# Patient Record
Sex: MALE | Race: WHITE
[De-identification: names, ages, dates, MRNs, and addresses within clinical notes are randomized per-mention and may not be internally consistent; named-entity substitution may affect disease eponyms.]

---

## 2024-08-17 ENCOUNTER — HOSPITAL ENCOUNTER (EMERGENCY)
Age: 69
Discharge: HOME | End: 2024-08-17
Payer: MEDICARE

## 2024-08-17 VITALS — HEART RATE: 90 BPM | SYSTOLIC BLOOD PRESSURE: 140 MMHG | OXYGEN SATURATION: 97 % | DIASTOLIC BLOOD PRESSURE: 80 MMHG

## 2024-08-17 VITALS — BODY MASS INDEX: 45.5 KG/M2

## 2024-08-17 VITALS
HEART RATE: 92 BPM | DIASTOLIC BLOOD PRESSURE: 83 MMHG | TEMPERATURE: 98.06 F | SYSTOLIC BLOOD PRESSURE: 170 MMHG | OXYGEN SATURATION: 97 %

## 2024-08-17 DIAGNOSIS — M25.561: ICD-10-CM

## 2024-08-17 DIAGNOSIS — M17.11: ICD-10-CM

## 2024-08-17 DIAGNOSIS — M25.461: Primary | ICD-10-CM

## 2024-08-17 LAB
ADD MANUAL DIFF: NO
HCT VFR BLD CALC: 49.3 % (ref 42–54)
HEMATOCRIT: 49.3 % (ref 42–54)
HEMOGLOBIN: 16.3 G/DL (ref 14–18)
IMMATURE GRANULOCYTES ABS AUTO: 0.03 10^3/UL (ref 0–0.03)
IMMATURE GRANULOCYTES PCT AUTO: 0.4 % (ref 0–0.5)
LACTATE/LACTIC ACID: 1.6 MMOL/L (ref 0.4–2)
LYMPHOCYTES  ABSOLUTE AUTO: 0.4 10^3/UL (ref 1.2–3.8)
MCV RBC: 89.2 FL (ref 80–94)
MEAN CORPUSCULAR HEMOGLOBIN: 29.5 PG (ref 25.9–34)
MEAN CORPUSCULAR HGB CONC: 33.1 G/DL (ref 29.9–35.2)
MEAN CORPUSCULAR VOLUME: 89.2 FL (ref 80–94)
PLATELET # BLD: 151 10^3/UL (ref 150–450)
PLATELET COUNT: 151 10^3/UL (ref 150–450)
RED BLOOD COUNT: 5.53 10^6/UL (ref 4.7–6.1)
WBC # BLD: 7.4 10^3/UL (ref 4–11)
WHITE BLOOD COUNT: 7.4 10^3/UL (ref 4–11)

## 2024-08-17 PROCEDURE — 96374 THER/PROPH/DIAG INJ IV PUSH: CPT

## 2024-08-17 PROCEDURE — 99284 EMERGENCY DEPT VISIT MOD MDM: CPT

## 2024-08-17 PROCEDURE — 96375 TX/PRO/DX INJ NEW DRUG ADDON: CPT

## 2024-08-17 PROCEDURE — 86140 C-REACTIVE PROTEIN: CPT

## 2024-08-17 PROCEDURE — 73564 X-RAY EXAM KNEE 4 OR MORE: CPT

## 2024-08-17 PROCEDURE — 83605 ASSAY OF LACTIC ACID: CPT

## 2024-08-17 PROCEDURE — 85025 COMPLETE CBC W/AUTO DIFF WBC: CPT

## 2024-08-17 PROCEDURE — 36415 COLL VENOUS BLD VENIPUNCTURE: CPT

## 2024-08-17 PROCEDURE — 85652 RBC SED RATE AUTOMATED: CPT

## 2024-08-17 NOTE — ED_ITS
HPI    
HPI - General Adult    
General    
Chief complaint: Extremity Problem, Nontraumatic    
Stated complaint: LOWER EXTREMITY PAIN    
Time Seen by Provider: 08/17/24 09:45    
Source: patient    
Mode of arrival: Wheelchair    
Limitations: no limitations    
Related Data    
                                  Previous Rx's    
    
    
    
?Medication ?Instructions ?Recorded    
     
nabumetone 750 mg tablet 750 mg PO BID PRN pain #14 tabs 08/17/24    
    
    
    
                                    Allergies    
    
    
    
Allergy/AdvReac Type Severity Reaction Status Date / Time    
     
Penicillins AdvReac Mild Hives Verified 08/17/24 09:45    
    
    
    
    
Opioid HPI    
Opioid Management    
Most Recent Opioid Data:     
    
    
                Last Pain Scale 10              08/17/24 10:23      
     
                          Last MAR Pain Assessment  08/17/24 10:23    
    
    
    
Exam    
Constitutional    
Vital Signs, click to edit/add:     
    
                                Last Vital Signs    
    
    
    
Temp  98.1 F   08/17/24 09:45    
     
Pulse  90   08/17/24 10:46    
     
Resp  18   08/17/24 10:46    
     
BP  140/80   08/17/24 10:46    
     
Pulse Ox  97   08/17/24 10:46    
     
O2 Del Method  Room Air  08/17/24 09:45    
    
    
    
    
    
Course    
Vital Signs    
Vital signs:     
    
                                   Vital Signs    
    
    
    
Temperature  98.1 F   08/17/24 09:45    
     
Pulse Rate  92 H  08/17/24 09:45    
     
Respiratory Rate  20   08/17/24 09:45    
     
Blood Pressure  170/83 H  08/17/24 09:45    
     
Pulse Oximetry  97   08/17/24 09:45    
     
Oxygen Delivery Method  Room Air  08/17/24 09:45    
    
    
                                            
    
    
    
Temperature  98.1 F   08/17/24 09:45    
     
Pulse Rate  90   08/17/24 10:46    
     
Respiratory Rate  18   08/17/24 10:46    
     
Blood Pressure  140/80   08/17/24 10:46    
     
Pulse Oximetry  97   08/17/24 10:46    
     
Oxygen Delivery Method  Room Air  08/17/24 09:45    
    
    
    
    
    
Medical Decision Making    
MDM Narrative    
Medical decision making narrative:     
Peripheral IV established and blood drawn and sent for testing.  He was given IV  
Toradol and IV Solu-Medrol.  X-rays of the right knee were obtained.    
    
Results - Normal white blood cell count.Lactate is negative.  Sed rate is   
elevated at 36.  CRP elevated at 8.01.  X-ray shows moderate size right knee   
joint effusion.  He has tricompartmental knee degenerative changes But no   
fracture or other acute osseous abnormalities.    
    
Patient was informed of results.  I do not suspect acute septic arthritis of the  
right knee joint.  ED nurse applied an Ace wrap and the patient was given   
cultures to limit weightbearing on the right knee.  He can call his orthopedist   
on Monday to discuss office follow-up.  I instructed him to go to the emergency   
department at [facility] if his condition worsens as that is the facility in   
which his orthopedist works.    
    
Lab Data    
Lab results reviewed: Yes I reviewed the patient's lab results    
Labs:     
    
                                   Lab Results    
    
    
    
  08/17/24 Range/Units    
    
  10:16     
     
WBC  7.4  (4.0-11.0)  10^3/uL    
     
RBC  5.53  (4.70-6.10)  10^6/uL    
     
Hgb  16.3  (14.0-18.0)  g/dL    
     
Hct  49.3  (42.0-54.0)  %    
     
MCV  89.2  (80.0-94.0)  fL    
     
MCH  29.5  (25.9-34.0)  pg    
     
MCHC  33.1  (29.9-35.2)  g/dL    
     
RDW  12.8  (11.0-15.0)  %    
     
Plt Count  151  (150-450)  10^3/uL    
     
MPV  9.3 L  (9.5-13.5)  fL    
     
Neut % (Auto)  82.0 H  (43.0-75.0)  %    
     
Lymph % (Auto)  5.1 L  (20.5-60.0)  %    
     
Mono % (Auto)  12.1 H  (1.7-12.0)  %    
     
Eos % (Auto)  0.1 L  (0.9-7.0)  %    
     
Baso % (Auto)  0.3  (0.2-2.0)  %    
     
Neut # (Auto)  6.1  (1.4-6.5)  10^3/uL    
     
Lymph # (Auto)  0.4 L  (1.2-3.8)  10^3/uL    
     
Mono # (Auto)  0.9 H  (0.3-0.8)  10^3/uL    
     
Eos # (Auto)  0.0  (0.0-0.7)  10^3/uL    
     
Baso # (Auto)  0.0  (0.0-0.1)  10^3/uL    
     
Abs Immat Gran (auto)  0.03  (0.00-0.03)  10^3/uL    
     
Imm/Tot Granulo (auto)  0.4  (0.0-0.5)  %    
     
ESR  36 H  (<=20)  mm/hr    
     
Lactate  1.6  (0.4-2.0)  mmol/L    
     
C-Reactive Protein  8.01 H  (<=0.50)  mg/dL    
    
    
    
    
Imaging Data    
xr knee:     
      Attestation: I have reviewed the pertinent imaging results.    
      Radiologist's impression:     
    
ITS Impressions    
    
Knee X-Ray  08/17/24 10:07    
IMPRESSION:    
     
1. Tricompartmental knee degenerative change most prominently involving the     
medial compartment    
2. Moderate size knee joint effusion.    
     
     
Electronically authenticated by: MANAN WALLS   Date: 8/17/2024  11:06    
    
    
    
    
    
Discharge Plan    
Discharge    
Stand Alone Forms:  Portal Instructions    
    
Chief Complaint: Extremity Problem, Nontraumatic    
    
Clinical Impression:    
 Effusion of knee joint right, Acute pain of right knee, Osteoarthritis of right  
knee    
    
    
Patient Disposition: Home, Self-Care    
    
Time of Disposition Decision: 11:12    
    
Prescriptions / Home Meds:    
New    
  nabumetone 750 mg tablet     
   750 mg PO BID PRN (Reason: pain) Qty: 14 0RF    
    
Print Language: English    
    
Instructions:  Osteoarthritis (ED), Swollen Knee Joint (ED), Knee Pain (ED)    
    
Referrals:    
Elvira Harper DO [Primary Care Provider] - 1 week    
    
Discharge Date/Time: 08/17/24 11:40

## 2024-08-17 NOTE — ED.GENADUL1
HPI
HPI - General Adult
General
Chief complaint: Extremity Problem, Nontraumatic
Stated complaint: LOWER EXTREMITY PAIN
Time Seen by Provider: 08/17/24 09:45
Source: patient
Mode of arrival: Wheelchair
Limitations: no limitations
Related Data
Previous Rx's

?Medication ?Instructions ?Recorded
nabumetone 750 mg tablet 750 mg PO BID PRN pain #14 tabs 08/17/24


Allergies

Allergy/AdvReac Type Severity Reaction Status Date / Time
Penicillins AdvReac Mild Hives Verified 08/17/24 09:45



Opioid HPI
Opioid Management
Most Recent Opioid Data: 
      Last Pain Scale 10 08/17/24 10:23 
      Last MAR Pain Assessment 08/17/24 10:23  


Exam
Constitutional
Vital Signs, click to edit/add: 

Last Vital Signs

Temp  98.1 F   08/17/24 09:45
Pulse  90   08/17/24 10:46
Resp  18   08/17/24 10:46
BP  140/80   08/17/24 10:46
Pulse Ox  97   08/17/24 10:46
O2 Del Method  Room Air  08/17/24 09:45




Course
Vital Signs
Vital signs: 

Vital Signs

Temperature  98.1 F   08/17/24 09:45
Pulse Rate  92 H  08/17/24 09:45
Respiratory Rate  20   08/17/24 09:45
Blood Pressure  170/83 H  08/17/24 09:45
Pulse Oximetry  97   08/17/24 09:45
Oxygen Delivery Method  Room Air  08/17/24 09:45



Temperature  98.1 F   08/17/24 09:45
Pulse Rate  90   08/17/24 10:46
Respiratory Rate  18   08/17/24 10:46
Blood Pressure  140/80   08/17/24 10:46
Pulse Oximetry  97   08/17/24 10:46
Oxygen Delivery Method  Room Air  08/17/24 09:45




Medical Decision Making
MDM Narrative
Medical decision making narrative: 
Peripheral IV established and blood drawn and sent for testing.  He was given IV Toradol and IV Solu-Medrol.  X-rays of the right knee were obtained.

Results - Normal white blood cell count.Lactate is negative.  Sed rate is elevated at 36.  CRP elevated at 8.01.  X-ray shows moderate size right knee joint effusion.  He has tricompartmental knee degenerative changes But no fracture or other acute 
osseous abnormalities.

Patient was informed of results.  I do not suspect acute septic arthritis of the right knee joint.  ED nurse applied an Ace wrap and the patient was given cultures to limit weightbearing on the right knee.  He can call his orthopedist on Monday to 
discuss office follow-up.  I instructed him to go to the emergency department at [facility] if his condition worsens as that is the facility in which his orthopedist works.

Lab Data
Lab results reviewed: Yes I reviewed the patient's lab results
Labs: 

Lab Results

  08/17/24 Range/Units
  10:16 
WBC  7.4  (4.0-11.0)  10^3/uL
RBC  5.53  (4.70-6.10)  10^6/uL
Hgb  16.3  (14.0-18.0)  g/dL
Hct  49.3  (42.0-54.0)  %
MCV  89.2  (80.0-94.0)  fL
MCH  29.5  (25.9-34.0)  pg
MCHC  33.1  (29.9-35.2)  g/dL
RDW  12.8  (11.0-15.0)  %
Plt Count  151  (150-450)  10^3/uL
MPV  9.3 L  (9.5-13.5)  fL
Neut % (Auto)  82.0 H  (43.0-75.0)  %
Lymph % (Auto)  5.1 L  (20.5-60.0)  %
Mono % (Auto)  12.1 H  (1.7-12.0)  %
Eos % (Auto)  0.1 L  (0.9-7.0)  %
Baso % (Auto)  0.3  (0.2-2.0)  %
Neut # (Auto)  6.1  (1.4-6.5)  10^3/uL
Lymph # (Auto)  0.4 L  (1.2-3.8)  10^3/uL
Mono # (Auto)  0.9 H  (0.3-0.8)  10^3/uL
Eos # (Auto)  0.0  (0.0-0.7)  10^3/uL
Baso # (Auto)  0.0  (0.0-0.1)  10^3/uL
Abs Immat Gran (auto)  0.03  (0.00-0.03)  10^3/uL
Imm/Tot Granulo (auto)  0.4  (0.0-0.5)  %
ESR  36 H  (<=20)  mm/hr
Lactate  1.6  (0.4-2.0)  mmol/L
C-Reactive Protein  8.01 H  (<=0.50)  mg/dL



Imaging Data
xr knee: 
      Attestation: I have reviewed the pertinent imaging results.
      Radiologist's impression: 

ITS Impressions

Knee X-Ray  08/17/24 10:07
IMPRESSION:
 
1. Tricompartmental knee degenerative change most prominently involving the 
medial compartment
2. Moderate size knee joint effusion.
 
 
Electronically authenticated by: MANAN WALLS   Date: 8/17/2024  11:06





Discharge Plan
Discharge
Stand Alone Forms:  Portal Instructions

Chief Complaint: Extremity Problem, Nontraumatic

Clinical Impression:
 Effusion of knee joint right, Acute pain of right knee, Osteoarthritis of right knee


Patient Disposition: Home, Self-Care

Time of Disposition Decision: 11:12

Prescriptions / Home Meds:
New
  nabumetone 750 mg tablet 
   750 mg PO BID PRN (Reason: pain) Qty: 14 0RF

Print Language: English

Instructions:  Osteoarthritis (ED), Swollen Knee Joint (ED), Knee Pain (ED)

Referrals:
Elvira Harper DO [Primary Care Provider] - 1 week

Discharge Date/Time: 08/17/24 11:40

## 2024-08-25 VITALS
TEMPERATURE: 98.4 F | DIASTOLIC BLOOD PRESSURE: 79 MMHG | OXYGEN SATURATION: 97 % | HEART RATE: 82 BPM | SYSTOLIC BLOOD PRESSURE: 131 MMHG

## 2024-08-26 VITALS
DIASTOLIC BLOOD PRESSURE: 95 MMHG | OXYGEN SATURATION: 94 % | HEART RATE: 95 BPM | SYSTOLIC BLOOD PRESSURE: 114 MMHG | TEMPERATURE: 98.5 F

## 2024-08-27 VITALS
HEART RATE: 89 BPM | OXYGEN SATURATION: 96 % | DIASTOLIC BLOOD PRESSURE: 62 MMHG | SYSTOLIC BLOOD PRESSURE: 115 MMHG | TEMPERATURE: 97.8 F

## 2024-08-28 VITALS
OXYGEN SATURATION: 98 % | HEART RATE: 85 BPM | TEMPERATURE: 97.52 F | SYSTOLIC BLOOD PRESSURE: 150 MMHG | DIASTOLIC BLOOD PRESSURE: 69 MMHG

## 2024-08-29 VITALS
DIASTOLIC BLOOD PRESSURE: 65 MMHG | OXYGEN SATURATION: 94 % | TEMPERATURE: 98.1 F | HEART RATE: 88 BPM | SYSTOLIC BLOOD PRESSURE: 119 MMHG

## 2024-08-29 NOTE — PC.NURSE
1035: Pt. to [facility] amb. with use of walker. Seated in recliner. VSS. PICC line in place to RUE. Site without s&s of infection or infiltration. Flushes easily with good blood return. IV antibiotic initiated at this time. Pt. given water. Denies needs.
1112: Antibiotic completed at this time without s&s of adverse reaction. PICC line flushed. Pt. d/c'd amb. to home with wife.

## 2024-08-30 ENCOUNTER — HOSPITAL ENCOUNTER (OUTPATIENT)
Dept: HOSPITAL 101 - INF | Age: 69
LOS: 1 days | Discharge: HOME | End: 2024-08-31
Payer: MEDICARE

## 2024-08-30 ENCOUNTER — HOSPITAL ENCOUNTER
Dept: HOSPITAL 101 - INF | Age: 69
Discharge: HOME | End: 2024-08-30
Payer: MEDICARE

## 2024-08-30 VITALS
OXYGEN SATURATION: 96 % | HEART RATE: 105 BPM | SYSTOLIC BLOOD PRESSURE: 122 MMHG | DIASTOLIC BLOOD PRESSURE: 84 MMHG | TEMPERATURE: 98 F

## 2024-08-30 DIAGNOSIS — Z79.899: Primary | ICD-10-CM

## 2024-08-30 DIAGNOSIS — R78.81: ICD-10-CM

## 2024-08-30 DIAGNOSIS — M00.9: ICD-10-CM

## 2024-08-30 DIAGNOSIS — A49.1: ICD-10-CM

## 2024-08-30 LAB
ANION GAP: 14.5
BLOOD UREA NITROGEN: 15 MG/DL (ref 7–18)
CALCIUM: 9.7 MG/DL (ref 8.5–10.1)
CARBON DIOXIDE: 28.2 MMOL/L (ref 21–32)
CHLORIDE: 95 MMOL/L (ref 98–107)
CO2 BLD-SCNC: 28.2 MMOL/L (ref 21–32)
ESTIMATED GFR (AFRICAN AMERICA: >60 (ref 60–?)
ESTIMATED GFR (NON-AFRICAN AME: >60 (ref 60–?)
GLUCOSE BLD-MCNC: 122 MG/DL (ref 74–106)
POTASSIUM SERPLBLD-SCNC: 3.7 MMOL/L (ref 3.5–5.1)
POTASSIUM: 3.7 MMOL/L (ref 3.5–5.1)
SODIUM BLD-SCNC: 134 MMOL/L (ref 136–145)
SODIUM: 134 MMOL/L (ref 136–145)

## 2024-08-30 PROCEDURE — 80048 BASIC METABOLIC PNL TOTAL CA: CPT

## 2024-08-30 PROCEDURE — 96365 THER/PROPH/DIAG IV INF INIT: CPT

## 2024-08-30 PROCEDURE — 86140 C-REACTIVE PROTEIN: CPT

## 2024-08-30 NOTE — PC.NURSE
1035: Pt to [facility] amb with use of walker. Seated in recliner. VSS. PICC line intact to right upper arm and without s&s of infection or infiltration. Dressing changed, see documentation. Blood obtained from PICC line for ordered labs. IV Rocephin 
initiated. Pt. without c/o or needs. Wife at chairside.
1116: Rocephin completed at this time without s&s of reaction. PICC line flushed with saline. Pt. d/c'd amb to home.

## 2024-08-31 VITALS
DIASTOLIC BLOOD PRESSURE: 70 MMHG | TEMPERATURE: 97.7 F | HEART RATE: 94 BPM | OXYGEN SATURATION: 96 % | SYSTOLIC BLOOD PRESSURE: 101 MMHG

## 2024-09-01 VITALS
SYSTOLIC BLOOD PRESSURE: 105 MMHG | DIASTOLIC BLOOD PRESSURE: 72 MMHG | TEMPERATURE: 98.1 F | HEART RATE: 98 BPM | OXYGEN SATURATION: 92 %

## 2024-09-02 VITALS
HEART RATE: 97 BPM | OXYGEN SATURATION: 94 % | SYSTOLIC BLOOD PRESSURE: 112 MMHG | TEMPERATURE: 98.2 F | DIASTOLIC BLOOD PRESSURE: 74 MMHG

## 2024-09-03 VITALS — SYSTOLIC BLOOD PRESSURE: 139 MMHG | DIASTOLIC BLOOD PRESSURE: 75 MMHG | TEMPERATURE: 98.2 F | OXYGEN SATURATION: 97 %

## 2024-09-03 NOTE — PC.NURSE
piccline flushed with ns after antibiotic completed. chg cap applied. released amb with walker accompanied by wife

## 2024-09-04 VITALS
OXYGEN SATURATION: 96 % | DIASTOLIC BLOOD PRESSURE: 72 MMHG | TEMPERATURE: 98.06 F | HEART RATE: 91 BPM | SYSTOLIC BLOOD PRESSURE: 136 MMHG

## 2024-09-05 VITALS
OXYGEN SATURATION: 96 % | DIASTOLIC BLOOD PRESSURE: 69 MMHG | SYSTOLIC BLOOD PRESSURE: 139 MMHG | HEART RATE: 101 BPM | TEMPERATURE: 97.52 F

## 2024-09-05 NOTE — XMS_ITS
Comprehensive CCD (C-CDA v2.1)  
  
                          Created on: 2024  
  
  
Kaylynn Mims  
External Reference #: CDR,PersonID:[id number]  
: [id number]  
Sex: Male  
  
Demographics  
  
  
                                        Address             [address] [place] [address]  [address]  
   
                                        Home Phone          [phone]  
   
                                        Preferred Language  en  
   
                                        Marital Status        
   
                                        Islam Affiliation Unknown  
   
                                        Race                White  
   
                                        Ethnic Group        Not  or Lati  
no  
  
  
Author  
  
  
                                        Organization        [facility]  
  
  
Care Team Providers  
  
  
                                Care Team Member Name Role            Phone  
   
                                MISC, DR ACEVEDO Admitting       Unavailable  
   
                                MISC, DR ACEVEDO Consulting      Unavailable  
   
                                MISC, DR ACEVEDO Attending       Unavailable  
   
                                ZIEBER, DR KAYLYNN HOPKINS Consulting      Unavailable  
   
                                HOUSE, DR TREVINO Consulting      Unavailable  
   
                                HOUSE, DR TREVINO Attending       Unavailable  
   
                                HOUSE, DR TREVINO Admitting       Unavailable  
   
                                Harper, Elvira   Unavailable     [phone]  
   
                                MD Gucci Maxwell Attending Provider [phone]  
   
                                Harper, DO Elvira A Primary Care Provider [phone]   
   
                                Harper, DO Elvira A Attending Provider [phone]  
   
                                SANJANA Khan Attending Provider [phone]  
[phone]  
   
                                Harper, DO Elvira A Primary Care Provider [phone]   
   
                                Harper, DO Elvira A Primary Care Provider [phone]   
   
                                SANJANA Khan Attending Provider [phone]  
   
                                [facility]aura [facility] Daniela BENITES Emergency Provider [phone]  
[phone]  
   
                                MD Josie Ann Admit Provider  [phone]  
   
                                MD Josie Ann Attending Provider [phone]  
   
                                MD Gucci Maxwell Other Provider  [phone]  
   
                                MD Ashley Rai Other Provider  [phone]  
[phone]  
   
                                GEOVANNY Hancock Other Provider  [phone]   
   
                                DO Ted Penny Other Provider  [phone]  
   
                                MD Jeff Onofre II Other Provider  [phone]   
   
                                DO Sai Butts A Other Provider  [phone]  
[phone]  
   
                                MD Paul Crum Other Provider  [phone]  
   
                                To Khan Admitting       Unavailable  
   
                                Binks, To Tavon Attending       Unavailable  
   
                                Harper, Elvira A Primary Care    Unavailable  
   
                                Olexa, Gucci   Admitting       Unavailable  
   
                                Olexa, Gucci   Attending       Unavailable  
   
                                Harper, Elvira A Primary Care    Unavailable  
   
                                Marissa, Josie Admitting       Unavailable  
   
                                Marissa, Josie Attending       Unavailable  
   
                                Alissa, Gucci   Consulting      Unavailable  
   
                                Harper, Elvira A Primary Care    Unavailable  
   
                                Ashley Rai Consulting      Unavailable  
   
                                Eduarda Hancock Consulting      Unavailable  
   
                                Ted Penny Consulting      Unavailable  
   
                                Jeff Onofre II Consulting      UnavailSai Toney Consulting      Unavailable  
   
                                Paul Crum  Consulting      Unavailable  
   
                                Harper, Elvira A Attending       Unavailable  
   
                                Harper, Elvira A Admitting       Unavailable  
   
                                Harper, Elvira A Primary Care    Unavailable  
   
                                To Khan Admitting       Unavailable  
   
                                To Khan Attending       Unavailable  
   
                                Harper, Elvira A Primary Care    Unavailable  
  
  
  
Allergies  
  
  
                                                    Allergy   
Classification                          Reported   
Allergen(s)               Allergy Type              Date of   
Onset                     Reaction(s)               Facility  
   
                                                    metFORMIN  
(2 sources)         metFORMIN           Drug Allergy          
4                         [facility]  
   
                                                    Penicillins   
(antibiotic)  
(2 sources)         Penicillins         Drug Allergy          
4                         [facility]  
   
                                                      
(9 sources)         metFORMIN           Drug Allergy          
4                         [facility]  
   
                                                      
(2 sources)  Penicillin   Drug Allergy              rash         [facility]  
Other Phone:   
[phone]  
   
                                                      
(8 sources)                             Penicillins;   
Translations:   
[Penicillins]                           Allergy to   
substance                                 
4                         [facility]  
   
                                                      
(1 source)          metFORMIN           Drug Allergy          
[address] [place] [address]   
Repository  
  
  
  
Medications  
Current Medications  
  
  
  
                      Medication Drug Class(es) Dates      Sig (Normalized) Sig   
(Original)  
   
                                                    acetaminophen 325 mg   
oral tablet  
(2 sources)                                         Start:   
2024                              take 2 tablets by   
mouth every four   
hours                                   Acetaminophen   
(Tylenol) 325 mg   
Tablet Active 650   
MG PO Q4H  12:00am  
   
                                                    ascorbic acid 500 mg   
oral tablet  
(2 sources)               Vitamin C                 Start:   
2024                              take 1 tablet by   
mouth twice daily   
at mealtime                             Ascorbic Acid   
(Vitamin C)   
(Vitamin C) 500 mg   
Tablet Active 500   
MG PO Twice daily   
with meals    
12:00am  
   
                                                    BD Syringe/Needle   
23G X 1  3 ML  
(1 source)                                          Start:   
2023                                          BD Syringe/Needle   
23G X 1  3 ML as   
directed SQ every 2   
weeks for 90 days   
 Active  
   
                                                    Blood Sugar   
Diagnostic  
(7 sources)                                         Start:   
05-                                          Blood Sugar   
Diagnostic Active 0   
.Route 50 May 15th,   
2024 12:00am As   
directed  
   
                                                    cefTRIAXone 2000 mg   
injection  
(2 sources)                             Cephalosporin   
Antibacterial                           Start:   
2024                              take 2 g   
intravenously   
every twenty-four   
hours                                   Ceftriaxone Active   
2 GM IV Q24H 24    
12:00am  
   
                                                    chlorthalidone 25 mg   
oral tablet  
(2 sources)                             Thiazide-like   
Diuretic                                Start:   
2024                              take 25 mg by   
mouth once daily                        Chlorthalidone   
Active 25 MG PO   
Daily  12:00am  
   
                                                    diclofenac sodium 75   
mg delayed release   
oral tablet  
(13 sources)                            Nonsteroidal   
Anti-inflammatory   
Drug                                    Start:   
05-  
End:   
2024                              take 75 mg by   
mouth twice daily                       Diclofenac Sodium   
Active 75 MG PO   
Twice daily 60    
9:30am  
   
                                                    docusate sodium 50   
mg / sennosides, usp   
8.6 mg oral tablet  
(2 sources)                                         Start:   
2024                              take 2 tablets by   
mouth twice daily                       Sennosides-Docusate   
Sodium Active 2 TAB   
PO Twice daily 60   
2024   
12:00am  
   
                                                    ferrous sulfate 324   
mg delayed release   
oral tablet  
(2 sources)                                         Start:   
2024                              take 324 mg by   
mouth twice daily   
at mealtime                             Ferrous Sulfate   
Active 324 MG PO   
Twice daily with   
meals 0 2024 12:00am  
   
                                                    nabumetone 750 mg   
oral tablet  
(6 sources)                             Nonsteroidal   
Anti-inflammatory   
Drug                                    Start:   
2024                              take 750 mg by   
mouth twice daily                       Nabumetone Active   
750 MG PO Twice   
daily 2024 12:00am  
  
  
  
                                                    Start: 2024  
End: 2024                                     Nabumetone Discontinued MG T  
ABLET 2024 12:00am   
2024 6:31pm  
  
  
  
                                                    oxyCODONE   
hydrochloride 5 mg   
oral tablet  
(2 sources)         Opioid Agonist      Start: 2024   take 5 mg by   
mouth every   
four hours                              Oxycodone Active 5   
MG PO Every 4   
hours 20 7 2024  
   
                                                    polyethylene glycol   
3350 43954 mg powder   
for oral solution  
(2 sources)     Osmotic Laxative Start: 08-                 Polyethylene   
Glycol 3350   
(Healthylax) 17   
gram Powder In   
Packet Active 17   
GM PO Daily    
12:00am  
   
                                                    valsartan 160 mg   
oral tablet  
(2 sources)                             Angiotensin 2   
Receptor Blocker          Start: 2024         take 160 mg   
by mouth   
once daily                              Valsartan Active   
160 MG PO Daily    
12:00am  
  
  
  
Completed/Discontinued Medications  
  
  
  
                      Medication Drug Class(es) Dates      Sig (Normalized) Sig   
(Original)  
   
                                                    glipiZIDE er 5 mg   
24 hr extended   
release oral tablet  
(20 sources)              Sulfonylurea              Start:   
2024  
End:   
2024                                          Glipizide   
Discontinued MG PO   
2024   
12:00am 2024 6:30pm  
  
  
  
                                                    Start: 2024  
End: 2024                         take 1 tablet by mouth once   
daily at mealtime                       Glipizide Discontinued 0 .ROUTE   
.COMPLEX 90 2024 8:21am   
2024 9:15am TAKE 1   
TABLET BY MOUTH EVERY DAY WITH FOOD  
   
                                                    Start: 2024  
End: 2024                                     Glipizide Discontinued MG PO  
 2024 12:00am 2024   
8:22am  
   
                                                    Start: 2024  
End: 2024                         take 1 tablet by mouth once   
daily at mealtime                       Glipizide Discontinued 1 TAB PO   
Daily 2024 1:00am   
2024 2:16pm   
FreeTextSi tablet with food   
Orally Once a day; Note: Source   
Status: Taking; Refills: 1; Qty: 90   
Tablet; Provider: Meredith ROGERS  
   
                                        Start: 08-   take 1 tablet by chrissy  
th every   
twenty-four hours                       glipiZIDE ER 5 MG 1 tablet with   
food Orally Once a day for 90 days   
10 Aug, 2023 Active  
  
  
  
                                                    meloxicam 15 mg   
oral tablet  
(15 sources)                            Nonsteroidal   
Anti-inflammatory   
Drug                                    Start:   
2024  
End:   
2024                              take 15 mg by   
mouth once daily                        Meloxicam   
Discontinued 15   
MG PO Daily May   
15th, 2024   
9:36am 2024   
9:16am On Hold:   
While on   
Diclofenac  
   
                                                    1 ml testosterone   
cypionate 200   
mg/ml injection  
(20 sources)              Androgen                  Start:   
2024  
End:   
2024                              inject 200 mg by   
intramuscular   
injection every   
other week                              Testosterone   
Cypionate   
Discontinued 200   
MG IM EVERY 2   
WEEKS 2 30 May   
3rd, 2024 8:40am   
2024   
7:48am  
  
  
  
                                                    Start: 2024  
End: 2024                         inject 1 mL by intramuscular   
injection every other week              Testosterone Cypionate   
Discontinued 200 MG SUBCUT EVERY 2   
WEEKS 2024 1:00am   
2024 9:44am   
FreeTextSig: INJECT 1 ML   
INTRAMUSCULARLY EVERY 2 WEEKS;   
Note: Source Status: Start;   
Refills: 2; Qty: 2 Milliliter;   
Provider: Meredith Felix (NPI:   
[id number])  
   
                                        Start: 08-   inject 1 mL by intra  
muscular   
injection every other week              Testosterone Cypionate 200 MG/ML 1   
mL Intramuscular every two weeks   
for 30 days 10 Aug, 2023 Active  
   
                                        Start: 08-   inject 1 mL by intra  
muscular   
injection every other week              Testosterone Cypionate 200 MG/ML 1   
mL Intramuscular every two weeks   
for 30 days 10 Aug, 2023 Active  
  
  
  
                                                    triamcinolone acetonide 1   
mg/ml topical cream  
(18 sources)              Corticosteroid            Start: 2024  
End: 2024                                     Triamcinolone Acetonide   
Discontinued 1 APPLIC   
TOPICAL Twice daily    
8:41am 2024   
9:44am  
  
  
  
                                                                Triamcinolone Ac  
etonide 0.1 % 1 application Externally twice a day Active  
  
  
  
Problems  
Active Problems  
  
  
                      Problem Classification Problem    Date       Documented Da  
te Episodic/Chronic  
   
                                                    Bacterial infection;   
unspecified site  
(10 sources)                            Microbiologic   
culture positive;   
Translations:   
[Bacteremia]                            Onset:   
2024                Episodic  
   
                                                    Diabetes mellitus   
without complication  
(14 sources)                            Type 2 diabetes   
mellitus;   
Translations: [Type   
2 diabetes mellitus   
without   
complications]                          Onset:   
2024                                          Chronic  
   
                                                    Infective arthritis   
and osteomyelitis   
(except that caused by   
tuberculosis or   
sexually transmitted   
disease)  
(5 sources)                             Knee pyogenic   
arthritis;   
Translations:   
[Pyogenic arthritis,   
unspecified]                            Onset:   
2024                Episodic  
   
                                                    Osteoarthritis  
(20 sources)                            Arthritis of knee;   
Translations:   
[Unilateral primary   
osteoarthritis, left   
knee]                                   2024          Chronic  
   
                                                    Other aftercare  
(2 sources)                             Other long term   
(current) drug   
therapy;   
Translations: [Other   
long term (current)   
drug therapy]                           Onset:   
2024                                          Episodic  
   
                                                    Other bone disease and   
musculoskeletal   
deformities  
(4 sources)                             Segmental and   
somatic dysfunction   
of cervical region;   
Translations: [SEG   
SOMATIC DYSF   
CERVICAL REGION]                        Onset:   
2023                                          Episodic  
   
                                                    Other connective   
tissue disease  
(1 source)                              Pain in right arm;   
Translations: [PAIN   
IN RIGHT ARM]                           Onset:   
2023                                          Episodic  
   
                                                    Other connective   
tissue disease  
(8 sources)                             Bursitis of right   
shoulder;   
Translations:   
[Bursitis of right   
shoulder]                               2024          Episodic  
   
                                                    Other connective   
tissue disease  
(2 sources)                             Bursitis of right   
shoulder;   
Translations:   
[Disorders of bursae   
and tendons in   
shoulder region,   
unspecified]                            2024          Episodic  
   
                                                    Other connective   
tissue disease  
(6 sources)                             Iliotibial band   
syndrome,   
unspecified leg;   
Translations: [Other   
disorders of muscle,   
ligament, and   
fascia]                                 2024          Episodic  
   
                                                    Other endocrine   
disorders  
(2 sources)                             Decreased   
testosterone level ;   
Translations:   
[Testicular   
hypofunction]                                               Chronic  
   
                                                    Other endocrine   
disorders  
(3 sources)                             Testicular   
hypofunction;   
Translations:   
[Testicular   
hypofunction]                           Onset:   
2024                                          Chronic  
   
                                                    Other nervous system   
disorders  
(1 source)                              Difficulty in   
walking, not   
elsewhere   
classified;   
Translations:   
[Difficulty in   
walking, not   
elsewhere   
classified]                             Onset:   
2024                                          Chronic  
   
                                                    Other nervous system   
disorders  
(1 source)                              Anesthesia of skin;   
Translations:   
[ANESTHESIA OF SKIN]                    Onset:   
2023                                          Episodic  
   
                                                    Other non-traumatic   
joint disorders  
(11 sources)                            Pain in right   
shoulder;   
Translations: [Right   
shoulder pain]                          2024          Episodic  
   
                                                    Other non-traumatic   
joint disorders  
(8 sources)                             Effusion, left knee;   
Translations:   
[Effusion of joint,   
lower leg]                              05-          Episodic  
   
                                                    Other non-traumatic   
joint disorders  
(11 sources)                            Pain in right knee;   
Translations: [Pain   
in joint, lower leg]                    Onset:   
2024                Episodic  
   
                                                    Other non-traumatic   
joint disorders  
(2 sources)                             Effusion of right   
knee joint;   
Translations:   
[Effusion, right   
knee]                                   2024          Episodic  
   
                                                    Other non-traumatic   
joint disorders  
(2 sources)                             Effusion, right   
knee; Translations:   
[Effusion of joint,   
lower leg]                              2024          Episodic  
   
                                                    Other screening for   
suspected conditions   
(not mental disorders   
or infectious disease)  
(10 sources)                            Encounter for   
screening for   
malignant neoplasm   
of prostate;   
Translations:   
[Decreased   
testosterone level ]                    Onset:   
2024                                          Episodic  
   
                                                    Residual codes;   
unclassified  
(2 sources)                             Pain; Translations:   
[Pain, unspecified]                     2024          Episodic  
   
                                                    Residual codes;   
unclassified  
(2 sources)                             Pain, unspecified;   
Translations:   
[Generalized pain]                      2024          Episodic  
   
                                                    Unclassified  
(1 source)                              Pain in right   
shoulder;   
Translations: [Pain   
in right shoulder]                      Onset:   
2024                                            
  
  
Past or Other Problems  
  
  
                      Problem Classification Problem    Date       Documented Da  
te Episodic/Chronic  
   
                                                    Other non-traumatic   
joint disorders  
(4 sources)                             Pain in left   
knee;   
Translations:   
[Pain in joint,   
lower leg]                              Onset:   
05-                05-                Episodic  
  
  
  
Results  
  
  
                          Test Name    Value        Interpretation Reference   
Range                                   Facility  
   
                                                    Basic Metabolic Panelon 08-2  
3-2024   
   
                                                    Creatinine Clr Calc   
Pharmacy        142.70          Normal                          [facility]   
Physician   
Group  
   
                                        Comment on above:   Order Comment: Reaso  
n for Exam Type 2 diabetes mellitus   
   
                                                            Result Comment: PERF  
ORMED BY:  
[place] [address]  
[phone]  
PATHOLOGIST MEDICAL DIRECTOR  
VIKAS MARTINEZ M.D.   
   
                                                            Performed By: #### U  
RMA, CMP, LIPID, CBCNO, PSAS, TEST ####  
[facility]  
[address]   
   
                                                    GFR/1.73 sq M.predicted   
MDRD (S/P/Bld) [Vol   
rate/Area]      mL/min/{1.73_m2} Normal                          [facility]   
Physician   
Group  
   
                                        Comment on above:   Order Comment: Reaso  
n for Exam Type 2 diabetes mellitus   
   
                                                            Performed By: #### U  
RMA, CMP, LIPID, CBCNO, PSAS, TEST ####  
[facility]  
[address] [place] [address] [place]   
   
                                                    C reactive protein [Mass/vol  
ume] in Serum or PlasmaOrdered By: Paul Crum on   
2024   
   
                      CRP [Mass/Vol] 22.7 mg/dL High       0.0-0.5    [facility]  
   
                                                    C-Reactive Proteinon   
024   
   
                      C-Reactive Protein 22.7 mg/dL High       0.0-0.5    The Count includes the [facility]   
Physician   
Group  
   
                                        Comment on above:   Result Comment: PERF  
ORMED BY:  
[address] [place] [address]  
[phone]  
PATHOLOGIST MEDICAL DIRECTOR  
VIKAS MARTINEZ M.D.   
   
                                                            Performed By: #### U  
RMA, CMP, LIPID, CBCNO, PSAS, TEST ####  
[facility]  
[address]   
   
                                                    Calcium [Mass/volume] in Ser  
um or PlasmaOrdered By: Josie Marissa on   
2024   
   
                      Calcium [Mass/Vol] 8.6 mg/dL             8.6-10.3   [facility]  
   
                                        Comment on above:   Order Comment: Reaso  
n for Exam Type 2 diabetes mellitus   
   
                                                            Performed By: #### U  
RMA, CMP, LIPID, CBCNO, PSAS, TEST ####  
[facility]  
[address]   
   
                                                    Carbon dioxide, total [Moles  
/volume] in Serum or PlasmaOrdered By: Josie   
Marissa on   
2024   
   
                      CO2 [Moles/Vol] 27.1 mmol/L            21.0-31.0  [facility]  
   
                                        Comment on above:   Order Comment: Reaso  
n for Exam Type 2 diabetes mellitus   
   
                                                            Performed By: #### U  
RMA, CMP, LIPID, CBCNO, PSAS, TEST ####  
[facility]  
[address] [place] [address] [place]   
   
                                                    Chloride [Moles/volume] in S  
lorraine or PlasmaOrdered By: Josie Marissa on   
2024   
   
                      Chloride [Moles/Vol] 98 mmol/L                  [facility]  
   
                                        Comment on above:   Order Comment: Reaso  
n for Exam Type 2 diabetes mellitus   
   
                                                            Performed By: #### U  
RMA, CMP, LIPID, CBCNO, PSAS, TEST ####  
[facility]  
[address] [place] [address] [place]   
   
                                                    Creatinine [Mass/volume] in   
Serum or PlasmaOrdered By: Josie Bethele on   
2024   
   
                      Creatinine [Mass/Vol] 0.67 mg/dL Low        0.70-1.30  [facility]  
   
                                        Comment on above:   Order Comment: Reaso  
n for Exam Type 2 diabetes mellitus   
   
                                                            Performed By: #### U  
RMA, CMP, LIPID, CBCNO, PSAS, TEST ####  
[facility]  
[address] [place] [address] [place]   
   
                                                    Glucose [Mass/volume] in Ser  
um or PlasmaOrdered By: Josie Ann on   
2024   
   
                      Glucose [Mass/Vol] 151 mg/dL  High            [facility]  
   
                                        Comment on above:   ADA recommended refe  
rence rangeRandom Glucose Reference Range   
is dependent on time and content of last meal. Glucose of more   
than 200 mg/dL in a nonstressed, ambulatory subject supports   
the diagnosis of Diabetes Mellitus.   
   
                                                            Order Comment: Reaso  
n for Exam Type 2 diabetes mellitus   
   
                                                            Result Comment: Rand  
om Glucose Reference Range is dependent on   
time and  
content of last meal. Glucose of more than 200 mg/dL in a  
nonstressed, ambulatory subject supports the diagnosis of  
Diabetes Mellitus.  
ADA recommended reference range   
   
                                                            Performed By: #### U  
RMA, CMP, LIPID, CBCNO, PSAS, TEST ####  
[address]   
   
                                                    No Panel InformationOrdered   
By: Josie Ann on 2024   
   
                      Estimated GFR (CKD-EPI) > 60.0 mL/Min                       
  [facility]  
   
                                                    Pharmacy Creatinine   
Clearance (Chem 142.70                                          [facility]  
   
                                                    Potassium [Moles/volume] in   
Serum or PlasmaOrdered By: Josie Ann on   
2024   
   
                      Potassium [Moles/Vol] 4.3 mmol/L            3.5-5.1    [facility]  
   
                                        Comment on above:   Order Comment: Reaso  
n for Exam Type 2 diabetes mellitus   
   
                                                            Performed By: #### U  
RMA, CMP, LIPID, CBCNO, PSAS, TEST ####  
[facility]  
[address] [place] [address] [place]   
   
                                                    Serum or plasma anion gap de  
terminationOrdered By: Josie Ann on 2024  
   
   
                      Anion gap [Moles/Vol] 12.2 mmol/L            6.0-15.0   [facility]  
   
                                        Comment on above:   Order Comment: Reaso  
n for Exam Type 2 diabetes mellitus   
   
                                                            Performed By: #### U  
RMA, CMP, LIPID, CBCNO, PSAS, TEST ####  
[place] [address] [place]   
   
                                                    Sodium [Moles/volume] in Ser  
um or PlasmaOrdered By: Josie Ann on   
2024   
   
                      Sodium [Moles/Vol] 133 mmol/L Low        136-145    [facility]  
   
                                        Comment on above:   Order Comment: Reaso  
n for Exam Type 2 diabetes mellitus   
   
                                                            Performed By: #### U  
RMA, CMP, LIPID, CBCNO, PSAS, TEST ####  
[address]   
   
                                                    Urea nitrogen [Mass/volume]   
in Serum or PlasmaOrdered By: Josie Ann on   
2024   
   
                                                    Urea nitrogen   
[Mass/Vol]      14 mg/dL                                    [facility]  
   
                                        Comment on above:   Order Comment: Reaso  
n for Exam Type 2 diabetes mellitus   
   
                                                            Performed By: #### U  
RMA, CMP, LIPID, CBCNO, PSAS, TEST ####  
[address]   
   
                                                    Automated basophil %Ordered   
By: Jeff Onofre on 2024   
   
                      Basophils/100 WBC (Bld) 1.5 %                 .          [facility]  
   
                                        Comment on above:   Performed By: #### U  
RMA, CMP, LIPID, CBCNO, PSAS, TEST ####  
[address]   
   
                                                    Automated basophil countOrde  
red By: Jeff Onofre on 2024   
   
                      Basophils (Bld) [#/Vol] 0.1 10*3/uL            0.0-0.2      
[facility]  
   
                                        Comment on above:   Result Comment: PERF  
ORMED BY:  
[place] [address]  
[phone]  
PATHOLOGIST MEDICAL DIRECTOR  
VIKAS MARTINEZ M.D.   
   
                                                            Performed By: #### U  
RMA, CMP, LIPID, CBCNO, PSAS, TEST ####  
[address]   
   
                                                    Automated blood monocyte cou  
ntOrdered By: Jeff Onofre on 2024   
   
                      Monocytes (Bld) [#/Vol] 1.2 10*3/uL High       0.0-0.8      
[facility]  
   
                                        Comment on above:   Performed By: #### U  
RMA, CMP, LIPID, CBCNO, PSAS, TEST ####  
[address]   
   
                                                    Automated eosinophil %Ordere  
d By: Jeff Onofre on 2024   
   
                                                    Eosinophils/100 WBC   
(Bld)           1.1 %                           .               [facility]  
   
                                        Comment on above:   Performed By: #### U  
RMA, CMP, LIPID, CBCNO, PSAS, TEST ####  
[address]   
   
                                                    Automated eosinophil countOr  
dered By: Jeff Onofre on 2024   
   
                                                    Eosinophils (Bld)   
[#/Vol]         0.1 10*3/uL                     0.0-0.45        [facility]  
   
                                        Comment on above:   Performed By: #### U  
RMA, CMP, LIPID, CBCNO, PSAS, TEST ####  
[address]   
   
                                                    Automated monocyte %Ordered   
By: Jeff Onofre on 2024   
   
                      Monocytes/100 WBC (Bld) 14.0 %                .          F  
[facility]  
   
                                        Comment on above:   Performed By: #### U  
RMA, CMP, LIPID, CBCNO, PSAS, TEST ####  
[address]   
   
                                                    Automated neutrophil %Ordere  
d By: Jeff Onofre on 2024   
   
                                                    Neutrophils/100 WBC   
(Bld)           68.5 %                          .               [facility]  
   
                                        Comment on above:   Performed By: #### U  
RMA, CMP, LIPID, CBCNO, PSAS, TEST ####  
[address]   
   
                                                    Basic Metabolic Panelon    
   
                      Anion gap [Moles/Vol] 12.4 mmol/L Normal     6.0-15.0   Th  
e [facility]   
Physician   
Group  
   
                                        Comment on above:   Performed By: #### U  
RMA, CMP, LIPID, CBCNO, PSAS, TEST ####  
[address]   
   
                      Calcium [Mass/Vol] 8.2 mg/dL  Low        8.6-10.3   The Count includes the [facility]   
Physician   
Group  
   
                                        Comment on above:   Performed By: #### U  
RMA, CMP, LIPID, CBCNO, PSAS, TEST ####  
[address]   
   
                      Chloride [Moles/Vol] 99 mmol/L  Normal          The   
[facility]   
Physician   
Group  
   
                                        Comment on above:   Performed By: #### U  
RMA, CMP, LIPID, CBCNO, PSAS, TEST ####  
[address]   
   
                      CO2 [Moles/Vol] 29.1 mmol/L Normal     21.0-31.0  [facility]   
Physician   
Group  
   
                                        Comment on above:   Performed By: #### U  
RMA, CMP, LIPID, CBCNO, PSAS, TEST ####  
[address]   
   
                      Creatinine [Mass/Vol] 0.90 mg/dL Normal     0.70-1.30  The  
 [facility]   
Physician   
Group  
   
                                        Comment on above:   Performed By: #### U  
RMA, CMP, LIPID, CBCNO, PSAS, TEST ####  
[address]   
   
                                                    Creatinine Clr Calc   
Pharmacy        126.85          Normal                          [facility]   
Physician   
Group  
   
                                        Comment on above:   Result Comment: PERF  
ORMED BY:  
[place] [address]  
[phone]  
PATHOLOGIST MEDICAL DIRECTOR  
VIKAS MARTINEZ M.D.   
   
                                                            Performed By: #### U  
RMA, CMP, LIPID, CBCNO, PSAS, TEST ####  
[address]   
   
                                                    GFR/1.73 sq M.predicted   
MDRD (S/P/Bld) [Vol   
rate/Area]      mL/min/{1.73_m2} Normal                          [facility]   
Physician   
[facility]  
   
                                        Comment on above:   Performed By: #### U  
RMA, CMP, LIPID, CBCNO, PSAS, TEST ####  
[address]   
   
                      Glucose [Mass/Vol] 137 mg/dL  High            The Count includes the [facility]   
Physician   
Group  
   
                                        Comment on above:   Result Comment: Rand  
om Glucose Reference Range is dependent on  
   
time and  
content of last meal. Glucose of more than 200 mg/dL in a  
nonstressed, ambulatory subject supports the diagnosis of  
Diabetes Mellitus.  
ADA recommended reference range   
   
                                                            Performed By: #### U  
RMA, CMP, LIPID, CBCNO, PSAS, TEST ####  
[address]   
   
                      Potassium [Moles/Vol] 4.5 mmol/L Normal     3.5-5.1    The  
 [facility]   
Physician   
Group  
   
                                        Comment on above:   Performed By: #### U  
RMA, CMP, LIPID, CBCNO, PSAS, TEST ####  
[address]   
   
                      Sodium [Moles/Vol] 136 mmol/L Normal     136-145    The Count includes the [facility]   
Physician   
Group  
   
                                        Comment on above:   Performed By: #### U  
RMA, CMP, LIPID, CBCNO, PSAS, TEST ####  
[address]   
   
                                                    Urea nitrogen   
[Mass/Vol]      17 mg/dL        Normal          7-25            [facility]   
Physician   
Group  
   
                                        Comment on above:   Performed By: #### U  
RMA, CMP, LIPID, CBCNO, PSAS, TEST ####  
[address]   
   
                                                    C-Reactive Proteinon   
024   
   
                      C-Reactive Protein 12.2 mg/dL High       0.0-0.5    The Count includes the [facility]   
Physician   
Group  
   
                                        Comment on above:   Result Comment: PERF  
ORMED BY:  
[place] [address]  
[phone]  
PATHOLOGIST MEDICAL DIRECTOR  
VIKAS MARTINEZ M.D.   
   
                                                            Performed By: #### U  
RMA, CMP, LIPID, CBCNO, PSAS, TEST ####  
[address]   
   
                                                    Complete Blood Count Auto Di  
ffon 2024   
   
                                                    Mean Corpuscular HGB   
Conc            33.8 g/dL       Normal          32.5-35.6       [facility]   
Physician   
Group  
   
                                        Comment on above:   Performed By: #### U  
RMA, CMP, LIPID, CBCNO, PSAS, TEST ####  
[address]   
   
                      NRBC%      0.2 /100{WBC} Normal     0-0.5      [facility]   
Physician   
Group  
   
                                        Comment on above:   Performed By: #### U  
RMA, CMP, LIPID, CBCNO, PSAS, TEST ####  
[address]   
   
                                                    Erythrocyte distribution wid  
th [Ratio] by Automated countOrdered By: Jfef Onofre   
on 2024   
   
                                                    Erythrocyte   
distribution width   
(RBC) [Ratio]   13.3 %                          12.0-14.8       [facility]  
   
                                        Comment on above:   Performed By: #### U  
RMA, CMP, LIPID, CBCNO, PSAS, TEST ####  
[address]   
   
                                                    Erythrocytes [#/volume] in B  
lood by Automated countOrdered By: Jeff Onofre   
on   
2024   
   
                      RBC (Bld) [#/Vol] 5.03 10*6/uL            3.90-5.60  [facility]  
   
                                        Comment on above:   Performed By: #### U  
RMA, CMP, LIPID, CBCNO, PSAS, TEST ####  
[address]   
   
                                                    Hematocrit [Volume Fraction]  
 of Blood by Automated countOrdered By: Jeff Onofre   
on 2024   
   
                                                    Hematocrit (Bld)   
[Volume fraction] 44.1 %                          38.8-50.0       [facility]  
   
                                        Comment on above:   Performed By: #### U  
RMA, CMP, LIPID, CBCNO, PSAS, TEST ####  
[address]   
   
                                                    Hemoglobin [Mass/volume] in   
BloodOrdered By: Jeff Onofre on 2024   
   
                                                    Hemoglobin (Bld)   
[Mass/Vol]      14.9 g/dL                       13.0-17.0       [facility]  
   
                                        Comment on above:   Performed By: #### U  
RMA, CMP, LIPID, CBCNO, PSAS, TEST ####  
[address]   
   
                                                    Leukocytes [#/volume] correc  
bassam for nucleated erythrocytes in Blood by Automated  
   
counOrdered By: Jeff Onofre on 2024   
   
                                                    WBC corrected for nucl   
RBC Auto (Bld) [#/Vol] 8.5 10*3/uL                     4.1-10.5        [facility]  
   
                                                    Leukocytes [#/volume] in Blo  
od by Automated countOrdered By: Jeff Onofre on   
2024   
   
                      WBC (Bld) [#/Vol] 8.5 10*3/uL            4.1-10.5   [facility]  
   
                                        Comment on above:   Performed By: #### U  
RMA, CMP, LIPID, CBCNO, PSAS, TEST ####  
[facility]  
[address]   
   
                                                    Lymphocytes [#/volume] in Bl  
ood by Automated countOrdered By: Jeff Onofre on  
   
2024   
   
                                                    Lymphocytes (Bld)   
[#/Vol]         1.3 10*3/uL                     1.00-4.8        [facility]  
   
                                        Comment on above:   Performed By: #### U  
RMA, CMP, LIPID, CBCNO, PSAS, TEST ####  
[facility]  
[address] [place] [address]   
   
                                                    Lymphocytes/100 leukocytes i  
n Blood by Automated countOrdered By: Jeff Onofre on   
2024   
   
                                                    Lymphocytes/100 WBC   
(Bld)           14.9 %                          .               [facility]  
   
                                        Comment on above:   Performed By: #### U  
RMA, CMP, LIPID, CBCNO, PSAS, TEST ####  
[facility]  
[address] [place] [address]   
   
                                                    MCH [Entitic mass] by Automa  
bassam countOrdered By: Jeff Onofre on 2024   
   
                                                    MCH (RBC) [Entitic   
mass]           29.6 pg                         27.5-35.2       [facility]  
   
                                        Comment on above:   Performed By: #### U  
RMA, CMP, LIPID, CBCNO, PSAS, TEST ####  
[facility]  
[address] [place] [address]   
   
                                                    MCHC Auto (RBC) [Mass/Vol]Or  
dered By: Jeff Onofre on 2024   
   
                      MCHC (RBC) [Mass/Vol] 33.8 g/dL             32.5-35.6  [facility]  
   
                                                    MCV [Entitic volume] by Auto  
mated countOrdered By: Jeff Onofre on 2024  
  
   
                      MCV (RBC) [Entitic vol] 87.5 fL               83.5-101   F  
[facility]  
   
                                        Comment on above:   Performed By: #### U  
RMA, CMP, LIPID, CBCNO, PSAS, TEST ####  
[facility]  
[address] [place] [address]   
   
                                                    Neutrophils [#/volume] in Bl  
ood by Automated countOrdered By: Jeff Onofre on  
   
2024   
   
                                                    Neutrophils (Bld)   
[#/Vol]         5.8 10*3/uL                     1.8-7.7         [facility]  
   
                                        Comment on above:   Performed By: #### U  
RMA, CMP, LIPID, CBCNO, PSAS, TEST ####  
[facility]  
[address] [place] [address]   
   
                                                    Nucleated erythrocytes [Pres  
ence] in Blood by Automated countOrdered By: Jeff Onofre on 2024   
   
                                                    Nucleated RBC Auto Ql   
(Bld)           0.2 /100{WBC}                   0-0.5           [facility]  
   
                                                    Platelet mean volume [Entiti  
c volume] in Blood by Automated countOrdered By:   
Jeff Onofre on 2024   
   
                                                    Platelet mean volume   
(Bld) [Entitic vol] 7.8 fL                          6.6-10.1        [facility]  
   
                                        Comment on above:   Performed By: #### U  
RMA, CMP, LIPID, CBCNO, PSAS, TEST ####  
[facility]  
[address] [place] [address]   
   
                                                    Platelets [#/volume] in Bloo  
d by Automated countOrdered By: Jeff Onofre on   
2024   
   
                      Platelets (Bld) [#/Vol] 182 10*3/uL            150-450      
[facility]  
   
                                        Comment on above:   Performed By: #### U  
RMA, CMP, LIPID, CBCNO, PSAS, TEST ####  
[facility]  
[address] [place] [address]   
   
                                                    XR chest 1V portableon    
   
                                        XR chest 1V portable [facility]  
[address] [place] [address]  
  
XRay Report  
Signed  
  
Patient: Kaylynn Mims MR#: [id number]  
16  
: 1955   
Acct:[id number]  
  
Age/Sex: 68 / M ADM   
Date: 24  
  
Loc:  Room:   
6P9393-3 Type: ADM IN  
Attending Dr: Josie Ann MD  
Copies to: MD Josie Sanz MD  
  
  
  
Ordering Provider:   
Jeff Onofre MD  
Date of Service:   
24  
Accession #:   
([id number]) XR/XR   
chest 1V portable:   
PICC line   
verification  
  
  
  
  
SINGLE VIEW CHEST  
  
CLINICAL HISTORY:   
PICC line placement.  
  
COMPARISON: None  
  
FINDINGS:  
  
A right-sided PICC   
line is identified   
with its tip   
projecting over the   
right subclavian vein   
region.  
Cardiomegaly with   
vascular congestion   
is noted. No lung   
consolidation   
pneumothorax pleural   
effus  
ion or free air.  
  
  
ORDER #: [id number]   
XR/XR chest 1V   
portable  
IMPRESSION:  
  
A RIGHT-SIDED PICC   
LINE IS SEEN WITH ITS   
TIP PROJECTING OVER   
THE RIGHT SUBCLAVIAN   
VEIN REGION.  
  
Impression dictated   
by: Claudy Smith Jr., D.O.2024   
11:28 AM  
  
  
Dictation Location:   
[place] [address]  
  
  
  
Transcribed By: [facility]   
24 1128  
Dictated By: Claudy Smith Jr, DO   
24 1126  
  
Signed By:  
24 1128       Normal                                  [facility]   
Physician   
Group  
   
                                                    Bacterial blood cultureOrder  
ed By: Josie Ann on 2024   
   
                                                    Bacteria identified Cx   
Nom (Bld)       NO GROWTH 5 DAYS                                 [facility]  
   
                                                    Bacteria identified Cx   
Nom (Bld)       NO GROWTH 5 DAYS                                 [facility]  
   
                                                    Basic Metabolic Panelon    
   
                      Anion gap [Moles/Vol] 11.4 mmol/L Normal     6.0-15.0   Th  
e [facility]   
Physician   
Group  
   
                                        Comment on above:   Performed By: #### U  
RMA, CMP, LIPID, CBCNO, PSAS, TEST ####  
[facility]  
[address] [place] [address] [place]   
   
                      Calcium [Mass/Vol] 8.2 mg/dL  Low        8.6-10.3   The Count includes the [facility]   
Physician   
Group  
   
                                        Comment on above:   Performed By: #### U  
RMA, CMP, LIPID, CBCNO, PSAS, TEST ####  
[facility]  
[address] [place] [id number] [place]   
   
                      Chloride [Moles/Vol] 103 mmol/L Normal          The   
[facility]   
Physician   
Group  
   
                                        Comment on above:   Performed By: #### U  
RMA, CMP, LIPID, CBCNO, PSAS, TEST ####  
[address]   
   
                      CO2 [Moles/Vol] 25.9 mmol/L Normal     21.0-31.0  The [facility]   
Physician   
Group  
   
                                        Comment on above:   Performed By: #### U  
RMA, CMP, LIPID, CBCNO, PSAS, TEST ####  
[address]   
   
                      Creatinine [Mass/Vol] 0.85 mg/dL Normal     0.70-1.30  The  
 [facility]   
Physician   
Group  
   
                                        Comment on above:   Performed By: #### U  
RMA, CMP, LIPID, CBCNO, PSAS, TEST ####  
[address]   
   
                                                    Creatinine Clr Calc   
Pharmacy        133.88          Normal                          The [facility]   
Physician   
Group  
   
                                        Comment on above:   Result Comment: PERF  
ORMED BY:  
[place] [address]  
[phone]  
PATHOLOGIST MEDICAL DIRECTOR  
VIKAS MARTINEZ M.D.   
   
                                                            Performed By: #### U  
RMA, CMP, LIPID, CBCNO, PSAS, TEST ####  
[address]   
   
                                                    GFR/1.73 sq M.predicted   
MDRD (S/P/Bld) [Vol   
rate/Area]      mL/min/{1.73_m2} Normal                          The [facility]   
Physician   
Group  
   
                                        Comment on above:   Performed By: #### U  
RMA, CMP, LIPID, CBCNO, PSAS, TEST ####  
[address]   
   
                      Glucose [Mass/Vol] 146 mg/dL  High            The Count includes the [facility]   
Physician   
Group  
   
                                        Comment on above:   Result Comment: Rand  
om Glucose Reference Range is dependent on  
   
time and  
content of last meal. Glucose of more than 200 mg/dL in a  
nonstressed, ambulatory subject supports the diagnosis of  
Diabetes Mellitus.  
ADA recommended reference range   
   
                                                            Performed By: #### U  
RMA, CMP, LIPID, CBCNO, PSAS, TEST ####  
[address]   
   
                      Potassium [Moles/Vol] 4.3 mmol/L Normal     3.5-5.1    The  
 [facility]   
Physician   
Group  
   
                                        Comment on above:   Performed By: #### U  
RMA, CMP, LIPID, CBCNO, PSAS, TEST ####  
[address]   
   
                      Sodium [Moles/Vol] 136 mmol/L Normal     136-145    The Count includes the [facility]   
Physician   
Group  
   
                                        Comment on above:   Performed By: #### U  
RMA, CMP, LIPID, CBCNO, PSAS, TEST ####  
[address]   
   
                                                    Urea nitrogen   
[Mass/Vol]      20 mg/dL        Normal          7-25            The [facility]   
Physician   
Group  
   
                                        Comment on above:   Performed By: #### U  
RMA, CMP, LIPID, CBCNO, PSAS, TEST ####  
[address]   
   
                                                    Blood Cultureon 2024   
   
                                                    Bacteria identified Cx   
Nom (Bld)                               NO GROWTH 5 DAYS  
PERFORMED BY:  
[place] [address]  
[phone]  
PATHOLOGIST MEDICAL   
DIRECTOR  
VIKAS MARTINEZ M.D.    Normal                                  The [facility]   
Physician   
Group  
   
                                        Comment on above:   Performed By: #### U  
RMA, CMP, LIPID, CBCNO, PSAS, TEST ####  
[address]   
   
                                                    Bacteria identified Cx   
Nom (Bld)                               NO GROWTH 5 DAYS  
PERFORMED BY:  
[place] [address]  
[phone]  
PATHOLOGIST MEDICAL   
DIRECTOR  
VIKAS MARTINEZ M.D.    Normal                                  The [facility]   
Physician   
Group  
   
                                        Comment on above:   Performed By: #### U  
RMA, CMP, LIPID, CBCNO, PSAS, TEST ####  
[address]   
   
                                                    Complete Blood Count Auto Di  
ffon 2024   
   
                      Basophils (Bld) [#/Vol] 0.1 10*3/uL Normal     0.0-0.2      
The [facility]   
Physician   
Group  
   
                                        Comment on above:   Result Comment: PERF  
ORMED BY:  
[place] [address]  
[phone]  
PATHOLOGIST MEDICAL DIRECTOR  
VIKAS MARTINEZ M.D.   
   
                                                            Performed By: #### U  
RMA, CMP, LIPID, CBCNO, PSAS, TEST ####  
[address]   
   
                      Basophils/100 WBC (Bld) 0.6 %      Normal     .          T  
[facility]   
Physician   
Group  
   
                                        Comment on above:   Performed By: #### U  
RMA, CMP, LIPID, CBCNO, PSAS, TEST ####  
[address]   
   
                                                    Eosinophils (Bld)   
[#/Vol]         0.0 10*3/uL     Normal          0.0-0.45        The [facility]   
Physician   
Group  
   
                                        Comment on above:   Performed By: #### U  
RMA, CMP, LIPID, CBCNO, PSAS, TEST ####  
[address]   
   
                                                    Eosinophils/100 WBC   
(Bld)           0.1 %           Normal          .               The [facility]   
Physician   
Group  
   
                                        Comment on above:   Performed By: #### U  
RMA, CMP, LIPID, CBCNO, PSAS, TEST ####  
[address]   
   
                                                    Erythrocyte   
distribution width   
(RBC) [Ratio]   13.3 %          Normal          12.0-14.8       The [facility]   
Physician   
Group  
   
                                        Comment on above:   Performed By: #### U  
RMA, CMP, LIPID, CBCNO, PSAS, TEST ####  
[address]   
   
                                                    Hematocrit (Bld)   
[Volume fraction] 43.3 %          Normal          38.8-50.0       The [facility]   
Physician   
Group  
   
                                        Comment on above:   Performed By: #### U  
RMA, CMP, LIPID, CBCNO, PSAS, TEST ####  
[address]   
   
                                                    Hemoglobin (Bld)   
[Mass/Vol]      14.7 g/dL       Normal          13.0-17.0       The [facility]   
Physician   
Group  
   
                                        Comment on above:   Performed By: #### U  
RMA, CMP, LIPID, CBCNO, PSAS, TEST ####  
[place] [id number] [place]   
   
                                                    Lymphocytes (Bld)   
[#/Vol]         0.8 10*3/uL     Low             1.00-4.8        The [facility]   
Physician   
Group  
   
                                        Comment on above:   Performed By: #### U  
RMA, CMP, LIPID, CBCNO, PSAS, TEST ####  
[address]   
   
                                                    Lymphocytes/100 WBC   
(Bld)           9.3 %           Normal          .               The [facility]   
Physician   
Group  
   
                                        Comment on above:   Performed By: #### U  
RMA, CMP, LIPID, CBCNO, PSAS, TEST ####  
[address]   
   
                                                    MCH (RBC) [Entitic   
mass]           29.7 pg         Normal          27.5-35.2       The [facility]   
Physician   
Group  
   
                                        Comment on above:   Performed By: #### U  
RMA, CMP, LIPID, CBCNO, PSAS, TEST ####  
[address]   
   
                      MCV (RBC) [Entitic vol] 87.6 fL    Normal     83.5-101   T  
[facility]   
Physician   
Group  
   
                                        Comment on above:   Performed By: #### U  
RMA, CMP, LIPID, CBCNO, PSAS, TEST ####  
[address]   
   
                                                    Mean Corpuscular HGB   
Conc            33.9 g/dL       Normal          32.5-35.6       The [facility]   
Physician   
Group  
   
                                        Comment on above:   Performed By: #### U  
RMA, CMP, LIPID, CBCNO, PSAS, TEST ####  
[address]   
   
                      Monocytes (Bld) [#/Vol] 0.9 10*3/uL High       0.0-0.8      
The [facility]   
Physician   
Group  
   
                                        Comment on above:   Performed By: #### U  
RMA, CMP, LIPID, CBCNO, PSAS, TEST ####  
[address]   
   
                      Monocytes/100 WBC (Bld) 9.7 %      Normal     .          T  
[facility]   
Physician   
Group  
   
                                        Comment on above:   Performed By: #### U  
RMA, CMP, LIPID, CBCNO, PSAS, TEST ####  
[address]   
   
                                                    Neutrophils (Bld)   
[#/Vol]         7.3 10*3/uL     Normal          1.8-7.7         The [facility]   
Physician   
Group  
   
                                        Comment on above:   Performed By: #### U  
RMA, CMP, LIPID, CBCNO, PSAS, TEST ####  
[address]   
   
                                                    Neutrophils/100 WBC   
(Bld)           80.3 %          Normal          .               The [facility]   
Physician   
Group  
   
                                        Comment on above:   Performed By: #### U  
RMA, CMP, LIPID, CBCNO, PSAS, TEST ####  
[address]   
   
                      NRBC%      0.2 /100{WBC} Normal     0-0.5      The [facility]   
Physician   
Group  
   
                                        Comment on above:   Performed By: #### U  
RMA, CMP, LIPID, CBCNO, PSAS, TEST ####  
[address]   
   
                                                    Platelet mean volume   
(Bld) [Entitic vol] 7.6 fL          Normal          6.6-10.1        The [facility]   
Physician   
Group  
   
                                        Comment on above:   Performed By: #### U  
RMA, CMP, LIPID, CBCNO, PSAS, TEST ####  
[address]   
   
                      Platelets (Bld) [#/Vol] 180 10*3/uL Normal     150-450      
The [facility]   
Physician   
Group  
   
                                        Comment on above:   Performed By: #### U  
RMA, CMP, LIPID, CBCNO, PSAS, TEST ####  
[address]   
   
                      RBC (Bld) [#/Vol] 4.95 10*6/uL Normal     3.90-5.60  The [facility]   
Physician   
Group  
   
                                        Comment on above:   Performed By: #### U  
RMA, CMP, LIPID, CBCNO, PSAS, TEST ####  
[place] [address] [place]   
   
                      WBC (Bld) [#/Vol] 9.1 10*3/uL Normal     4.1-10.5   The [facility]   
Physician   
Group  
   
                                        Comment on above:   Performed By: #### U  
RMA, CMP, LIPID, CBCNO, PSAS, TEST ####  
[facility]  
[address] [id number] [facility]   
   
                                                    Aerobic Cultureon 2024  
   
   
                                        Aerobic Culture     Comment right knee   
culture #3  
ORGANISM:   
Streptococcus   
mitis/oralis grp   
(O:STRMITORGR)  
Comments  
Organism Not   
Routinely Tested for   
Susceptibilities  
Quantity of Growth  
Rare Growth  
*********************  
**********  
* This is a corrected   
result. *  
*********************  
**********  
A prior result that   
was reported as final   
has been changed.  
Strep mitis/oralis   
group added to report  
Comment right knee   
culture #3  
Anaerobic Culture   
Results  
No Anaerobes Isolated   
3 Days  
Comment right knee   
culture #3  
Gram Stain Result  
1+ White Blood Cells  
No Bacteria Seen  
PERFORMED BY:  
[place] [address]  
[phone]  
PATHOLOGIST MEDICAL   
DIRECTOR  
VIKAS MARTINEZ M.D.    Normal                                  [facility]   
Physician   
Group  
   
                                        Comment on above:   Performed By: #### U  
RMA, CMP, LIPID, CBCNO, PSAS, TEST ####  
[facility]  
[address] [id number] [facility]   
   
                                        Aerobic Culture     Comment right knee   
culture #2  
ORGANISM:   
Streptococcus   
mitis/oralis grp   
(O:STRMITORGR)  
Comments  
Organism Not   
Routinely Tested for   
Susceptibilities  
Quantity of Growth  
Rare Growth  
*********************  
**********  
* This is a corrected   
result. *  
*********************  
**********  
A prior result that   
was reported as final   
has been changed.  
Strep mitis/oralis   
group added to report  
  
  
Comment right knee   
culture #2  
Anaerobic Culture   
Results  
No Anaerobes Isolated   
3 Days  
Comment right knee   
culture #2  
Gram Stain Result  
2+ White Blood Cells  
No Bacteria Seen  
PERFORMED BY:  
[place] [id number] [phone]  
PATHOLOGIST MEDICAL   
DIRECTOR  
VIKAS Art                                  [facility]   
Physician   
Group  
   
                                        Comment on above:   Performed By: #### U  
RMA, CMP, LIPID, CBCNO, PSAS, TEST ####  
[address]   
   
                                        Aerobic Culture     Comment right knee   
culture #1  
ORGANISM:   
Streptococcus   
mitis/oralis grp   
(O:STRMITORGR)  
Comments  
Organism Not   
Routinely Tested for   
Susceptibilities  
Quantity of Growth  
Rare Growth  
*********************  
**********  
* This is a corrected   
result. *  
*********************  
**********  
A prior result that   
was reported as final   
has been changed.  
Strep mitis/oralis   
group added to report  
  
Comment right knee   
culture #1  
Anaerobic Culture   
Results  
No Anaerobes Isolated   
3 Days  
Comment right knee   
culture #1  
Gram Stain Result  
1+ White Blood Cells  
No Bacteria Seen  
PERFORMED BY:  
[place] [address]  
[phone]  
PATHOLOGIST MEDICAL   
DIRECTOR  
VIKAS MARTINEZ M.D.    Normal                                  The [facility]   
Physician   
Group  
   
                                        Comment on above:   Performed By: #### U  
RMA, CMP, LIPID, CBCNO, PSAS, TEST ####  
[address]   
   
                                                    Basic Metabolic Panelon 08-2  
   
   
                      Anion gap [Moles/Vol] 11.6 mmol/L Normal     6.0-15.0     
[facility]   
Physician   
Group  
   
                                        Comment on above:   Performed By: #### U  
RMA, CMP, LIPID, CBCNO, PSAS, TEST ####  
[address]   
   
                      Calcium [Mass/Vol] 8.4 mg/dL  Low        8.6-10.3   The Count includes the [facility]   
Physician   
Group  
   
                                        Comment on above:   Performed By: #### U  
RMA, CMP, LIPID, CBCNO, PSAS, TEST ####  
[address]   
   
                      Chloride [Moles/Vol] 102 mmol/L Normal          The   
[facility]   
Physician   
Group  
   
                                        Comment on above:   Performed By: #### U  
RMA, CMP, LIPID, CBCNO, PSAS, TEST ####  
[facility]  
[address]   
   
                      CO2 [Moles/Vol] 25.2 mmol/L Normal     21.0-31.0  The [facility]   
Physician   
Group  
   
                                        Comment on above:   Performed By: #### U  
RMA, CMP, LIPID, CBCNO, PSAS, TEST ####  
[facility]  
[address]   
   
                      Creatinine [Mass/Vol] 0.72 mg/dL Normal     0.70-1.30  The  
 [facility]   
Physician   
Group  
   
                                        Comment on above:   Performed By: #### U  
RMA, CMP, LIPID, CBCNO, PSAS, TEST ####  
[address]   
   
                                                    Creatinine Clr Calc   
Pharmacy        142.25          Normal                          The [facility]   
Physician   
Group  
   
                                        Comment on above:   Result Comment: PERF  
ORMED BY:  
[place] [address]  
[phone]  
PATHOLOGIST MEDICAL DIRECTOR  
VIKAS MARTINEZ M.D.   
   
                                                            Performed By: #### U  
RMA, CMP, LIPID, CBCNO, PSAS, TEST ####  
[address]   
   
                                                    GFR/1.73 sq M.predicted   
MDRD (S/P/Bld) [Vol   
rate/Area]      mL/min/{1.73_m2} Normal                          The [facility]   
Physician   
Group  
   
                                        Comment on above:   Performed By: #### U  
RMA, CMP, LIPID, CBCNO, PSAS, TEST ####  
[address]   
   
                      Glucose [Mass/Vol] 142 mg/dL  High            The Count includes the [facility]   
Physician   
Group  
   
                                        Comment on above:   Result Comment: Rand  
 Glucose Reference Range is dependent on  
   
time and  
content of last meal. Glucose of more than 200 mg/dL in a  
nonstressed, ambulatory subject supports the diagnosis of  
Diabetes Mellitus.  
ADA recommended reference range   
   
                                                            Performed By: #### U  
RMA, CMP, LIPID, CBCNO, PSAS, TEST ####  
[address]   
   
                      Potassium [Moles/Vol] 3.8 mmol/L Normal     3.5-5.1    The  
 [facility]   
Physician   
Group  
   
                                        Comment on above:   Performed By: #### U  
RMA, CMP, LIPID, CBCNO, PSAS, TEST ####  
[address]   
   
                      Sodium [Moles/Vol] 135 mmol/L Low        136-145    The Count includes the [facility]   
Physician   
Group  
   
                                        Comment on above:   Performed By: #### U  
RMA, CMP, LIPID, CBCNO, PSAS, TEST ####  
[address]   
   
                                                    Urea nitrogen   
[Mass/Vol]      18 mg/dL        Normal          7-25            The [facility]   
Physician   
Group  
   
                                        Comment on above:   Performed By: #### U  
RMA, CMP, LIPID, CBCNO, PSAS, TEST ####  
[address]   
   
                                                    Complete Blood Count Auto Di  
ffon 2024   
   
                      Basophils (Bld) [#/Vol] 0.0 10*3/uL Normal     0.0-0.2      
The [facility]   
Physician   
[facility]  
   
                                        Comment on above:   Result Comment: PERF  
ORMED BY:  
[place] [address]  
[phone]  
PATHOLOGIST MEDICAL DIRECTOR  
VIKAS MARTINEZ M.D.   
   
                                                            Performed By: #### U  
RMA, CMP, LIPID, CBCNO, PSAS, TEST ####  
[address]   
   
                      Basophils/100 WBC (Bld) 0.5 %      Normal     .          T  
[facility]   
Physician   
Group  
   
                                        Comment on above:   Performed By: #### U  
RMA, CMP, LIPID, CBCNO, PSAS, TEST ####  
[address]   
   
                                                    Eosinophils (Bld)   
[#/Vol]         0.0 10*3/uL     Normal          0.0-0.45        The [facility]   
Physician   
Group  
   
                                        Comment on above:   Performed By: #### U  
RMA, CMP, LIPID, CBCNO, PSAS, TEST ####  
[address]   
   
                                                    Eosinophils/100 WBC   
(Bld)           0.4 %           Normal          .               The [facility]   
Physician   
Group  
   
                                        Comment on above:   Performed By: #### U  
RMA, CMP, LIPID, CBCNO, PSAS, TEST ####  
[address]   
   
                                                    Erythrocyte   
distribution width   
(RBC) [Ratio]   13.5 %          Normal          12.0-14.8       The [facility]   
Physician   
Group  
   
                                        Comment on above:   Performed By: #### U  
RMA, CMP, LIPID, CBCNO, PSAS, TEST ####  
[address]   
   
                                                    Hematocrit (Bld)   
[Volume fraction] 43.0 %          Normal          38.8-50.0       The [facility]   
Physician   
Group  
   
                                        Comment on above:   Performed By: #### U  
RMA, CMP, LIPID, CBCNO, PSAS, TEST ####  
[address]   
   
                                                    Hemoglobin (Bld)   
[Mass/Vol]      14.7 g/dL       Normal          13.0-17.0       The [facility]   
Physician   
Group  
   
                                        Comment on above:   Performed By: #### U  
RMA, CMP, LIPID, CBCNO, PSAS, TEST ####  
[address]   
   
                                                    Lymphocytes (Bld)   
[#/Vol]         1.5 10*3/uL     Normal          1.00-4.8        The [facility]   
Physician   
Group  
   
                                        Comment on above:   Performed By: #### U  
RMA, CMP, LIPID, CBCNO, PSAS, TEST ####  
[address]   
   
                                                    Lymphocytes/100 WBC   
(Bld)           17.6 %          Normal          .               The [facility]   
Physician   
Group  
   
                                        Comment on above:   Performed By: #### U  
RMA, CMP, LIPID, CBCNO, PSAS, TEST ####  
[address]   
   
                                                    MCH (RBC) [Entitic   
mass]           29.9 pg         Normal          27.5-35.2       [facility]   
Physician   
Group  
   
                                        Comment on above:   Performed By: #### U  
RMA, CMP, LIPID, CBCNO, PSAS, TEST ####  
[address]   
   
                      MCV (RBC) [Entitic vol] 87.5 fL    Normal     83.5-101   T  
he [facility]   
Physician   
Group  
   
                                        Comment on above:   Performed By: #### U  
RMA, CMP, LIPID, CBCNO, PSAS, TEST ####  
[address]   
   
                                                    Mean Corpuscular HGB   
Conc            34.2 g/dL       Normal          32.5-35.6       [facility]   
Physician   
Group  
   
                                        Comment on above:   Performed By: #### U  
RMA, CMP, LIPID, CBCNO, PSAS, TEST ####  
[address]   
   
                      Monocytes (Bld) [#/Vol] 0.8 10*3/uL Normal     0.0-0.8      
[facility]   
Physician   
Group  
   
                                        Comment on above:   Performed By: #### U  
RMA, CMP, LIPID, CBCNO, PSAS, TEST ####  
[address]   
   
                      Monocytes/100 WBC (Bld) 10.0 %     Normal     .          [facility]   
Physician   
Group  
   
                                        Comment on above:   Performed By: #### U  
RMA, CMP, LIPID, CBCNO, PSAS, TEST ####  
[address]   
   
                                                    Neutrophils (Bld)   
[#/Vol]         5.9 10*3/uL     Normal          1.8-7.7         [facility]   
Physician   
[facility]  
   
                                        Comment on above:   Performed By: #### U  
RMA, CMP, LIPID, CBCNO, PSAS, TEST ####  
[address]   
   
                                                    Neutrophils/100 WBC   
(Bld)           71.5 %          Normal          .               [facility]   
Physician   
Group  
   
                                        Comment on above:   Performed By: #### U  
RMA, CMP, LIPID, CBCNO, PSAS, TEST ####  
[address]   
   
                      NRBC%      0.1 /100{WBC} Normal     0-0.5      [facility]   
Physician   
Group  
   
                                        Comment on above:   Performed By: #### U  
RMA, CMP, LIPID, CBCNO, PSAS, TEST ####  
[address]   
   
                                                    Platelet mean volume   
(Bld) [Entitic vol] 7.8 fL          Normal          6.6-10.1        The [facility]   
Physician   
Group  
   
                                        Comment on above:   Performed By: #### U  
RMA, CMP, LIPID, CBCNO, PSAS, TEST ####  
[facility]  
[address]   
   
                      Platelets (Bld) [#/Vol] 175 10*3/uL Normal     150-450      
[facility]   
Physician   
Group  
   
                                        Comment on above:   Performed By: #### U  
RMA, CMP, LIPID, CBCNO, PSAS, TEST ####  
[facility]  
[address]   
   
                      RBC (Bld) [#/Vol] 4.91 10*6/uL Normal     3.90-5.60  [facility]   
Physician   
Group  
   
                                        Comment on above:   Performed By: #### U  
RMA, CMP, LIPID, CBCNO, PSAS, TEST ####  
[address]   
   
                      WBC (Bld) [#/Vol] 8.3 10*3/uL Normal     4.1-10.5   The [facility] the [facility]   
Physician   
Group  
   
                                        Comment on above:   Performed By: #### U  
RMA, CMP, LIPID, CBCNO, PSAS, TEST ####  
[address]   
   
                                                    ECG 12 lead ECGon 2024  
   
   
                                        ECG 12 lead ECG     [facility] [place]  
[address] [place] [address]  
  
Electrocardiograph   
Report  
Signed  
  
Patient: Kaylynn Mims MR#: [id number]  
16  
: 1955   
Acct:[id number]  
  
Age/Sex: 68 / M ADM   
Date: 24  
  
Loc:  Room:   
[address] [place] [address] Type: ADM IN  
Attending Dr: Josie Ann MD  
  
Ordering Provider:   
Eze Gordon MD  
Date of Service:   
  
Accession #:   
([id number]) ECG/ECG   
12 lead ECG: pre-op  
  
  
Copies to:  
  
  
Test Reason :  
Blood Pressure : */*   
mmHG  
Vent. Rate : 72 BPM   
Atrial Rate : 72 BPM  
P-R Int : 168 ms QRS   
Dur : 102 ms  
QT Int : 416 ms P-R-T   
Axes : 30 21 -12   
degrees  
QTcB Int : 455 ms  
  
Normal sinus rhythm  
  
Non-specific change   
in ST segment in  
No previous ECGs   
available  
Confirmed by   
JESSICA VELASCO MD ([id number]) on   
2024 8:59:42 PM  
  
Referred By:   
Electronically Signed   
By: JESSICA VELASCO MD  
  
  
  
Transcribed By: MUS  
Signed By Jessica Velasco MD 0  
24        Normal                                  [facility]   
Physician   
Group  
   
                                                    Gram stain for investigation  
 of transfusion reactionOrdered By: Jeff Onofre   
on   
2024   
   
                                                    Microscopic observation   
Gram stain Nom (Unsp   
spec)           St. mitis/oralis grp Abnormal                        [facility]  
   
                                                    Microscopic observation   
Gram stain Nom (Unsp   
spec)           St. mitis/oralis grp Abnormal                        [facility]  
   
                                                    Microscopic observation   
Gram stain Nom (Unsp   
spec)           St. mitis/oralis grp Abnormal                        [facility]  
   
                                                    US venous duplex LE RTon    
   
                                        US venous duplex LE RT [facility] [place] [address]  
  
Ultrasound Report  
Signed  
  
Patient: Kaylynn Mims MR#: [id number]  
16  
: 1955   
Acct:[id number]  
  
Age/Sex: 68 / M ADM   
Date: 24  
  
Loc:  Room:   
[address] [place] [address] Type: ADM IN  
Attending Dr: Josie Ann MD  
  
Ordering Provider:   
MAYRA Martinez  
Date of Service:   
24  
Accession #:   
([id number] US/US   
venous duplex LE RT:   
leg pain and swelling  
  
  
Copies to: MAYRA Martinez MD  
  
  
RIGHT LOWER EXTREMITY   
VENOUS DUPLEX  
  
INDICATION: Painful   
swollen right leg  
  
Unilateral right   
lower extremity   
venous duplex Doppler   
study was obtained   
utilizing B-mode,   
color-  
flow and spectral   
Doppler.  
  
FINDINGS: The right   
common femoral,   
femoral, and   
popliteal veins   
showed adequate   
compressibility,  
color-flow and   
augmentation. The   
right posterior   
tibial and peroneal   
veins were   
compressible, as  
well as proximal   
greater saphenous   
vein. The   
contralateral left   
common femoral vein   
was  
compressible with   
color-flow and   
augmentation.  
  
  
ORDER #: [id number]   
US/US venous duplex   
LE RT  
IMPRESSION:  
  
NO EVIDENCE OF DEEP   
VENOUS THROMBOSIS IN   
THE RIGHT LOWER   
EXTREMITY. NO   
SUPERFICIAL   
THROMBOPHLEBITIS  
WAS NOTED.  
  
Impression dictated   
by: Neo Franco M.D.2024 9:51 AM  
  
  
Dictation Location:   
[place] [address]  
  
Tech: Bridget Luz  
  
Transcribed By: MARBELLA   
24  
Dictated By: Neo Franco MD 24  
  
Signed By:  
24       Normal                                  [facility]   
Physician   
Group  
   
                                                    Aerobic Cultureon 2024  
   
   
                                        Aerobic Culture     Comment R knee  
ORGANISM:   
Streptococcus   
mitis/oralis grp   
(O:STRMITORGR)  
Comments  
Organism Not   
Routinely Tested for   
Susceptibilities  
Quantity of Growth  
Light Growth  
  
Comment R knee  
No Anaerobes Isolated   
3 Days  
Comment R knee  
Gram Stain Result  
3+ White Blood Cells  
No Bacteria Seen  
PERFORMED BY:  
[place] [address]  
[phone]  
PATHOLOGIST MEDICAL   
DIRECTOR  
VIKAS MARTINEZ M.D.    Normal                                  [facility]   
Physician   
Group  
   
                                        Comment on above:   Performed By: #### U  
RMA, CMP, LIPID, CBCNO, PSAS, TEST ####  
[address]   
   
                                        Aerobic Culture     No Growth 2 Days  
No Anaerobes Isolated   
3 Days  
Gram Stain Result  
Rare White Blood   
Cells  
4+ Red Blood Cells  
No Bacteria Seen  
PERFORMED BY:  
[place] [address]  
[phone]  
PATHOLOGIST MEDICAL   
DIRECTOR  
VIKAS MARTINEZ M.D.    Normal                                  [facility]   
Physician   
Group  
   
                                        Comment on above:   Performed By: #### U  
RMA, CMP, LIPID, CBCNO, PSAS, TEST ####  
[address]   
   
                                                    Alanine aminotransferase [En  
zymatic activity/volume] in Serum or PlasmaOrdered   
By:   
Daniela Sanchez on 2024   
   
                                                    ALT [Catalytic   
activity/Vol]   38 U/L                                      [facility]  
   
                                        Comment on above:   Performed By: #### U  
RMA, CMP, LIPID, CBCNO, PSAS, TEST ####  
[place] [address] [place]   
   
                                                    Albumin [Mass/volume] in Ser  
um or Plasma by Bromocresol green (BCG) dye binding   
methoOrdered By: Daniela Sanchez on 2024   
   
                                                    Albumin BCG dye   
[Mass/Vol]      3.9 g/dL                        3.5-5.7         [facility]  
   
                                                    Alkaline phosphatase [Enzyma  
tic activity/volume] in Serum or PlasmaOrdered By:   
Daniela   
[place] on 2024   
   
                                                    ALP [Catalytic   
activity/Vol]   60 U/L                                    [facility]  
   
                                        Comment on above:   Performed By: #### U  
RMA, CMP, LIPID, CBCNO, PSAS, TEST ####  
[facility]  
[address]   
   
                                                    Aspartate aminotransferase [  
Enzymatic activity/volume] in Serum or PlasmaOrdered  
By:   
Daniela Bullimore on 2024   
   
                                                    AST [Catalytic   
activity/Vol]   19 U/L                          13-39           [facility]  
   
                                        Comment on above:   Performed By: #### U  
RMA, CMP, LIPID, CBCNO, PSAS, TEST ####  
[address]   
   
                                                    Automated basophil %Ordered   
By: Daniela Mayimore on 2024   
   
                      Basophils/100 WBC (Bld) 0.5 %      Normal     .          F  
[facility]  
   
                                        Comment on above:   Performed By: #### U  
RMA, CMP, LIPID, CBCNO, PSAS, TEST ####  
[address]   
   
                                                    Automated basophil countOrde  
red By: Daniela Mayimore on 2024   
   
                      Basophils (Bld) [#/Vol] 0.0 10*3/uL Normal     0.0-0.2      
[facility]  
   
                                        Comment on above:   Performed By: #### U  
RMA, CMP, LIPID, CBCNO, PSAS, TEST ####  
[facility]  
[address] [place] [address]   
   
                                                    Automated blood monocyte cou  
ntOrdered By: Daniela Laloimore on 2024   
   
                      Monocytes (Bld) [#/Vol] 0.5 10*3/uL Normal     0.0-0.8      
[facility]  
   
                                        Comment on above:   Performed By: #### U  
RMA, CMP, LIPID, CBCNO, PSAS, TEST ####  
[facility]  
[address] [place] [address]   
   
                                                    Automated eosinophil %Ordere  
d By: Daniela Laura on 2024   
   
                                                    Eosinophils/100 WBC   
(Bld)           0.1 %           Normal          .               [facility]  
   
                                        Comment on above:   Performed By: #### U  
RMA, CMP, LIPID, CBCNO, PSAS, TEST ####  
[address]   
   
                                                    Automated eosinophil countOr  
dered By: Daniela Sanchez on 2024   
   
                                                    Eosinophils (Bld)   
[#/Vol]         0.0 10*3/uL     Normal          0.0-0.45        [facility]  
   
                                        Comment on above:   Performed By: #### U  
RMA, CMP, LIPID, CBCNO, PSAS, TEST ####  
[address]   
   
                                                    Automated monocyte %Ordered   
By: Daniela Sanchez on 2024   
   
                      Monocytes/100 WBC (Bld) 5.1 %      Normal     .          F  
[facility]  
   
                                        Comment on above:   Performed By: #### U  
RMA, CMP, LIPID, CBCNO, PSAS, TEST ####  
[address]   
   
                                                    Automated neutrophil %Ordere  
d By: Daniela Sanchez on 2024   
   
                                                    Neutrophils/100 WBC   
(Bld)           88.3 %          Normal          .               [facility]  
   
                                        Comment on above:   Performed By: #### U  
RMA, CMP, LIPID, CBCNO, PSAS, TEST ####  
[address]   
   
                                                    Bacterial blood cultureOrder  
ed By: Daniela Sanchez on 2024   
   
                                                    Bacteria identified Cx   
Nom (Bld)       NO GROWTH 5 DAYS                                 [facility]  
   
                                                    Bilirubin.total [Mass/volume  
] in Serum or PlasmaOrdered By: Daniela Sanchez on   
2024   
   
                      Bilirubin [Mass/Vol] 1.5 mg/dL  High       0.3-1.0    [facility]  
   
                                        Comment on above:   Samples from patient  
s who have taken Naproxen have shown   
spurious elevation in Total Bilirubin levels. A metabolite of   
Naproxen, O-desmethylnaproxen, has been shown to interfere   
with the Jendrassik-Grof method for measuring Total Bilirubin.   
   
                                                            Result Comment: Samp  
les from patients who have taken Naproxen   
have shown  
spurious elevation in Total Bilirubin levels. A metabolite  
of Naproxen, O-desmethylnaproxen, has been shown to  
interfere with the Jendrassik-Grof method for measuring  
Total Bilirubin.   
   
                                                            Performed By: #### U  
RMA, CMP, LIPID, CBCNO, PSAS, TEST ####  
[facility]  
[address] [place] [id number] [facility]   
   
                                                    Blood Cultureon 2024   
   
                                                    Bacteria identified Cx   
Nom (Bld)                               NO GROWTH 5 DAYS  
PERFORMED BY:  
[place] [address]  
[phone]  
PATHOLOGIST MEDICAL   
DIRECTOR  
VIKAS MARTINEZ M.D.    Normal                                  [facility]   
Physician   
Group  
   
                                        Comment on above:   Performed By: #### L  
ACTIC, CUBLD ####  
[facility]  
[address]   
   
                                                    Bacteria identified Cx   
Nom (Bld)                               Results called  
at 0209 on 24  
Gram Stain  
Gram Positive Cocci   
in Chains  
ORGANISM:   
Streptococcus   
mitis/oralis grp   
(O:STRMITORGR)  
Aerobic Lori Charge   
(Strep)  
---------------------  
---- SUSCEPTIBILITY   
---------------------  
---  
ORGANISM:   
O:STRMITORGR  
ANTIBIOTIC   
INTERPRETATION LORI  
Ampicillin I 0.5  
Cefepime S 0.5  
Ceftriaxone S <0.25  
Erythromycin R >0.5  
Penicillin I 0.25  
Vancomycin S 0.5  
Results called  
at 0209 on 24  
Staphylococcus aureus   
DNA [Presence] by ZOË   
with non-probe   
detection in Positive   
blood culture  
Not detected  
Bacteroides fragilis   
DNA [Presence] by ZOË   
with non-probe   
detection in Positive   
blood culture  
Not detected  
Candida auris DNA   
[Presence] by ZOË   
with non-probe   
detection in Positive   
blood culture  
Not detected  
Candida albicans DNA   
[Presence] by ZOË   
with non-probe   
detection in Positive   
blood culture  
Not detected  
Acinetobacter   
calcoaceticus-marielena  
ii complex DNA   
[Presence] by ZOË   
with non-probe   
detection in Positive   
blood culture  
Not detected  
Cryptococcus   
neoformans or gattii   
9002  
Not detected  
Cephalosporin   
resistance blaCTX-M   
gene [Presence] by   
Molecular method  
Not Applicable  
Escherichia coli  
Not detected  
Enterobacterales DNA   
[Presence] by ZOË   
with non-probe   
detection in Positive   
blood culture  
Not detected  
Enterobacter cloacae   
complex DNA   
[Presence] by ZOË   
with non-probe   
detection in Positive   
blood culture  
Not detected  
Staphylococcus   
epidermidis DNA   
[Presence] by ZOË   
with non-probe   
detection in Positive   
blood culture  
Not detected  
Enterococcus faecalis   
DNA [Presence] by ZOË   
with non-probe   
detection in Positive   
blood culture  
Not detected  
Enterococcus faecium   
DNA [Presence] by ZOË   
with non-probe   
detection in Positive   
blood culture  
Not detected  
Candida glabrata DNA   
[Presence] by ZOË   
with non-probe   
detection in Positive   
blood culture  
Not detected  
Haemophilus   
influenzae (reported   
as H flu)  
Not detected  
Carbapenem resistance   
blaIMP gene   
[Presence] by   
Molecular method  
Not Applicable  
Klebsiella aerogenes   
DNA [Presence] by ZOË   
with non-probe   
detection in Positive   
blood culture  
Not detected  
Klebsiella   
pneumoniae+Klebsiella   
variicola+Klebsiella   
quasipneumoniae DNA   
[Presence] by ZOË   
with non-probe   
detection in Positive   
blood culture  
Not detected  
Klebsiella oxytoca   
DNA [Presence] by ZOË   
with non-probe   
detection in Positive   
blood culture  
Not detected  
Carbapenem resistance   
blaKPC gene   
[Presence] by   
Molecular method  
Not Applicable  
Nereida krusei DNA   
[Presence] by ZOË   
with non-probe   
detection in Positive   
blood culture  
Not detected  
Listeria   
monocytogenes   
(reported as   
listeriosis)  
Not detected  
Staphylococcus   
lugdunensis DNA   
[Presence] by ZOË   
with non-probe   
detection in Positive   
blood culture  
Not detected  
Methicillin   
resistance mecA+mecC   
genes+SCCmec+OrfX   
junction [Presence]   
by Molecular method  
Not Applicable  
Carbapenem resistance   
blaNDM gene   
[Presence] by   
Molecular method  
Not Applicable  
Neisseria   
meningitidis -   
reported as   
meningococcal disease  
Not detected  
Carbapenem resistance   
cherri OXA-48-like gene   
[Presence] by   
Molecular method  
Not Applicable  
Candida parapsilosis   
DNA [Presence] by ZOË   
with non-probe   
detection in Positive   
blood culture  
Not detected  
Streptococcus   
pneumoniae - reported   
at [facility]  
Not detected  
Proteus sp DNA   
[Presence] by ZOË   
with non-probe   
detection in Positive   
blood culture  
Not detected  
Pseudomonas   
aeruginosa DNA   
[Presence] by ZOË   
with non-probe   
detection in Positive   
blood culture  
Not detected  
Salmonella sp DNA   
[Presence] by ZOË   
with non-probe   
detection in Positive   
blood culture  
Not detected  
Serratia marcescens   
DNA [Presence] by ZOË   
with non-probe   
detection in Positive   
blood culture  
Not detected  
Staphylococcus sp DNA   
[Presence] by ZOË   
with non-probe   
detection in Positive   
blood culture  
Not detected  
Stenotrophomonas   
maltophilia DNA   
[Presence] by ZOË   
with non-probe   
detection in Positive   
blood culture  
Not detected  
Group A   
(Streptococcus   
pyogenes) [id number]  
Not detected  
Group B Strep   
(Streptococcus   
agalactiae)  
Not detected  
Streptococcus sp DNA   
[Presence] by ZOË   
with non-probe   
detection in Positive   
blood culture  
Detected  
Candida tropicalis   
DNA [Presence] by ZOË   
with non-probe   
detection in Positive   
blood culture  
Not detected  
Vancomycin resistance   
Aidan + vanB genes   
[Presence] by   
Molecular method  
Not Applicable  
Carbapenem resistance   
blaVIM gene   
[Presence] by   
Molecular method  
Not Applicable  
Colistin resistance   
mcr-1 gene [Presence]   
by Molecular method  
Not Applicable  
Methicillin   
resistance mecA+mecC   
genes [Presence] in   
Isolate or Specimen   
by Molecular genetics   
method  
Not Applicable  
RESIS. GENE COMMENT 1  
Antimicrobial   
resistance can occur   
via multiple  
RESIS. GENE COMMENT 2  
mechanisms. A Not   
Detected result for   
antimicrobial  
RESIS. GENE COMMENT 3  
resistance gene(s)   
does not indicate   
antimicrobial  
RESIS. GENE COMMENT 4  
susceptibility.  
S = SUSCEPTIBLE I =   
INTERMEDIATE R =   
RESISTANT  
BLANK = DATA NO (more   
content not   
included)...        Normal                                  [facility]   
Physician   
Group  
   
                                        Comment on above:   Performed By: #### L  
ACTIC, CUBLD ####  
[address]   
   
                                                    Body fluid crystal identific  
ation by light microscopyOrdered By: Jeff Onofre  
 on   
2024   
   
                                                    Crystals LM Nom (Body   
fld)            None seen                       None Seen       [facility]  
   
                                                    C reactive protein [Mass/vol  
ume] in Serum or PlasmaOrdered By: Daniela Sanchez   
on   
2024   
   
                      CRP [Mass/Vol] 12.3 mg/dL High       0.0-0.5    [facility]  
   
                                                    C-Reactive Proteinon   
024   
   
                      C-Reactive Protein 12.3 mg/dL High       0.0-0.5    The Count includes the [facility]   
Physician   
Group  
   
                                        Comment on above:   Result Comment: PERF  
ORMED BY:  
[place] [address]  
[phone]  
PATHOLOGIST MEDICAL DIRECTOR  
VIKAS MARTINEZ M.D.   
   
                                                            Performed By: #### U  
RMA, CMP, LIPID, CBCNO, PSAS, TEST ####  
[facility]  
[address]   
   
                                                    Calcium [Mass/volume] in Ser  
um or PlasmaOrdered By: Daniela Sanchez on   
2024   
   
                      Calcium [Mass/Vol] 8.6 mg/dL  Normal     8.6-10.3   [facility]  
   
                                        Comment on above:   Performed By: #### U  
RMA, CMP, LIPID, CBCNO, PSAS, TEST ####  
[facility]  
[address] [place] [address]   
   
                                                    Carbon dioxide, total [Moles  
/volume] in Serum or PlasmaOrdered By: Daniela Sanchez   
on 2024   
   
                      CO2 [Moles/Vol] 23.1 mmol/L Normal     21.0-31.0  [facility]  
   
                                        Comment on above:   Performed By: #### U  
RMA, CMP, LIPID, CBCNO, PSAS, TEST ####  
[facility]  
[address] [place] [address]   
   
                                                    Cell Count Diff,Synovial Flu  
idon 2024   
   
                                                    Appearance, Synovial   
Fluid                     Cloudy                    Critically   
abnormal                  Clear                     [facility]   
Physician   
Group  
   
                                        Comment on above:   Order Comment: Reaso  
n for Exam Type 2 diabetes mellitus   
   
                                                            Performed By: #### U  
RMA, CMP, LIPID, CBCNO, PSAS, TEST ####  
[facility]  
[address] [place] [address]   
   
                                Color, Synovial Fluid Yellow          Critically  
   
abnormal                  [facility]               [facility]   
Physician   
Group  
   
                                        Comment on above:   Order Comment: Reaso  
n for Exam Type 2 diabetes mellitus   
   
                                                            Performed By: #### U  
RMA, CMP, LIPID, CBCNO, PSAS, TEST ####  
[facility]  
[address] [place] [address]   
   
                                                    Color, Synovial   
Supernatant     Yellow          Normal                          [facility]   
Physician   
Group  
   
                                        Comment on above:   Order Comment: Reaso  
n for Exam Type 2 diabetes mellitus   
   
                                                            Result Comment: The   
reference interval and other method   
performance  
specifications have not been established for this body  
fluid. The test result must be integrated into the clinical  
context for interpretation.   
   
                                                            Performed By: #### U  
RMA, CMP, LIPID, CBCNO, PSAS, TEST ####  
[facility]  
[address] [place] [address] [place]   
   
                                                    Eosinophils,Synovial   
Fluid           0 %             Normal          0-2             [facility]   
Physician   
Group  
   
                                        Comment on above:   Order Comment: Reaso  
n for Exam Type 2 diabetes mellitus   
   
                                                            Result Comment: PERF  
ORMED BY:  
[address]  
[place] [address]  
[phone]  
PATHOLOGIST MEDICAL DIRECTOR  
VIKAS MARTINEZ M.D.   
   
                                                            Performed By: #### U  
RMA, CMP, LIPID, CBCNO, PSAS, TEST ####  
[facility]  
[address]   
   
                                                    Lymphocytes, Synovial   
Fluid           7 %             Normal          0-74            [facility]   
Physician   
Group  
   
                                        Comment on above:   Order Comment: Reaso  
n for Exam Type 2 diabetes mellitus   
   
                                                            Performed By: #### U  
RMA, CMP, LIPID, CBCNO, PSAS, TEST ####  
[facility]  
[address]   
   
                                                    Monocyte/Histiocyte,Syn  
ov.Fld.         1 %             Normal          0-69            [facility]   
Physician   
Group  
   
                                        Comment on above:   Order Comment: Reaso  
n for Exam Type 2 diabetes mellitus   
   
                                                            Performed By: #### U  
RMA, CMP, LIPID, CBCNO, PSAS, TEST ####  
[address]   
   
                                                    Neutrophils, Synovial   
Fluid           92 %            High            0-24            [facility]   
Physician   
Group  
   
                                        Comment on above:   Order Comment: Reaso  
n for Exam Type 2 diabetes mellitus   
   
                                                            Performed By: #### U  
RMA, CMP, LIPID, CBCNO, PSAS, TEST ####  
[facility]  
[address]   
   
                      RBC, Synovial Fluid 6370 /uL   High       0-0        [facility]   
Physician   
Group  
   
                                        Comment on above:   Order Comment: Reaso  
n for Exam Type 2 diabetes mellitus   
   
                                                            Performed By: #### U  
RMA, CMP, LIPID, CBCNO, PSAS, TEST ####  
[address]   
   
                      TNC, Synovial Fluid 29062 /uL  High       0-150      [facility]   
Physician   
Group  
   
                                        Comment on above:   Order Comment: Reaso  
n for Exam Type 2 diabetes mellitus   
   
                                                            Result Comment: The   
reference interval and other method   
performance  
specifications have not been established for this body  
fluid. The test result must be integrated into the clinical  
context for interpretation.   
   
                                                            Performed By: #### U  
RMA, CMP, LIPID, CBCNO, PSAS, TEST ####  
[address]   
   
                                                    Chloride [Moles/volume] in S  
lorraine or PlasmaOrdered By: Daniela Sanchez on   
2024   
   
                      Chloride [Moles/Vol] 102 mmol/L Normal          [facility]  
   
                                        Comment on above:   Performed By: #### U  
RMA, CMP, LIPID, CBCNO, PSAS, TEST ####  
[address]   
   
                                                    Complete Blood Count Auto Di  
ffon 2024   
   
                                                    Mean Corpuscular HGB   
Conc            34.1 g/dL       Normal          32.5-35.6       [facility]   
Physician   
Group  
   
                                        Comment on above:   Performed By: #### U  
RMA, CMP, LIPID, CBCNO, PSAS, TEST ####  
[address]   
   
                      Monocytes/100 WBC (Bld) 22.07 %    High       0.00-20.00 T  
[facility]   
Physician   
Group  
   
                                        Comment on above:   Result Comment: For   
adults in ED, MDW > 20.0 may be associated  
   
with a higher  
risk of sepsis during the first 12 hrs of hospital admission   
   
                                                            Performed By: #### U  
RMA, CMP, LIPID, CBCNO, PSAS, TEST ####  
[address]   
   
                      NRBC%      0.8 /100{WBC} High       0-0.5      [facility]   
Physician   
Group  
   
                                        Comment on above:   Performed By: #### U  
RMA, CMP, LIPID, CBCNO, PSAS, TEST ####  
[address]   
   
                                                    Comprehensive Metabolic Pane  
guerline 2024   
   
                      Albumin [Mass/Vol] 3.9 g/dL   Normal     3.5-5.7    [facility]   
Physician   
Group  
   
                                        Comment on above:   Performed By: #### U  
RMA, CMP, LIPID, CBCNO, PSAS, TEST ####  
[address]   
   
                                                    Creatinine Clr Calc   
Pharmacy        143.91          Normal                          [facility]   
Physician   
Group  
   
                                        Comment on above:   Performed By: #### U  
RMA, CMP, LIPID, CBCNO, PSAS, TEST ####  
[address]   
   
                                                    GFR/1.73 sq M.predicted   
MDRD (S/P/Bld) [Vol   
rate/Area]      mL/min/{1.73_m2} Normal                          [facility]   
Physician   
Group  
   
                                        Comment on above:   Performed By: #### U  
RMA, CMP, LIPID, CBCNO, PSAS, TEST ####  
[address]   
   
                                                    Creatinine [Mass/volume] in   
Serum or PlasmaOrdered By: Daniela Sanchez on   
2024   
   
                      Creatinine [Mass/Vol] 0.68 mg/dL Low        0.70-1.30  [facility]  
   
                                        Comment on above:   Performed By: #### U  
RMA, CMP, LIPID, CBCNO, PSAS, TEST ####  
[address]   
   
                                                    Crystals,Synovial Fluidon    
   
                      Crystals,Synovial Fluid None Seen  Normal     None Seen  T  
he [facility]   
Physician   
Group  
   
                                        Comment on above:   Order Comment: Reaso  
n for Exam Type 2 diabetes mellitus   
   
                                                            Result Comment: PERF  
ORMED BY:  
[place] [address]  
[phone]  
PATHOLOGIST MEDICAL DIRECTOR  
VIKAS MARTINEZ M.D.   
   
                                                            Performed By: #### U  
RMA, CMP, LIPID, CBCNO, PSAS, TEST ####  
[address]   
   
                                                    Determination of appearance   
of body fluidOrdered By: Jeff Onofre on   
2024   
   
                      Appearance (Body fld) Cloudy     Abnormal   Clear      [facility]  
   
                                                    Erythrocyte Sedimentation Ra  
gina 2024   
   
                      ESR (Bld) [Velocity] 32 mm/h    High       0-19       The   
[facility]   
Physician   
Group  
   
                                        Comment on above:   Result Comment: PERF  
ORMED BY:  
[place] [address]  
[phone]  
PATHOLOGIST MEDICAL DIRECTOR  
VIKAS MARTINEZ M.D.   
   
                                                            Performed By: #### U  
RMA, CMP, LIPID, CBCNO, PSAS, TEST ####  
[address]   
   
                                                    Erythrocyte distribution wid  
th [Ratio] by Automated countOrdered By: Daniela Sanchez   
on 2024   
   
                                                    Erythrocyte   
distribution width   
(RBC) [Ratio]   13.3 %          Normal          12.0-14.8       [facility]  
   
                                        Comment on above:   Performed By: #### U  
RMA, CMP, LIPID, CBCNO, PSAS, TEST ####  
[facility]  
[address]   
   
                                                    Erythrocyte sedimentation ra  
te by Photometric methodOrdered By: Daniela Sanchez  
 on   
2024   
   
                                                    ESR Photometric method   
(Bld) [Velocity] 32 mm/hr        High            0-19            [facility]  
   
                                                    Erythrocytes [#/volume] in B  
lood by Automated countOrdered By: Daniela Sanchez   
on   
2024   
   
                      RBC (Bld) [#/Vol] 5.50 10*6/uL Normal     3.90-5.60  [facility]  
   
                                        Comment on above:   Performed By: #### U  
RMA, CMP, LIPID, CBCNO, PSAS, TEST ####  
[facility]  
[address]   
   
                                                    Evaluation of color of body   
fluidOrdered By: Jeff Onofre on 2024   
   
                      Color (Body fld) Yellow     Abnormal   [facility]  
   
                                                    Glucose [Mass/volume] in Ser  
um or PlasmaOrdered By: Daniela Sanchez on   
2024   
   
                      Glucose [Mass/Vol] 169 mg/dL  High            [facility]  
   
                                        Comment on above:   ADA recommended refe  
rence rangeRandom Glucose Reference Range   
is dependent on time and content of last meal. Glucose of more   
than 200 mg/dL in a nonstressed, ambulatory subject supports   
the diagnosis of Diabetes Mellitus.   
   
                                                            Result Comment: Rand  
om Glucose Reference Range is dependent on   
time and  
content of last meal. Glucose of more than 200 mg/dL in a  
nonstressed, ambulatory subject supports the diagnosis of  
Diabetes Mellitus.  
ADA recommended reference range   
   
                                                            Performed By: #### U  
RMA, CMP, LIPID, CBCNO, PSAS, TEST ####  
[facility]  
[address] [place] [address] [place]   
   
                                                    Glucose [Mass/volume] in Syn  
ovial fluidOrdered By: Jeff Onofre on 2024  
   
   
                                                    Glucose (Syn fld)   
[Mass/Vol]      80 mg/dL                                        [facility]  
   
                                        Comment on above:   No reference range e  
stablished   
   
                                                    Glucose, Synovial Fluidon    
   
                      Glucose, Synovial Fluid 80 mg/dL   Normal                T  
he [facility]   
Physician   
Group  
   
                                        Comment on above:   Order Comment: Reaso  
n for Exam Type 2 diabetes mellitus   
   
                                                            Result Comment: No r  
eference range established  
PERFORMED BY:  
[place] [address]  
[phone]  
PATHOLOGIST MEDICAL DIRECTOR  
VIKAS MARTINEZ M.D.   
   
                                                            Performed By: #### U  
RMA, CMP, LIPID, CBCNO, PSAS, TEST ####  
[facility]  
[address] [place] [address]   
   
                                                    Gram Stainon 2024   
   
                                                    Microscopic observation   
Gram stain Nom (Unsp   
spec)                                   Comment R knee  
Gram Stain Result  
3+ White Blood Cells  
No Bacteria Seen  
PERFORMED BY:  
[place] [address]  
[phone]  
PATHOLOGIST MEDICAL   
DIRECTOR  
VIKAS MARTINEZ M.D.    Normal                                  [facility]   
Physician   
Group  
   
                                        Comment on above:   Performed By: #### U  
RMA, CMP, LIPID, CBCNO, PSAS, TEST ####  
[address]   
   
                                                    Microscopic observation   
Gram stain Nom (Unsp   
spec)                                   Gram Stain Result  
Rare White Blood   
Cells  
4+ Red Blood Cells  
No Bacteria Seen  
PERFORMED BY:  
[place] [address]  
[phone]  
PATHOLOGIST MEDICAL   
DIRECTOR  
VIKAS MARTINEZ M.D.    Normal                                  [facility]   
Physician   
Group  
   
                                        Comment on above:   Performed By: #### U  
RMA, CMP, LIPID, CBCNO, PSAS, TEST ####  
[facility]  
[address] [place] [address]   
   
                                                    Gram stain for investigation  
 of transfusion reactionOrdered By: eJff Onofre   
on   
2024   
   
                                                    Microscopic observation   
Gram stain Nom (Unsp   
spec)           St. mitis/oralis grp Abnormal                        [facility]  
   
                                                    Gram stain for investigation  
 of transfusion reactionOrdered By: Josie Ann   
on   
2024   
   
                                                    Microscopic observation   
Gram stain Nom (Unsp   
spec)                                                           [facility]  
   
                                                    Microscopic observation   
Gram stain Nom (Unsp   
spec)                                   No Anaerobes Isolated   
3 Days                                                      [facility]  
   
                                                    Hematocrit [Volume Fraction]  
 of Blood by Automated countOrdered By: Daniela Sanchez   
on 2024   
   
                                                    Hematocrit (Bld)   
[Volume fraction] 47.8 %          Normal          38.8-50.0       [facility]  
   
                                        Comment on above:   Performed By: #### U  
RMA, CMP, LIPID, CBCNO, PSAS, TEST ####  
[facility]  
[address]   
   
                                                    Hemoglobin [Mass/volume] in   
BloodOrdered By: Daniela Sanchez on 2024   
   
                                                    Hemoglobin (Bld)   
[Mass/Vol]      16.3 g/dL       Normal          13.0-17.0       [facility]  
   
                                        Comment on above:   Performed By: #### U  
RMA, CMP, LIPID, CBCNO, PSAS, TEST ####  
[facility]  
[address]   
   
                                                    Lab Allan Total Protein, Flon  
 2024   
   
                                                    Lab Allan Total Protein,   
Fl              4.4 g/dL        Normal          .               [facility]   
Physician   
Group  
   
                                        Comment on above:   Order Comment: Reaso  
n for Exam Type 2 diabetes mellitus   
   
                                                            Result Comment: ____  
_____________________________________  
: BODY FLUID TYPE : TOTAL PROTEIN :  
:_________________:_______________________:  
: Amniotic Fluid : <0.4 :  
:_________________:_______________________:  
: : Nonmalignant: <3.0 :  
: Ascitic Fluid : Malignant: >3.0 :  
:_________________:_______________________:  
: Bile, Clear : <0.9 :  
:_________________:_______________________:  
: Bile, Yellow : 0.2 - 0.6 :  
:_________________:_______________________:  
: Lymph : 2.2 - 6.0 :  
:_________________:_______________________:  
: Human Milk : 1.9 - 2.0 :  
:_________________: ______________________:  
: Nasal Secretion : 0.1 - 3.5 :  
:_________________:_______________________:  
: Pancreatic : 0.0 - 0.1 :  
: Juice : (post stimulation) :  
:_________________:_______________________:  
: : Transudate: <0.3 :  
: Pleural Fluid : Exudate: >0.3 :  
:_________________:_______________________:  
: Saliva : 0.1 - 0.2 :  
: (Mixed Glands) : :  
:_________________:_______________________:  
: Synovial Fluid : <2.5 :  
:_________________:_______________________:  
: Tears : 0.8 - 0.9 :  
:_________________:_______________________:  
[place]. Reference Intervals  
for Adults and Children 2008. Ninth  
Edition (V9.1) [employer],  
[facility]; [place]: 2009.  
Performed at: [facility] [id number]  
: Alejandro Salomon PhD, Phone: [phone]  
PERFORMED BY:  
[place] [address]  
[phone]  
PATHOLOGIST MEDICAL DIRECTOR  
VIKAS MARTINEZ M.D.   
   
                                                            Performed By: #### U  
RMA, CMP, LIPID, CBCNO, PSAS, TEST ####  
[facility]  
[address] [place] [address] [place]   
   
                                                    Lactate [Moles/volume] in Se  
rum or PlasmaOrdered By: Daniela Bullimore on   
2024   
   
                      Lactate [Moles/Vol] 1.3 mmol/L            0.5-2.2    [facility]  
   
                                        Comment on above:   Result Comment: PERF  
ORMED BY:  
[place] [address]  
[phone]  
PATHOLOGIST MEDICAL DIRECTOR  
VIKAS MARTINEZ M.D.   
   
                                                            Performed By: #### L  
ACTJULIA SULLIVANLD ####  
[facility]  
[address] [place] [address]   
   
                                                    Leukocytes [#/volume] correc  
bassam for nucleated erythrocytes in Blood by Automated  
   
counOrdered By: Daniela Bullimore on 2024   
   
                                                    WBC corrected for nucl   
RBC Auto (Bld) [#/Vol] 9.4 10*3/uL                     4.1-10.5        [facility]  
   
                                                    Leukocytes [#/volume] in Blo  
od by Automated countOrdered By: Daniela Bullimore on  
   
2024   
   
                      WBC (Bld) [#/Vol] 9.4 10*3/uL Normal     4.1-10.5   [facility]  
   
                                        Comment on above:   Performed By: #### U  
RMA, CMP, LIPID, CBCNO, PSAS, TEST ####  
[facility]  
[address] [place] [address] [place]   
   
                                                    Lymphocytes [#/volume] in Bl  
ood by Automated countOrdered By: Daniela Bullimore   
on   
2024   
   
                                                    Lymphocytes (Bld)   
[#/Vol]         0.6 10*3/uL     Low             1.00-4.8        [facility]  
   
                                        Comment on above:   Performed By: #### U  
RMA, CMP, LIPID, CBCNO, PSAS, TEST ####  
[facility]  
[address]   
   
                                                    Lymphocytes/100 leukocytes i  
n Blood by Automated countOrdered By: Daniela Sanchez on   
2024   
   
                                                    Lymphocytes/100 WBC   
(Bld)           6.0 %           Normal          .               [facility]  
   
                                        Comment on above:   Performed By: #### U  
RMA, CMP, LIPID, CBCNO, PSAS, TEST ####  
[facility]  
[address] [place] [address]   
   
                                                    MCH [Entitic mass] by Automa  
bassam countOrdered By: Daniela Sanchez on 2024   
   
                                                    MCH (RBC) [Entitic   
mass]           29.6 pg         Normal          27.5-35.2       [facility]  
   
                                        Comment on above:   Performed By: #### U  
RMA, CMP, LIPID, CBCNO, PSAS, TEST ####  
[address]   
   
                                                    MCHC Auto (RBC) [Mass/Vol]Or  
dered By: Daniela Sanchez on 2024   
   
                      MCHC (RBC) [Mass/Vol] 34.1 g/dL             32.5-35.6  [facility]  
   
                                                    MCV [Entitic volume] by Auto  
mated countOrdered By: Daniela Sanchez on   
2024   
   
                      MCV (RBC) [Entitic vol] 86.9 fL    Normal     83.5-101   F  
[facility]  
   
                                        Comment on above:   Performed By: #### U  
RMA, CMP, LIPID, CBCNO, PSAS, TEST ####  
[facility]  
[address] [place] [address]   
   
                                                    Manual body fluid eosinophil  
s/100 leukocytesOrdered By: Jeff Onofre on   
2024   
   
                                                    Eosinophils/100 WBC   
Manual cnt (Body fld) 0 %                             0-2             [facility]  
   
                                                    Manual body fluid erythrocyt  
es count (number/volume)Ordered By: Jeff Onofre   
on   
2024   
   
                                                    RBC Manual cnt (Body   
fld) [#/Vol]    6370 /uL        High            0-0             [facility]  
   
                                                    Manual body fluid lymphocyte  
s/100 leukocytesOrdered By: Jeff Onofre on   
2024   
   
                                                    Lymphocytes/100 WBC   
Manual cnt (Body fld) 7 %                             0-74            [facility]  
   
                                                    Monocyte distribution width   
[Entitic volume] in Blood by AutomatedOrdered By:   
Daniela Sanchez on 2024   
   
                                                    Monocyte distribution   
width Auto (Bld)   
[Entitic vol]   22.07 %         High            0.00-20.00      [facility]  
   
                                        Comment on above:   For adults in ED, MD  
W > 20.0 may be associated with a higher   
risk of sepsis during the first 12 hrs of hospital admission   
   
                                                    Neutrophils [#/volume] in Bl  
ood by Automated countOrdered By: Daniela Sanchez   
on   
2024   
   
                                                    Neutrophils (Bld)   
[#/Vol]         8.4 10*3/uL     High            1.8-7.7         [facility]  
   
                                        Comment on above:   Performed By: #### U  
RMA, CMP, LIPID, CBCNO, PSAS, TEST ####  
[facility]  
[address] [place] [address]   
   
                                                    Neutrophils/100 WBC Manual c  
nt (Body fld)Ordered By: Jeff Onofre on   
2024   
   
                                                    Neutrophils/100 WBC   
(Body fld)      92 %            High            0-24            [facility]  
   
                                                    No Panel InformationOrdered   
By: Jeff Onofre on 2024   
   
                                                    Synovial Fluid   
Supernatant Color Yellow                                          [facility]  
   
                                        Comment on above:   The reference interv  
al and other method performance   
specifications have not been established for this body fluid.   
The test result must be integrated into the clinical context   
for interpretation.   
   
                                                    Synovial Fluid Tot   
Nucleated Cells 39963 /uL       High            0-150           [facility]  
   
                                        Comment on above:   The reference interv  
al and other method performance   
specifications have not been established for this body fluid.   
The test result must be integrated into the clinical context   
for interpretation.   
   
                                                    No Panel InformationOrdered   
By: Daniela Sanchez on 2024   
   
                                                    Bacterial ID (NA   
Multiplex Assay) St. mitis/oralis grp Abnormal                        [facility]  
   
                      Estimated GFR (CKD-EPI) > 60.0 mL/Min                       
  [facility]  
   
                                                    Pharmacy Creatinine   
Clearance (Chem 143.91                                          [facility]  
   
                                                    Nucleated erythrocytes [Pres  
ence] in Blood by Automated countOrdered By: Daniela Sanchez on 2024   
   
                                                    Nucleated RBC Auto Ql   
(Bld)           0.8 /100{WBC}   High            0-0.5           [facility]  
   
                                                    Platelet mean volume [Entiti  
c volume] in Blood by Automated countOrdered By:   
Daniela Sanchez on 2024   
   
                                                    Platelet mean volume   
(Bld) [Entitic vol] 8.0 fL          Normal          6.6-10.1        [facility]  
   
                                        Comment on above:   Performed By: #### U  
RMA, CMP, LIPID, CBCNO, PSAS, TEST ####  
[facility]  
[address]   
   
                                                    Platelets [#/volume] in Bloo  
d by Automated countOrdered By: Daniela Sanchez on   
2024   
   
                      Platelets (Bld) [#/Vol] 170 10*3/uL Normal     150-450      
[facility]  
   
                                        Comment on above:   Performed By: #### U  
RMA, CMP, LIPID, CBCNO, PSAS, TEST ####  
[facility]  
[address] [place] [address] [place]   
   
                                                    Potassium [Moles/volume] in   
Serum or PlasmaOrdered By: Daniela Sanchez on   
2024   
   
                      Potassium [Moles/Vol] 4.0 mmol/L Normal     3.5-5.1    [facility]  
   
                                        Comment on above:   Performed By: #### U  
RMA, CMP, LIPID, CBCNO, PSAS, TEST ####  
[facility]  
[address] [place] [address] [place]   
   
                                                    Protein [Mass/volume] in Bod  
y fluidOrdered By: Jeff Onofre on 2024   
   
                                                    Protein (Body fld)   
[Mass/Vol]      See comment                                     [facility]  
   
                                        Comment on above:   Testing sent to [facility].No reference range   
established   
   
                                                    Protein (Body fld)   
[Mass/Vol]      4.4 g/dL                        .               [facility]  
   
                                        Comment on above:   ____________________  
_____________________ : BODY FLUID TYPE :   
TOTAL PROTEIN : :_________________:_______________________: :   
Amniotic Fluid : <0.4 :   
:_________________:_______________________: : : Nonmalignant:   
<3.0 : : Ascitic Fluid : Malignant: >3.0 :   
:_________________:_______________________: : Bile, Clear :   
<0.9 : :_________________:_______________________: : Bile,   
Yellow : 0.2 - 0.6 :   
:_________________:_______________________: : Lymph : 2.2 -   
6.0 : :_________________:_______________________: : Human Milk   
: 1.9 - 2.0 : :_________________: ______________________: :   
Nasal Secretion : 0.1 - 3.5 :   
:_________________:_______________________: : Pancreatic : 0.0   
- 0.1 : : Juice : (post stimulation) :   
:_________________:_______________________: : : Transudate:   
<0.3 : : Pleural Fluid : Exudate: >0.3 :   
:_________________:_______________________: : Saliva : 0.1 -   
0.2 : : (Mixed Glands) : :   
:_________________:_______________________: : Synovial Fluid :   
<2.5 : :_________________:_______________________: : Tears :   
0.8 - 0.9 : :_________________:_______________________: Tianna   
WJustino V. Reference Intervals for Adults and Children   
2008. Ninth Edition (V9.1) [employer], [facility];   
[place]: 2009.Performed at: [address] [id number] Director: Alejandro Salomon PhD, Phone: [phone]   
   
                                                    Protein [Mass/volume] in Ser  
um or PlasmaOrdered By: Daniela Sanchez on   
2024   
   
                      Protein [Mass/Vol] 6.8 g/dL              6.4-8.9    [facility]  
   
                                        Comment on above:   Performed By: #### U  
RMA, CMP, LIPID, CBCNO, PSAS, TEST ####  
[facility]  
[address]   
   
                                                    Serum globulin measurement b  
y calculation (mass/volume)Ordered By: Daniela Sanchez   
on 2024   
   
                      Globulin (S) [Mass/Vol] 2.9 g/dL                         [facility]  
   
                                        Comment on above:   Performed By: #### U  
RMA, CMP, LIPID, CBCNO, PSAS, TEST ####  
[facility]  
[address] [place] [address]   
   
                                                    Serum or plasma albumin/glob  
ulin mass ratioOrdered By: Daniela Bullimore on   
2024   
   
                                                    Albumin/Globulin [Mass   
ratio]          1.3 {ratio}                                     [facility]  
   
                                        Comment on above:   Performed By: #### U  
RMA, CMP, LIPID, CBCNO, PSAS, TEST ####  
[address]   
   
                                                    Serum or plasma anion gap de  
terminationOrdered By: Daniela Bullimore on   
2024   
   
                      Anion gap [Moles/Vol] 13.9 mmol/L Normal     6.0-15.0   [facility]  
   
                                        Comment on above:   Performed By: #### U  
RMA, CMP, LIPID, CBCNO, PSAS, TEST ####  
[address]   
   
                                                    Sodium [Moles/volume] in Ser  
um or PlasmaOrdered By: Dainela Bullimore on   
2024   
   
                      Sodium [Moles/Vol] 135 mmol/L Low        136-145    [facility]  
   
                                        Comment on above:   Performed By: #### U  
RMA, CMP, LIPID, CBCNO, PSAS, TEST ####  
[facility]  
[address] [place] [address]   
   
                                                    Synovial fluid differential   
cell countOrdered By: Jeff Onofre on 2024   
   
                                                    Differential panel (Syn   
fld)            1 %                             0-69            [facility]  
   
                                                    Total Protein, Synovial Flui  
don 2024   
   
                                                    Total Protein, Synovial   
Fluid                           Normal                          [facility]   
Physician   
Group  
   
                                        Comment on above:   Order Comment: Reaso  
n for Exam Type 2 diabetes mellitus   
   
                                                            Result Comment: Test  
ing sent to [facility] Laboratory.  
No reference range established  
PERFORMED BY:  
[place] [address]  
[phone]  
PATHOLOGIST MEDICAL DIRECTOR  
VIKAS MARTINEZ M.D.   
   
                                                            Performed By: #### U  
RMA, CMP, LIPID, CBCNO, PSAS, TEST ####  
[address]   
   
                                                    Urea nitrogen [Mass/volume]   
in Serum or PlasmaOrdered By: Daniela Bullimore on   
2024   
   
                                                    Urea nitrogen   
[Mass/Vol]      15 mg/dL        Normal          7-25            [facility]  
   
                                        Comment on above:   Performed By: #### U  
RMA, CMP, LIPID, CBCNO, PSAS, TEST ####  
[address]   
   
                                                    XR knee RT 4V*on 2024   
   
                                        XR knee RT 4V*      [facility] [place] [address]  
  
XRay Report  
Signed  
  
Patient: Kaylynn Mims MR#: [id number]  
16  
: 1955   
Acct:[id number]  
  
Age/Sex: 68 / M ADM   
Date: 24  
  
Loc: ER Room: Type:   
[facility] ER  
Attending Dr:  
Copies to: MAYRA Martinez  
  
  
  
Ordering Provider:   
MAYRA Martinez  
Date of Service:   
24  
Accession #:   
([id number] XR/XR   
knee RT 4V*:   
Extremity Injury,   
Lower  
  
  
  
  
XR knee RT 4V*   
2024 11:19 AM  
  
SIGNS AND SYMPTOMS:   
Right knee pain and   
swelling  
  
PROTOCOL: Frontal,   
lateral, and oblique   
radiographs of the   
right knee  
  
COMPARISON: 2024  
  
FINDINGS:  
  
There is narrowing of   
the weightbearing   
joint spaces and   
temporal joint space   
which I lateral  
spurring. Fracture.   
There is a large   
joint effusion. No   
soft tissue swelling.  
  
  
ORDER #: [id number]   
XR/XR knee RT 4V*  
IMPRESSION:  
  
No fracture.  
  
Tricompartmental   
degenerative changes   
are redemonstrated   
with a large joint   
effusion. This is  
worsened compared to   
the prior exam.  
  
Impression dictated   
by: Jesus Dukes M.D.2024 12:03   
PM  
  
  
Dictation Location:   
[place] [address]  
  
  
  
Transcribed By: [facility]   
24 1203  
Dictated By: Jesus Dukes II, MD   
24 1149  
  
Signed By:  
24 1203       Normal                                  [facility]   
Physician   
Group  
   
                                                    XR knee RT 4V*on 2024   
   
                                        XR knee RT 4V*      [facility] [place] [id number]  
  
XRay Report  
Signed  
  
Patient: Kaylynn Mims MR#: [id number]  
16  
: 1955   
Acct:[id number]  
  
Age/Sex: 68 / M ADM   
Date: 24  
  
Loc: [facility] Room:   
Type: REG CLI  
Attending Dr: To Khan APRN  
Copies to: SANJANA Samuel  
  
  
  
Ordering Provider:   
SANJANA Samuel  
Date of Service:   
24  
Accession #:   
([id number] XR/XR   
knee RT 4V*: M25.561   
- Pain in right knee  
  
  
  
  
RIGHT KNEE - 4 views  
  
CLINICAL HISTORY:   
Jaundice right knee   
pain with increasing   
pain over 2 years.  
  
COMPARISON: None  
  
FINDINGS:  
  
Moderate degenerative   
changes with medial   
weightbearing joint   
space narrowing. No   
acute bony  
process. Moderate   
joint effusion.  
  
  
ORDER #: [id number]   
XR/XR knee RT 4V*  
IMPRESSION:  
  
MODERATE DEGENERATIVE   
CHANGES OF THE RIGHT   
KNEE WITHOUT ACUTE   
BONY PROCESS.  
  
Impression dictated   
by: Claudy Smith Jr., D.[email or web address]   
2:42 PM  
  
  
Dictation Location:   
[place] [address]  
  
  
  
Transcribed By: [facility]   
24 1442  
Dictated By: Claudy Smith Jr, DO   
24 1441  
  
Signed By:  
24 1442       Normal                                  [facility]   
Physician   
Group  
   
                                                    XR knee LT 4V*on 05-   
   
                                        XR knee LT 4V*      [facility] [place] [address]  
  
XRay Report  
Signed  
  
Patient: Kaylynn Mims MR#: [id number]  
16  
: 1955   
Acct:[id number]  
  
Age/Sex: 68 / M ADM   
Date: 05/15/24  
  
Loc: [facility] Room:   
Type: REG CLI  
Attending Dr: To Khan APRN  
Copies to: SANJANA Samuel  
  
  
  
Ordering Provider:   
SANJANA Samuel  
Date of Service:   
05/15/24  
Accession #:   
([id number] XR/XR   
knee LT 4V*: M25.562   
- Pain in left knee  
  
  
  
  
XR knee LT 4V*   
5/15/2024 10:14 AM  
  
SIGNS AND SYMPTOMS:   
Medial and posterior   
left knee pain  
  
PROTOCOL: Frontal,   
lateral, and oblique   
radiographs of the   
left knee  
  
COMPARISON: None  
  
FINDINGS:  
  
There is   
tricompartmental   
joint space loss,   
greatest in the   
medial weightbearing   
compartment. There  
is spurring of the   
tibial spines, tibial   
plateaus, and femoral   
condyles. No joint   
effusion. No soft  
tissue swelling.   
There is a 15 mm   
suspected ossific   
loose body in the   
anterior aspect of   
the joint  
space.  
  
  
ORDER #: [id number]   
XR/XR knee LT 4V*  
IMPRESSION:  
  
Tricompartmental   
degenerative changes   
are noted in the left   
knee greatest in the   
medial  
weightbearing   
compartment.  
  
No fracture.  
  
There is a 15 mm   
suspected ossific   
loose body in the   
anterior aspect of   
the joint space.  
  
Impression dictated   
by: Jeuss Dukes M.D.5/15/2024 3:02 PM  
  
  
Dictation Location:   
[place] [address]  
  
  
  
Transcribed By: [facility]   
05/15/24 1502  
Dictated By: Jesus Dukes II, MD   
05/15/24 1500  
  
Signed By:  
05/15/24 1502       Normal                                  [facility]   
Physician   
Group  
   
                                                    A1C with Estimated Average G  
polodavid 2024   
   
                      Glucose [Mass/Vol] 137 mg/dL  Normal                [facility]   
Physician   
Group  
   
                                        Comment on above:   Result Comment: PERF  
ORMED BY:  
[place] [address]  
[phone]  
PATHOLOGIST MEDICAL DIRECTOR  
VIKAS MARTINEZ M.D.   
   
                                                            Performed By: #### A  
1C WTH eA ####  
[facility]  
[address] [place] [address]   
   
                                                    Alanine aminotransferase [En  
zymatic activity/volume] in Serum or PlasmaOrdered   
By:   
Elvira Harper on 2024   
   
                                                    ALT [Catalytic   
activity/Vol]   31 U/L          Normal          7-52            [facility]  
   
                                        Comment on above:   Order Comment: Reaso  
n for Exam Type 2 diabetes mellitus   
   
                                                            Performed By: #### U  
RMA, CMP, LIPID, CBCNO, PSAS, TEST ####  
[facility]  
[address] [place] [address] [place]   
   
                                                    Albumin [Mass/volume] in Ser  
um or Plasma by Bromocresol green (BCG) dye binding   
methoOrdered By: Elviar Harper on 2024   
   
                                                    Albumin BCG dye   
[Mass/Vol]      4.4 g/dL                        3.5-5.7         [facility]  
   
                                                    Alkaline phosphatase [Enzyma  
tic activity/volume] in Serum or PlasmaOrdered By:   
Elvira Harper on 2024   
   
                                                    ALP [Catalytic   
activity/Vol]   68 U/L          Normal                    [facility]  
   
                                        Comment on above:   Order Comment: Reaso  
n for Exam Type 2 diabetes mellitus   
   
                                                            Performed By: #### U  
RMA, CMP, LIPID, CBCNO, PSAS, TEST ####  
[facility]  
[address]   
   
                                                    Aspartate aminotransferase [  
Enzymatic activity/volume] in Serum or PlasmaOrdered  
By:   
Elvira Harper on 2024   
   
                                                    AST [Catalytic   
activity/Vol]   22 U/L          Normal          13-39           [facility]  
   
                                        Comment on above:   Order Comment: Reaso  
n for Exam Type 2 diabetes mellitus   
   
                                                            Performed By: #### U  
RMA, CMP, LIPID, CBCNO, PSAS, TEST ####  
[facility]  
[address]   
   
                                                    Bilirubin.total [Mass/volume  
] in Serum or PlasmaOrdered By: Elvira Harper on   
2024   
   
                      Bilirubin [Mass/Vol] 1.4 mg/dL  High       0.3-1.0    [facility]  
   
                                        Comment on above:   Samples from patient  
s who have taken Naproxen have shown   
spurious elevation in Total Bilirubin levels. A metabolite of   
Naproxen, O-desmethylnaproxen, has been shown to interfere   
with the Jendrassik-Grof method for measuring Total Bilirubin.   
   
                                                            Order Comment: Reaso  
n for Exam Type 2 diabetes mellitus   
   
                                                            Result Comment: Samp  
les from patients who have taken Naproxen   
have shown  
spurious elevation in Total Bilirubin levels. A metabolite  
of Naproxen, O-desmethylnaproxen, has been shown to  
interfere with the Jendrassik-Grof method for measuring  
Total Bilirubin.   
   
                                                            Performed By: #### U  
RMA, CMP, LIPID, CBCNO, PSAS, TEST ####  
[facility]  
[address]   
   
                                                    Calcium [Mass/volume] in Ser  
um or PlasmaOrdered By: Elvira Harper on 2024   
   
                      Calcium [Mass/Vol] 8.8 mg/dL  Normal     8.6-10.3   [facility]  
   
                                        Comment on above:   Order Comment: Reaso  
n for Exam Type 2 diabetes mellitus   
   
                                                            Performed By: #### U  
RMA, CMP, LIPID, CBCNO, PSAS, TEST ####  
[facility]  
[address] [place] [id number] [place]   
   
                                                    Carbon dioxide, total [Moles  
/volume] in Serum or PlasmaOrdered By: Elvira Harper   
on   
2024   
   
                      CO2 [Moles/Vol] 29.3 mmol/L Normal     21.0-31.0  [facility]  
   
                                        Comment on above:   Order Comment: Reaso  
n for Exam Type 2 diabetes mellitus   
   
                                                            Performed By: #### U  
RMA, CMP, LIPID, CBCNO, PSAS, TEST ####  
[facility]  
[address] [place] [id number] [place]   
   
                                                    Chloride [Moles/volume] in S  
lorraine or PlasmaOrdered By: Elvira Harper on 2024  
   
   
                      Chloride [Moles/Vol] 104 mmol/L Normal          [facility]  
   
                                        Comment on above:   Order Comment: Reaso  
n for Exam Type 2 diabetes mellitus   
   
                                                            Performed By: #### U  
RMA, CMP, LIPID, CBCNO, PSAS, TEST ####  
[facility]  
[address] [place] [id number] [place]   
   
                                                    Cholesterol [Mass/volume] in  
 Serum or PlasmaOrdered By: Elvira Harper on   
2024   
   
                      Cholesterol [Mass/Vol] 166 mg/dL  Normal     140-200    [facility]  
   
                                        Comment on above:   Chol less than 200 m  
g/dl low riskChol 201-239 mg/dl borderline  
   
riskChol 240 mg/dl and greater high risk   
   
                                                            Order Comment: Reaso  
n for Exam Type 2 diabetes mellitus   
   
                                                            Result Comment: Chol  
 less than 200 mg/dl low risk  
Chol 201-239 mg/dl borderline risk  
Chol 240 mg/dl and greater high risk   
   
                                                            Performed By: #### U  
RMA, CMP, LIPID, CBCNO, PSAS, TEST ####  
[facility]  
[address] [place] [id number] [place]   
   
                                                    Cholesterol in LDL Calc [Mas  
s/Vol]Ordered By: Elvira Harper on 2024   
   
                                                    Cholesterol in LDL   
[Mass/Vol]      100 mg/dL                       0-100           [facility]  
   
                                        Comment on above:   LDL ATP III CLASSIFI  
CATIONLDL less than 100 mg/dL OptimalLDL   
100-129 mg/dL Near or above optimalLDL 130-159 mg/dL   
Borderline highLDL 160-189 mg/dL HighLDL greater than 189   
mg/dL Very high   
   
                                                    Cholesterol in VLDL Calc [Ma  
ss/Vol]Ordered By: Elvira Harper on 2024   
   
                                                    Cholesterol in VLDL   
[Mass/Vol]      23 mg/dL                                        [facility]  
   
                                                    Comprehensive Metabolic Pane  
guerline 2024   
   
                      Albumin [Mass/Vol] 4.4 g/dL   Normal     3.5-5.7    The Count includes the [facility]   
Physician   
Group  
   
                                        Comment on above:   Order Comment: Reaso  
n for Exam Type 2 diabetes mellitus   
   
                                                            Performed By: #### U  
RMA, CMP, LIPID, CBCNO, PSAS, TEST ####  
[facility]  
[address]   
   
                                                    GFR/1.73 sq M.predicted   
MDRD (S/P/Bld) [Vol   
rate/Area]      mL/min/{1.73_m2} Normal                          [facility]   
Physician   
Group  
   
                                        Comment on above:   Order Comment: Reaso  
n for Exam Type 2 diabetes mellitus   
   
                                                            Performed By: #### U  
RMA, CMP, LIPID, CBCNO, PSAS, TEST ####  
[facility]  
[address] [place] [address]   
   
                                                    Creatinine [Mass/volume] in   
Serum or PlasmaOrdered By: Elvira Harper on   
2024   
   
                      Creatinine [Mass/Vol] 0.79 mg/dL Normal     0.70-1.30  [facility]  
   
                                        Comment on above:   Order Comment: Reaso  
n for Exam Type 2 diabetes mellitus   
   
                                                            Performed By: #### U  
RMA, CMP, LIPID, CBCNO, PSAS, TEST ####  
[facility]  
[address] [place] [address]   
   
                                                    Erythrocyte distribution wid  
th [Ratio] by Automated countOrdered By: Elvira Harper on   
2024   
   
                                                    Erythrocyte   
distribution width   
(RBC) [Ratio]   13.5 %          Normal          12.0-14.8       [facility]  
   
                                        Comment on above:   Order Comment: Reaso  
n for Exam Type 2 diabetes mellitus   
   
                                                            Performed By: #### U  
RMA, CMP, LIPID, CBCNO, PSAS, TEST ####  
[facility]  
[address] [place] [address] [place]   
   
                                                    Erythrocytes [#/volume] in B  
lood by Automated countOrdered By: Elvira Harper on   
2024   
   
                      RBC (Bld) [#/Vol] 5.82 10*6/uL High       3.90-5.60  [facility]  
   
                                        Comment on above:   Order Comment: Reaso  
n for Exam Type 2 diabetes mellitus   
   
                                                            Performed By: #### U  
RMA, CMP, LIPID, CBCNO, PSAS, TEST ####  
[facility]  
[address] [place] [id number] [place]   
   
                                                    Glucose [Mass/volume] in Ser  
um or PlasmaOrdered By: Elvira Harper on 2024   
   
                      Glucose [Mass/Vol] 128 mg/dL  High            [facility]  
   
                                        Comment on above:   ADA recommended refe  
rence rangeRandom Glucose Reference Range   
is dependent on time and content of last meal. Glucose of more   
than 200 mg/dL in a nonstressed, ambulatory subject supports   
the diagnosis of Diabetes Mellitus.   
   
                                                            Order Comment: Reaso  
n for Exam Type 2 diabetes mellitus   
   
                                                            Result Comment: Rand  
om Glucose Reference Range is dependent on   
time and  
content of last meal. Glucose of more than 200 mg/dL in a  
nonstressed, ambulatory subject supports the diagnosis of  
Diabetes Mellitus.  
ADA recommended reference range   
   
                                                            Performed By: #### U  
RMA, CMP, LIPID, CBCNO, PSAS, TEST ####  
[facility]  
[address] [place] [address] [place]   
   
                                                    Glucose mean value [Mass/vol  
ume] in Blood Estimated from glycated   
hemoglobinOrdered   
By: Elvira Harper on 2024   
   
                                                    Average glucose   
Estimated from glycated   
hemoglobin (Bld)   
[Mass/Vol]      137 mg/dL                                       [facility]  
   
                                                    Hematocrit [Volume Fraction]  
 of Blood by Automated countOrdered By: Elvira Harper  
on   
2024   
   
                                                    Hematocrit (Bld)   
[Volume fraction] 50.0 %          Normal          38.8-50.0       [facility]  
   
                                        Comment on above:   Order Comment: Reaso  
n for Exam Type 2 diabetes mellitus   
   
                                                            Performed By: #### U  
RMA, CMP, LIPID, CBCNO, PSAS, TEST ####  
[facility]  
[address] [place] [id number] [place]   
   
                                                    Hemoglobin A1c percentageOrd  
ered By: Elvira Harper on 2024   
   
                                                    HbA1c (Bld) [Mass   
fraction]       6.4 %           High            4.3-5.6         [facility]  
   
                                        Comment on above:   Increased risk for d  
iabetes: 5.7 - 6.4diabetes: >6.4glycemic   
control for adults with diabetes: <7.0   
   
                                                            Result Comment: Incr  
eased risk for diabetes: 5.7 - 6.4  
diabetes: >6.4  
glycemic control for adults with diabetes: <7.0   
   
                                                            Performed By: #### A  
1C [facility] e[facility] ####  
[address]   
   
                                                    Hemoglobin [Mass/volume] in   
BloodOrdered By: Elvira Harper on 2024   
   
                                                    Hemoglobin (Bld)   
[Mass/Vol]      16.6 g/dL       Normal          13.0-17.0       [facility]  
   
                                        Comment on above:   Order Comment: Reaso  
n for Exam Type 2 diabetes mellitus   
   
                                                            Performed By: #### U  
RMA, CMP, LIPID, CBCNO, PSAS, TEST ####  
[place] [id number] [facility]   
   
                                                    Hemogram CBC Without Diffon   
2024   
   
                                                    Mean Corpuscular HGB   
Conc            33.1 g/dL       Normal          32.5-35.6       [facility]   
Physician   
Group  
   
                                        Comment on above:   Order Comment: Reaso  
n for Exam Type 2 diabetes mellitus   
   
                                                            Performed By: #### U  
RMA, CMP, LIPID, CBCNO, PSAS, TEST ####  
[facility]  
[address] [place], [place] [id number] [facility]   
   
                      WBC (Bld) [#/Vol] 5.3 10*3/uL Normal     4.1-10.5   The Count includes the [facility]   
Physician   
Group  
   
                                        Comment on above:   Order Comment: Reaso  
n for Exam Type 2 diabetes mellitus   
   
                                                            Performed By: #### U  
RMA, CMP, LIPID, CBCNO, PSAS, TEST ####  
[place] [id number] [place]   
   
                                                    Leukocytes [#/volume] correc  
bassam for nucleated erythrocytes in Blood by Automated  
   
counOrdered By: Elvira Harper on 2024   
   
                                                    WBC corrected for nucl   
RBC Auto (Bld) [#/Vol] 5.3 10*3/uL                     4.1-10.5        [facility]  
   
                                                    Lipid Panelon 2024   
   
                                                    LDL   
Cholesterol,Calculated 100 mg/dL       Normal          0-100           [facility]   
Physician   
Group  
   
                                        Comment on above:   Order Comment: Reaso  
n for Exam Type 2 diabetes mellitus   
   
                                                            Result Comment: LDL   
ATP III CLASSIFICATION  
LDL less than 100 mg/dL Optimal  
-129 mg/dL Near or above optimal  
-159 mg/dL Borderline high  
-189 mg/dL High  
LDL greater than 189 mg/dL Very high   
   
                                                            Performed By: #### U  
RMA, CMP, LIPID, CBCNO, PSAS, TEST ####  
[facility]  
[address]   
   
                      Triglyceride w/Reflex 117 mg/dL  Normal     0-149      The  
 [facility]   
Physician   
Group  
   
                                        Comment on above:   Order Comment: Reaso  
n for Exam Type 2 diabetes mellitus   
   
                                                            Result Comment: TRIG  
 ATP III CLASSIFICATION  
TRIG less than 150 mg/dL Normal  
TRIG 150-199 mg/dL Borderline high  
TRIG 200-500 mg/dL High  
TRIG greater than 500 mg/dL Very high  
Standard traceable to the Center for Disease Conrtrol and  
Prevention (CDC) test method.   
   
                                                            Performed By: #### U  
RMA, CMP, LIPID, CBCNO, PSAS, TEST ####  
[facility]  
[address]   
   
                      VLDL CHOLESTEROL 23 mg/dL   Normal                [facility]   
Physician   
Group  
   
                                        Comment on above:   Order Comment: Reaso  
n for Exam Type 2 diabetes mellitus   
   
                                                            Performed By: #### U  
RMA, CMP, LIPID, CBCNO, PSAS, TEST ####  
[facility]  
[address]   
   
                                                    MCH [Entitic mass] by Automa  
bassam countOrdered By: Elvira Harper on 2024   
   
                                                    MCH (RBC) [Entitic   
mass]           28.5 pg         Normal          27.5-35.2       [facility]  
   
                                        Comment on above:   Order Comment: Reaso  
n for Exam Type 2 diabetes mellitus   
   
                                                            Performed By: #### U  
RMA, CMP, LIPID, CBCNO, PSAS, TEST ####  
[facility]  
[address]   
   
                                                    MCHC Auto (RBC) [Mass/Vol]Or  
dered By: Elvira Harper on 2024   
   
                      MCHC (RBC) [Mass/Vol] 33.1 g/dL             32.5-35.6  [facility]  
   
                                                    MCV [Entitic volume] by Auto  
mated countOrdered By: Elvira Harper on 2024   
   
                      MCV (RBC) [Entitic vol] 85.9 fL    Normal     83.5-101   F  
[facility]  
   
                                        Comment on above:   Order Comment: Reaso  
n for Exam Type 2 diabetes mellitus   
   
                                                            Performed By: #### U  
RMA, CMP, LIPID, CBCNO, PSAS, TEST ####  
[facility]  
[address]   
   
                                                    Microalbumin [Mass/volume] i  
n UrineOrdered By: Elvira Harper on 2024   
   
                                                    Albumin DL <= 20 mg/L   
(U) [Mass/Vol]  1.1 mg/dL       Normal          0.0-1.8         [facility]  
   
                                        Comment on above:   Order Comment: Reaso  
n for Exam Type 2 diabetes mellitus   
   
                                                            Result Comment: PERF  
ORMED BY:  
[place] [address]  
[phone]  
PATHOLOGIST MEDICAL DIRECTOR  
VIKAS MARTINEZ M.D.   
   
                                                            Performed By: #### U  
RMA, CMP, LIPID, CBCNO, PSAS, TEST ####  
[facility]  
[address] [place] [address]   
   
                                                    No Panel InformationOrdered   
By: Elvira Harper on 2024   
   
                      Estimated GFR (CKD-EPI) > 60.0 mL/Min                       
  [facility]  
   
                                                    Pharmacy Creatinine   
Clearance (Chem N/A                                             [facility]  
   
                                                    PSA Screen (Yearly Only)on 0  
2024   
   
                                                    PSA Screen (Yearly   
Only)           0.970 ng/mL     Normal          0.000-4.000     [facility]   
Physician   
Group  
   
                                        Comment on above:   Order Comment: Reaso  
n for Exam Prostate cancer screening  
Is patient <50 yrs? Medicare does not pay <50.: N  
Is Medicare the insurance?: Y   
   
                                                            Result Comment: Seri  
al tumor marker results determined by   
assays using  
different manufacturers or methods may not be comparable.  
[facility]  and method:  
Learning HyperdriveEL DXI, CHEMILUMINESCENT IMMUNOASSAY.  
PERFORMED BY:  
[place] [address]  
[phone]  
PATHOLOGIST MEDICAL DIRECTOR  
VIKAS MARTINEZ M.D.   
   
                                                            Performed By: #### U  
RMA, CMP, LIPID, CBCNO, PSAS, TEST ####  
[place] [id number] [facility]   
   
                                                    Platelet mean volume [Entiti  
c volume] in Blood by Automated countOrdered By:   
Elvira Harper on 2024   
   
                                                    Platelet mean volume   
(Bld) [Entitic vol] 7.9 fL          Normal          6.6-10.1        [facility]  
   
                                        Comment on above:   Order Comment: Reaso  
n for Exam Type 2 diabetes mellitus   
   
                                                            Result Comment: PERF  
ORMED BY:  
[place] [address]  
[phone]  
PATHOLOGIST MEDICAL DIRECTOR  
VIKAS MARTINEZ M.D.   
   
                                                            Performed By: #### U  
RMA, CMP, LIPID, CBCNO, PSAS, TEST ####  
[facility]  
[address] [place] [address]   
   
                                                    Platelets [#/volume] in Bloo  
d by Automated countOrdered By: Elvira Harper on   
2024   
   
                      Platelets (Bld) [#/Vol] 170 10*3/uL Normal     150-450      
[facility]  
   
                                        Comment on above:   Order Comment: Reaso  
n for Exam Type 2 diabetes mellitus   
   
                                                            Performed By: #### U  
RMA, CMP, LIPID, CBCNO, PSAS, TEST ####  
[facility]  
[address] [place] [address]   
   
                                                    Potassium [Moles/volume] in   
Serum or PlasmaOrdered By: Elvira Harper on   
2024   
   
                      Potassium [Moles/Vol] 4.6 mmol/L Normal     3.5-5.1    [facility]  
   
                                        Comment on above:   Order Comment: Reaso  
n for Exam Type 2 diabetes mellitus   
   
                                                            Performed By: #### U  
RMA, CMP, LIPID, CBCNO, PSAS, TEST ####  
[facility]  
[address] [place] [address]   
   
                                                    Prostate specific Ag [Mass/v  
olume] in Serum or PlasmaOrdered By: Elvira Harper on  
   
2024   
   
                                                    Prostate specific Ag   
[Mass/Vol]      0.970 ng/mL                     0.000-4.000     [facility]  
   
                                        Comment on above:   Serial tumor marker   
results determined by assays using   
different manufacturers or methods may not be   
comparable.[facility]  and   
method:ANNA Tuic[employer]EL DXI, CHEMILUMINESCENT IMMUNOASSAY.   
   
                                                    Protein [Mass/volume] in Ser  
um or PlasmaOrdered By: Elvira Harper on 2024   
   
                      Protein [Mass/Vol] 6.7 g/dL   Normal     6.4-8.9    [facility]  
   
                                        Comment on above:   Order Comment: Reaso  
n for Exam Type 2 diabetes mellitus   
   
                                                            Performed By: #### U  
RMA, CMP, LIPID, CBCNO, PSAS, TEST ####  
[facility]  
[address]   
   
                                                    Serum globulin measurement b  
y calculation (mass/volume)Ordered By: Elvira Harper   
on   
2024   
   
                      Globulin (S) [Mass/Vol] 2.3 g/dL   Normal                [facility]  
   
                                        Comment on above:   Order Comment: Reaso  
n for Exam Type 2 diabetes mellitus   
   
                                                            Performed By: #### U  
RMA, CMP, LIPID, CBCNO, PSAS, TEST ####  
[facility]  
[address]   
   
                                                    Serum or plasma albumin/glob  
ulin mass ratioOrdered By: Elvira Harper on   
2024   
   
                                                    Albumin/Globulin [Mass   
ratio]          1.9 {ratio}     Normal                          [facility]  
   
                                        Comment on above:   Order Comment: Reaso  
n for Exam Type 2 diabetes mellitus   
   
                                                            Performed By: #### U  
RMA, CMP, LIPID, CBCNO, PSAS, TEST ####  
[facility]  
[address] [place] [address]   
   
                                                    Serum or plasma anion gap de  
terminationOrdered By: Elvira Harper on 2024   
   
                      Anion gap [Moles/Vol] 8.3 mmol/L Normal     6.0-15.0   [facility]  
   
                                        Comment on above:   Order Comment: Reaso  
n for Exam Type 2 diabetes mellitus   
   
                                                            Performed By: #### U  
RMA, CMP, LIPID, CBCNO, PSAS, TEST ####  
[facility]  
[address]   
   
                                                    Serum or plasma high density  
 lipoprotein (HDL) cholesterol measurementOrdered   
By:   
Elvira Harper on 2024   
   
                                                    Cholesterol in HDL   
[Mass/Vol]      43 mg/dL        Normal          23-92           [facility]  
   
                                        Comment on above:   HDL CHOL ATP-III CLA  
SSIFICATION Cardiovascular RiskHDL > or   
equal to 60 mg/dL LOWHDL < 40 mg/dL HIGH   
   
                                                            Order Comment: Reaso  
n for Exam Type 2 diabetes mellitus   
   
                                                            Result Comment: HDL   
CHOL ATP-III CLASSIFICATION  
Cardiovascular  
Risk  
HDL > or equal to 60 mg/dL LOW  
HDL < 40 mg/dL HIGH   
   
                                                            Performed By: #### U  
RMA, CMP, LIPID, CBCNO, PSAS, TEST ####  
[facility]  
[address]   
   
                                                    Serum or plasma total choles  
terol/high density lipoprotein (HDL) cholesterol   
mass   
ratOrdered By: Elvira Harper on 2024   
   
                                                    Cholesterol.total/Fernanda  
sterol in HDL [Mass   
ratio]          3.9 {ratio}     Normal          <5.0            [facility]  
   
                                        Comment on above:   Order Comment: Reaso  
n for Exam Type 2 diabetes mellitus   
   
                                                            Result Comment: PERF  
ORMED BY:  
[place] [address]  
[phone]  
PATHOLOGIST MEDICAL DIRECTOR  
VIKAS MARTINEZ M.D.   
   
                                                            Performed By: #### U  
RMA, CMP, LIPID, CBCNO, PSAS, TEST ####  
[facility]  
[address] [place] [address] [place]   
   
                                                    Sodium [Moles/volume] in Ser  
um or PlasmaOrdered By: Elvira Harper on 2024   
   
                      Sodium [Moles/Vol] 137 mmol/L Normal     136-145    [facility]  
   
                                        Comment on above:   Order Comment: Reaso  
n for Exam Type 2 diabetes mellitus   
   
                                                            Performed By: #### U  
RMA, CMP, LIPID, CBCNO, PSAS, TEST ####  
[facility]  
[address] [place] [address]   
   
                                                    Testosteroneon 2024   
   
                      Testosterone 4.30 ng/mL Normal     1.75-7.81  [facility]   
Physician   
Group  
   
                                        Comment on above:   Order Comment: Reaso  
n for Exam Low testosterone   
   
                                                            Result Comment: PERF  
ORMED BY:  
[place] [address]  
[phone]  
PATHOLOGIST MEDICAL DIRECTOR  
VIKAS MARTINEZ M.D.   
   
                                                            Performed By: #### U  
RMA, CMP, LIPID, CBCNO, PSAS, TEST ####  
[facility]  
[address] [place] [address] [place]   
   
                                                    Testosterone [Mass/volume] i  
n Serum or PlasmaOrdered By: Elvira Harper on   
2024   
   
                      Testosterone [Mass/Vol] 4.30 ng/mL            1.75-7.81  F  
[facility]  
   
                                                    Triglyceride [Mass/volume] i  
n Serum or PlasmaOrdered By: Elvira Harper on   
2024   
   
                      Triglyceride [Mass/Vol] 117 mg/dL             0-149      F  
[facility]  
   
                                        Comment on above:   TRIG ATP III CLASSIF  
ICATIONTRIG less than 150 mg/dL NormalTRIG  
   
150-199 mg/dL Borderline highTRIG 200-500 mg/dL High TRIG   
greater than 500 mg/dL Very highStandard traceable to the   
Center for Disease Conrtrol and Prevention (CDC) test method.   
   
                                                    Urea nitrogen [Mass/volume]   
in Serum or PlasmaOrdered By: Elvira Harper on   
2024   
   
                                                    Urea nitrogen   
[Mass/Vol]      14 mg/dL        Normal          7-25            [facility]  
   
                                        Comment on above:   Order Comment: Reaso  
n for Exam Type 2 diabetes mellitus   
   
                                                            Performed By: #### U  
RMA, CMP, LIPID, CBCNO, PSAS, TEST ####  
[facility]  
[address]   
   
                                                    XR shoulder RT min 2V*on    
   
                                        XR shoulder RT min 2V* [facility]  
[address] [place] [address]  
  
XRay Report  
Signed  
  
Patient: Kaylynn Mims MR#: [id number]  
16  
: 1955   
Acct:[id number]  
  
Age/Sex: 68 / M ADM   
Date: 24  
  
[place] [facility] [place]   
[facility]  
Attending Dr: Gucci Maxwell MD  
Copies to: Gucci Maxwell MD  
  
  
  
Ordering Provider:   
Gucci Maxwell MD  
Date of Service:   
24  
Accession #:   
([id number]) XR/XR   
shoulder RT min 2V*:   
M25.511 - Pain in   
right shoulder  
  
  
  
  
4 views RIGHT   
shoulder plain film  
  
HISTORY: RIGHT   
shoulder pain for 2   
months.  
  
COMPARISON: None  
  
ACUTE FINDINGS: None  
  
DEGENERATIVE CHANGE:   
Extensive   
acromioclavicular   
degeneration with   
inferior marginal   
spurring.  
  
SOFT TISSUE FINDINGS:   
Unremarkable  
  
JOINT EFFUSION: None  
  
POSTOP CHANGES: None  
  
BONY MINERALIZATION:   
Adequate  
  
  
ORDER #: [id number]   
XR/XR shoulder RT min   
2V*  
IMPRESSION: Extensive   
acromioclavicular   
degenerative change.  
  
Impression dictated   
by: Neo Perales M.D.3/7/2024 2:07 PM  
  
  
Dictation Location:   
[place] [address]  
  
  
  
Transcribed By: [facility]   
24 1407  
Dictated By:   
Neo Perales DO   
24 1406  
  
Signed By:  
24 1407       Normal                                  [facility]   
Physician   
Group  
   
                                                    A1C HEMOGLOBINon 2023   
   
                                                    HbA1c (Bld) [Mass   
fraction]       5.8 %                                           [facility]   
[facility]  
Other Phone:   
[phone]  
   
                                                    HbA1c (Bld) [Mass fraction]o  
n 2023   
   
                      A1C HEMOGLOBIN                                  [facility]  
t   
[facility]   
[facility]  
Other Phone:   
[phone]  
   
                                                    A1C HEMOGLOBINon 08-   
   
                                                    HbA1c (Bld) [Mass   
fraction]       5.7 %                                           [facility]   
[facility]  
Other Phone:   
[phone]  
   
                                                    HbA1c (Bld) [Mass fraction]o  
n 08-   
   
                      A1C HEMOGLOBIN                                  [facility]  
t   
[facility]   
[facility]  
Other Phone:   
[phone]  
   
                                                    URINE DRUG SCREEN (IN-HOUSE)  
on 08-   
   
                                                    URINE DRUG SCREEN   
(IN-HOUSE)                                                      [facility]   
[facility]  
Other Phone:   
[phone]  
   
                                                    CBC AUTO DIFFon 2023   
   
                      BASO #     0.0 103/ul Normal     0.0-0.1    [facility]  
   
                                        Comment on above:   Performed By: #### C  
BC ####  
[facility]  
[address] [place] [address]  
Dr. Dariana Hollingsworth   
   
                      Basophils/100 WBC (Bld) 0.5 %      Normal     0.2-2.0    [facility]  
   
                                        Comment on above:   Performed By: #### C  
BC ####  
[facility]  
[address] [place] [address]  
Dr. Dariana Hollingsworth   
   
                      EO #       0.1 103/ul Normal     0.0-0.7    [facility]  
   
                                        Comment on above:   Performed By: #### C  
BC ####  
[facility]  
[address] [place] [address]  
Dr. Dariana Hollingsworth   
   
                                                    Eosinophils/100 WBC   
(Bld)           1.8 %           Normal          0.9-7.0         [facility]  
   
                                        Comment on above:   Performed By: #### C  
BC ####  
[facility]  
[address] [place] [address]  
Dr. Dariana Hollingsworth   
   
                                                    Erythrocyte   
distribution width   
(RBC) [Ratio]   12.6 %          Normal          11.0-15.0       [facility]  
   
                                        Comment on above:   Performed By: #### C  
BC ####  
[facility]  
[address] [place] [address]  
Dr. Dariana oHllingsworth   
   
                                                    Hematocrit (Bld)   
[Volume fraction] 50.9 %          Normal          42.0-54.0       [facility]  
   
                                        Comment on above:   Performed By: #### C  
BC ####  
[facility]  
[address] [place] [address]  
Dr. Dariana Hollingsworth   
   
                                                    Hemoglobin (Bld)   
[Mass/Vol]      16.6 g/dL       Normal          14.0-18.0       [facility]  
   
                                        Comment on above:   Performed By: #### C  
BC ####  
[facility]  
[address] [place] [address]  
Dr. Dariana Hollingsworth   
   
                      IG #       0.03 10e3/ul Normal     0.00-0.03  [facility]  
   
                                        Comment on above:   Performed By: #### C  
BC ####  
[facility]  
[address] [place] [address]  
Dr. Dariana Hollingsworth   
   
                      IG %       0.5 %      Normal     0.0-0.5    [facility]  
   
                                        Comment on above:   Performed By: #### C  
BC ####  
[facility]  
[address] [place] [address]  
Dr. Dariana Hollingsworth   
   
                      LYMPH #    1.4 103/ul Normal     1.2-3.8    [facility]  
   
                                        Comment on above:   Performed By: #### C  
BC ####  
[facility]  
[address] [place] [address]  
Dr. Dariana Hollingsworth   
   
                                                    Lymphocytes/100 WBC   
(Bld)           22.8 %          Normal          20.5-60.0       [facility]  
   
                                        Comment on above:   Performed By: #### C  
BC ####  
[facility]  
[address] [place] [address]  
Dr. Dariana Hollingsworth   
   
                      MANUAL DIFF REQ NO         Normal                [facility]  
   
                                        Comment on above:   Performed By: #### C  
BC ####  
[facility]  
[address] [place] [address]  
Dr. Dariana Hollingsworth   
   
                                                    MCH (RBC) [Entitic   
mass]           28.2 pg         Normal          25.9-34.0       [facility]  
   
                                        Comment on above:   Performed By: #### C  
BC ####  
[facility]  
[address] [place] [address]  
Dr. Dariana Hollingsworth   
   
                      MCHC (RBC) [Mass/Vol] 32.6 g/dL  Normal     29.9-35.2  [facility]  
   
                                        Comment on above:   Performed By: #### C  
BC ####  
[facility]  
[address] [place] [address]  
Dr. Dariana Hollingsworth   
   
                      MCV (RBC) [Entitic vol] 86.4 fL    Normal     80.0-94.0  [facility]  
   
                                        Comment on above:   Performed By: #### C  
BC ####  
[facility]  
[address] [place] [address]  
Dr. Dariana Hollingsworth   
   
                      MONO #     0.5 103/ul Normal     0.3-0.8    [facility]  
   
                                        Comment on above:   Performed By: #### C  
BC ####  
[facility]  
[address] [place] [address]  
Dr. Dariana Hollingsworth   
   
                      Monocytes/100 WBC (Bld) 8.7 %      Normal     1.7-12.0   [facility]  
   
                                        Comment on above:   Performed By: #### C  
BC ####  
[facility]  
[address] [place] [address]  
Dr. Dariana Hollingsworth   
   
                      NEUT #     4.1 103/ul Normal     1.4-6.5    [facility]  
   
                                        Comment on above:   Performed By: #### C  
BC ####  
[facility]  
[address] [place] [address]  
Dr. Dariana Hollingsworth   
   
                                                    Neutrophils/100 WBC   
(Bld)           65.7 %          Normal          43.0-75.0       [facility]  
   
                                        Comment on above:   Performed By: #### C  
BC ####  
[facility]  
[address] [place] [address]  
Dr. Dariana Hollingsworth   
   
                                                    Platelet mean volume   
(Bld) [Entitic vol] 9.3 fL          Critically low  9.5-13.5        [facility]  
   
                                        Comment on above:   Performed By: #### C  
BC ####  
[facility]  
[address] [place] [address]  
Dr. Dariana Hollingsworth   
   
                      PLT        176 103/ul Normal     150-450    [facility]  
   
                                        Comment on above:   Performed By: #### C  
BC ####  
[facility]  
[address] [place] [address]  
Dr. Dariana Hollingsworth   
   
                      RBC        5.89 106/ul Normal     4.70-6.10  [facility]  
   
                                        Comment on above:   Performed By: #### C  
BC ####  
[facility]  
[address] [place] [address]  
Dr. Dariana Hollingsworth   
   
                      WBC        6.2 103/ul Normal     4.0-11.0   [facility]  
   
                                        Comment on above:   Performed By: #### C  
BC ####  
[facility]  
[address] [place] [address]  
Dr. Dariana Hollingsworth   
   
                                                    GLYCOHEMOGLOBIN A1Con 2023   
   
                      ADA RECOMMENDATION SEE BELOW  Normal                [facility]  
   
                                        Comment on above:   Result Comment: ADA   
RECOMMENDED LIMIT 4.0 - 6.0 ADA   
THERAPEUTIC TARGET < 7.0 ACTION SUGGESTED > 7.0   
   
                                                            Performed By: #### A  
1C ####  
[facility]  
[address] [place] [address]  
Dr. Dariana Hollingsworth   
   
                      Glucose [Mass/Vol] 126 mg/dL  Normal                [facility]  
   
                                        Comment on above:   Performed By: #### A  
1C ####  
[facility]  
[address] [place] [address]  
Dr. Dariana Hollingsworth   
   
                                                    HbA1c (Bld) [Mass   
fraction]       6.0 %           Normal          4.5-6.2         [facility]  
   
                                        Comment on above:   Performed By: #### A  
1C ####  
[facility]  
[address] [place] [address]  
Dr. Darinaa Hollingsworth   
   
                                                    MICROALBUMIN, RAND URon 04-1  
3-2023   
   
                      mALB       2.0 mg/L   Normal     <=30.0     [facility]  
   
                                        Comment on above:   Performed By: #### M  
ALBR ####  
[facility]  
[address] [place] [address]  
Dr. Dariana Hollingsworth   
   
                                                    PROF 14(COMP METB)on   
023   
   
                      Albumin [Mass/Vol] 3.6 g/dL   Normal     3.4-5.0    [facility]  
   
                                        Comment on above:   Performed By: #### C  
MP ####  
[facility]  
[address] [place] [address]  
Dr. Dariana Hollingsworth   
   
                                                    Albumin/Globulin [Mass   
ratio]          1.0 {ratio}     Normal                          [facility]  
   
                                        Comment on above:   Performed By: #### C  
MP ####  
[facility]  
[address] [place] [address]  
Dr. Dariana Hollingsworth   
   
                                                    ALP [Catalytic   
activity/Vol]   82 U/L          Normal                    [facility]  
   
                                        Comment on above:   Performed By: #### C  
MP ####  
[facility]  
[address] [place] [address]  
Dr. Dariana Hollingsworth   
   
                                                    ALT [Catalytic   
activity/Vol]   46 U/L          Normal          16-63           [facility]  
   
                                        Comment on above:   Performed By: #### C  
MP ####  
[facility]  
[address] [place] [address]  
Dr. Dariana Hollingsworth   
   
                      Anion gap [Moles/Vol] 11.2 mmol/L Normal                [facility]  
   
                                        Comment on above:   Performed By: #### C  
MP ####  
[facility]  
[address] [place] [address]  
Dr. Dariana Hollingsworth   
   
                                                    AST [Catalytic   
activity/Vol]   24 U/L          Normal          15-37           [facility]  
   
                                        Comment on above:   Performed By: #### C  
MP ####  
[facility]  
[address] [place] [address]  
Dr. Dariana Hollingsworth   
   
                      Bilirubin [Mass/Vol] 1.2 mg/dL  Critically high 0.2-1.0     
 [facility]  
   
                                        Comment on above:   Performed By: #### C  
MP ####  
[facility]  
[address] [place] [address]  
Dr. Dariana Hollingsworth   
   
                      Calcium [Mass/Vol] 8.9 mg/dL  Normal     8.5-10.1   [facility]  
   
                                        Comment on above:   Performed By: #### C  
MP ####  
[facility]  
[address] [place] [address]  
Dr. Dariana Hollingsworth   
   
                      Chloride [Moles/Vol] 104 mmol/L Normal          [facility]  
   
                                        Comment on above:   Performed By: #### C  
MP ####  
[facility]  
[address] [place] [address]  
Dr. Dariana Hollingsworth   
   
                      CO2 [Moles/Vol] 25.2 mmol/L Normal     21.0-32.0  [facility]  
   
                                        Comment on above:   Performed By: #### C  
MP ####  
[facility]  
[address] [place] [address]  
Dr. Dariana Hollingsworth   
   
                      Creatinine [Mass/Vol] 0.83 mg/dL Normal     0.70-1.30  [facility]  
   
                                        Comment on above:   Performed By: #### C  
MP ####  
[facility]  
[address] [place] [address]  
Dr. Dariana Hollingsworth   
   
                      EGFR-AF AMERICAN >60        Normal     >=60       [facility]  
   
                                        Comment on above:   Performed By: #### C  
MP ####  
[facility]  
[address] [place] [address]  
Dr. Dariana Hollingsworth   
   
                      EGFR-NON AF AMERICAN >60        Normal     >=60       [facility]  
   
                                        Comment on above:   Performed By: #### C  
MP ####  
[facility]  
[address] [place] [address]  
Dr. Dariana Hollingsworth   
   
                      Globulin (S) [Mass/Vol] 3.7 g/dL   Normal                [facility]  
   
                                        Comment on above:   Performed By: #### C  
MP ####  
[facility]  
[address] [place] [address]  
Dr. Dariana Hollingsworth   
   
                      Glucose [Mass/Vol] 133 mg/dL  Critically high      [facility]  
   
                                        Comment on above:   Performed By: #### C  
MP ####  
[facility]  
[address] [place] [address]  
Dr. Dariana Hollingsworth   
   
                      Potassium [Moles/Vol] 4.4 mmol/L Normal     3.5-5.1    [facility]  
 [facility]  
   
                                        Comment on above:   Performed By: #### C  
MP ####  
[facility]  
[address] [place] [address]  
Dr. Dariana Hollingsworth   
   
                      Protein [Mass/Vol] 7.3 g/dL   Normal     6.4-8.2    [facility]  
   
                                        Comment on above:   Performed By: #### C  
MP ####  
[facility]  
[address] [place] [address]  
Dr. Dariana Hollingsworth   
   
                      Sodium [Moles/Vol] 136 mmol/L Normal     136-145    [facility]  
   
                                        Comment on above:   Performed By: #### C  
MP ####  
[facility]  
[address] [place], [place] [address]  
Dr. Dariana Hollingsworth   
   
                                                    Urea nitrogen   
[Mass/Vol]      13.0 mg/dL      Normal          7.0-18.0        [facility]  
   
                                        Comment on above:   Performed By: #### C  
MP ####  
[facility]  
[address] [place], [place] [address]  
Dr. Dariana Hollingsworth   
   
                                                    Urea   
nitrogen/Creatinine   
[Mass ratio]    15.7 mg/mg      Normal                          [facility]  
   
                                        Comment on above:   Performed By: #### C  
MP ####  
[facility]  
[address] [place], [place] [address]  
Dr. Dariana Hollingsworth   
   
                                                    XR CSPINE MIN 4 VIEWSon    
   
                                        XR CSPINE MIN 4 VIEWS EXAMINATION: XR   
CSPINE MIN 4 VIEWS  
HISTORY: Cervical   
segmental dysfunction  
COMPARISON: No   
relevant comparison   
available.  
FINDINGS:  
BONES: No significant   
spondylosis,   
scoliosis, fracture,   
or visible bony   
lesion.  
DISC SPACES: Mild   
narrowing C5-C6.   
Suspect mild foramen   
narrowing C5-C6  
bilaterally.  
PARASPINOUS:   
Negative. No   
paraspinous   
abnormality is seen.  
OTHER: Negative.  
IMPRESSION:  
1. C5-C6 moderate   
degenerative disc   
disease and suspected   
mild-moderate foramen  
narrowing   
bilaterally. Consider   
MRI for further   
evaluation if   
symptoms persist.  
Electronically   
authenticated by:   
KAYLYNN GOMEZ Date:   
2023-02-10 07:11    Normal                                  [facility]  
  
  
  
Vital Signs  
  
  
                          Date Time    Vital Sign   Value        Performing   
Clinician                               Facility  
   
                                                    2024   
08:050400          Body height         187.96 cm           DO Elvira Harper  
Work Phone:   
[phone]                          [facility]  
   
                                                    2024   
08:                              Body mass index   
(BMI) [Ratio]             41.5 kg/m2                DO Elvira Harper  
Work Phone:   
[phone]                          [facility]  
   
                                                    2024   
08:05040          Body temperature    98.3 [degF]         DO Elviraandres Harper  
Work Phone:   
[phone]                          [facility]  
   
                                                    2024   
08:          Body weight         146.59 kg           DO Elviraamilcar Harper  
Work Phone:   
[phone]                          [facility]  
   
                                                    2024   
08:                              Diastolic blood   
pressure                  58 mm[Hg]                 DO Elvira Harper  
Work Phone:   
[phone]                          [facility]  
   
                                                    2024   
08:          Heart rate          97 /min             DO Elvira Harper  
Work Phone:   
[phone]                          [facility]  
   
                                                    2024   
08:          Respiratory rate    20 /min             DO Elvira Harper  
Work Phone:   
[phone]                          [facility]  
   
                                                    2024   
08:                              SaO2% (BldA) [Mass   
fraction]                 98 %                      DO Elvira Harper  
Work Phone:   
[phone]                          [facility]  
   
                                                    2024   
08:                              Systolic blood   
pressure                  102 mm[Hg]                DO Elvira Harper  
Work Phone:   
[phone]                          [facility]  
   
                                                    2024   
11:          Body temperature    98.8 [degF]         DO Elvira Harper  
Work Phone:   
[phone]                          [facility]  
   
                                                    2024   
11:                              Diastolic blood   
pressure                  82 mm[Hg]                 DO Elvira Harper  
Work Phone:   
[phone]                          [facility]  
   
                                                    2024   
11:          Heart rate          75 /min             DO Elvira Harper  
Work Phone:   
[phone]                          [facility]  
   
                                                    2024   
11:          Respiratory rate    18 /min             DO Elvira Harper  
Work Phone:   
[phone]                          [facility]  
   
                                                    2024   
11:                              SaO2% (BldA) [Mass   
fraction]                 96 %                      DO Elvira Harper  
Work Phone:   
[phone]                          [facility]  
   
                                                    2024   
11:                              Systolic blood   
pressure                  177 mm[Hg]                DO Elvira Harper  
Work Phone:   
[phone]                          [facility]  
   
                                                    2024   
05:          Body weight         158.5 kg            DO Elvira Harper  
Work Phone:   
[phone]                          [facility]  
   
                                                    2024   
13:          Body height         189.23 cm           DO Elvira Harper  
Work Phone:   
[phone]                          [facility]  
   
                                                    2024   
15:                              Inhaled oxygen flow   
rate                      2 L/min                   DO Elvira Harper  
Work Phone:   
[phone]                          [facility]  
   
                                                    2024   
17:          Body temperature    98.3 [degF]         DO Elvira Harper  
Work Phone:   
[phone]                          [facility]  
   
                                                    2024   
17:                              Diastolic blood   
pressure                  67 mm[Hg]                 DO Elvira Harper  
Work Phone:   
[phone]                          [facility]  
   
                                                    2024   
17:          Heart rate          75 /min             DO Elvira Harper  
Work Phone:   
[phone]                          [facility]  
   
                                                    2024   
17:          Respiratory rate    18 /min             DO Elvira Harper  
Work Phone:   
[phone]                          [facility]  
   
                                                    2024   
17:                              SaO2% (BldA) [Mass   
fraction]                 95 %                      DO Elvira Harper  
Work Phone:   
[phone]                          [facility]  
   
                                                    2024   
17:                              Systolic blood   
pressure                  148 mm[Hg]                DO Elvira Harper  
Work Phone:   
[phone]                          [facility]  
   
                                                    2024   
11:          Body height         189.23 cm           DO Elvira Harper  
Work Phone:   
[phone]                          [facility]  
   
                                                    2024   
11:          Body weight         160.57 kg           DO Elvira Harper  
Work Phone:   
[phone]                          [facility]  
   
                                                    2024   
14:          Body height         187.96 cm           DO Elvira Harper  
Work Phone:   
[phone]                          [facility]  
   
                                                    2024   
14:                              Body mass index   
(BMI) [Ratio]             45.4 kg/m2                DO Elvira Harper  
Work Phone:   
[phone]                          [facility]  
   
                                                    2024   
14:          Body weight         160.57 kg           DO Elvira Harper  
Work Phone:   
[phone]                          [facility]  
   
                                                    2024   
14:                              Diastolic blood   
pressure                  82 mm[Hg]                 DO Elvira Harper  
Work Phone:   
[phone]                          [facility]  
   
                                                    2024   
14:          Heart rate          68 /min             DO Elvira Harper  
Work Phone:   
[phone]                          [facility]  
   
                                                    2024   
14:                              SaO2% (BldA) [Mass   
fraction]                 97 %                      DO Elvira Harper  
Work Phone:   
[phone]                          [facility]  
   
                                                    2024   
14:                              Systolic blood   
pressure                  144 mm[Hg]                DO Elvira Harper  
Work Phone:   
[phone]                          [facility]  
   
                                                    2024   
09:          Body height         187.96 cm           DO Elvira Harper  
Work Phone:   
[phone]                          [facility]  
   
                                                    2024   
09:                              Body mass index   
(BMI) [Ratio]             45.7 kg/m2                DO Elvira Harper  
Work Phone:   
[phone]                          [facility]  
   
                                                    2024   
09:          Body weight         161.47 kg           DO Elvira Harper  
Work Phone:   
[phone]                          [facility]  
   
                                                    2024   
09:                              Diastolic blood   
pressure                  82 mm[Hg]                 DO Elvira Harper  
Work Phone:   
[phone]                          [facility]  
   
                                                    2024   
09:          Heart rate          68 /min             DO Elvira Harper  
Work Phone:   
[phone]                          [facility]  
   
                                                    2024   
09:          Respiratory rate    18 /min             DO Elvira Harper  
Work Phone:   
[phone]                          [facility]  
   
                                                    2024   
09:                              SaO2% (BldA) [Mass   
fraction]                 98 %                      DO Elvira Harper  
Work Phone:   
[phone]                          [facility]  
   
                                                    2024   
09:                              Systolic blood   
pressure                  144 mm[Hg]                DO Elvira Harper  
Work Phone:   
[phone]                          [facility]  
   
                                                    2024   
07:          Body height         187.96 cm           DO Elvira Harper  
Work Phone:   
[phone]                          [facility]  
   
                                                    2024   
07:                              Body mass index   
(BMI) [Ratio]             46.3 kg/m2                DO Elvira Harper  
Work Phone:   
[phone]                          [facility]  
   
                                                    2024   
07:          Body weight         163.74 kg           DO Elvira Harper  
Work Phone:   
[phone]                          [facility]  
   
                                                    2024   
07:                              Diastolic blood   
pressure                  78 mm[Hg]                 DO Elvira Harper  
Work Phone:   
[phone]                          [facility]  
   
                                                    2024   
07:          Heart rate          76 /min             DO Elvira Harper  
Work Phone:   
[phone]                          [facility]  
   
                                                    2024   
07:          Respiratory rate    18 /min             DO Elvira Harper  
Work Phone:   
[phone]                          [facility]  
   
                                                    2024   
07:                              SaO2% (BldA) [Mass   
fraction]                 94 %                      DO Elvira Harper  
Work Phone:   
[phone]                          [facility]  
   
                                                    2024   
07:                              Systolic blood   
pressure                  138 mm[Hg]                DO Elvira Harper  
Work Phone:   
[phone]                          [facility]  
   
                                                    05-   
09:          Body height         187.96 cm           DO Elvira Harper  
Work Phone:   
[phone]                          [facility]  
   
                                                    05-   
09:                              Body mass index   
(BMI) [Ratio]             46.4 kg/m2                DO Elvira Harper  
Work Phone:   
[phone]                          [facility]  
   
                                                    05-   
09:          Body weight         163.97 kg           DO Elvira Harper  
Work Phone:   
[phone]                          [facility]  
   
                                                    05-   
09:                              Diastolic blood   
pressure                  78 mm[Hg]                 DO Elvira Harper  
Work Phone:   
[phone]                          [facility]  
   
                                                    05-   
09:          Heart rate          74 /min             DO Elvira Harper  
Work Phone:   
[phone]                          [facility]  
   
                                                    05-   
09:          Respiratory rate    18 /min             DO Elvira Harper  
Work Phone:   
[phone]                          [facility]  
   
                                                    05-   
09:                              SaO2% (BldA) [Mass   
fraction]                 94 %                      DO Elvira Harper  
Work Phone:   
[phone]                          [facility]  
   
                                                    05-   
09:                              Systolic blood   
pressure                  142 mm[Hg]                DO Elvira Harper  
Work Phone:   
[phone]                          [facility]  
   
                                                    2024   
08:          Body height         187.96 cm           DO Elvira Harper  
Work Phone:   
[phone]                          [facility]  
   
                                                    2024   
08:                              Body mass index   
(BMI) [Ratio]             47 kg/m2                  DO Elvira Harper  
Work Phone:   
[phone]                          [facility]  
   
                                                    2024   
08:          Body weight         166.01 kg           DO Elvira Harper  
Work Phone:   
[phone]                          [facility]  
   
                                                    2024   
13:          Body height         187.96 cm           DO Elvira Harper  
Work Phone:   
[phone]                          [facility]  
   
                                                    2024   
13:                              Body mass index   
(BMI) [Ratio]             47 kg/m2                  DO Elvira Harper  
Work Phone:   
[phone]                          [facility]  
   
                                                    2024   
13:          Body weight         166.09 kg           DO Elvira Harper  
Work Phone:   
[phone]                          [facility]  
   
                                                    2024   
13:                              Diastolic blood   
pressure                  84 mm[Hg]                 DO Elvira Harper  
Work Phone:   
[phone]                          [facility]  
   
                                                    2024   
13:          Heart rate          72 /min             DO Elvira Harper  
Work Phone:   
[phone]                          [facility]  
   
                                                    2024   
13:          Respiratory rate    18 /min             DO Elvira Harper  
Work Phone:   
[phone]                          [facility]  
   
                                                    2024   
13:                              SaO2% (BldA) [Mass   
fraction]                 96 %                      DO Elvira Harper  
Work Phone:   
[phone]                          [facility]  
   
                                                    2024   
13:                              Systolic blood   
pressure                  132 mm[Hg]                DO Elvira Harper  
Work Phone:   
[phone]                          [facility]  
   
                                                    2023   
10:          Body height         187.96 cm           Elviraamilcar Harper  
Other Phone:   
[phone]                           [employer]  
Other Phone:   
[phone]  
   
                                                    2023   
10:                              Body mass index   
(BMI) [Ratio]             46.91 kg/m2               Elviraamilcar Harper  
Other Phone:   
[phone]                           [employer]  
Other Phone:   
[phone]  
   
                                                    2023   
10:          Body weight         165.75 kg           Elviraamilcar Harper  
Other Phone:   
[phone]                           [employer]  
Other Phone:   
[phone]  
   
                                                    2023   
10:                              Diastolic blood   
pressure                  78 mm[Hg]                 Elvira Harper  
Other Phone:   
[phone]                           [employer]  
Other Phone:   
[phone]  
   
                                                    2023   
10:          Respiratory rate    18 /min             Elvira Harper  
Other Phone:   
[phone]                           [employer]  
Other Phone:   
[phone]  
   
                                                    2023   
10:                              SaO2% (BldA) [Mass   
fraction]                 97 %                      Elvira Meredith  
Other Phone:   
[phone]                           [employer]  
Other Phone:   
[phone]  
   
                                                    2023   
10:                              Systolic blood   
pressure                  128 mm[Hg]                Elvira Meredith  
Other Phone:   
[phone]                           [employer]  
Other Phone:   
[phone]  
   
                                                    08-   
09:          Body height         187.96 cm           Elvira Harper  
Other Phone:   
[phone]                           [employer]  
Other Phone:   
[phone]  
   
                                                    08-   
09:                              Body mass index   
(BMI) [Ratio]             47.06 kg/m2               Elviraandres Harper  
Other Phone:   
[phone]                           [employer]  
Other Phone:   
[phone]  
   
                                                    08-   
09:          Body weight         166.29 kg           Elviraamilcar Harper  
Other Phone:   
[phone]                           [employer]  
Other Phone:   
[phone]  
   
                                                    08-   
09:                              Diastolic blood   
pressure                  78 mm[Hg]                 Elvira Meredith  
Other Phone:   
[phone]                           [employer]  
Other Phone:   
[phone]  
   
                                                    08-   
09:          Respiratory rate    18 /min             Elviraandres Harper  
Other Phone:   
[phone]                           [employer]  
Other Phone:   
[phone]  
   
                                                    08-   
09:                              SaO2% (BldA) [Mass   
fraction]                 97 %                      Elvira Meredith  
Other Phone:   
[phone]                           [employer]  
Other Phone:   
[phone]  
   
                                                    08-   
09:                              Systolic blood   
pressure                  136 mm[Hg]                Elvira Meredith  
Other Phone:   
[phone]                           [employer]  
Other Phone:   
[phone]  
  
  
  
Encounters  
  
  
                          Encounter Date Encounter Type Care Provider Facility  
   
                                                    Start: 2024  
End: 2024           ambulatory                DO Elvira A Harper  
Work Phone:   
[phone]                          [facility]  
Work Phone:   
[phone]  
   
                                                    Start: 2024  
End: 2024                         Patient encounter   
procedure                               DO Elvira Harper  
Work Phone:   
[phone]                          [facility]   
[facility] [place]  
Work Phone:   
[phone]  
   
                                Start: 2024 Non-patient / Non-visit DO Alyssia  
andres Harper  
Work Phone:   
[phone]                          [facility]   
[facility]   
[facility] [place]  
Work Phone:   
[phone]  
   
                                Start: 2024 Non-patient / Non-visit DO Alyssia  
andres Harper  
Work Phone:   
[phone]                          [facility]   
[facility]   
Disease  
Work Phone:   
[phone]  
   
                                Start: 2024 Non-patient / Non-visit DO Alyssia  
andres Harper  
Work Phone:   
[phone]                          [facility]   
[facility] [place]   
Orthopedics  
Work Phone:   
[phone]  
   
                                                    Start: 2024  
End: 2024                         Evaluation and   
management of inpatient                 DO Elvira Harper  
Work Phone:   
[phone]                          [facility]   
Surg  
Work Phone:   
[phone]  
   
                                                    Start: 2024  
End: 2024           ambulatory                DO Elvira A Harper  
Work Phone:   
[phone]                          [facility]  
Work Phone:   
[phone]  
   
                                                    Start: 2024  
End: 2024                         Patient encounter   
procedure                               DO Elvira Harper  
Work Phone:   
[phone]                          [facility]   
[facility]   
[facility] [place]  
Work Phone:   
[phone]  
   
                                                    Start: 2024  
End: 2024           ambulatory                DO Elvira A Harper  
Work Phone:   
[phone]                          [facility]  
Work Phone:   
[phone]  
   
                                                    Start: 2024  
End: 2024                         Patient encounter   
procedure                               DO Elvira Harper  
Work Phone:   
[phone]                          [facility]   
[facility] [place]  
Work Phone:   
[phone]  
   
                                Start: 2024 Non-patient / Non-visit DO Alyssia  
andres Harper  
Work Phone:   
[phone]                          [facility] [place]  
Work Phone:   
[phone]  
   
                                                    Start: 2024  
End: 2024           ambulatory                DO Elvira A Harper  
Work Phone:   
[phone]                          [facility]  
Work Phone:   
[phone]  
   
                                                    Start: 2024  
End: 2024                         Patient encounter   
procedure                               DO Elvira Harper  
Work Phone:   
[phone]                          [facility] [place]  
Work Phone:   
[phone]  
   
                                                    Start: 05-  
End: 05-     ambulatory          To Khan Facility:[facility]  
   
                                                    Start: 05-  
End: 05-                         Patient encounter   
procedure                               DO Elvira Harper  
Work Phone:   
[phone]                          [facility]   
[facility]  
   
                                                    Start: 2024  
End: 2024                         Patient encounter   
procedure                               DO Elvira Harper  
Work Phone:   
[phone]                          [facility]  
   
                                                    Start: 2024  
End: 2024           ambulatory                DO Elvira A Harper  
Work Phone:   
[phone]                          [facility]  
Work Phone:   
[phone]  
   
                                                    Start: 2024  
End: 2024     ambulatory          Gucci Maxwell        Facility:[facility]  
   
                                                    Start: 2024  
End: 2024                         Patient encounter   
procedure                               DO Elvira Harper  
Work Phone:   
[phone]                          [facility]   
[facility]   
Orthopedics  
Work Phone:   
[phone]  
   
                                                    Start: 2024  
End: 2024                         Patient encounter   
procedure                               DO Elvira Meredith  
Work Phone:   
[phone]                          [facility] [place]  
Work Phone:   
[phone]  
   
                                                    Start: 2023  
End: 2023           ambulatory                Elvira Harper  
Other Phone:   
[phone]                           [employer] [facility] [employer]   
[employer]  
Other Phone:   
[phone]  
   
                                        Start: 2023   Patient encounter   
procedure                 Elvira Harper              [facility]   
[place]  
   
                                                    Start: 08-  
End: 08-           ambulatory                Elvira Harper  
Other Phone:   
[phone]                           [employer] [facility] [employer]   
[employer]  
Other Phone:   
[phone]  
   
                                        Start: 08-   Office outpatient ne  
w   
45 minutes                Elvira Harper              [facility]   
[place]  
   
                                                    Start: 2023  
End: 2023     ambulatory          DR BELINDA MAHAJAN    Facility:[facility]  
   
                                                    Start: 2023  
End: 02-     ambulatory          DR DOCTOR ORELLANA      Facility:[facility]  
  
  
  
Procedures  
  
  
                          Date         Procedure    Procedure Detail Performing   
Clinician  
   
                          Start: 2024 Plain chest X-ray              DO Gl  
oria Meredith  
[employer] Phone:   
[phone]  
   
                                        Start: 2024   Blood culture for ba  
cteria,   
including anaerobic screen                           DO Elvira Meredith  
Work Phone:   
[phone]  
   
                                        Start: 2024   Investigation of   
transfusion reaction                                DO Elvira Harper  
Work Phone:   
[phone]  
   
                          Start: 2024 Arthroscopy of knee              DO   
Elvira Meredith  
Work Phone:   
[phone]  
   
                                        Start: 2024   Bacterial ID (NA Mul  
tiplex   
Assay)                                              DO Elvira Harper  
Work Phone:   
[phone]  
   
                                        Start: 2024   Blood culture for ba  
cteria,   
including anaerobic screen                           DO Elvira Meredith  
Work Phone:   
[phone]  
   
                                        Start: 2024   Investigation of   
transfusion reaction                                DO Elvira Harper  
Work Phone:   
[phone]  
   
                                        Start: 2024   Duplex scan of lower  
 limb   
veins                                               DO Elvira Cellerix  
Work Phone:   
[phone]  
   
                          Start: 2024 X-ray of right knee              DO   
Elvira Cellerix  
Work Phone:   
[phone]  
   
                          Start: 2024 X-ray of right knee              DO   
Elvira Cellerix  
Work Phone:   
[phone]  
   
                                        Start: 05-   Radiologic examinati  
on of   
knee                                                DO Elvira Cellerix  
Work Phone:   
[phone]  
   
                                        Start: 2024   Plain X-ray of right  
   
shoulder                                            DO Elvira [employer] Phone:   
[phone]  
   
                          Start: 2023 PSA screening              DR DOCTOR ORELLANA  
   
                                        Comment on above:   Performed By: #### P  
SAD ####  
[facility]  
[address] [place] [address]  
Dr. Dariana Hollingsworth   
  
  
  
Plan of Treatment  
  
  
                          Date         Care Activity Detail       Author  
   
                                                    Start:   
2024                                                  [facility]  
   
                                                    Start:   
2024                                                  [facility]  
   
                                                    Start:   
2024                                                  [facility]  
   
                                                    Start:   
2024                              Blood culture for   
bacteria, including   
anaerobic screen          Blood Culture             [facility]  
   
                                                    Start:   
2024          Hospital admission                      [facility]  
   
                                                    Start:   
2024                              Referral to infectious   
diseases physician                                  [facility]  
   
                                                    Start:   
2024                                                  [facility]  
   
                                                    Start:   
2024                              Microbial culture, body   
fluid                                               [facility]  
   
                                                    Start:   
2024                              Physical therapy   
procedure                                           [facility]  
   
                                                    Start:   
2024                              Referral to occupational   
therapist                                           [facility]  
   
                                                    Start:   
2024          Consultation                            [facility]  
   
                                                    Start:   
2024          Hospital admission                      [facility]  
   
                                                    Start:   
2024                                                  [facility]  
   
                                                    Start:   
2024                              Bacteria identified in   
Blood by Culture                                    [facility]  
   
                                                    Start:   
2024                              Blood culture for   
bacteria, including   
anaerobic screen          Blood Culture             [facility]  
   
                                                    Start:   
2024                              Insertion of Infusion   
Device into Upper Vein,   
Percutaneous Approach                   Insertion of Infusion   
Device into Upper Vein,   
Percutaneous Approach                   [facility]  
   
                                                    Start:   
2024                              Irrigation of Joints   
using Irrigating   
Substance, Percutaneous   
Endoscopic Approach                     Irrigation of Joints   
using Irrigating   
Substance, Percutaneous   
Endoscopic Approach                     [facility]  
   
                                                    Start:   
2024                              Duplex scan of lower   
limb veins                US venous duplex LE RT    [facility]  
   
                                                    Start:   
2024                              US Lower extremity vein   
- right                                             [facility]  
   
                                                    Start:   
2024                                                  [facility]  
   
                                                            Bacteria identified   
in   
Unspecified specimen by   
Aerobe culture                                      [facility]  
   
                                                            Bacteria identified   
in   
Unspecified specimen by   
Anaerobe culture                                    [facility]  
   
                                                            Glucose measurement   
estimated from glycated   
hemoglobin                                          [facility]  
   
                                       Patient Education Know your [employer] [facility]  
Work Phone:   
[phone]  
   
                                       Patient referral              [facility]  
Work Phone:   
[phone]  
   
                                       XR Knee - right 4 Views              [facility]  
  
  
  
Immunizations  
  
  
                      Immunization Date Immunization Notes      Care Provider Fa  
cility  
   
                          2023   Prevnar 20                Elvira Harper  
Other Phone:   
[phone]                           [facility]  
   
                                        2023          zoster vaccine   
recombinant                                         Elvira Harper  
Other Phone:   
[phone]                           [facility]  
   
                                        10-          COVID-19 Pfizer   
(bivalent)                                          Elvira Harper  
Other Phone:   
[phone]                           [facility]  
   
                                        2021          COVID-19 Vaccine Pfi  
zer   
- Documentation Purposes   
Only                                                Elvira Harper  
Other Phone:   
[phone]                           [facility]  
   
                                        2021          COVID-19 Vaccine Pfi  
zer   
- Documentation Purposes   
Only                                                Elvira Harper  
Other Phone:   
[phone]                           [facility]  
   
                                        2021          COVID-19 Vaccine Pfi  
zer   
- Documentation Purposes   
Only                                                Elvira Harper  
Other Phone:   
[phone]                           [facility]  
  
  
  
Payers  
  
  
                          Date         Payer Category Payer        Policy ID  
   
                          2024   Medicare                  [id number]  
   
                          2024   Self-pay                  [id number]  
[id number]  
   
                          1960   Medicare                  [id number]  
   
                          1955   Unknown                   [id number]  
[id number]  
   
                          1956   Unknown                   [id number]  
[id number]  
   
                                       Unknown      Moni BC/TITUS [id number]   
[id number]  
   
                                       Unknown                   [id number] [id number]  
[id number]  
   
                                       Unknown                   [id number] [id number]  
[id number]  
   
                                       Unknown                   [id number] [id number]  
[id number]  
   
                                       Unknown                   [id number] [id number]  
[id number]  
   
                                       Unknown                   [id number] [id number]  
[id number]  
  
  
  
Social History  
  
  
                          Date         Type         Detail       Facility  
   
                                       Sex Assigned At Birth              [facility]  
Other Phone:   
[phone]  
   
                          Start: 1955 Sex Assigned At Birth Male         [facility]  
   
                                                    Start: 2024  
End: 2024                         Tobacco smoking status   
NHIS                                    Never smoked tobacco   
(finding)                               [facility]  
  
  
  
Medical Equipment  
  
  
                                Procedure Code  Equipment Code  Equipment Origin  
al   
Text                      Equipment Identifier      Dates  
   
                                                                OneTouch Delica   
Lancets 33G                                           
   
                                                                OneTouch Delica   
Lancets                                             Start:   
2024  
End: 2024  
   
                                                                OneTouch Delica   
Lancets                                             Start:   
2024  
End: 2024  
   
                                                                OneTouch Delica   
Lancets                                             Start:   
2024  
End: 2024  
   
                                                                OneTouch Delica   
Lancets                                             Start:   
2024  
End: 2024  
   
                                                                OneTouch Delica   
Lancets                                             Start:   
2024  
End: 2024  
   
                                                                OneTouch Delica   
Lancets                                             Start:   
2024  
End: 2024  
   
                                                                OneTouch Delica   
Lancets                                             Start:   
2024  
End: 2024  
   
                                                                OneTouch Delica   
Lancets                                             Start:   
2024  
End: 2024  
   
                                                                OneTouch Delica   
Lancets                                             Start:   
2024  
End: 2024  
  
  
  
Goals  
  
  
                                Date            Patient Goal    Desired Activity  
/State  
   
                                                                  
  
  
  
Functional Status  
  
  
                          Date         Assessment   Result       Facility  
   
                                2024      Functional status Patient is Pro  
gressing Toward   
Baseline                                [facility]  
Work Phone: [phone]  
   
                          2024   Functional status Patient Not at Baseline  
 [facility]  
Work Phone: [phone]  
  
  
  
Mental Status  
  
  
                          Date         Assessment   Result       Facility  
   
                                2024      Cognitive function Cognitive Sta  
tus Patient at   
Baseline                                [facility]  
Work Phone: [phone]  
   
                                2024      Cognitive function Cognitive Sta  
tus Patient at   
Baseline                                [facility]  
Work Phone: [phone]  
  
  
  
Clinical Notes 08- to 2024  
  
  
                                Note Date & Type Note            Facility  
  
  
  
                                        2024 Progress note   
  
  
  
   
                                           
   
                                        Note Date/Time      2024   
12:37pm  
   
                                                    [facility]  
ENTER  
[address] [place] [address]  
  
Hospitalist Progress Note  
Signed with Addenda  
  
Patient: Kaylynn Mims MR#: [id number]  
[id number]  
: 1955 Acct:[id number]  
  
Age/Sex: 68 / M Adm Date:   
4  
Loc:  Room: [address] [place] [address] Type: ADM IN  
Attending Dr: Josie Ann MD  
  
Copies to: ~  
  
  
  
**ADDENDUM**1  
Uncontrolled blood pressure, patient does not have a   
history of hypertension, valsartan started, today   
the dose increased  
  
Addendum Documented By: Josie Ann MD 24   
141  
Addendum Signed By: <Electronically signed by Josie Ann MD> 24 141  
  
  
  
Date of Service:  
2024  
  
  
Subjective  
Subjective  
Narrative:  
Patient has been seen and examined today. He   
complains of worsening pain overnight, sharp, like   
sensation, currently complains 8 out of 10, however   
clinically he does not appear to be in distress  
Patient underwent irrigation of the right knee on   
, cultures pending, so far negative  
Blood cultures strep viridans 1 out of 2  
Synovial fluid culture strep mitis  
  
  
  
Physical exam:  
General -awake, alert, oriented ?3, not in acute   
distress  
Cardiovascular -S1 with S2, no murmurs, no rubs, no   
gallops  
Pulmonary - clear to auscultation bilaterally  
Gastrointestinal - abdomen is soft, nondistended,   
nontender, bowel sounds positive, there is no   
rigidity, no rebound  
Right knee in dressing  
Extremities -no edema  
Neurological -no focal neurological dysfunction   
noted  
  
  
  
Exam  
Physical Exam  
Vital Signs:  
  
  
  
  
Temp Pulse Resp BP Pulse Ox O2 Del Method O2 Flow   
Rate  
  
36.8 C 75 17 172/85 H 95 Room Air 2  
  
24 12:05 24 12:05 24 12:05   
24 12:05 24 12:05 24 12:05   
24 15:21  
  
  
  
  
Objective  
Lab Results  
  
24 05:35  
  
24 05:35  
  
Microbiology Results  
Microbiology  
  
24 10:30 Knee,Right - Other Aerobic Culture -   
Preliminary  
Rare normal skin zack  
2 Days  
24 10:30 Knee,Right - Other Anaerobic Culture   
- Preliminary  
24 10:30 Knee,Right - Other Gram Stain - Final  
24 10:28 Knee,Right - Other Aerobic Culture -   
Final  
No Growth 2 Days  
24 10:28 Knee,Right - Other Anaerobic Culture   
- Preliminary  
24 10:28 Knee,Right - Other Gram Stain - Final  
24 10:29 Knee,Right - Other Aerobic Culture -   
Final  
Rare normal skin zack  
2 Days  
24 10:29 Knee,Right - Other Anaerobic Culture   
- Preliminary  
24 10:29 Knee,Right - Other Gram Stain - Final  
24 19:45 Blood - Left Hand Blood Culture -   
Preliminary  
[facility]  
24 19:45 Blood - Left Hand Bacterial ID (NA   
Multiplex Assay) - Final  
24 23:00 Joint Fluid - Knee, Right Aerobic   
Culture - Final  
[facility]oral[facility]  
24 23:00 Joint Fluid - Knee, Right Anaerobic   
Culture - Preliminary  
No Anaerobes Isolated 2 Days  
24 23:00 Joint Fluid - Knee, Right Gram Stain   
- Final  
24 19:15 Joint Fluid - Knee, Right Aerobic   
Culture - Final  
No Growth 2 Days  
24 19:15 Joint Fluid - Knee, Right Anaerobic   
Culture - Final  
No Anaerobes Isolated 3 Days  
24 19:15 Joint Fluid - Knee, Right Gram Stain   
- Final  
24 19:55 Blood - Right Hand Blood Culture -   
Preliminary  
No Growth 2 Days  
  
  
  
Meds  
Allergies and Active Meds  
Allergies  
  
metformin Allergy (Unknown, Verified 24 08:53)  
back spasms  
Penicillins Allergy (Unknown, Verified 24   
08:53)  
rash  
  
  
Active Meds:  
Active Medications  
  
  
  
Generic Name Dose Route Start Last Admin  
  
Trade Name Freq PRN Reason Stop Dose Admin  
  
Acetaminophen 650 mg 24 17:56 24 08:21  
  
Acetaminophen 325 Mg Tablet PO 25 17:55 650 mg  
  
Q4H PRN Administration  
  
Pain Scale 1 - 5  
  
Acetaminophen 1,000 mg 24 12:00 24 12:23  
  
Acetaminophen 500 Mg Tablet PO 25 11:59 1,000   
mg  
  
Q8H NIHARIKA Administration  
  
Ascorbic Acid 500 mg 24 17:00 24 07:58  
  
Ascorbic Acid 500 Mg Tablet PO 25 16:59 500 mg  
  
BID.WITH.MEALS NIHARIKA Administration  
  
Docusate Sodium 200 mg 24 17:56  
  
Docusate 100 Mg Capsule PO 25 17:55  
  
BID PRN  
  
Constipation  
  
Enoxaparin Sodium 40 mg 24 10:00 24   
09:42  
  
Enoxaparin 40 Mg/0.4 Ml Syringe SUBCUT 25   
09:59 40 mg  
  
DAILY@1000 NIHARIKA Administration  
  
Ferrous Sulfate 324 mg 24 17:00 24 07:57  
  
Ferrous Sulfate 324 Mg Tablet.Dr PO 25 16:59   
324 mg  
  
BID.WITH.MEALS NIHARIKA Administration  
  
Hydralazine HCl 10 mg 24 17:56  
  
Hydralazine 20 Mg/Ml Vial IV-PUSH 25 17:55  
  
Q4H PRN  
  
if SBP > 185  
  
Hydromorphone HCl 0.5 mg 24 09:38  
  
Hydromorphone 0.5 Mg/0.5 Ml Syringe IV-PUSH  
  
Q4H PRN  
  
pain  
  
Ceftriaxone Sodium 2 gm in 50 mls @ 100 mls/hr   
24 14:00 24 15:44  
  
Rocephin  mls/hr  
  
Q24H NIHARIKA Administration  
  
Mineral Oil 1 each 24 11:33  
  
Mineral Oil (Orange) 1 Each Enema IN  
  
ONCE PRN  
  
Constipation  
  
Naloxone HCl 0.4 mg 24 11:33  
  
Naloxone Hcl 0.4 Mg/Ml Vial IV-PUSH 25 11:32  
  
Q2M PRN  
  
Opioid Reversal  
  
Oxycodone HCl 5 mg 24 17:56 24 22:50  
  
Oxycodone Ir 5 Mg Tablet PO 5 mg  
  
Q4HR PRN Administration  
  
Pain  
  
Oxycodone HCl 5 mg 24 11:33 24 12:24  
  
Oxycodone Ir 5 Mg Tablet PO 5 mg  
  
Q4HR PRN Administration  
  
Pain Scale 6 - 10  
  
Pantoprazole Sodium 40 mg 24 09:00 24   
08:37  
  
Pantoprazole 40 Mg Tablet.Dr PO 25 08:59 40 mg  
  
DAILY NIHARIKA Administration  
  
Polyethylene Glycol 17 gm 24 09:00 24   
08:37  
  
Polyethylene Glycol 3350 17 Gm Powd.Pack PO 24   
08:59 17 gm  
  
DAILY NIHARIKA Administration  
  
Prochlorperazine Edisylate 5 mg 24 17:56  
  
Prochlorperazine Edisylate 10 Mg/2 Ml Vial IV-PUSH   
25 17:55  
  
Q4H PRN  
  
Nausea And Vomiting  
  
Senna/Docusate Sodium 2 tab 24 09:00 24   
08:37  
  
Sennosides/Docusate 8.6-50mg 1 Tab Tablet PO   
24 08:59 2 tab  
  
DAILY NIHARIKA Administration  
  
Sodium Chloride 0 ml 24 14:00 24 05:33  
  
Sodium Chloride 0.9 % 10 Ml Syringe IV-PUSH 25   
13:59 Not Given  
  
QSHIFT NIHARIKA  
  
Sodium Chloride 0 ml 24 13:44 24 23:45  
  
Sodium Chloride 0.9 % 10 Ml Syringe IV-PUSH 25   
13:43 10 ml  
  
PRN PRN Administration  
  
Flush  
  
Tramadol HCl 50 mg 24 11:33 24 01:16  
  
Tramadol 50 Mg Tablet PO 25 11:32 50 mg  
  
Q6H PRN Administration  
  
Pain Scale 1 - 5  
  
Valsartan 80 mg 24 13:10 24 08:37  
  
Valsartan 80 Mg Tablet PO 25 13:09 80 mg  
  
DAILY NIHARIKA Administration  
  
  
  
  
A&P - Hospitalist  
Assessment/Plan  
(1) Septic arthritis of knee, right:  
  
Plan  
1. Suspected acute right knee septic arthritis,   
status post irrigation on   
Continue with Rocephin therapy, cultures reviewed,   
discussed with ID  
Blood cultures strep viridans 1 out of 2, repeated   
cultures pending  
Appreciate orthopedic input, will reevaluate today  
Continue with pain management  
  
2. DVT prophylaxis SCDs Lovenox  
  
  
Documented By: Josie Ann MD 24 [id number]  
Signed By: <Electronically signed by Josie Ann MD>  
24 1237  
   
  
[facility]  
Work Phone: [phone]- Progress note  
  
  
  
  
                                        Author              Josie Ann  
[facility]  
2024 2:16pm  
   
                                        Note Date/Time      2024 2:  
16pm  
   
                                                    [facility]  
ENTER  
[address] [place] [address]  
  
Hospitalist Progress Note  
Signed  
  
Patient: Kaylynn Mims MR#: [id number]  
[id number]  
: 1955 Acct:[id number]  
  
Age/Sex: 68 / M Adm Date:   
4  
Loc:  Room: [address] [place] [address] Type: ADM IN  
Attending Dr: Josie Ann MD  
  
Copies to: ~  
  
  
Date of Service:  
2024  
  
  
Subjective  
Subjective  
Narrative:  
Patient has been seen and examined today. He was ambulated in the hallway with a
   
walker, however still complains of significant pain requiring opioids.  
Otherwise denies any abdominal pain, passing flatus, no nausea no vomiting, 
denies   
any shortness of breath any chest pain  
  
  
Physical exam:  
General -awake, alert, oriented ?3, not in acute distress  
Cardiovascular -S1 with S2, no murmurs, no rubs, no gallops  
Pulmonary - clear to auscultation bilaterally  
Gastrointestinal - abdomen is soft, nondistended, nontender, bowel sounds 
positive,   
there is no rigidity, no rebound  
Right knee in dressing, I did not undress, it take a look at the knee, still 
some   
swelling noted, incision site looks quite well, without any drainage without any
   
erythema, no obvious skin signs of infection  
Extremities -no edema  
Neurological -no focal neurological dysfunction noted  
  
  
  
Exam  
Physical Exam  
Vital Signs:  
  
  
  
  
Temp Pulse Resp BP Pulse Ox O2 Del Method O2 Flow Rate  
  
36.5 C 79 20 175/80 H 95 Room Air 2  
  
24 12:08 24 12:08 24 12:08 24 12:08 24 12:08 
24   
12:08 24 15:21  
  
  
  
  
Objective  
Lab Results  
  
24 05:35  
  
24 05:25  
  
Microbiology Results  
Microbiology  
  
24 13:40 Blood - Right Antecubital Blood Culture - Preliminary  
No Growth 2 Days  
24 13:41 Blood - Right Hand Blood Culture - Preliminary  
No Growth 2 Days  
24 10:30 Knee,Right - Other Aerobic Culture - Final  
[facility]  
24 10:30 Knee,Right - Other Anaerobic Culture - Final  
24 10:30 Knee,Right - Other Gram Stain - Final  
24 10:29 Knee,Right - Other Aerobic Culture - Final  
[facility]  
24 10:29 Knee,Right - Other Anaerobic Culture - Final  
24 10:29 Knee,Right - Other Gram Stain - Final  
24 10:28 Knee,Right - Other Aerobic Culture - Final  
[facility]  
24 10:28 Knee,Right - Other Anaerobic Culture - Final  
24 10:28 Knee,Right - Other Gram Stain - Final  
24 23:00 Joint Fluid - Knee, Right Aerobic Culture - Final  
[facility]  
24 23:00 Joint Fluid - Knee, Right Anaerobic Culture - Final  
No Anaerobes Isolated 3 Days  
24 23:00 Joint Fluid - Knee, Right Gram Stain - Final  
24 19:45 Blood - Left Hand Blood Culture - Preliminary  
[facility]  
24 19:45 Blood - Left Hand Bacterial ID (NA Multiplex Assay) - Final  
24 19:55 Blood - Right Hand Blood Culture - Preliminary  
No Growth 3 Days  
  
  
  
Meds  
Allergies and Active Meds  
Allergies  
  
metformin Allergy (Unknown, Verified 24 08:53)  
back spasms  
Penicillins Allergy (Unknown, Verified 24 08:53)  
rash  
  
  
Active Meds:  
Active Medications  
  
  
  
Generic Name Dose Route Start Last Admin  
  
Trade Name Freq PRN Reason Stop Dose Admin  
  
Acetaminophen 650 mg 24 17:56 24 08:21  
  
Acetaminophen 325 Mg Tablet PO 25 17:55 650 mg  
  
Q4H PRN Administration  
  
Pain Scale 1 - 5  
  
Acetaminophen 1,000 mg 24 12:00 24 12:14  
  
Acetaminophen 500 Mg Tablet PO 25 11:59 1,000 mg  
  
Q8H [facility] Administration  
  
Ascorbic Acid 500 mg 24 17:00 24 09:21  
  
Ascorbic Acid 500 Mg Tablet PO 25 16:59 500 mg  
  
BID.WITH.MEALS [facility] Administration  
  
Docusate Sodium 200 mg 24 17:56  
  
Docusate 100 Mg Capsule PO 25 17:55  
  
BID PRN  
  
Constipation  
  
Enoxaparin Sodium 40 mg 24 10:00 24 09:21  
  
Enoxaparin 40 Mg/0.4 Ml Syringe SUBCUT 25 09:59 40 mg  
  
DAILY@1000 [facility] Administration  
  
Ferrous Sulfate 324 mg 24 17:00 24 09:21  
  
Ferrous Sulfate 324 Mg Tablet.Dr PO 25 16:59 324 mg  
  
BID.WITH.MEALS [facility] Administration  
  
Heparin Sodium (Porcine) 0 unit 24 14:00 24 05:21  
  
Heparin-Lock 500 Unit/5 Ml Syringe IV-PUSH 25 13:59 500 unit  
  
QSHIFT [facility] Administration  
  
Hydralazine HCl 10 mg 24 17:56  
  
Hydralazine 20 Mg/Ml Vial IV-PUSH 25 17:55  
  
Q4H PRN  
  
if SBP > 185  
  
Hydromorphone HCl 0.5 mg 24 09:38  
  
Hydromorphone 0.5 Mg/0.5 Ml Syringe IV-PUSH  
  
Q4H PRN  
  
pain  
  
Ceftriaxone Sodium 2 gm in 50 mls @ 100 mls/hr 24 14:00 24 16:50  
  
Rocephin IV Infused  
  
Q24H [facility] Infusion  
  
Mineral Oil 1 each 24 11:33  
  
Mineral Oil (Orange) 1 Each Enema IN  
  
ONCE PRN  
  
Constipation  
  
Naloxone HCl 0.4 mg 24 11:33  
  
Naloxone Hcl 0.4 Mg/Ml Vial IV-PUSH 25 11:32  
  
Q2M PRN  
  
Opioid Reversal  
  
Oxycodone HCl 5 mg 24 11:33 24 12:24  
  
Oxycodone Ir 5 Mg Tablet PO 5 mg  
  
Q4HR PRN Administration  
  
Pain Scale 6 - 10  
  
Oxycodone HCl 10 mg 24 12:38 24 13:30  
  
Oxycodone Ir 5 Mg Tablet PO 10 mg  
  
Q4HR PRN Administration  
  
Pain  
  
Pantoprazole Sodium 40 mg 24 09:00 24 09:20  
  
Pantoprazole 40 Mg Tablet.Dr PO 25 08:59 40 mg  
  
DAILY [facility] Administration  
  
Polyethylene Glycol 17 gm 24 09:00 24 09:22  
  
Polyethylene Glycol 3350 17 Gm Powd.Pack PO 24 08:59 17 gm  
  
DAILY [facility] Administration  
  
Prochlorperazine Edisylate 5 mg 24 17:56  
  
Prochlorperazine Edisylate 10 Mg/2 Ml Vial IV-PUSH 25 17:55  
  
Q4H PRN  
  
Nausea And Vomiting  
  
Senna/Docusate Sodium 2 tab 24 21:00 24 09:22  
  
Sennosides/Docusate 8.6-50mg 1 Tab Tablet PO 24 20:59 2 tab  
  
BID [facility] Administration  
  
Sodium Chloride 0 ml 24 14:00 24 05:29  
  
Sodium Chloride 0.9 % 10 Ml Syringe IV-PUSH 25 13:59 10 ml  
  
QSHIFT [facility] Administration  
  
Sodium Chloride 0 ml 24 13:44 24 23:45  
  
Sodium Chloride 0.9 % 10 Ml Syringe IV-PUSH 25 13:43 10 ml  
  
PRN PRN Administration  
  
Flush  
  
Tramadol HCl 50 mg 24 11:33 24 12:15  
  
Tramadol 50 Mg Tablet PO 25 11:32 50 mg  
  
Q6H PRN Administration  
  
Pain Scale 1 - 5  
  
Valsartan 160 mg 24 09:00  
  
Valsartan 160 Mg Tablet PO 25 08:59  
  
DAILY [facility]A  
  
  
  
  
A&P - Hospitalist  
Assessment/Plan  
(1) Septic arthritis of knee, right:  
  
Plan  
1. Suspected acute right knee septic arthritis, status post irrigation on   
Continue with Rocephin therapy, cultures reviewed, discussed with ID  
Appreciate orthopedic input,  
Continue with pain management, quite difficult to control,  
  
2. DVT prophylaxis SCDs Lovenox  
  
Plan to discharge in a.m.  
  
  
Documented By: Josie Ann MD 24  
Signed By: <Electronically signed by Josie Ann MD>  
24  
   
  
[facility]  
Work Phone: [phone]- Progress note  
  
  
  
  
                                        Author              Paul Crum  
[facility]  
2024 10:56am  
   
                                        Note Date/Time      2024 10  
:56am  
   
                                                    [facility]  
ENTER  
[address] [place] [address]  
  
Infect. Disease Progress Note  
Signed  
  
Patient: Kaylynn Mims MR#: [id number]  
[id number]  
: 1955 Acct:[id number]  
  
Age/Sex: 68 / M Adm Date:   
4  
Loc:  Room: [address] [place] [address] Type: ADM IN  
Attending Dr: Josie Ann MD  
  
Copies to: ~  
  
  
Date of Service:  
2024  
  
  
Subjective  
Interval history:  
Patient still endorses he is having a significant mount of pain at the right 
knee   
itself. States he had a cold sweat last night but remains afebrile. Patient just
   
feels he should be able to put more weight and do more with his right knee as he
   
tells me it feels just as bad as it did when he came to the hospital  
  
Exam  
Physical Exam  
Vital Signs:  
  
Vital Signs  
  
  
  
Temp Pulse Resp BP Pulse Ox O2 Del Method  
  
24 08:00 Room Air  
  
24 07:24 98.8 F 72 12 188/107 H 97 Room Air  
  
24 04:00 86 18 168/83 H 97  
  
24 00:00 76 18 145/69 H 95  
  
24 20:00 98.9 F 82 18 171/89 H 97 Room Air  
  
24 20:00 Room Air  
  
24 16:33 97.6 F 83 18 171/85 H 95 Room Air  
  
24 12:05 98.3 F 75 17 172/85 H 95 Room Air  
  
  
  
Const  
General: cooperative, uncomfortable and no acute distress  
Orientation: oriented x3  
HEENT  
Head: normal to inspection  
Ears: hearing grossly normal bilaterally  
Mouth: oral mucosae normal  
Eyes  
General: appearance normal, both eyes and all related structures  
Neck  
Neck: normal visual inspection  
Chest  
Chest palpation & inspection: normal inspection of the chest  
Resp  
Effort & Inspection: normal respiratory effort  
Cardio  
Rate: regular rate  
Rhythm: regular rhythm  
GI  
Inspection: normal to inspection  
Palpation: soft and nontender  
Auscultation: normal bowel sounds  
Skin  
General: no rashes or lesions noted  
Neuro  
General: patient oriented x3  
Extrem  
General: abnormal to inspection (R knee wrapped)  
Other:  
Wrap not taken down. Patient endorses that he having a lot of knee pain.  
  
Objective  
Labs  
CBC/BMP:  
CBC, BMP  
  
  
  
24  
  
05:25  
  
Sodium 133 L  
  
Potassium 4.3  
  
Chloride 98  
  
Carbon Dioxide 27.1  
  
Anion Gap 12.2  
  
BUN 14  
  
Creatinine 0.67 L  
  
Calcium 8.6  
  
  
  
Labs:  
  
  
  
  
24  
  
05:25  
  
BUN 14  
  
Creatinine 0.67 L  
  
  
  
Microbiology  
Microbiology:  
Microbiology - Results from entire visit  
  
24 10:30 Knee,Right - Other Aerobic Culture - Final  
[facility]  
24 10:30 Knee,Right - Other Anaerobic Culture - Final  
24 10:30 Knee,Right - Other Gram Stain - Final  
24 10:29 Knee,Right - Other Aerobic Culture - Final  
[facility]  
24 10:29 Knee,Right - Other Anaerobic Culture - Final  
24 10:29 Knee,Right - Other Gram Stain - Final  
24 10:28 Knee,Right - Other Aerobic Culture - Final  
[facility]  
24 10:28 Knee,Right - Other Anaerobic Culture - Final  
24 10:28 Knee,Right - Other Gram Stain - Final  
24 23:00 Joint Fluid - Knee, Right Aerobic Culture - Final  
[facility]  
24 23:00 Joint Fluid - Knee, Right Anaerobic Culture - Final  
No Anaerobes Isolated 3 Days  
24 23:00 Joint Fluid - Knee, Right Gram Stain - Final  
24 19:45 Blood - Left Hand Blood Culture - Preliminary  
[facility]  
24 19:45 Blood - Left Hand Bacterial ID (NA Multiplex Assay) - Final  
24 19:55 Blood - Right Hand Blood Culture - Preliminary  
No Growth 3 Days  
24 13:40 Blood - Right Antecubital Blood Culture - Preliminary  
No Growth 1 Day  
24 13:41 Blood - Right Hand Blood Culture - Preliminary  
No Growth 1 Day  
24 19:15 Joint Fluid - Knee, Right Aerobic Culture - Final  
No Growth 2 Days  
24 19:15 Joint Fluid - Knee, Right Anaerobic Culture - Final  
No Anaerobes Isolated 3 Days  
24 19:15 Joint Fluid - Knee, Right Gram Stain - Final  
  
  
  
Allergies and Medications  
Allergies and Active Meds  
Allergies  
  
metformin Allergy (Unknown, Verified 24 08:53)  
back spasms  
Penicillins Allergy (Unknown, Verified 24 08:53)  
rash  
  
  
Active Medications  
  
Acetaminophen (Acetaminophen 325 Mg Tablet) 650 mg PO Q4H PRN  
PRN Reason: Pain Scale 1 - 5  
Stop: 25 17:55  
Last Admin: 24 08:21 Dose: 650 mg  
  
Acetaminophen (Acetaminophen 500 Mg Tablet) 1,000 mg PO Q8H [facility]  
Stop: 25 11:59  
Last Admin: 24 04:02 Dose: 1,000 mg  
  
Ascorbic Acid (Ascorbic Acid 500 Mg Tablet) 500 mg PO BID.WITH.MEALS [facility]  
Stop: 25 16:59  
Last Admin: 24 09:21 Dose: 500 mg  
  
Docusate Sodium (Docusate 100 Mg Capsule) 200 mg PO BID PRN  
PRN Reason: Constipation  
Stop: 25 17:55  
Enoxaparin Sodium (Enoxaparin 40 Mg/0.4 Ml Syringe) 40 mg SUBCUT DAILY@1000 [facility]  
Stop: 25 09:59  
Last Admin: 24 09:21 Dose: 40 mg  
  
Ferrous Sulfate (Ferrous Sulfate 324 Mg Tablet.Dr) 324 mg PO BID.WITH.MEALS [facility]  
Stop: 25 16:59  
Last Admin: 24 09:21 Dose: 324 mg  
  
Heparin Sodium (Porcine) (Heparin-Lock 500 Unit/5 Ml Syringe) 0 unit IV-PUSH 
QSHIFT   
[facility]  
Stop: 25 13:59  
Last Admin: 24 05:21 Dose: 500 unit  
  
Hydralazine HCl (Hydralazine 20 Mg/Ml Vial) 10 mg IV-PUSH Q4H PRN  
PRN Reason: if SBP > 185  
Stop: 25 17:55  
Hydromorphone HCl (Hydromorphone 0.5 Mg/0.5 Ml Syringe) 0.5 mg IV-PUSH Q4H PRN  
PRN Reason: pain  
Ceftriaxone Sodium (Rocephin) 2 gm in 50 mls @ 100 mls/hr IV Q24H [facility]  
Last Infusion: 24 16:50 Dose: Infused  
  
Mineral Oil (Mineral Oil (Orange) 1 Each Enema) 1 each IN ONCE PRN  
PRN Reason: Constipation  
Naloxone HCl (Naloxone Hcl 0.4 Mg/Ml Vial) 0.4 mg IV-PUSH Q2M PRN  
PRN Reason: Opioid Reversal  
Stop: 25 11:32  
Oxycodone HCl (Oxycodone Ir 5 Mg Tablet) 5 mg PO Q4HR PRN  
PRN Reason: Pain Scale 6 - 10  
Last Admin: 24 12:24 Dose: 5 mg  
  
Oxycodone HCl (Oxycodone Ir 5 Mg Tablet) 10 mg PO Q4HR PRN  
PRN Reason: Pain  
Last Admin: 24 09:20 Dose: 10 mg  
  
Pantoprazole Sodium (Pantoprazole 40 Mg Tablet.Dr) 40 mg PO DAILY [facility]  
Stop: 25 08:59  
Last Admin: 24 09:20 Dose: 40 mg  
  
Polyethylene Glycol (Polyethylene Glycol 3350 17 Gm Powd.Pack) 17 gm PO DAILY 
[facility]  
Stop: 24 08:59  
Last Admin: 24 09:22 Dose: 17 gm  
  
Prochlorperazine Edisylate (Prochlorperazine Edisylate 10 Mg/2 Ml Vial) 5 mg IV-
PUSH   
Q4H PRN  
PRN Reason: Nausea And Vomiting  
Stop: 25 17:55  
Senna/Docusate Sodium (Sennosides/Docusate 8.6-50mg 1 Tab Tablet) 2 tab PO BID 
[facility]  
Stop: 24 20:59  
Last Admin: 24 09:22 Dose: 2 tab  
  
Sodium Chloride (Sodium Chloride 0.9 % 10 Ml Syringe) 0 ml IV-PUSH QSHIFT [facility]  
Stop: 25 13:59  
Last Admin: 24 05:29 Dose: 10 ml  
  
Sodium Chloride (Sodium Chloride 0.9 % 10 Ml Syringe) 0 ml IV-PUSH PRN PRN  
PRN Reason: Flush  
Stop: 25 13:43  
Last Admin: 24 23:45 Dose: 10 ml  
  
Tramadol HCl (Tramadol 50 Mg Tablet) 50 mg PO Q6H PRN  
PRN Reason: Pain Scale 1 - 5  
Stop: 25 11:32  
Last Admin: 24 01:16 Dose: 50 mg  
  
Valsartan (Valsartan 80 Mg Tablet) 80 mg PO DAILY [facility]  
Stop: 25 13:09  
Last Admin: 24 09:21 Dose: 80 mg  
  
  
  
  
A&P - Infectious Disease  
Assessment/Plan  
(1) Septic arthritis of knee, right:  
(2) Positive blood culture:  
(3) Streptococcus viridans infection:  
  
Plan  
Patient's cultures all have grown strep mitis/oralis. Follow-up blood cultures 
are   
negative. Patient's white count has remained normal. He also remains afebrile.   
Patient concerned over the significant knee pain he continues to have. Charge 
nurse   
reach out to orthopedics for them to come back and perhaps reevaluate the 
patient.   
From an infection standpoint patient on appropriate antibiotics. He has a PICC 
line   
in the right upper extremity. He is planning to get daily IV ceftriaxone at 
[facility].  
  
  
Documented By: Paul Crum MD 24  
Signed By: <Electronically signed by MD Paul Crum>  
24  
   
  
[facility]  
Work Phone: [phone]- [facility]  
  
  
  
  
                                        Author              Paul Crum  
[facility]  
2024 9:16am  
   
                                        Note Date/Time      2024 9:  
16am  
   
                                                    [facility]  
ENTER  
[address] [place] [address]  
  
Infect. Disease Progress Note  
Signed  
  
Patient: Kaylynn Mims MR#: [id number]  
[id number]  
: 1955 Acct:[id number]  
  
Age/Sex: 68 / M Adm Date:   
4  
Loc: 4N Room: 5Y6016-6 Type: ADM IN  
Attending Dr: Josie Ann MD  
  
Copies to: ~  
  
  
Date of Service:  
2024  
  
  
Subjective  
Interval history:  
Patient feels better overall but expresses concern about where he is going to 
have   
ongoing antibiotics due to cost.  
  
Exam  
Physical Exam  
Vital Signs:  
  
  
  
  
Temp Pulse Resp BP Pulse Ox O2 Del Method O2 Flow Rate  
  
98.5 F 75 17 172/77 H 97 Room Air 2  
  
24 07:43 24 07:43 24 07:43 24 07:43 24 07:43 
24   
07:59 24 15:21  
  
  
  
Const  
General: cooperative and no acute distress  
Orientation: oriented x3  
HEENT  
Head: normal to inspection  
Ears: hearing grossly normal bilaterally  
Mouth: oral mucosae normal  
Eyes  
General: appearance normal, both eyes and all related structures  
Neck  
Neck: normal visual inspection  
Chest  
Chest palpation & inspection: normal inspection of the chest  
Resp  
Effort & Inspection: normal respiratory effort  
Cardio  
Rate: regular rate  
Rhythm: regular rhythm  
GI  
Inspection: normal to inspection  
Palpation: soft and nontender  
Auscultation: normal bowel sounds  
Skin  
General: no rashes or lesions noted  
Neuro  
General: patient oriented x3  
Extrem  
General: abnormal to inspection (R knee wrapped)  
  
Objective  
Labs  
CBC/BMP:  
CBC, BMP  
  
  
  
24  
  
05:35  
  
Corrected WBC 8.5  
  
Uncorrected WBC Count 8.5  
  
RBC 5.03  
  
Hgb 14.9  
  
Hct 44.1  
  
Plt Count 182  
  
Sodium 136  
  
Potassium 4.5  
  
Chloride 99  
  
Carbon Dioxide 29.1  
  
Anion Gap 12.4  
  
BUN 17  
  
Creatinine 0.90  
  
Calcium 8.2 L  
  
  
  
Labs:  
  
  
  
  
24  
  
05:35  
  
BUN 17  
  
Creatinine 0.90  
  
  
  
Microbiology  
Microbiology:  
Microbiology - Results from entire visit  
  
24 19:45 Blood - Left Hand Blood Culture - Preliminary  
Presumptive Viridans Strep  
24 19:45 Blood - Left Hand Bacterial ID (NA Multiplex Assay) - Final  
24 19:55 Blood - Right Hand Blood Culture - Preliminary  
No Growth 2 Days  
24 10:28 Knee,Right - Other Aerobic Culture - Preliminary  
No Growth 1 Day  
24 10:28 Knee,Right - Other Anaerobic Culture - Preliminary  
No Anaerobes Isolated 1 Day  
24 10:28 Knee,Right - Other Gram Stain - Final  
24 10:29 Knee,Right - Other Aerobic Culture - Preliminary  
No Growth 1 Day  
24 10:29 Knee,Right - Other Anaerobic Culture - Preliminary  
No Anaerobes Isolated 1 Day  
24 10:29 Knee,Right - Other Gram Stain - Final  
24 10:30 Knee,Right - Other Aerobic Culture - Preliminary  
Rare normal skin zack  
1 Day  
24 10:30 Knee,Right - Other Anaerobic Culture - Preliminary  
No Anaerobes Isolated 1 Day  
24 10:30 Knee,Right - Other Gram Stain - Final  
24 23:00 Joint Fluid - Knee, Right Aerobic Culture - Preliminary  
[facility]  
24 23:00 Joint Fluid - Knee, Right Anaerobic Culture - Preliminary  
No Anaerobes Isolated 1 Day  
24 23:00 Joint Fluid - Knee, Right Gram Stain - Final  
24 19:15 Joint Fluid - Knee, Right Aerobic Culture - Final  
No Growth 2 Days  
24 19:15 Joint Fluid - Knee, Right Anaerobic Culture - Preliminary  
No Anaerobes Isolated 2 Days  
24 19:15 Joint Fluid - Knee, Right Gram Stain - Final  
  
  
  
Allergies and Medications  
Allergies and Active Meds  
Allergies  
  
metformin Allergy (Unknown, Verified 24 08:53)  
back spasms  
Penicillins Allergy (Unknown, Verified 24 08:53)  
rash  
  
  
Active Medications  
  
Acetaminophen (Acetaminophen 325 Mg Tablet) 650 mg PO Q4H PRN  
PRN Reason: Pain Scale 1 - 5  
Stop: 25 17:55  
Last Admin: 24 08:21 Dose: 650 mg  
  
Acetaminophen (Acetaminophen 500 Mg Tablet) 1,000 mg PO Q8H [facility]  
Stop: 25 11:59  
Last Admin: 24 04:15 Dose: Not Given  
  
Ascorbic Acid (Ascorbic Acid 500 Mg Tablet) 500 mg PO BID.WITH.MEALS [facility]  
Stop: 25 16:59  
Last Admin: 24 07:58 Dose: 500 mg  
  
Docusate Sodium (Docusate 100 Mg Capsule) 200 mg PO BID PRN  
PRN Reason: Constipation  
Stop: 25 17:55  
Enoxaparin Sodium (Enoxaparin 40 Mg/0.4 Ml Syringe) 40 mg SUBCUT DAILY@1000 [facility]  
Stop: 25 09:59  
Last Admin: 24 11:05 Dose: 40 mg  
  
Ferrous Sulfate (Ferrous Sulfate 324 Mg Tablet.) 324 mg PO BID.WITH.MEALS [facility]  
Stop: 25 16:59  
Last Admin: 24 07:57 Dose: 324 mg  
  
Hydralazine HCl (Hydralazine 20 Mg/Ml Vial) 10 mg IV-PUSH Q4H PRN  
PRN Reason: if SBP > 185  
Stop: 25 17:55  
Hydromorphone HCl (Hydromorphone 1 Mg/Ml Syringe) 0.5 mg IV-PUSH Q4H PRN  
PRN Reason: pain  
Ceftriaxone Sodium (Rocephin) 2 gm in 50 mls @ 100 mls/hr IV Q24H [facility]  
Last Admin: 24 15:44 Dose: 100 mls/hr  
  
Mineral Oil (Mineral Oil (Orange) 1 Each Enema) 1 each IN ONCE PRN  
PRN Reason: Constipation  
Naloxone HCl (Naloxone Hcl 0.4 Mg/Ml Vial) 0.4 mg IV-PUSH Q2M PRN  
PRN Reason: Opioid Reversal  
Stop: 25 11:32  
Oxycodone HCl (Oxycodone Ir 5 Mg Tablet) 5 mg PO Q4HR PRN  
PRN Reason: Pain  
Last Admin: 24 22:50 Dose: 5 mg  
  
Oxycodone HCl (Oxycodone Ir 5 Mg Tablet) 5 mg PO Q4HR PRN  
PRN Reason: Pain Scale 6 - 10  
Last Admin: 24 08:38 Dose: 5 mg  
  
Pantoprazole Sodium (Pantoprazole 40 Mg Tablet.) 40 mg PO DAILY [facility]  
Stop: 25 08:59  
Last Admin: 24 08:37 Dose: 40 mg  
  
Polyethylene Glycol (Polyethylene Glycol 3350 17 Gm Powd.Pack) 17 gm PO DAILY 
[facility]  
Stop: 24 08:59  
Last Admin: 24 08:37 Dose: 17 gm  
  
Prochlorperazine Edisylate (Prochlorperazine Edisylate 10 Mg/2 Ml Vial) 5 mg IV-
PUSH   
Q4H PRN  
PRN Reason: Nausea And Vomiting  
Stop: 25 17:55  
Senna/Docusate Sodium (Sennosides/Docusate 8.6-50mg 1 Tab Tablet) 2 tab PO DAILY
 [facility]  
Stop: 24 08:59  
Last Admin: 24 08:37 Dose: 2 tab  
  
Sodium Chloride (Sodium Chloride 0.9 % 10 Ml Syringe) 0 ml IV-PUSH QSHIFT [facility]  
Stop: 25 13:59  
Last Admin: 24 05:33 Dose: Not Given  
  
Sodium Chloride (Sodium Chloride 0.9 % 10 Ml Syringe) 0 ml IV-PUSH PRN PRN  
PRN Reason: Flush  
Stop: 25 13:43  
Last Admin: 24 23:45 Dose: 10 ml  
  
Tramadol HCl (Tramadol 50 Mg Tablet) 50 mg PO Q6H PRN  
PRN Reason: Pain Scale 1 - 5  
Stop: 25 11:32  
Last Admin: 24 01:16 Dose: 50 mg  
  
Valsartan (Valsartan 80 Mg Tablet) 80 mg PO DAILY [facility]  
Stop: 25 13:09  
Last Admin: 24 08:37 Dose: 80 mg  
  
  
  
  
A&P - Infectious Disease  
Assessment/Plan  
(1) Septic arthritis of knee, right:  
(2) Positive blood culture:  
(3) Streptococcus viridans infection:  
  
Plan  
[place] strep from blood culture which would likely be the strep mitis oralis 
that was   
cultured from the joint fluid. Ceftriaxone should suffice. Once dailyneeded. 
Patient   
lives closest to [facility] about 12 miles and expressesconcern about 
getting   
there once daily. His sister is currently in the room anddid offer for him to 
stay at   
her house which is closer to [facility]. Patient states 
he   
only has Medicare and cannot afford IV antibiotics at home. Ultimately will need
 to   
get IV ceftriaxone once daily. 24more days required  
  
  
Documented By: Paul Crum MD [id number]  
Signed By: <Electronically signed by MD Paul Crum>  
24  
   
  
[facility]  
Work Phone: [phone]- Progress note  
  
  
  
  
                                        Author              Josie Ann  
[facility]  
2024 1:08pm  
   
                                        Note Date/Time      2024 1:  
08pm  
   
                                                    [facility]  
ENTER  
[address] [place] [address]  
  
Hospitalist Progress Note  
Signed  
  
Patient: Kaylynn Mims MR#: [id number]  
[id number]  
: 1955 Acct:[id number]  
  
Age/Sex: 68 / M Adm Date:   
4  
Loc: 4N Room: 0Y0412-5 Type: ADM IN  
Attending Dr: Josie Ann MD  
  
Copies to: ~  
  
  
Date of Service:  
2024  
  
  
Subjective  
Subjective  
Narrative:  
  
Patient underwent irrigation of the right knee on , cultures pending, 
so far   
negative  
Blood cultures strep viridans 1 out of 2  
  
Patient is doing better, the pain improved after the surgery  
  
  
Physical exam:  
General -awake, alert, oriented ?3, not in acute distress  
Cardiovascular -S1 with S2, no murmurs, no rubs, no gallops  
Pulmonary - clear to auscultation bilaterally  
Gastrointestinal - abdomen is soft, nondistended, nontender, bowel sounds 
positive,   
there is no rigidity, no rebound  
Extremities -no edema  
Neurological -no focal neurological dysfunction noted  
  
  
  
Exam  
Physical Exam  
Vital Signs:  
  
  
  
  
Temp Pulse Resp BP Pulse Ox O2 Del Method O2 Flow Rate  
  
36.6 C 75 18 173/78 H 97 Room Air 2  
  
24 12:05 24 12:05 24 12:05 24 12:05 24 12:05 
24   
12:05 24 12:00  
  
  
  
  
Objective  
Lab Results  
  
24 05:51  
  
24 05:51  
  
Microbiology Results  
Microbiology  
  
24 10:30 Knee,Right - Other Aerobic Culture - Preliminary  
Rare normal skin zack  
1 Day  
24 10:30 Knee,Right - Other Anaerobic Culture - Preliminary  
No Anaerobes Isolated 1 Day  
24 10:28 Knee,Right - Other Aerobic Culture - Preliminary  
No Growth 1 Day  
24 10:28 Knee,Right - Other Anaerobic Culture - Preliminary  
No Anaerobes Isolated 1 Day  
24 10:29 Knee,Right - Other Aerobic Culture - Preliminary  
No Growth 1 Day  
24 10:29 Knee,Right - Other Anaerobic Culture - Preliminary  
No Anaerobes Isolated 1 Day  
24 23:00 Joint Fluid - Knee, Right Aerobic Culture - Preliminary  
24 23:00 Joint Fluid - Knee, Right Anaerobic Culture - Preliminary  
No Anaerobes Isolated 1 Day  
24 23:00 Joint Fluid - Knee, Right Gram Stain - Final  
24 19:15 Joint Fluid - Knee, Right Aerobic Culture - Final  
No Growth 2 Days  
24 19:15 Joint Fluid - Knee, Right Anaerobic Culture - Preliminary  
No Anaerobes Isolated 2 Days  
24 19:15 Joint Fluid - Knee, Right Gram Stain - Final  
24 19:45 Blood - Left Hand Blood Culture - Preliminary  
Presumptive Viridans Strep  
24 19:45 Blood - Left Hand Bacterial ID (NA Multiplex Assay) - Final  
24 19:55 Blood - Right Hand Blood Culture - Preliminary  
No Growth 1 Day  
  
  
  
Meds  
Allergies and Active Meds  
Allergies  
  
metformin Allergy (Unknown, Verified 24 08:53)  
back spasms  
Penicillins Allergy (Unknown, Verified 24 08:53)  
rash  
  
  
Active Meds:  
Active Medications  
  
  
  
Generic Name Dose Route Start Last Admin  
  
Trade Name Freq PRN Reason Stop Dose Admin  
  
Acetaminophen 650 mg 24 17:56 24 08:21  
  
Acetaminophen 325 Mg Tablet PO 25 17:55 650 mg  
  
Q4H PRN Administration  
  
Pain Scale 1 - 5  
  
Acetaminophen 1,000 mg 24 12:00 24 12:15  
  
Acetaminophen 500 Mg Tablet PO 25 11:59 1,000 mg  
  
Q8H NIHARIKA Administration  
  
Ascorbic Acid 500 mg 24 17:00 24 08:20  
  
Ascorbic Acid 500 Mg Tablet PO 25 16:59 500 mg  
  
BID.WITH.MEALS NIHARIKA Administration  
  
Docusate Sodium 200 mg 24 17:56  
  
Docusate 100 Mg Capsule PO 25 17:55  
  
BID PRN  
  
Constipation  
  
Enoxaparin Sodium 40 mg 24 10:00 24 11:05  
  
Enoxaparin 40 Mg/0.4 Ml Syringe SUBCUT 25 09:59 40 mg  
  
DAILY@1000 NIHARIKA Administration  
  
Ferrous Sulfate 324 mg 24 17:00 24 08:20  
  
Ferrous Sulfate 324 Mg Tablet.Dr PO 25 16:59 324 mg  
  
BID.WITH.MEALS NIHARIKA Administration  
  
Hydralazine HCl 10 mg 24 17:56  
  
Hydralazine 20 Mg/Ml Vial IV-PUSH 25 17:55  
  
Q4H PRN  
  
if SBP > 185  
  
Hydromorphone HCl 0.25 mg 24 17:56  
  
Hydromorphone 1 Mg/Ml Syringe IV-PUSH  
  
Q4H PRN  
  
pain  
  
Vancomycin HCl 1.5 gm/ 530 mls @ 353.333 mls/hr 24 08:00 24 08:39  
  
Dextrose IV 25 07:59 353.33 mls/hr  
  
Q12H NIHARIKA Administration  
  
Cefepime HCl 2 gm in 50 mls @ 100 mls/hr 24 10:45 24 11:30  
  
Maxipime  mls/hr  
  
Q8H NIHARIKA Administration  
  
Mineral Oil 1 each 24 11:33  
  
Mineral Oil (Orange) 1 Each Enema IN  
  
ONCE PRN  
  
Constipation  
  
Naloxone HCl 0.4 mg 24 11:33  
  
Naloxone Hcl 0.4 Mg/Ml Vial IV-PUSH 25 11:32  
  
Q2M PRN  
  
Opioid Reversal  
  
Oxycodone HCl 5 mg 24 17:56  
  
Oxycodone Ir 5 Mg Tablet PO  
  
Q4HR PRN  
  
Pain  
  
Oxycodone HCl 5 mg 24 11:33  
  
Oxycodone Ir 5 Mg Tablet PO  
  
Q4HR PRN  
  
Pain Scale 6 - 10  
  
Pantoprazole Sodium 40 mg 24 09:00 24 08:20  
  
Pantoprazole 40 Mg Tablet. PO 25 08:59 40 mg  
  
DAILY NIHARIKA Administration  
  
Polyethylene Glycol 17 gm 24 09:00 24 08:21  
  
Polyethylene Glycol 3350 17 Gm Powd.Pack PO 24 08:59 17 gm  
  
DAILY NIHARIKA Administration  
  
Prochlorperazine Edisylate 5 mg 24 17:56  
  
Prochlorperazine Edisylate 10 Mg/2 Ml Vial IV-PUSH 25 17:55  
  
Q4H PRN  
  
Nausea And Vomiting  
  
Senna/Docusate Sodium 2 tab 24 09:00 24 08:20  
  
Sennosides/Docusate 8.6-50mg 1 Tab Tablet PO 24 08:59 2 tab  
  
DAILY NIHARIKA Administration  
  
Sodium Chloride 0 ml 24 14:00 24 05:28  
  
Sodium Chloride 0.9 % 10 Ml Syringe IV-PUSH 25 13:59 Not Given  
  
QSHIFT [facility]  
  
Tramadol HCl 50 mg 24 11:33 24 11:06  
  
Tramadol 50 Mg Tablet PO 25 11:32 50 mg  
  
Q6H PRN Administration  
  
Pain Scale 1 - 5  
  
Vancomycin HCl 1 each 24 18:36  
  
Vancomycin - Pharmacy Dosing 1 Each Miscell IV  
  
ONCE PRN  
  
ZZ.Pharmacy Consult  
  
Protocol  
  
  
  
  
A&P - Hospitalist  
Assessment/Plan  
(1) Septic arthritis of knee, right:  
  
Plan  
1. Suspected right knee septic arthritis, status post irrigation on   
Continue with empiric ntibiotic therapy, ID consulted  
Synovial fluid cultures so far negative  
Blood cultures strep viridans 1 out of 2, repeated cultures pending  
Appreciate orthopedic input  
Continue with pain management  
  
2. DVT prophylaxis SCDs for now  
  
  
Documented By: Josie Ann MD 24 [id number]  
Signed By: <Electronically signed by Josie Ann MD>  
24 2562  
   
  
[facility]  
Work Phone: [phone]- Consult note  
  
  
  
  
                                        Author              Paul Crum  
[facility]  
2024 9:03am  
   
                                        Note Date/Time      2024 9:  
09am  
   
                                                    [facility]  
ENTER  
[address] [place] [address]  
  
Infect. Disease Consult Note  
Signed  
  
Patient: Kaylynn Mims MR#: [id number]  
[id number]  
: 1955 Acct:[id number]  
  
Age/Sex: 68 / M Adm Date:   
4  
Loc: 4N Room: 2I1417-4 Type: ADM IN  
Attending Dr: Josie Ann MD  
  
Copies to: DO Paul Baig MD Ruta Semaskiene, MD~  
  
  
HPI  
Data of Consult  
Consult date:  
24  
  
Requesting Physician:  
Josie Ann MD  
  
Primary Care Provider:  
Elvira Harper DO  
  
Consult Narrative  
History of present illness:  
Mr. Mims is a 68 year old male who is status post right knee irrigation and 
lavage   
after concern for septic arthritis became apparent. He has bilateral 
osteoarthritis.   
He recently had an injection of steroids in his right knee and shortly after 
that the   
knee became exquisitely worse with pain and swelling. Blood cultures were drawn 
and   
are positive for Streptococcus viridans. Aspiration of the joint fluid confirmed
   
infection. Intraoperative cultures pending.  
  
CC: Josie Ann MD  
  
  
[facility]  
Surgical History (Reviewed 24 @ 14:38 by GARRET Chong)  
  
History of facial surgery  
jaw  
  
  
Family History (Reviewed 24 @ 14:38 by GARRET Chong)  
Brother Heart disease  
Legacy FamHx Relation: Brother(s)  
Family history of mental disorder  
Legacy FamHx Relation: Brother(s); Legacy FamHx Problem: Diagnosed with Mental   
Illness  
Father   
Family/Other   
Legacy FamHx Problem: father, --cirrhosis of the liver:mother,   
--multiple health issues:1 sister, --pancreatic cancer  
Mother   
Heart disease  
Sister Cancer  
Legacy FamHx Problem: Diagnosed with Cancer  
Heart disease  
  
  
Social History  
Smoking Status: Never smoker  
Substance Use Type: None  
  
Allergies and Medications  
Allergies and Active Meds  
Allergies  
  
metformin Allergy (Unknown, Verified 24 08:53)  
back spasms  
Penicillins Allergy (Unknown, Verified 24 08:53)  
rash  
  
  
Active Medications  
  
Acetaminophen (Acetaminophen 325 Mg Tablet) 650 mg PO Q4H PRN  
PRN Reason: Pain Scale 1 - 5  
Stop: 25 17:55  
Last Admin: 24 08:21 Dose: 650 mg  
  
Acetaminophen (Acetaminophen 500 Mg Tablet) 1,000 mg PO Q8H [facility]  
Stop: 25 11:59  
Last Admin: 24 03:10 Dose: Not Given  
  
Ascorbic Acid (Ascorbic Acid 500 Mg Tablet) 500 mg PO BID.WITH.MEALS [facility]  
Stop: 25 16:59  
Last Admin: 24 08:20 Dose: 500 mg  
  
Docusate Sodium (Docusate 100 Mg Capsule) 200 mg PO BID PRN  
PRN Reason: Constipation  
Stop: 25 17:55  
Enoxaparin Sodium (Enoxaparin 40 Mg/0.4 Ml Syringe) 40 mg SUBCUT DAILY@1000 [facility]  
Stop: 25 09:59  
Ferrous Sulfate (Ferrous Sulfate 324 Mg Tablet.Dr) 324 mg PO BID.WITH.MEALS [facility]  
Stop: 25 16:59  
Last Admin: 24 08:20 Dose: 324 mg  
  
Hydralazine HCl (Hydralazine 20 Mg/Ml Vial) 10 mg IV-PUSH Q4H PRN  
PRN Reason: if SBP > 185  
Stop: 25 17:55  
Hydromorphone HCl (Hydromorphone 1 Mg/Ml Syringe) 0.25 mg IV-PUSH Q4H PRN  
PRN Reason: pain  
Vancomycin HCl 1.5 gm/ (Dextrose) 530 mls @ 353.333 mls/hr IV Q12H [facility]  
Stop: 25 07:59  
Last Admin: 24 08:39 Dose: 353.33 mls/hr  
  
Cefepime HCl (Maxipime) 1 gm in 50 mls @ 12.5 mls/hr IV Q8H [facility]  
Last Infusion: 24 03:45 Dose: Infused  
  
Mineral Oil (Mineral Oil (Orange) 1 Each Enema) 1 each IN ONCE PRN  
PRN Reason: Constipation  
Naloxone HCl (Naloxone Hcl 0.4 Mg/Ml Vial) 0.4 mg IV-PUSH Q2M PRN  
PRN Reason: Opioid Reversal  
Stop: 25 11:32  
Oxycodone HCl (Oxycodone Ir 5 Mg Tablet) 5 mg PO Q4HR PRN  
PRN Reason: Pain  
Oxycodone HCl (Oxycodone Ir 5 Mg Tablet) 5 mg PO Q4HR PRN  
PRN Reason: Pain Scale 6 - 10  
Pantoprazole Sodium (Pantoprazole 40 Mg Tablet.Dr) 40 mg PO DAILY [facility]  
Stop: 25 08:59  
Last Admin: 24 08:20 Dose: 40 mg  
  
Polyethylene Glycol (Polyethylene Glycol 3350 17 Gm Powd.Pack) 17 gm PO DAILY 
[facility]  
Stop: 24 08:59  
Last Admin: 24 08:21 Dose: 17 gm  
  
Prochlorperazine Edisylate (Prochlorperazine Edisylate 10 Mg/2 Ml Vial) 5 mg IV-
PUSH   
Q4H PRN  
PRN Reason: Nausea And Vomiting  
Stop: 25 17:55  
Senna/Docusate Sodium (Sennosides/Docusate 8.6-50mg 1 Tab Tablet) 2 tab PO DAILY
 [facility]  
Stop: 24 08:59  
Last Admin: 24 08:20 Dose: 2 tab  
  
Sodium Chloride (Sodium Chloride 0.9 % 10 Ml Syringe) 0 ml IV-PUSH QSHIFT [facility]  
Stop: 25 13:59  
Last Admin: 24 05:28 Dose: Not Given  
  
Tramadol HCl (Tramadol 50 Mg Tablet) 50 mg PO Q6H PRN  
PRN Reason: Pain Scale 1 - 5  
Stop: 25 11:32  
Last Admin: 24 01:29 Dose: 50 mg  
  
Vancomycin HCl (Vancomycin - Pharmacy Dosing 1 Each Miscell) 1 each IV ONCE PRN;
   
Protocol  
PRN Reason: ZZ.Pharmacy Consult  
  
  
  
Exam  
Physical Exam  
Vital Signs:  
  
  
Vital Signs  
  
  
  
Temp Pulse Pulse Resp BP BP Pulse Ox  
  
24 07:46 97.9 F 72 16 169/84 H 97  
  
24 04:00 97.9 F 70 16 133/68 95  
  
24 23:59 97.9 F 74 18 177/83 H 98  
  
24 20:40  
  
24 19:06 98.6 F 72 17 182/61 H 95  
  
24 17:44  
  
24 17:42 66 17 153/69 H 97  
  
24 16:21 66 17 143/73 H 96  
  
24 16:00  
  
24 15:51 72 17 142/71 H 96  
  
24 15:21 68 17 157/70 H 96  
  
24 14:51 70 17 163/73 H 94 L  
  
24 14:21 73 17 162/80 H 95  
  
24 14:06 68 17 159/85 H 96  
  
24 13:51 69 17 143/72 H 96  
  
24 13:36 68 17 141/73 H 97  
  
24 13:21 67 17 152/78 H 97  
  
24 12:30  
  
24 12:06 70 16 142/72 H 95  
  
24 11:45 97.4 F L 69 16 135/70 95  
  
24 11:35 73 14 147/72 H 98  
  
24 11:30 69 16 145/73 H 98  
  
24 11:25 69 16 142/77 H 98  
  
24 11:20 98.4 F 69 14 141/75 H 95  
  
24 09:07 98.1 F 73 18 144/85 H 96  
  
  
  
  
O2 Del Method O2 Flow Rate  
  
24 07:46 Room Air  
  
24 04:00 Room Air  
  
24 23:59 Room Air  
  
24 20:40 Room Air  
  
24 19:06  
  
24 17:44 Room Air  
  
24 17:42 Room Air  
  
24 16:21 Room Air  
  
24 16:00 Room Air  
  
24 15:51 Room Air  
  
24 15:21 Nasal Cannula 2  
  
24 14:51 Nasal Cannula 2  
  
24 14:21 Nasal Cannula 2  
  
24 14:06 Nasal Cannula 2  
  
24 13:51 Nasal Cannula 3  
  
24 13:36 Nasal Cannula 3  
  
24 13:21 Nasal Cannula 3  
  
24 12:30 Nasal Cannula 3  
  
24 12:06  
  
24 11:45  
  
24 11:35  
  
24 11:30  
  
24 11:25  
  
24 11:20 Simple Mask 8  
  
24 09:07 Room Air  
  
  
Intake and Output  
  
  
  
24  
  
23:59 07:59 15:59  
  
Intake Total 1130 / 1810 750 / 750  
  
Output Total 1800 / 1800  
  
Balance 1130 / 1650 -1050 / -1050  
  
Intake:  
  
 / 1260 50 / 50  
  
Cefepime 1Gm-*Ns* 1 gm In 50 ml 50 / 100 50 / 50  
  
@ 12.5 mls/hr IV Q8H NIHARIKA Rx#:  
  
[id number]  
  
Vancomycin 1.5 gm In Dextrose 5 530 / 1060  
  
% in Water 500 ml @ 353.333  
  
mls/hr IV Q12H NIHARIKA Rx#:[id number]  
  
Oral 550 / 550 700 / 700  
  
Output:  
  
Urine 1800 / 1800  
  
Other:  
  
# Unmeasured Voids 2  
  
Weight 346 lb 12.594 oz  
  
Date of Last Bowel Movement 24  
  
  
Patient Weight  
  
  
  
24  
  
23:59  
  
Weight 346 lb 12.594 oz  
  
  
  
Const  
General: cooperative and no acute distress  
Orientation: oriented x3  
HEENT  
Head: normal to inspection  
Ears: hearing grossly normal bilaterally  
Mouth: oral mucosae normal  
Eyes  
General: appearance normal, both eyes and all related structures  
Neck  
Neck: normal visual inspection  
Chest  
Chest palpation & inspection: normal inspection of the chest  
Resp  
Effort & Inspection: normal respiratory effort  
Cardio  
Rate: regular rate  
Rhythm: regular rhythm  
GI  
Inspection: normal to inspection  
Palpation: soft and nontender  
Auscultation: normal bowel sounds  
Skin  
General: no rashes or lesions noted  
Neuro  
General: patient oriented x3  
Extrem  
General: abnormal to inspection (R knee wrapped)  
  
Results - Infectious Disease  
Labs  
  
24 05:51  
  
24 05:51  
  
Labs:  
  
Laboratory Results - last 24 hr  
  
  
  
24  
  
05:51  
  
Corrected WBC 9.1  
  
Uncorrected WBC Count 9.1  
  
RBC 4.95  
  
Hgb 14.7  
  
Hct 43.3  
  
MCV 87.6  
  
MCH 29.7  
  
MCHC 33.9  
  
RDW 13.3  
  
Plt Count 180  
  
MPV 7.6  
  
Neut % (Auto) 80.3  
  
Lymph % (Auto) 9.3  
  
Mono % (Auto) 9.7  
  
Eos % (Auto) 0.1  
  
Baso % (Auto) 0.6  
  
Nucleat RBC Rel Count 0.2  
  
Neut # (Auto) 7.3  
  
Lymph # (Auto) 0.8 L  
  
Mono # (Auto) 0.9 H  
  
Eos # (Auto) 0.0  
  
Baso # (Auto) 0.1  
  
PHA Creatinine Clear 133.88  
  
Sodium 136  
  
Potassium 4.3  
  
Chloride 103  
  
Carbon Dioxide 25.9  
  
Anion Gap 11.4  
  
BUN 20  
  
Creatinine 0.85  
  
Est GFR (CKD-EPI) > 60.0  
  
Glucose 146 H  
  
Calcium 8.2 L  
  
  
  
  
Laboratory Tests  
  
  
  
24  
  
23:00  
  
Synovial RBC 6370 H  
  
Synovial Tot Nuc Cell 74745 H  
  
Synovial Neutrophils 92 H  
  
Synovial Crystals None seen  
  
  
  
Microbiology Results  
Microbiology Narrative:  
  
  
  
  
  
24 19:45 Blood Culture - Preliminary  
  
Blood - Left Hand Presumptive Viridans Strep  
  
Bacterial ID (NA Multiplex Assay) - Final  
  
24 19:55 Blood Culture - Preliminary  
  
Blood - Right Hand No Growth 1 Day  
  
24 10:30 Aerobic Culture - Pending  
  
Knee,Right - Other Anaerobic Culture - Pending  
  
Gram Stain - Pending  
  
24 10:29 Aerobic Culture - Pending  
  
Knee,Right - Other Anaerobic Culture - Pending  
  
Gram Stain - Pending  
  
24 10:28 Aerobic Culture - Pending  
  
Knee,Right - Other Anaerobic Culture - Pending  
  
Gram Stain - Pending  
  
24 19:15 Aerobic Culture - Preliminary  
  
Joint Fluid - Knee, Right No Growth 1 Day  
  
Anaerobic Culture - Preliminary  
  
No Anaerobes Isolated 1 Day  
  
Gram Stain -  
  
Gram Stain Final   
3+ White Blood Cells  
No Bacteria Seen Final  
  
24 23:00 Aerobic Culture - Pending  
  
Joint Fluid - Knee, Right Anaerobic Culture - Pending  
  
Gram Stain -  
  
Gram Stain Final 24-  
Rare White Blood Cells  
4+ Red Blood Cells  
No Bacteria Seen Final  
  
  
  
  
A&P - Infectious Disease  
(1) Septic arthritis of knee, right:  
(2) Positive blood culture:  
(3) Streptococcus viridans infection:  
  
Plan  
Patient's blood culture 1 of 2 sets is positive for presumptive viridans strep. 
I   
presume this organism is likely what is infecting the knee however given the   
injection so I will cover for potential healthcare associated pathogens. 
Therefore   
vancomycin and cefepime to be maintained at this time. Patient likelywill 
require   
PICC line. Gram stain did not show any bacteria but did have whitecells. Cell 
count   
consistent with septic arthritis. Follow-up on intraoperative cultures.  
  
  
Documented By: Paul Crum MD 24 0855  
Signed By: <Electronically signed by MD Paul Crum>  
24 0903  
   
  
[facility]  
Work Phone: [phone]- Progress note  
  
  
  
  
                                        Author              Jeff Milliganisle  
[facility]  
2024 8:06am  
   
                                        Note Date/Time      2024 8:  
06am  
   
                                                    [facility]  
ENTER  
[address] [place] [address]  
  
Orthopedic Progress Note  
Signed  
  
Patient: Kaylynn Mims MR#: [id number]  
[id number]  
: 1955 Acct:[id number]  
  
Age/Sex: 68 / M Adm Date:   
4  
Loc: 4N Room: [address] [place] [address] Type: ADM IN  
Attending Dr: Josie Ann MD  
  
Copies to: ~  
  
  
Date of Service:  
2024  
  
  
Subjective  
Subjective  
Interval History:  
Patient resting comfortably in bed this morning. He reports that he is sore in 
the   
knee but it feels better than what it did prior to surgery.  
Nursing reports no acute events overnight  
  
Exam  
Physical Exam  
Vital Signs:  
  
  
  
  
Temp Pulse Resp BP Pulse Ox O2 Del Method O2 Flow Rate  
  
97.9 F 72 16 169/84 H 97 Room Air 2  
  
24 07:46 24 07:46 24 07:46 24 07:46 24 07:46 
24   
07:46 24 15:21  
  
  
  
Narrative:  
Right knee Ace wrap and dressing is clean dry and intact. Passively I can move 
his   
knee in a short arc of motion without any discomfort. Neurovascular intact 
distally  
  
Objective  
Labs  
Labs:  
Laboratory Results - last 24 hr  
  
  
  
24  
  
05:51  
  
Corrected WBC 9.1  
  
Uncorrected WBC Count 9.1  
  
RBC 4.95  
  
Hgb 14.7  
  
Hct 43.3  
  
MCV 87.6  
  
MCH 29.7  
  
MCHC 33.9  
  
RDW 13.3  
  
Plt Count 180  
  
MPV 7.6  
  
Neut % (Auto) 80.3  
  
Lymph % (Auto) 9.3  
  
Mono % (Auto) 9.7  
  
Eos % (Auto) 0.1  
  
Baso % (Auto) 0.6  
  
Nucleat RBC Rel Count 0.2  
  
Neut # (Auto) 7.3  
  
Lymph # (Auto) 0.8 L  
  
Mono # (Auto) 0.9 H  
  
Eos # (Auto) 0.0  
  
Baso # (Auto) 0.1  
  
PHA Creatinine Clear 133.88  
  
Sodium 136  
  
Potassium 4.3  
  
Chloride 103  
  
Carbon Dioxide 25.9  
  
Anion Gap 11.4  
  
BUN 20  
  
Creatinine 0.85  
  
Est GFR (CKD-EPI) > 60.0  
  
Glucose 146 H  
  
Calcium 8.2 L  
  
  
  
Micro  
Microbiology Results:  
Microbiology  
  
24 19:45 Blood - Left Hand Blood Culture - Preliminary  
Presumptive Viridans Strep  
24 19:45 Blood - Left Hand Bacterial ID (NA Multiplex Assay) - Final  
24 19:55 Blood - Right Hand Blood Culture - Preliminary  
No Growth 1 Day  
24 19:15 Joint Fluid - Knee, Right Aerobic Culture - Preliminary  
No Growth 1 Day  
24 19:15 Joint Fluid - Knee, Right Anaerobic Culture - Preliminary  
No Anaerobes Isolated 1 Day  
24 19:15 Joint Fluid - Knee, Right Gram Stain - Final  
  
  
  
Assessment / Plan  
Assessment and plan  
(1) Septic arthritis of knee, right:  
Code(s):  
M00.9 - Pyogenic arthritis, unspecified  
  
Plan  
POD 1 sp R knee washout for septic arthritis  
1. Pain control  
2. DVT prophylaxis: SCDs bilaterally and I will defer to the hospitalist team 
onany   
chemical prophylaxis  
3. Infectious disease consulted. Antibiotics per ID. Intraoperative cultures 
pending.   
Blood culture from  is growing strep viridans  
4. PT/OT: WBAT and range of motion as tolerated right lower extremity  
5. Keep dressing in place until 2-week postop follow-up in the office. Upon   
discharge, needs set up with outpatient physical therapy to continue working on 
range   
of motion.  
6. Orthopedic surgery will sign off. Please call with any questions or concerns  
  
  
Documented By: Jeff Onofre MD 24 08  
03  
Signed By: <Electronically signed by Jeff Onofre MD>  
24 0806  
   
  
[facility]  
Work Phone: [phone]- Progress note  
  
  
  
  
                                        Author              Josie Ann  
[facility]  
2024 7:23pm  
   
                                        Note Date/Time      2024 11  
:42am  
   
                                                    [facility]  
ENTER  
[address] [place] [address]  
  
Hospitalist Progress Note  
Signed with Addenda  
  
Patient: Kaylynn Mims MR#: [id number]  
[id number]  
: 1955 Acct:[id number]  
  
Age/Sex: 68 / M Adm Date:   
4  
Loc:  Room: 5P6006-4 Type: ADM IN  
Attending Dr: Josie Ann MD  
  
Copies to: ~  
  
  
  
**ADDENDUM**1  
Patient has been seen and examined today. Patient is doing better. After the 
surgery   
his pain is improved. He was able to ambulate with quite reasonably good pain   
control.  
Otherwise he denies any complaints. No chest pain no shortness of breath no   
palpitations no dizziness  
  
Addendum Documented By: Josie Ann MD 24  
Addendum Signed By: <Electronically signed by Josie Ann MD> 24  
  
  
  
Date of Service:  
2024  
  
  
Subjective  
Subjective  
Narrative:  
  
Patient underwent irrigation of the right knee, cultures pending, so far 
negative  
  
  
Physical exam:  
General -awake, alert, oriented ?3, not in acute distress  
Cardiovascular -S1 with S2, no murmurs, no rubs, no gallops  
Pulmonary - clear to auscultation bilaterally  
Gastrointestinal - abdomen is soft, nondistended, nontender, bowel sounds 
positive,   
there is no rigidity, no rebound  
Extremities -no edema  
Neurological -no focal neurological dysfunction noted  
  
  
  
Exam  
Physical Exam  
Vital Signs:  
  
  
  
  
Temp Pulse Resp BP Pulse Ox O2 Del Method  
  
36.7 C 73 18 144/85 H 96 Room Air  
  
24 09:07 24 09:07 24 09:07 24 09:07 24 09:07 
24   
09:07  
  
  
  
  
Objective  
Lab Results  
  
24 06:26  
  
24 06:26  
  
Microbiology Results  
Microbiology  
  
24 19:15 Joint Fluid - Knee, Right Aerobic Culture - Preliminary  
No Growth 1 Day  
24 19:15 Joint Fluid - Knee, Right Anaerobic Culture - Preliminary  
No Anaerobes Isolated 1 Day  
24 19:15 Joint Fluid - Knee, Right Gram Stain - Final  
24 23:00 Joint Fluid - Knee, Right Gram Stain - Final  
  
  
  
Meds  
Allergies and Active Meds  
Allergies  
  
metformin Allergy (Unknown, Verified 24 08:53)  
back spasms  
Penicillins Allergy (Unknown, Verified 24 08:53)  
rash  
  
  
Active Meds:  
Active Medications  
  
  
  
Generic Name Dose Route Start Last Admin  
  
Trade Name Freq PRN Reason Stop Dose Admin  
  
Acetaminophen 650 mg 24 17:56  
  
Acetaminophen 325 Mg Tablet PO 25 17:55  
  
Q4H PRN  
  
Pain Scale 1 - 5  
  
Acetaminophen 1,000 mg 24 11:45  
  
Acetaminophen 500 Mg Tablet PO 25 11:44  
  
Q8H [facility]  
  
Ascorbic Acid 500 mg 24 17:00  
  
Ascorbic Acid 500 Mg Tablet PO 25 16:59  
  
BID.WITH.MEALS [facility]  
  
Docusate Sodium 200 mg 24 17:56  
  
Docusate 100 Mg Capsule PO 25 17:55  
  
BID PRN  
  
Constipation  
  
Droperidol 1.25 mg 24 09:41  
  
Droperidol 5 Mg/2 Ml Vial IV-PUSH 24 12:42  
  
ONCE PRN  
  
Nausea And Vomiting  
  
Ferrous Sulfate 324 mg 24 17:00  
  
Ferrous Sulfate 324 Mg Tablet.Dr PO 25 16:59  
  
BID.WITH.MEALS [facility]  
  
Hydralazine HCl 10 mg 24 17:56  
  
Hydralazine 20 Mg/Ml Vial IV-PUSH 25 17:55  
  
Q4H PRN  
  
if SBP > 185  
  
Hydromorphone HCl 0.25 mg 24 17:56  
  
Hydromorphone 1 Mg/Ml Syringe IV-PUSH  
  
Q4H PRN  
  
pain  
  
Hydromorphone HCl 0.5 mg 24 09:41  
  
Hydromorphone 0.5 Mg/0.5 Ml Syringe IV-PUSH 24 12:42  
  
Q5M PRN  
  
Pain  
  
Vancomycin HCl 1.5 gm/ 530 mls @ 353.333 mls/hr 24 08:00 24 08:09  
  
Dextrose IV 25 07:59 353.33 mls/hr  
  
Q12H [facility] Administration  
  
Cefepime HCl 1 gm in 50 mls @ 12.5 mls/hr 24 06:30 24 08:09  
  
Maxipime IV 12.5 mls/hr  
  
Q8H [facility] Administration  
  
Lactated Ringer's 1,000 mls @ 20 mls/hr 24 08:03 24 09:17  
  
Lactated Ringers IV 24 08:02 20 mls/hr  
  
.Q24H ONE Administration  
  
Ketorolac Tromethamine 15 mg 24 22:00 24 05:30  
  
Ketorolac Tromethamine 15 Mg/Ml Vial IV-PUSH 24 14:01 15 mg  
  
Q8HR [facility]A Administration  
  
Ketorolac Tromethamine 15 mg 24 11:33  
  
Ketorolac Tromethamine 15 Mg/Ml Vial IV-PUSH 24 11:34  
  
ONCE ONE  
  
Labetalol HCl 10 mg 24 09:41  
  
Labetalol 100 Mg/20 Ml Vial IV-PUSH 24 12:42  
  
Q10M PRN  
  
Hypertension  
  
Mineral Oil 1 each 24 11:33  
  
Mineral Oil (Orange) 1 Each Enema IN  
  
ONCE PRN  
  
Constipation  
  
Naloxone HCl 0.4 mg 24 11:33  
  
Naloxone Hcl 0.4 Mg/Ml Vial IV-PUSH 25 11:32  
  
Q2M PRN  
  
Opioid Reversal  
  
Oxycodone HCl 5 mg 24 17:56  
  
Oxycodone Ir 5 Mg Tablet PO  
  
Q4HR PRN  
  
Pain  
  
Oxycodone HCl 5 mg 24 11:33  
  
Oxycodone Ir 5 Mg Tablet PO  
  
Q4HR PRN  
  
Pain Scale 6 - 10  
  
Pantoprazole Sodium 40 mg 24 09:00  
  
Pantoprazole 40 Mg Tablet. PO 25 08:59  
  
DAILY NIHARIKA  
  
Polyethylene Glycol 17 gm 24 09:00  
  
Polyethylene Glycol 3350 17 Gm Powd.Pack PO 24 08:59  
  
DAILY NIHARIKA  
  
Prochlorperazine Edisylate 5 mg 24 17:56  
  
Prochlorperazine Edisylate 10 Mg/2 Ml Vial IV-PUSH 25 17:55  
  
Q4H PRN  
  
Nausea And Vomiting  
  
Senna/Docusate Sodium 2 tab 24 09:00  
  
Sennosides/Docusate 8.6-50mg 1 Tab Tablet PO 24 08:59  
  
DAILY NIHARIKA  
  
Sodium Chloride 0 ml 24 14:00  
  
Sodium Chloride 0.9 % 10 Ml Syringe IV-PUSH 25 13:59  
  
QSHIFT NIHARIKA  
  
Tramadol HCl 50 mg 24 11:33  
  
Tramadol 50 Mg Tablet PO 25 11:32  
  
Q6H PRN  
  
Pain Scale 1 - 5  
  
Vancomycin HCl 1 each 24 18:36  
  
Vancomycin - Pharmacy Dosing 1 Each Miscell IV  
  
ONCE PRN  
  
ZZ.Pharmacy Consult  
  
Protocol  
  
  
  
  
A&P - Hospitalist  
Assessment/Plan  
(1) Septic arthritis of knee, right:  
  
Plan  
1. Suspected right knee septic arthritis, status post irrigation on   
Continue with empiric ntibiotic therapy, ID consulted  
Appreciate orthopedic input  
Continue with pain management  
  
2. DVT prophylaxis SCDs for now  
  
  
Documented By: Josie Ann MD 24 [id number]  
Signed By: <Electronically signed by Josie Ann MD>  
24 1142  
   
  
[facility]  
Work Phone: [phone]- History and physical note  
  
  
  
  
                                        Author              Josie Ann  
[facility]  
2024 11:41am  
   
                                        Note Date/Time      2024 6:  
33pm  
   
                                                    [facility]  
ENTER  
[address] [place] [address]  
  
Hospitalist H&P  
Signed  
  
Patient: Kaylynn Mims MR#: [id number]  
[id number]  
: 1955 Acct:[id number]  
  
Age/Sex: 68 / M Adm Date:   
4  
Loc:  Room: 3I7349-3 Type: ADM IN  
Attending Dr: Josie Ann MD  
  
Copies to: Elvira Harper, DO Josie Ann MD~  
  
  
HPI  
DATE OF EXAMINATION: 24  
CHIEF COMPLAINT: Right knee pain  
HISTORY OF PRESENT ILLNESS:  
68 years old male presented with complaints of right knee pain. Patient does 
have   
history of osteoarthritis and got injection into his left knee previously, on   
Wednesday, which is 5 days ago patient got injection into his left and right 
knee   
with he thinks with steroids by Dr. Mora. That day patient was doing quite 
well. He   
was walking and done some shopping. However the next day the pain started to 
come, on   
Friday he was not able to ambulate normally. The pain was quite severe. He went 
to   
[facility] emergency room and was prescribed some pain medications. And was sent 
home.   
The pain continued so he came to emergency room. He denied any fevers any 
chills. He   
denied any injury or any trauma. He denied any recent infections. X-ray in 
emergency   
room was done which showed large effusions with degenerative changes, effusions 
are   
worse compared to 2024.  
  
10 systems are reviewed and are negative apart as mentioned H&P  
  
General -patient is awake alert oriented ?3, does not appear to be in distress  
HEENT -normal oropharyngeal mucosa without any ulcers or exudates  
Cardiovascular -S1 plus S2, with regular rate, without any murmurs, gallops, 
rubs  
Pulmonary -clear to auscultation bilaterally  
Gastrointestinal -abdomen is soft, nondistended, nontender, bowel sounds 
positive, no   
rigidity, no rebound  
Genitourinary -no flank tenderness  
Musculoskeletal -no back tenderness,  
Right knee is quite swollen and warmer than the left, but there is no redness, 
no   
skin lesions, no signs of trauma  
Neurological -no facial asymmetry noted, pupils are equal and symmetrical,   
extraocular muscle intact, no nystagmus, motor function 5 out of 5 in upper and 
lower   
extremities, sensation 5 out of 5 in upper and lower extremities, finger to nose
 test   
performed well without any dysmetria, reflexes symmetrical bilaterally in lower   
extremities, speech is clear, no tremors noted, gait deferred  
Skin -no significant ulcers, no rash noted  
Extremities - no edema in bilateral lower extremities noted  
Psychiatry - appropriate affect  
  
  
  
[facility]  
Surgical History (Reviewed 24 @ 14:38 by GARRET Chong)  
  
History of facial surgery  
jaw  
  
  
Family History (Reviewed 24 @ 14:38 by GARRET Chong)  
Brother Heart disease  
Legacy FamHx Relation: Brother(s)  
Family history of mental disorder  
Legacy FamHx Relation: Brother(s); Legacy FamHx Problem: Diagnosed with Mental   
Illness  
Father   
Family/Other   
Legacy FamHx Problem: father, --cirrhosis of the liver:mother,   
--multiple health issues:1 sister, --pancreatic cancer  
Mother   
Heart disease  
Sister Cancer  
Legacy FamHx Problem: Diagnosed with Cancer  
Heart disease  
  
  
Social History  
Smoking Status: Never smoker  
Substance Use Type: None  
  
Meds  
Medications and Allergies  
Allergies  
  
metformin Allergy (Unknown, Verified 24 08:53)  
back spasms  
Penicillins Allergy (Unknown, Verified 24 08:53)  
rash  
  
  
Home Medications  
  
blood sugar diagnostic #50 ea 05/15/24 [Rx Confirmed 24]  
testosterone cypionate 200 mg/mL intramuscular oil 200 mg IM Q2W 24 days #2 mL   
24 [Rx Confirmed 24]  
diclofenac sodium 75 mg tablet,delayed release 75 mg PO BID 30 days #60 tabs 
24   
[Rx Confirmed 24]  
nabumetone 750 mg tablet 750 mg PO BID PRN pain 24 [History Confirmed 
24]  
  
  
  
Exam  
Physical Exam  
Vital Signs:  
  
  
  
  
Temp Pulse Resp BP Pulse Ox O2 Del Method  
  
36.8 C 75 18 148/67 H 95 Room Air  
  
24 17:08 24 17:08 24 17:08 24 17:08 24 17:08 
24   
17:08  
  
  
  
  
Results - Hospitalist H&P  
Lab Results  
Labs:  
Laboratory Last Values  
  
  
  
Corrected WBC 9.4 X10E3/uL (4.1-10.5) 24 16:43  
  
Uncorrected WBC Count 9.4 x10E3/uL (4.1-10.5) 24 16:43  
  
RBC 5.50 X10E6/uL (3.90-5.60) 24 16:43  
  
Hgb 16.3 g/dL (13.0-17.0) 24 16:43  
  
Hct 47.8 % (38.8-50.0) 24 16:43  
  
MCV 86.9 fl (83.5-101) 24 16:43  
  
MCH 29.6 pg (27.5-35.2) 24 16:43  
  
MCHC 34.1 g/dL (32.5-35.6) 24 16:43  
  
RDW 13.3 % (12.0-14.8) 24 16:43  
  
Plt Count 170 x10E3/uL (150-450) 24 16:43  
  
MPV 8.0 fl (6.6-10.1) 24 16:43  
  
Neut % (Auto) 88.3 % (.) 24 16:43  
  
Lymph % (Auto) 6.0 % (.) 24 16:43  
  
Mono % (Auto) 5.1 % (.) 24 16:43  
  
Eos % (Auto) 0.1 % (.) 24 16:43  
  
Baso % (Auto) 0.5 % (.) 24 16:43  
  
Nucleat RBC Rel Count 0.8 /100 WBC (0-0.5) H 24 16:43  
  
Neut # (Auto) 8.4 x10E3/uL (1.8-7.7) H 24 16:43  
  
Lymph # (Auto) 0.6 x10E3/uL (1.00-4.8) L 24 16:43  
  
Mono # (Auto) 0.5 x10E3/uL (0.0-0.8) 24 16:43  
  
Eos # (Auto) 0.0 x10E3/uL (0.0-0.45) 24 16:43  
  
Baso # (Auto) 0.0 x10E3/uL (0.0-0.2) 24 16:43  
  
Monocyte Dist Width 22.07 % (0.00-20.00) H 24 16:43  
  
ESR 32 mm/hr (0-19) H 24 16:43  
  
PHA Creatinine Clear 143.91 24 16:43  
  
Sodium 135 mmol/L (136-145) L 24 16:43  
  
Potassium 4.0 mmol/L (3.5-5.1) 24 16:43  
  
Chloride 102 mmol/L () 24 16:43  
  
Carbon Dioxide 23.1 mmol/L (21.0-31.0) 24 16:43  
  
Anion Gap 13.9 mEq/L (6.0-15.0) 24 16:43  
  
BUN 15 mg/dL (7-25) 24 16:43  
  
Creatinine 0.68 mg/dL (0.70-1.30) L 24 16:43  
  
Est GFR (CKD-EPI) > 60.0 mL/Min 24 16:43  
  
Glucose 169 mg/dL () H 24 16:43  
  
Calcium 8.6 mg/dL (8.6-10.3) 24 16:43  
  
Total Bilirubin 1.5 mg/dl (0.3-1.0) H 24 16:43  
  
AST 19 U/L (13-39) 24 16:43  
  
ALT 38 U/L (7-52) 24 16:43  
  
Alkaline Phosphatase 60 U/L () 24 16:43  
  
C-Reactive Prot, Quant 12.3 mg/dL (0.0-0.5) H 24 16:43  
  
Total Protein 6.8 gm/dL (6.4-8.9) 24 16:43  
  
Albumin 3.9 gm/dL (3.5-5.7) 24 16:43  
  
Globulin 2.9 gm/dL 24 16:43  
  
Albumin/Globulin Ratio 1.3 24 16:43  
  
  
  
  
Assessment & Plan  
Assessment/Plan  
(1) Septic arthritis of knee, right:  
  
Plan  
1. Right knee intractable pain to extended he is not able to ambulate associated
with   
large knee effusions by clinical exam and on x-ray, status post intra-articular   
injection on , per patient with steroids  
Will start empiric antibiotic therapy,  
We will get in touch with orthopedic doctor regarding the patient  
N.p.o. after midnight if he needs any surgical intervention  
Pain management with IV for intractable pain  
  
2. DVT prophylaxis SCDs for now  
IP vs OBS Justification  
Based on differential dx, clinical care plan, and risk of adverse events, if   
untreated, in my clinical judgement this patient requires an acute care setting 
as:   
INPATIENT because of an expectation of an over 2 midnight stay.  
Estimated length of stay (# of days): 5  
  
  
Documented By: Josie Ann MD 24 182  
Signed By: <Electronically signed by Joise Ann MD>  
24 1141  
   
  
[facility]  
Work Phone: [phone]- Progress note  
  
  
  
  
                                        Author              Jeff Onofre  
[facility]  
2024 10:04am  
   
                                        Note Date/Time      2024 10  
:04am  
   
                                                    [facility]  
ENTER  
[address] [place] [address]  
  
Orthopedic Progress Note  
Signed  
  
Patient: Kaylynn Mims MR#: [id number]  
[id number]  
: 1955 Acct:[id number]  
  
Age/Sex: 68 / M Adm Date:   
4  
Loc:  Room: [address] [place] [address] Type: ADM IN  
Attending Dr: Josie Ann MD  
  
Copies to: ~  
  
  
Date of Service:  
2024  
  
  
Subjective  
Subjective  
Interval History:  
Patient is resting comfortably in bed this morning. He reports that while the 
pain   
improved slightly after the aspiration yesterday and has returned and he still 
has   
significant pain with any sort of range of motion of the knee.  
Nursing reports no acute events overnight  
  
Exam  
Physical Exam  
Vital Signs:  
  
  
  
  
Temp Pulse Resp BP Pulse Ox O2 Del Method  
  
98.1 F 73 18 144/85 H 96 Room Air  
  
24 09:07 24 09:07 24 09:07 24 09:07 24 09:07 
24   
09:07  
  
  
  
Narrative:  
Right knee has a very large effusion. With any passive range of motion of the 
right   
knee causes significant pain. Tenderness diffusely about the knee. Minimal to no
  
erythema noted. There is some skin discoloration within the pretibial region   
bilaterally but no pitting edema noted.  
  
Objective  
Labs  
Labs:  
Laboratory Results - last 24 hr  
  
  
  
24  
  
16:43 19:15 19:45  
  
Corrected WBC 9.4  
  
Uncorrected WBC Count 9.4  
  
RBC 5.50  
  
Hgb 16.3  
  
Hct 47.8  
  
MCV 86.9  
  
MCH 29.6  
  
MCHC 34.1  
  
RDW 13.3  
  
Plt Count 170  
  
MPV 8.0  
  
Neut % (Auto) 88.3  
  
Lymph % (Auto) 6.0  
  
Mono % (Auto) 5.1  
  
Eos % (Auto) 0.1  
  
Baso % (Auto) 0.5  
  
Nucleat RBC Rel Count 0.8 H  
  
Neut # (Auto) 8.4 H  
  
Lymph # (Auto) 0.6 L  
  
Mono # (Auto) 0.5  
  
Eos # (Auto) 0.0  
  
Baso # (Auto) 0.0  
  
Monocyte Dist Width 22.07 H  
  
ESR 32 H  
  
PHA Creatinine Clear 143.91  
  
Sodium 135 L  
  
Potassium 4.0  
  
Chloride 102  
  
Carbon Dioxide 23.1  
  
Anion Gap 13.9  
  
BUN 15  
  
Creatinine 0.68 L  
  
Est GFR (CKD-EPI) > 60.0  
  
Glucose 169 H  
  
Lactic Acid 1.3  
  
Calcium 8.6  
  
Total Bilirubin 1.5 H  
  
AST 19  
  
ALT 38  
  
Alkaline Phosphatase 60  
  
C-Reactive Prot, Quant 12.3 H  
  
Total Protein 6.8  
  
Albumin 3.9  
  
Globulin 2.9  
  
Albumin/Globulin Ratio 1.3  
  
Synovial Color Cancelled  
  
Synovial Appearance Cancelled  
  
Synov Supernatant Color Cancelled  
  
Synovial RBC Cancelled  
  
Synovial Tot Nuc Cell Cancelled  
  
Synovial Mononuclear Cancelled  
  
Synovial Neutrophils Cancelled  
  
Synovial Eosinophils Cancelled  
  
Synov Monos/Histiocyte Cancelled  
  
Synovial LE Cells Cancelled  
  
Synovial Lymphocytes % Cancelled  
  
Synovial Other Cells % Cancelled  
  
Synovial Crystals Cancelled  
  
Synovial Glucose  
  
Synovial Total Protein  
  
Synovial Fluid Comment Cancelled  
  
  
  
  
24  
  
23:00 06:26  
  
Corrected WBC 8.3  
  
Uncorrected WBC Count 8.3  
  
RBC 4.91  
  
Hgb 14.7  
  
Hct 43.0  
  
MCV 87.5  
  
MCH 29.9  
  
MCHC 34.2  
  
RDW 13.5  
  
Plt Count 175  
  
MPV 7.8  
  
Neut % (Auto) 71.5  
  
Lymph % (Auto) 17.6  
  
Mono % (Auto) 10.0  
  
Eos % (Auto) 0.4  
  
Baso % (Auto) 0.5  
  
Nucleat RBC Rel Count 0.1  
  
Neut # (Auto) 5.9  
  
Lymph # (Auto) 1.5  
  
Mono # (Auto) 0.8  
  
Eos # (Auto) 0.0  
  
Baso # (Auto) 0.0  
  
Monocyte Dist Width  
  
ESR  
  
PHA Creatinine Clear 142.25  
  
Sodium 135 L  
  
Potassium 3.8  
  
Chloride 102  
  
Carbon Dioxide 25.2  
  
Anion Gap 11.6  
  
BUN 18  
  
Creatinine 0.72  
  
Est GFR (CKD-EPI) > 60.0  
  
Glucose 142 H  
  
Lactic Acid  
  
Calcium 8.4 L  
  
Total Bilirubin  
  
AST  
  
ALT  
  
Alkaline Phosphatase  
  
C-Reactive Prot, Quant  
  
Total Protein  
  
Albumin  
  
Globulin  
  
Albumin/Globulin Ratio  
  
Synovial Color Yellow A  
  
Synovial Appearance Cloudy A  
  
Synov Supernatant Color Yellow  
  
Synovial RBC 6370 H  
  
Synovial Tot Nuc Cell 78262 H  
  
Synovial Mononuclear  
  
Synovial Neutrophils 92 H  
  
Synovial Eosinophils 0  
  
Synov Monos/Histiocyte 1  
  
Synovial LE Cells  
  
Synovial Lymphocytes % 7  
  
Synovial Other Cells %  
  
Synovial Crystals None seen  
  
Synovial Glucose 80  
  
Synovial Total Protein  
  
Synovial Fluid Comment  
  
  
  
Micro  
Microbiology Results:  
Microbiology  
  
24 23:00 Joint Fluid - Knee, Right Gram Stain - Final  
24 19:15 Joint Fluid - Knee, Right Gram Stain - Final  
  
  
  
Assessment / Plan  
Assessment and plan  
(1) Septic arthritis of knee, right:  
Code(s):  
M00.9 - Pyogenic arthritis, unspecified  
  
Plan  
Right knee septic arthritis  
  
We discussed the patient's aspiration results this morning. I explained to him 
that   
with a cell count of greater than 79,000 with 92% PMNs is certainly concerning 
for   
infection especially given his history as well as his elevated inflammatory 
labs. As   
a result, I recommended a washout of the knee today. We discussed this procedure
in   
detail including its risk and benefits. Ultimately patient agreed to proceed 
after   
all of his questions and concerns were answered and addressed. Informed consent 
was   
obtained.  
  
Remain n.p.o. Plan for right knee washout/lavage later this morning.  
  
  
Documented By: Jeff Onofre MD 08/20/24 10  
02  
Signed By: <Electronically signed by Jeff Onofre MD>  
24 0868  
   
  
[facility]  
Work Phone: [phone]- Consult note  
  
  
  
  
                                        Author              Jeff Onofre  
[facility]  
2024 11:12pm  
   
                                        Note Date/Time      2024 11  
:12pm  
   
                                                    [facility]  
ENTER  
[address] [place] [address]  
  
Orthopedic Consult Note  
Signed  
  
Patient: Kaylynn Mims MR#: [id number]  
[id number]  
: 1955 Acct:[id number]  
  
Age/Sex: 68 / M Adm Date:   
4  
Loc:  Room: [address] [place] [address] Type: ADM IN  
Attending Dr: Josie Ann MD  
  
Copies to: DO Jeff Baig MD Ruta Semaskiene, MD~  
  
  
History of Present Illness  
HPI  
Consult date:  
2024  
  
Requesting provider:  
Josie Ann MD  
  
History of present illness:  
Patient is a 68-year-old male with known bilateral knee osteoarthritis being 
treated   
by Dr. Woods. He had bilateral knee steroid injections a few days ago. He did 
really   
well initially for the first day. However, after that he began having 
significant   
pain and swelling to the point where he could not even put weight on the right 
knee.   
He says with any movement of the knee he has significant pain diffusely. He 
denies   
any trauma or injury to the knee. He denies any recent infections.  
I spoke with the emergency room physician and they were unable to obtain any 
fluid. I   
also spoke to lab who reported the small mount of fluid the ER sent was not 
enough   
for analysis.  
Currently the patient is resting comfortably in his bed. Has not had pain 
medications   
for many hours. He reports the knee is bothering him quite a bit ifhe moves it. 
He   
denies any history of gout or pseudogout.  
  
[facility]  
Surgical History (Reviewed 24 @ 14:38 by GARRET Chong)  
  
History of facial surgery  
jaw  
  
  
Family History (Reviewed 24 @ 14:38 by GARRET Chong)  
Brother Heart disease  
Legacy FamHx Relation: Brother(s)  
Family history of mental disorder  
Legacy FamHx Relation: Brother(s); Legacy FamHx Problem: Diagnosed with Mental   
Illness  
Father   
Family/Other   
Legacy FamHx Problem: father, --cirrhosis of the liver:mother,   
--multiple health issues:1 sister, --pancreatic cancer  
Mother   
Heart disease  
Sister Cancer  
Legacy FamHx Problem: Diagnosed with Cancer  
Heart disease  
  
  
Social History  
Smoking Status: Never smoker  
Substance Use Type: None  
  
Allergies & Medications  
Medications and Allergies  
Allergies  
  
metformin Allergy (Unknown, Verified 24 13:30)  
back spasms  
Penicillins Allergy (Unknown, Verified 24 13:30)  
rash  
  
  
Home Medications  
  
blood sugar diagnostic #50 ea 05/15/24 [Rx Confirmed 24]  
testosterone cypionate 200 mg/mL intramuscular oil 200 mg IM Q2W 24 days #2 mL   
24 [Rx Confirmed 24]  
diclofenac sodium 75 mg tablet,delayed release 75 mg PO BID 30 days #60 tabs 
24   
[Rx Confirmed 24]  
nabumetone 750 mg tablet 750 mg PO BID PRN pain 24 [History Confirmed 
24]  
  
  
  
Exam  
Physical Exam  
Vital Signs:  
  
  
  
  
Temp Pulse Resp BP Pulse Ox O2 Del Method  
  
97.8 F 72 18 177/79 H 97 Room Air  
  
24 23:01 24 22:50 24 23:01 24 23:01 24 23:01 
24   
23:01  
  
  
  
Narrative:  
Right knee has a very large effusion. With any passive range of motion of the 
right   
knee causes significant pain. Tenderness diffusely about the knee. Minimal to no
  
erythema noted. There is some skin discoloration within the pretibial region   
bilaterally but no pitting edema noted.  
  
Results - Orthopedics  
Lab Results  
  
24 16:43  
  
24 16:43  
  
Labs:  
Laboratory Results - Last 48 hrs.  
  
24 19:45: Lactic Acid 1.3  
24 19:15: Synovial Color Cancelled, Synovial Appearance Cancelled, Synov   
Supernatant Color Cancelled, Synovial RBC Cancelled, Synovial Tot Nuc Cell 
Cancelled,   
Synovial Mononuclear Cancelled, Synovial Neutrophils Cancelled, Synovial 
Eosinophils   
Cancelled, Synov Monos/Histiocyte Cancelled, Synovial LE Cells Cancelled, 
Synovial   
Lymphocytes % Cancelled, Synovial Other Cells % Cancelled, Synovial Crystals   
Cancelled, Synovial Fluid Comment Cancelled  
24 16:43: Corrected WBC 9.4, Uncorrected WBC Count 9.4, RBC 5.50, Hgb 
16.3, Hct   
47.8, MCV 86.9, MCH 29.6, MCHC 34.1, RDW 13.3, Plt Count 170, MPV 8.0,Neut % 
(Auto)   
88.3, Lymph % (Auto) 6.0, Mono % (Auto) 5.1, Eos % (Auto) 0.1, Baso % (Auto) 
0.5,   
Nucleat RBC Rel Count 0.8 H, Neut # (Auto) 8.4 H, Lymph # (Auto) 0.6 L, Mono # 
(Auto)   
0.5, Eos # (Auto) 0.0, Baso # (Auto) 0.0, Monocyte Dist Width 22.07 H, ESR 32 H,
PHA   
Creatinine Clear 143.91, Sodium 135 L, Potassium 4.0, Chloride 102, Carbon 
Dioxide   
23.1, Anion Gap 13.9, BUN 15, Creatinine 0.68 L, Est GFR (CKD-EPI) > 60.0, 
Glucose   
169 H, Calcium 8.6, Total Bilirubin 1.5 H, AST 19, ALT 38, Alkaline Phosphatase 
60,   
C-Reactive Prot, Quant12.3 H, Total Protein 6.8, Albumin 3.9, Globulin 2.9,   
Albumin/Globulin Ratio 1.3  
  
H & H  
  
  
  
24 Range/Units  
  
16:43  
  
Hgb 16.3 (13.0-17.0) g/dL  
  
Hct 47.8 (38.8-50.0) %  
  
  
All other labs are normal.  
  
Microbiology Results  
Microbiology:  
Microbiology - Results from entire visit  
  
24 19:15 Joint Fluid - Knee, Right Gram Stain - Final  
  
  
Imaging & Diagnostic Results  
Imaging/Diagnostics:  
Nonweightbearing x-rays of the right knee were independently reviewed today. 
These do   
demonstrate signs of osteoarthritis. There is certainly a large effusion noted. 
No   
acute osseous abnormalities are appreciated.  
  
Assessment/Plan  
(1) Effusion, right knee:  
Code(s):  
M25.461 - Effusion, right knee  
  
Plan  
Right knee effusion, concern for gout/pseudogout versus septic arthritis  
  
I had a long conversation with the patient regarding his presentation and 
history   
along with his exam of the right knee. At this point in time the amountof 
swelling   
that he has in his right knee is concerning and given the timing of the 
injection is   
also concerning from an infection standpoint. His ESR is 32 and his CRP is 12.3   
today. Given this information coupled with his history as well as his exam, I am
  
concerned for septic arthritis of the right knee. I recommended a right knee   
aspiration and analysis of the fluid. We discussed therisks and benefits of the   
procedure at length. All the patient's questions and concerns were answered and   
addressed. Informed consent was obtained.  
After consent was obtained, the right knee had approximately 160 cc of 
yellowishegg   
drop soup appearing fluid aspirated using sterile technique. Patient tolerated 
the   
aspiration well. This fluid will be sent for the following:  
Cell count with differential  
Crystals  
Gram stain  
Culture  
Protein  
Glucose  
Patient will remain n.p.o. after midnight. I will check on the results in the 
morning   
and if there is concern for infection then we will consider a washout ofthe 
knee. I   
did discuss this with the patient this evening.  
  
  
Documented By: Jeff Onofre MD 24  
Signed By: <Electronically signed by Jeff Onorfe MD>  
24 2312  
   
  
[facility]  
Work Phone: [phone]- Evaluation note*   
  
                      Encounter Date Diagnosis  Assessment Notes Treatment Notes  
 Treatment   
Clinical Notes  
   
                                                Medicare annual   
wellness visit,   
initial (ICD-10 -   
Z00.00)                                             Personalized health   
advice was given to   
the beneficiary   
including a written   
plan for screenings   
discussed and   
provided. Advanced   
care planning   
reviewed and/or   
information given   
as requested.   
Additional   
counseling was   
provided here today   
in regards to, [ ].   
The above visit was   
performed by Melany PÉREZ   
under direct   
supervision of Dr. Elvira Harper DO.   
Document reviewed   
and amended by   
provider signed   
below.  
UTD on colonoscopy  
Due for screening   
lab work in April  
To get second   
shingrix vaccine   
and TdaP at   
pharmacy  
Declines flu   
vaccine  
                                          
   
                                                Type 2 diabetes   
mellitus (ICD-10 -   
E11.9)                                              Well controlled on   
current dose of   
glipizide ER, will   
continue  
                                          
   
                                                Prostate cancer   
screening (ICD-10 -   
Z12.5)                                                        
   
                                                Low testosterone   
(ICD-10 - E29.1)                                    I have personally   
reviewed the [place]RS   
report for this   
patient. I have   
considered the   
risks of abuse,   
dependence,   
addiction and   
diversion. I   
believe that it is   
clinically   
appropriate for   
this patient to be   
prescribed this   
medication based on   
documented   
diagnosis.  
                                          
  
  
[facility]  
Other Phone: [phone]- Evaluation note*   
  
                      Encounter Date Diagnosis  Assessment Notes Treatment Notes  
 Treatment   
Clinical Notes  
   
                                        10 Aug, 2023        Type 2 diabetes   
mellitus (ICD-10 -   
E11.9)                                              Will discontinue   
Januvia due to   
cost, will trial   
switching to   
glipizide ER 5mg   
daily. Plan to f/u   
in 3 months for AWV   
and recheck a1c. To   
call with any   
issues with med in   
the mean time.  
                                          
   
                                        10 Aug, 2023        Low testosterone   
(ICD-10 - E29.1)                                    Recent PSA and CBC.   
Plan to check CBC   
and testosterone   
level next visit  
                                          
   
                                        10 Aug, 2023        High risk   
medication use   
(ICD-10 - Z79.899)                                            
  
  
[facility]  
Other Phone: [phone] summary  
  
  
  
  
                                        Author              Josie Ann  
[facility]  
2024 2:20pm  
   
                                        Note Date/Time      2024 2:  
21pm  
   
                                                    [facility]  
ENTER  
[address] [place] [address]  
  
Discharge Summary  
Signed  
  
Patient: Kaylynn Mims MR#: [id number]  
[id number]  
: 1955 Acct:[id number]  
  
Age/Sex: 68 / M Adm Date:   
4  
Loc:  Room: [address] [place] [address]  
  
Attending Dr: Josie Ann MD  
  
Copies to: DO Josie Baig MD~  
  
  
Providers  
Date of Discharge: 24  
Discharging Provider: Josie Ann  
Primary Care Provider: Elvira Harper  
Consults:  
  
  
24 17:56  
Consult to Orthopedic Surgery Routine  
Comment:  
Consulting Provider: [facility]  
Reason For Exam: knee effusion  
Has Provider Been Notified: Yes  
Date of Notification: 24  
Time of Notification: 07:32  
  
24 17:57  
Consult to Occupational Therapy Routine  
Comment:  
Physician Instructions:  
Consult to OT for:: Evaluation and Treat  
Consult to Physical Therapy Routine  
Comment:  
Physician Instructions:  
Consult to PT for:: Evaluation and Treat  
  
24 11:33  
Consult to Infectious Diseases Routine  
Comment:  
Consulting Provider: HANG - Infectious Disease  
Reason For Exam: native R knee septic arthritis  
Has Provider Been Notified: Yes  
Date of Notification: 24  
Time of Notification: 11:42  
Consult to Occupational Therapy Routine  
Comment:  
Physician Instructions: WBAT and ROMAT RLE  
Consult to OT for:: Evaluation and Treat  
Comment: WBAT  
Consult to Physical Therapy Routine  
Comment:  
Physician Instructions:  
Consult to PT for:: Evaluation and Treat  
Comment: WBAT  
Extended Comment: WBAT and ROMAT RLE  
  
  
  
  
Discharge Diagnosis  
(1) Septic arthritis of knee, right:  
Final Diagnosis  
Final Discharge Diagnosis:  
Acute strep mitis/oralis septic arthritis of the right knee, status post 
incision and   
irrigation by Ortho team, discharged on Rocephin therapy through the right upper
  
extremity PICC line  
  
Newly diagnosed hypertension, started new medications  
  
  
  
Summary  
Hospital Course  
Hospital course:  
68 years old male presented with complaints of right knee pain. Few days ago 
hehad   
bilateral knee injection with steroids, 24 hours after he developed severe pain 
and   
swelling of the right knee. Patient was afebrile without leukocytosis,however he
did   
have significantly elevated CRP. Right knee drainage was done byan orthopedic 
doctor   
while in the emergency room and was sent for cultures. Patient was started on   
broad-spectrum antibiotics with vancomycin and cefepime, cultures revealed strep
  
mitis, with blood culture showing strep mitis as well. Repeated blood cultures 
remain   
negative. Antibiotics were adjusted Rocephin therapy and PICC line was placed in
the   
right upper extremity. Patient underwent incision and irrigation of the septic 
knee   
by orthopedic surgeon. ID was consulted. Patient was arranged outpatient 
infusion   
center to get IV antibiotics and physical therapy. With recommendations to 
follow-up   
with orthopedic doctor, ID as outpatient.  
Patient's blood pressure was noted to be elevated and he was started on 
valsartan and   
chlorthalidone therapy with recommendations to follow-up with primarycare doctor
for   
further evaluation and treatment. He was asymptomatic. Denies any nausea any   
headache, any vision problems any chest pain shortness of breath.  
On discharge she still had right knee pain, but it was better since admission. 
Denies   
any fevers. Denies any abdominal pain.  
  
Review of system:  
General - denies any fevers, dizziness, headache  
Pulmonary - denies any SOB, cough  
Gastrointestinal - denies any abdominal pain, any nausea, vomiting  
Cardiovascular - denies any chest pain, palpitations  
  
Physical exam:  
General -awake, alert, oriented ?3, not in acute distress  
Cardiovascular -S1 with S2, no murmurs, no rubs, no gallops  
Pulmonary - clear to auscultation bilaterally  
Gastrointestinal - abdomen is soft, slightly distended with positive bowel 
sounds, no   
tenderness  
Extremities -no edema  
Right knee in dressing  
Neurological -no focal neurological dysfunction noted  
  
The patient CARE and further plan was discussed with the patient and the 
patient's   
wife at the bedside. All questions answered. Patient expressed understanding and
was   
discharged home in a stable condition. The patient was given written and verbal   
instructions. The recommendations were made to follow-up as outpatient within 
one   
week.The patient was informed if his symptoms get worse to go back to emergency 
room   
or call his primary care physician office.  
  
Time spent on the discharge day 35 min.  
Time Spent with Patient  
Time spent providing/coordinating discharge services (# min): 35  
Surgeries and Procedures  
  
  
Operation Date: 24 12:30  
Actual Procedures  
p OR Right Knee Washout, Arthroscopy(Right) - Jeff Onofre II, MD  
  
  
  
Discharge Plan  
Discharge Plan  
Patient Disposition: Home  
  
Activity: No Activity Restriction  
  
Diet: Low-Sodium  
  
Additional Instructions:  
Weekly CRP to be done  
  
You were found to have elevated blood pressure, so blood pressure medications 
were   
initiated. Please follow-up with your family doctor for further evaluationand   
treatment and adjustment of your medications.  
  
  
Your next antibiotic will be at [facility] on 24 
at   
11:00am.  
  
Instructions: Know your Meds  
  
Prescriptions:  
New  
ceftriaxone 2 gram Recon Soln  
2 g IV Q24H 24 Days Qty: 24 0RF  
acetaminophen [Tylenol] 325 mg Tablet  
650 mg PO Q4H PRN (Reason: Pain Scale 1 - 5) Qty: 20 0RF  
ascorbic acid (vitamin C) [Vitamin C] 500 mg Tablet  
500 mg PO BID.WITH.MEALS Qty: 60 0RF  
oxycodone 5 mg Tablet  
5 mg PO Q4HR PRN (Reason: Pain Scale 6 - 10) 7 Days Qty: 20 0RF  
ferrous sulfate 324 mg (65 mg iron) Tablet,Delayed Release (Dr/Ec)  
324 mg PO BID.WITH.MEALS Qty: 0 0RF  
polyethylene glycol 3350 [HealthyLax] 17 gram Powder In Packet  
17 g PO DAILY Qty: 30 0RF  
sennosides-docusate sodium 8.6-50 mg Tablet  
2 tab PO BID PRN (Reason: constipation) Qty: 60 0RF  
valsartan 160 mg Tablet  
160 mg PO DAILY Qty: 30 3RF  
chlorthalidone 25 mg tablet  
25 mg PO DAILY Qty: 30 0RF  
  
Continued  
testosterone cypionate 200 mg/mL oil  
200 mg IM Q2W 24 Days Qty: 2 2RF  
nabumetone 750 mg tablet  
750 mg PO BID PRN (Reason: pain)  
(DME) blood sugar diagnostic Strip  
See Rx Instructions .Route Qty: 50 2RF  
Rx Instructions:  
As directed  
diclofenac sodium 75 mg tablet,delayed release (DR/EC)  
75 mg PO BID 30 Days Qty: 60 2RF  
  
Other Ambulatory Orders:  
DME Home Medical Equipment (Routine) Timeframe: 2024  
Location: Determined by Patient  
Ordered By: Jeff Onofre II  
Basic Metabolic Panel (Routine) Timeframe: 1 Week  
Location: Determined by Patient  
Ordered By: Josie Semaskiene  
C-Reactive Protein (QWEEK) Timeframe: 2024  
Location: Determined by Patient  
Ordered By: Josie Semaskiene  
C-Reactive Protein (QWEEK) Timeframe: 2024  
Location: Determined by Patient  
Ordered By: Josie Semaskiene  
C-Reactive Protein (QWEEK) Timeframe: 2024  
Location: Determined by Patient  
Ordered By: Josie Semaskiene  
C-Reactive Protein (Routine) Timeframe: 2024  
Location: Determined by Patient  
Ordered By: Josie Semaskiene  
C-Reactive Protein (Routine) Timeframe: 2024  
Location: Determined by Patient  
Ordered By: Josie Semaskiene  
C-Reactive Protein (Routine) Timeframe: 2024  
Location: Determined by Patient  
Ordered By: Josie Semaskiene  
  
Follow Up:  
Paul Crum MD [Active Staff] - (Call office on Monday to schedule follow-
upwith   
Dr. Crum in 2 weeks)  
Jeff Onofre II, MD [Active Staff] - 24 10:00 am (Follow-up with   
Orthopedic Surgery)  
Elvira Harper DO [Primary Care Provider] - 24 10:45 am (Follow-up with 
your   
Primary Care Provider, call office to reschedule if needed. )  
  
  
Exam  
Physical Exam  
Vital Signs:  
  
  
  
  
Temp Pulse Resp BP Pulse Ox O2 Del Method O2 Flow Rate  
  
37.1 C 75 18 177/82 H 96 Room Air 2  
  
24 11:41 24 11:41 24 11:41 24 11:41 24 11:41 
24   
11:41 24 15:21  
  
  
  
  
Diagnostic Studies  
Completed and Pending Studies  
Pending studies at discharge:  
  
  
24 19:45  
Blood Culture Stat  
  
24 13:40  
Blood Culture Routine  
  
  
Preliminary micro results at discharge  
  
  
  
24 13:40 Blood Culture - Preliminary  
  
Blood - Right Antecubital No Growth 3 Days  
  
24 13:41 Blood Culture - Preliminary  
  
Blood - Right Hand No Growth 3 Days  
  
24 19:55 Blood Culture - Preliminary  
  
Blood - Right Hand No Growth 4 Days  
  
  
  
  
Labs on day of discharge:  
  
  
24 14:52: C-Reactive Prot, Quant 22.7 H  
  
  
  
  
  
Documented By: Josie Ann MD 24 1416  
Signed By: <Electronically signed by Josie Ann MD>  
24 1420  
   
  
[facility]  
Work Phone: [phone] note*   
  
                          Diagnosis    Onset Date   Resolution   Status  
   
                          Right shoulder pain                           acute  
   
                          Type 2 diabetes mellitus                           acu  
te  
   
                          Bursitis of right shoulder                           a  
cute  
   
                          Right shoulder pain                           acute  
  
  
[facility]  
Work Phone: [phone] note*   
  
                          Diagnosis    Onset Date   Resolution   Status  
   
                          Bursitis of right shoulder                           a  
cute  
   
                          Right shoulder pain                           acute  
   
                          Effusion, left knee                           noneacti  
ve  
   
                          Left knee pain                           noneactive  
   
                          Arthritis of left knee                           chron  
ic  
   
                          Effusion, left knee                           noneacti  
ve  
  
  
[facility]  
Work Phone: [phone] note*   
  
                          Diagnosis    Onset Date   Resolution   Status  
   
                          Effusion, left knee                           noneacti  
ve  
   
                          Left knee pain                           noneactive  
   
                          Arthritis of left knee                           chron  
ic  
   
                          Effusion, left knee                           noneacti  
ve  
   
                          Iliotibial band syndrome                           non  
eactive  
   
                          Right knee pain                           noneactive  
  
  
[facility]  
Work Phone: [phone] note*   
  
                          Diagnosis    Onset Date   Resolution   Status  
   
                          Arthritis of left knee                           chron  
ic  
   
                          Effusion, left knee                           noneacti  
ve  
   
                          Iliotibial band syndrome                           non  
eactive  
   
                          Right knee pain                           noneactive  
   
                          Arthritis of knee, right                           chr  
onic  
   
                          Arthritis of left knee                           chron  
ic  
  
  
[facility]  
Work Phone: [phone] note*   
  
                          Diagnosis    Onset Date   Resolution   Status  
   
                          Iliotibial band syndrome                           non  
eactive  
   
                          Right knee pain                           noneactive  
   
                          Arthritis of knee, right                           chr  
onic  
   
                          Arthritis of left knee                           chron  
ic  
   
                          Effusion, right knee                           acute  
   
                          Intractable pain                           acute  
   
                          Pain and swelling of right knee                         
    acute  
   
                          Positive blood culture                           acute  
   
                          Septic arthritis of knee, right                         
    acute  
   
                          Streptococcus viridans infection                        
     acute  
  
  
[facility]  
Work Phone: [phone] note*   
  
                          Diagnosis    Onset Date   Resolution   Status  
   
                          Iliotibial band syndrome                           non  
eactive  
   
                          Right knee pain                           noneactive  
   
                          Arthritis of knee, right                           chr  
onic  
   
                          Arthritis of left knee                           chron  
ic  
   
                          Effusion, right knee                           resolve  
d  
   
                          Intractable pain                           resolved  
   
                          Pain and swelling of right knee                         
    resolved  
   
                          Positive blood culture                           resol  
maine  
   
                          Septic arthritis of knee, right                         
    resolved  
   
                          Streptococcus viridans infection                        
     resolved  
  
  
[facility]  
Work Phone: [phone] general Narrative - Reported*   
  
                                Type            Description     Date  
   
                                Medical History type 2 diabetes   
   
                                Surgical History jaw sx            
   
                                Hospitalization History see above         
  
  
[facility]  
Other Phone: [phone]  
  
Summary Purpose  
  
  
                                                      
  
  
  
Family History  
  
  
                          Relationship Condition    Age at Onset Recorded Date/T  
denise  
   
                          brother      Heart disease Unknown        
   
                                       Family history of mental disorder Unknown  
        
   
                          father            Unknown        
   
                          family member      Unknown        
   
                          Not Specified      Unknown        
   
                                       Heart disease Unknown        
   
                          sister       Malignant neoplasm Unknown        
  
  
  
                          Relationship Condition    Age at Onset Recorded Date/T  
denise  
   
                          brother      Heart disease Unknown        
   
                                       Family history of mental disorder Unknown  
        
   
                          father            Unknown        
   
                          family member      Unknown        
   
                          mother            Unknown        
   
                                       Heart disease Unknown        
   
                          sister       Malignant neoplasm Unknown        
  
  
  
Advance Directives  
  
  
                                Advance Directive Response        Recorded Date/  
Time  
   
                                Advance Directives No               11:42am  
  
  
  
                                Advance Directive Response        Recorded Date/  
Time  
   
                                Advance Directives No               9:39am  
  
  
  
Chief Complaint and Reason for Visit  
  
  
                                        Chief Complaint     BACK SPASMS  
CONSULT DR ELVIRA HARPER RT SHOULDER PAIN  
M25.511 - Pain in right shoulder  
z12.5 e29.1 e11.9  
   
                                        Reason for Visit    Right shoulder pain  
Type 2 diabetes mellitus  
Bursitis of right shoulder  
Right shoulder pain  
  
  
  
                                        Chief Complaint     CONSULT DR ELVIRA GUZMAN RT SHOULDER PAIN  
M25.511 - Pain in right shoulder  
z12.5 e29.1 e11.9  
l knee pain  
M25.562  
Lt knee pain and effusion  
   
                                        Reason for Visit    Bursitis of right sh  
oulder  
Right shoulder pain  
Effusion, left knee  
Left knee pain  
Arthritis of left knee  
Effusion, left knee  
  
  
  
                                        Chief Complaint     l knee pain  
M25.562  
Lt knee pain and effusion  
Amb Documentation  
knee leg hip pain  
   
                                        Reason for Visit    Effusion, left knee  
Left knee pain  
Arthritis of left knee  
Effusion, left knee  
Iliotibial band syndrome  
Right knee pain  
  
  
  
                                        Chief Complaint     l knee pain  
M25.562  
Lt knee pain and effusion  
Amb Documentation  
knee leg hip pain  
M25.561  
   
                                        Reason for Visit    Effusion, left knee  
Left knee pain  
Arthritis of left knee  
Effusion, left knee  
Iliotibial band syndrome  
Right knee pain  
  
  
  
                                        Chief Complaint     Lt knee pain and eff  
usion  
Amb Documentation  
knee leg hip pain  
M25.561  
B/L knee steroid injection  
   
                                        Reason for Visit    Arthritis of left kn  
ee  
Effusion, left knee  
Iliotibial band syndrome  
Right knee pain  
Arthritis of knee, right  
Arthritis of left knee  
  
  
  
                                        Chief Complaint     Lt knee pain and eff  
usion  
Amb Documentation  
knee leg hip pain  
M25.561  
B/L knee steroid injection  
rt knee swelling  
   
                                        Reason for Visit    Arthritis of left kn  
ee  
Effusion, left knee  
Iliotibial band syndrome  
Right knee pain  
Arthritis of knee, right  
Arthritis of left knee  
  
  
  
                                        Chief Complaint     Amb Documentation  
knee leg hip pain  
M25.561  
B/L knee steroid injection  
rt knee swelling  
   
                                        Reason for Visit    Iliotibial band synd  
bon  
Right knee pain  
Arthritis of knee, right  
Arthritis of left knee  
Effusion, right knee  
Intractable pain  
Pain and swelling of right knee  
Positive blood culture  
Septic arthritis of knee, right  
Streptococcus viridans infection  
  
  
  
                                        Chief Complaint     Amb Documentation  
knee leg hip pain  
M25.561  
B/L knee steroid injection  
rt knee swelling  
Amb Documentation  
septic arthritis  
   
                                        Reason for Visit    Iliotibial band synd  
bon  
Right knee pain  
Arthritis of knee, right  
Arthritis of left knee  
Effusion, right knee  
Intractable pain  
Pain and swelling of right knee  
Positive blood culture  
Septic arthritis of knee, right  
Streptococcus viridans infection  
  
  
  
Additional Source Comments  
  
  
  
                                                    PREGNANCY (unrecognized sect  
ion and content)  
   
                                                    No Pregnancy Status Records   
FoundNo Pregnancy Status Records Found  
  
  
  
  
                                                    INFORMATION SOURCE (unrecogn  
ized section and content)  
   
                                          
  
  
  
                                        DATE CREATED        AUTHOR  
   
                                2023                      [facility]  
[facility]  
  
  
  
                                DATE CREATED    AUTHOR          AUTHOR'S ORGANIZ  
ATION  
   
                                2024                      [facility]  
[facility]  
  
  
  
  
  
                                                    REASON FOR VISIT (unrecogniz  
ed section and content)  
   
                                                    [facility]  
  
  
  
  
                                                    Care Teams (unrecognized sec  
tion and content)  
   
                                          
  
  
  
                                                    Team Status: Active   
   
                          Member       Role         Status       Tyesha Harper DO Primary Care Provider Active        
   
  
  
  
                                                    Team Status: Active   
   
                          Member       Role         Status       Tyesha Harper DO Primary Care Provider Active        
 Start: 2024  
  
   
                          SANJANA Samuel Attending Provider Active    
     Start: 2024  
  
  
  
  
                                                    Team Status: Inactive   
   
                          Member       Role         Status       Tyesha Harper DO Primary Care Provider Active        
 Start: 2024  
End: 2024  
   
                          SNAJANA Samuel Attending Provider Active    
     Start: 2024  
End: 2024  
  
  
  
                                                    Team Status: Inactive   
   
                          Member       Role         Status       Tyesha Harper DO Primary Care Provider Active        
 Start: 2024  
End: 2024  
   
                          Matheus Woods DO Attending Provider Active         
Start: 2024  
End: 2024  
  
  
  
                                                    Team Status: Inactive   
   
                          Member       Role         Status       Tyesha Harper DO Primary Care Provider Active        
 Start: 2024  
End: 2024  
   
                          Daniela Sanchez , FNP-BC Emergency Provider Active   
      Start: 2024  
End: 2024  
   
                                        Josie Ann MD Admit Provider, Att  
ending   
Provider                  Active                    Start: 2024  
End: 2024  
   
                          Gucci Maxwell MD Other Provider Active       Start: A  
ugust 2024  
End: 2024  
   
                          Ashley Rai MD Other Provider Active       Star  
t: 2024  
End: 2024  
   
                          Eduarda L Karissa , NP-C Other Provider Active         
Start: 2024  
End: 2024  
   
                          Ted Penny DO Other Provider Active       Start  
: 2024  
End: 2024  
   
                          Jeff Onofre II, MD Other Provider Active       S  
tart: 2024  
End: 2024  
   
                          Sai Butts DO Other Provider Active       Start:  
 2024  
End: 2024  
   
                          Paul Crum MD Other Provider Active       Start:   
2024  
End: 2024  
  
  
  
                                                    Team Status: Active   
   
                          Member       Role         Status       Dates  
   
                          Elvira Harper DO Primary Care Provider Active        
 Start: 2024  
  
   
                          Daniela Sanchez , P-BC Emergency Provider Active   
      Start: 2024  
  
   
                                        Josie Ann MD Admit Provider, Oth  
er   
Provider                  Active                    Start: 2024  
  
   
                          Jeff Onofre II, MD Attending Provider Active      
   Start: 2024  
  
  
  
  
                                                    Team Status: Active   
   
                          Member       Role         Status       Dates  
   
                          Elvira Harper DO Primary Care Provider Active        
 Start: 2024  
  
   
                          Daniela Sanchez , FNP-BC Emergency Provider Active   
      Start: 2024  
  
   
                                        Josie Ann MD Admit Provider, Oth  
er   
Provider                  Active                    Start: 2024  
  
   
                          Gucci Maxwell MD Other Provider Active       Start: A  
ugust 2024  
  
   
                          Ashley Rai MD Other Provider Active       Star  
t: 2024  
  
   
                          Edaurda L Karissa , NP-C Other Provider Active         
Start: 2024  
  
   
                          Ted A Hemalatha , DO Other Provider Active       Start  
: 2024  
  
   
                          Jeff Onofre II, MD Other Provider Active       S  
tart: 2024  
  
   
                          Sai Butts , DO Other Provider Active       Start:  
 2024  
  
   
                                        Paul Crum MD  Attending Provider,   
Other   
Provider                  Active                    Start: 2024  
  
  
  
  
                                                    Team Status: Inactive   
   
                          Member       Role         Status       Dates  
   
                                        Elvira Harper , DO Primary Care Provide  
r,   
Attending Provider        Active                    Start: 2024  
End: 2024  
  
  
  
                                                    Team Status: Inactive   
   
                          Member       Role         Status       Dates  
   
                          Elvira Harper , DO Primary Care Provider Active        
 Start: 2024  
End: 2024  
   
                          Gucci Maxwell MD Attending Provider Active       Star  
t: 2024  
End: 2024  
  
  
  
                                                    Team Status: Inactive   
   
                          Member       Role         Status       Dates  
   
                          Gucci Maxwell MD Attending Provider Active       Star  
t: 2024  
End: 2024  
   
                          Elvira Harper , DO Primary Care Provider Active        
 Start: 2024  
End: 2024  
  
  
  
                                                    Team Status: Inactive   
   
                          Member       Role         Status       Dates  
   
                                        Elvira Harper , DO Primary Care Provide  
r, Attending   
Provider                  Active                    Start: 2024  
End: 2024  
  
  
  
                                                    Team Status: Inactive   
   
                          Member       Role         Status       Dates  
   
                          Elvira Harper , DO Primary Care Provider Active        
 Start: May 15th, 2024  
End: May 15th, 2024  
   
                          SANJANA Samuel Attending Provider Active    
     Start: May 15th, 2024  
End: May 15th, 2024  
  
  
  
                                                    Team Status: Inactive   
   
                          Member       Role         Status       Dates  
   
                          Elvira Harper , DO Primary Care Provider Active        
 Start: May 23rd, 2024  
End: May 23rd, 2024  
   
                          Matheus Woods DO Attending Provider Active         
Start: May 23rd, 2024  
End: May 23rd, 2024  
  
  
  
                                                    Team Status: Active   
   
                          Member       Role         Status       Dates  
   
                          Elvira Harper , DO Primary Care Provider Active        
 Start: 2024  
  
   
                          Daniela Sanchez , [facility] Provider Active   
      Start: 2024  
  
   
                                        Josie Ann MD Admit Provider, Att  
ending   
Provider                  Active                    Start: 2024  
  
  
  
  
                                                    Team Status: Active   
   
                          Member       Role         Status       Dates  
   
                          Elvira Harper , DO Primary Care Provider Active        
 Start: 2024  
  
   
                          Maya Joaquin Attending Provider Active       Start:  
 2024  
  
  
  
  
                                                    Team Status: Inactive   
   
                          Member       Role         Status       Dates  
   
                                        Elvira A Harper , DO Primary Care Provide  
r,   
Attending Provider        Active                    Start: 2024  
End: 2024  
  
  
  
  
  
                                                    Goals (unrecognized section   
and content)  
   
                                                    Goals may be documented in a  
n alternate section  
  
FOR RECORDS PERTAINING TO PATIENTS WHO ARE OR HAVE BEEN ENROLLED IN A CHEMICAL 
DEPENDENCY/SUBSTANCEABUSE PROGRAM, SOME INFORMATION MAY BE OMITTED. This 
clinical summary was aggregated from multiple sources. Caution should be 
exercised in using it in the provision of clinical care. This summary normalizes
information from multiple sources, and as a consequence, information in this 
document may materially change the coding, format and clinical context of 
patient data. In addition, data may be omitted in some cases. CLINICAL DECISIONS
SHOULD BE BASED ON THE PRIMARY CLINICAL RECORDS. [facility] provides 
no warranty or guarantee of the accuracy or completeness of information in this 
document.

## 2024-09-05 NOTE — XMS_ITS
Comprehensive CCD (C-CDA v2.1)  
  
                          Created on: 2024  
  
  
Kaylynn Mims  
External Reference #: CDR,PersonID:[id number]  
: 1955  
Sex: Male  
  
Demographics  
  
  
                                        Address             [address] [place] [address]  [id number]  
   
                                        Home Phone          [phone]  
   
                                        Preferred Language  en  
   
                                        Marital Status        
   
                                        Scientology Affiliation Unknown  
   
                                        Race                White  
   
                                        Ethnic Group        Not  or Lati  
no  
  
  
Author  
  
  
                                        Organization        [facility]  
  
  
Care Team Providers  
  
  
                                Care Team Member Name Role            Phone  
   
                                MISC, DR ACEVEDO Admitting       Unavailable  
   
                                MISC, DR ACEVEDO Consulting      Unavailable  
   
                                MISC, DR ACEVEDO Attending       Unavailable  
   
                                ZIEBER, DR KAYLYNN HOPKINS Consulting      Unavailable  
   
                                HOUSE, DR TREVINO Consulting      Unavailable  
   
                                HOUSE, DR TREVINO Attending       Unavailable  
   
                                HOUSE, DR TREVINO Admitting       Unavailable  
   
                                Harper, Elvira   Unavailable     [phone]  
   
                                MD Gucci Maxwell Attending Provider [phone]  
   
                                Harper, DO Elvira A Primary Care Provider [phone]   
   
                                Harper, DO Elvira A Attending Provider [phone]  
   
                                SANJANA Khan Attending Provider [phone]  
[phone]  
   
                                Harper, DO Elvira A Primary Care Provider [phone]   
   
                                Harper, DO Elvira A Primary Care Provider [phone]   
   
                                SANJANA Khan Attending Provider [phone]  
   
                                [facility] Daniela BENITES Emergency Provider [phone]  
[phone]  
   
                                MD Josie Ann Admit Provider  [phone]  
   
                                MD Josie Ann Attending Provider [phone]  
   
                                MD Gucci Maxwell Other Provider  [phone]  
   
                                MD Ashley Rai Other Provider  [phone]  
[phone]  
   
                                GEOVANNY Hancock Other Provider  [phone]   
   
                                DO Ted Penny Other Provider  [phone]  
   
                                MD Jeff Onofre II Other Provider  [phone]   
   
                                DO Sai Butts A Other Provider  [phone]  
[phone]  
   
                                MD Paul Crum Other Provider  [phone]  
   
                                To Khan Admitting       Unavailable  
   
                                Binks, To Tavon Attending       Unavailable  
   
                                Harper, Elvira A Primary Care    Unavailable  
   
                                Olexa, Gucci   Admitting       Unavailable  
   
                                Olexa, Gucci   Attending       Unavailable  
   
                                Harper, Elvira A Primary Care    Unavailable  
   
                                Marissa, Josie Admitting       Unavailable  
   
                                Marissa, Josie Attending       Unavailable  
   
                                Alissa, Gucci   Consulting      Unavailable  
   
                                Harper, Elvira A Primary Care    Unavailable  
   
                                Ashley Rai Consulting      Unavailable  
   
                                Eduarda Hancock Consulting      Unavailable  
   
                                Ted Penny Consulting      Unavailable  
   
                                Jeff Onofre II Consulting      UnavailSai Toney Consulting      Unavailable  
   
                                Paul Crum  Consulting      Unavailable  
   
                                Harper, Elvira A Attending       Unavailable  
   
                                Harper, Elvira A Admitting       Unavailable  
   
                                Harper, Elvira A Primary Care    Unavailable  
   
                                To Khan Admitting       Unavailable  
   
                                To Khan Attending       Unavailable  
   
                                Harper, Elvira A Primary Care    Unavailable  
  
  
  
Allergies  
  
  
                                                    Allergy   
Classification                          Reported   
Allergen(s)               Allergy Type              Date of   
Onset                     Reaction(s)               Facility  
   
                                                    metFORMIN  
(2 sources)         metFORMIN           Drug Allergy          
4                         [facility]  
   
                                                    Penicillins   
(antibiotic)  
(2 sources)         Penicillins         Drug Allergy          
4                         [facility]  
   
                                                      
(9 sources)         metFORMIN           Drug Allergy          
4                         [facility]  
   
                                                      
(2 sources)  Penicillin   Drug Allergy              rash         [facility]   
[facility]  
Other Phone:   
[phone]  
   
                                                      
(8 sources)                             Penicillins;   
Translations:   
[Penicillins]                           Allergy to   
substance                                 
4                         [facility]  
   
                                                      
(1 source)          metFORMIN           Drug Allergy          
[address] [place] [address]   
Repository  
  
  
  
Medications  
Current Medications  
  
  
  
                      Medication Drug Class(es) Dates      Sig (Normalized) Sig   
(Original)  
   
                                                    acetaminophen 325 mg   
oral tablet  
(2 sources)                                         Start:   
2024                              take 2 tablets by   
mouth every four   
hours                                   Acetaminophen   
(Tylenol) 325 mg   
Tablet Active 650   
MG PO Q4H  12:00am  
   
                                                    ascorbic acid 500 mg   
oral tablet  
(2 sources)               Vitamin C                 Start:   
2024                              take 1 tablet by   
mouth twice daily   
at mealtime                             Ascorbic Acid   
(Vitamin C)   
(Vitamin C) 500 mg   
Tablet Active 500   
MG PO Twice daily   
with meals    
12:00am  
   
                                                    BD Syringe/Needle   
23G X 1  3 ML  
(1 source)                                          Start:   
2023                                          BD Syringe/Needle   
23G X 1  3 ML as   
directed SQ every 2   
weeks for 90 days   
 Active  
   
                                                    Blood Sugar   
Diagnostic  
(7 sources)                                         Start:   
05-                                          Blood Sugar   
Diagnostic Active 0   
.Route 50 May 15th,   
2024 12:00am As   
directed  
   
                                                    cefTRIAXone 2000 mg   
injection  
(2 sources)                             Cephalosporin   
Antibacterial                           Start:   
2024                              take 2 g   
intravenously   
every twenty-four   
hours                                   Ceftriaxone Active   
2 GM IV Q24H 24    
12:00am  
   
                                                    chlorthalidone 25 mg   
oral tablet  
(2 sources)                             Thiazide-like   
Diuretic                                Start:   
2024                              take 25 mg by   
mouth once daily                        Chlorthalidone   
Active 25 MG PO   
Daily  12:00am  
   
                                                    diclofenac sodium 75   
mg delayed release   
oral tablet  
(13 sources)                            Nonsteroidal   
Anti-inflammatory   
Drug                                    Start:   
05-  
End:   
2024                              take 75 mg by   
mouth twice daily                       Diclofenac Sodium   
Active 75 MG PO   
Twice daily 60    
9:30am  
   
                                                    docusate sodium 50   
mg / sennosides, usp   
8.6 mg oral tablet  
(2 sources)                                         Start:   
2024                              take 2 tablets by   
mouth twice daily                       Sennosides-Docusate   
Sodium Active 2 TAB   
PO Twice daily 60   
2024   
12:00am  
   
                                                    ferrous sulfate 324   
mg delayed release   
oral tablet  
(2 sources)                                         Start:   
2024                              take 324 mg by   
mouth twice daily   
at mealtime                             Ferrous Sulfate   
Active 324 MG PO   
Twice daily with   
meals 0 2024 12:00am  
   
                                                    nabumetone 750 mg   
oral tablet  
(6 sources)                             Nonsteroidal   
Anti-inflammatory   
Drug                                    Start:   
2024                              take 750 mg by   
mouth twice daily                       Nabumetone Active   
750 MG PO Twice   
daily 2024 12:00am  
  
  
  
                                                    Start: 2024  
End: 2024                                     Nabumetone Discontinued MG T  
ABLET 2024 12:00am   
2024 6:31pm  
  
  
  
                                                    oxyCODONE   
hydrochloride 5 mg   
oral tablet  
(2 sources)         Opioid Agonist      Start: 2024   take 5 mg by   
mouth every   
four hours                              Oxycodone Active 5   
MG PO Every 4   
hours 20 7 2024  
   
                                                    polyethylene glycol   
3350 31082 mg powder   
for oral solution  
(2 sources)     Osmotic Laxative Start: 08-                 Polyethylene   
Glycol 3350   
(Healthylax) 17   
gram Powder In   
Packet Active 17   
GM PO Daily    
12:00am  
   
                                                    valsartan 160 mg   
oral tablet  
(2 sources)                             Angiotensin 2   
Receptor Blocker          Start: 2024         take 160 mg   
by mouth   
once daily                              Valsartan Active   
160 MG PO Daily    
12:00am  
  
  
  
Completed/Discontinued Medications  
  
  
  
                      Medication Drug Class(es) Dates      Sig (Normalized) Sig   
(Original)  
   
                                                    glipiZIDE er 5 mg   
24 hr extended   
release oral tablet  
(20 sources)              Sulfonylurea              Start:   
2024  
End:   
2024                                          Glipizide   
Discontinued MG PO   
2024   
12:00am 2024 6:30pm  
  
  
  
                                                    Start: 2024  
End: 2024                         take 1 tablet by mouth once   
daily at mealtime                       Glipizide Discontinued 0 .ROUTE   
.COMPLEX 90 2024 8:21am   
2024 9:15am TAKE 1   
TABLET BY MOUTH EVERY DAY WITH FOOD  
   
                                                    Start: 2024  
End: 2024                                     Glipizide Discontinued MG PO  
 2024 12:00am 2024   
8:22am  
   
                                                    Start: 2024  
End: 2024                         take 1 tablet by mouth once   
daily at mealtime                       Glipizide Discontinued 1 TAB PO   
Daily 2024 1:00am   
2024 2:16pm   
FreeTextSi tablet with food   
Orally Once a day; Note: Source   
Status: Taking; Refills: 1; Qty: 90   
Tablet; Provider: Meredith ROGERS  
   
                                        Start: 08-   take 1 tablet by chrissy  
th every   
twenty-four hours                       glipiZIDE ER 5 MG 1 tablet with   
food Orally Once a day for 90 days   
10 Aug, 2023 Active  
  
  
  
                                                    meloxicam 15 mg   
oral tablet  
(15 sources)                            Nonsteroidal   
Anti-inflammatory   
Drug                                    Start:   
2024  
End:   
2024                              take 15 mg by   
mouth once daily                        Meloxicam   
Discontinued 15   
MG PO Daily May   
15th, 2024   
9:36am 2024   
9:16am On Hold:   
While on   
Diclofenac  
   
                                                    1 ml testosterone   
cypionate 200   
mg/ml injection  
(20 sources)              Androgen                  Start:   
2024  
End:   
2024                              inject 200 mg by   
intramuscular   
injection every   
other week                              Testosterone   
Cypionate   
Discontinued 200   
MG IM EVERY 2   
WEEKS 2 30 May   
3rd, 2024 8:40am   
2024   
7:48am  
  
  
  
                                                    Start: 2024  
End: 2024                         inject 1 mL by intramuscular   
injection every other week              Testosterone Cypionate   
Discontinued 200 MG SUBCUT EVERY 2   
WEEKS 2024 1:00am   
2024 9:44am   
FreeTextSig: INJECT 1 ML   
INTRAMUSCULARLY EVERY 2 WEEKS;   
Note: Source Status: Start;   
Refills: 2; Qty: 2 Milliliter;   
Provider: Meredith Felix (NPI:   
[id number])  
   
                                        Start: 08-   inject 1 mL by intra  
muscular   
injection every other week              Testosterone Cypionate 200 MG/ML 1   
mL Intramuscular every two weeks   
for 30 days 10 Aug, 2023 Active  
   
                                        Start: 08-   inject 1 mL by intra  
muscular   
injection every other week              Testosterone Cypionate 200 MG/ML 1   
mL Intramuscular every two weeks   
for 30 days 10 Aug, 2023 Active  
  
  
  
                                                    triamcinolone acetonide 1   
mg/ml topical cream  
(18 sources)              Corticosteroid            Start: 2024  
End: 2024                                     Triamcinolone Acetonide   
Discontinued 1 APPLIC   
TOPICAL Twice daily    
8:41am 2024   
9:44am  
  
  
  
                                                                Triamcinolone Ac  
etonide 0.1 % 1 application Externally twice a day Active  
  
  
  
Problems  
Active Problems  
  
  
                      Problem Classification Problem    Date       Documented Da  
te Episodic/Chronic  
   
                                                    Bacterial infection;   
unspecified site  
(10 sources)                            Microbiologic   
culture positive;   
Translations:   
[Bacteremia]                            Onset:   
2024                Episodic  
   
                                                    Diabetes mellitus   
without complication  
(14 sources)                            Type 2 diabetes   
mellitus;   
Translations: [Type   
2 diabetes mellitus   
without   
complications]                          Onset:   
2024                                          Chronic  
   
                                                    Infective arthritis   
and osteomyelitis   
(except that caused by   
tuberculosis or   
sexually transmitted   
disease)  
(5 sources)                             Knee pyogenic   
arthritis;   
Translations:   
[Pyogenic arthritis,   
unspecified]                            Onset:   
2024                Episodic  
   
                                                    Osteoarthritis  
(20 sources)                            Arthritis of knee;   
Translations:   
[Unilateral primary   
osteoarthritis, left   
knee]                                   2024          Chronic  
   
                                                    Other aftercare  
(2 sources)                             Other long term   
(current) drug   
therapy;   
Translations: [Other   
long term (current)   
drug therapy]                           Onset:   
2024                                          Episodic  
   
                                                    Other bone disease and   
musculoskeletal   
deformities  
(4 sources)                             Segmental and   
somatic dysfunction   
of cervical region;   
Translations: [SEG   
SOMATIC DYSF   
CERVICAL REGION]                        Onset:   
2023                                          Episodic  
   
                                                    Other connective   
tissue disease  
(1 source)                              Pain in right arm;   
Translations: [PAIN   
IN RIGHT ARM]                           Onset:   
2023                                          Episodic  
   
                                                    Other connective   
tissue disease  
(8 sources)                             Bursitis of right   
shoulder;   
Translations:   
[Bursitis of right   
shoulder]                               2024          Episodic  
   
                                                    Other connective   
tissue disease  
(2 sources)                             Bursitis of right   
shoulder;   
Translations:   
[Disorders of bursae   
and tendons in   
shoulder region,   
unspecified]                            2024          Episodic  
   
                                                    Other connective   
tissue disease  
(6 sources)                             Iliotibial band   
syndrome,   
unspecified leg;   
Translations: [Other   
disorders of muscle,   
ligament, and   
fascia]                                 2024          Episodic  
   
                                                    Other endocrine   
disorders  
(2 sources)                             Decreased   
testosterone level ;   
Translations:   
[Testicular   
hypofunction]                                               Chronic  
   
                                                    Other endocrine   
disorders  
(3 sources)                             Testicular   
hypofunction;   
Translations:   
[Testicular   
hypofunction]                           Onset:   
2024                                          Chronic  
   
                                                    Other nervous system   
disorders  
(1 source)                              Difficulty in   
walking, not   
elsewhere   
classified;   
Translations:   
[Difficulty in   
walking, not   
elsewhere   
classified]                             Onset:   
2024                                          Chronic  
   
                                                    Other nervous system   
disorders  
(1 source)                              Anesthesia of skin;   
Translations:   
[ANESTHESIA OF SKIN]                    Onset:   
2023                                          Episodic  
   
                                                    Other non-traumatic   
joint disorders  
(11 sources)                            Pain in right   
shoulder;   
Translations: [Right   
shoulder pain]                          2024          Episodic  
   
                                                    Other non-traumatic   
joint disorders  
(8 sources)                             Effusion, left knee;   
Translations:   
[Effusion of joint,   
lower leg]                              05-          Episodic  
   
                                                    Other non-traumatic   
joint disorders  
(11 sources)                            Pain in right knee;   
Translations: [Pain   
in joint, lower leg]                    Onset:   
2024                Episodic  
   
                                                    Other non-traumatic   
joint disorders  
(2 sources)                             Effusion of right   
knee joint;   
Translations:   
[Effusion, right   
knee]                                   2024          Episodic  
   
                                                    Other non-traumatic   
joint disorders  
(2 sources)                             Effusion, right   
knee; Translations:   
[Effusion of joint,   
lower leg]                              2024          Episodic  
   
                                                    Other screening for   
suspected conditions   
(not mental disorders   
or infectious disease)  
(10 sources)                            Encounter for   
screening for   
malignant neoplasm   
of prostate;   
Translations:   
[Decreased   
testosterone level ]                    Onset:   
2024                                          Episodic  
   
                                                    Residual codes;   
unclassified  
(2 sources)                             Pain; Translations:   
[Pain, unspecified]                     2024          Episodic  
   
                                                    Residual codes;   
unclassified  
(2 sources)                             Pain, unspecified;   
Translations:   
[Generalized pain]                      2024          Episodic  
   
                                                    Unclassified  
(1 source)                              Pain in right   
shoulder;   
Translations: [Pain   
in right shoulder]                      Onset:   
2024                                            
  
  
Past or Other Problems  
  
  
                      Problem Classification Problem    Date       Documented Da  
te Episodic/Chronic  
   
                                                    Other non-traumatic   
joint disorders  
(4 sources)                             Pain in left   
knee;   
Translations:   
[Pain in joint,   
lower leg]                              Onset:   
05-                05-                Episodic  
  
  
  
Results  
  
  
                          Test Name    Value        Interpretation Reference   
Range                                   Facility  
   
                                                    Basic Metabolic Panelon 08-2  
3-2024   
   
                                                    Creatinine Clr Calc   
Pharmacy        142.70          Normal                          [facility]   
Physician   
Group  
   
                                        Comment on above:   Order Comment: Reaso  
n for Exam Type 2 diabetes mellitus   
   
                                                            Result Comment: PERF  
ORMED BY:  
[place] [id number]  
[phone]  
PATHOLOGIST MEDICAL DIRECTOR  
VIKAS MARTINEZ M.D.   
   
                                                            Performed By: #### U  
RMA, CMP, LIPID, CBCNO, PSAS, TEST ####  
[facility]  
[address]   
   
                                                    GFR/1.73 sq M.predicted   
MDRD (S/P/Bld) [Vol   
rate/Area]      mL/min/{1.73_m2} Normal                          [facility]   
Physician   
Group  
   
                                        Comment on above:   Order Comment: Reaso  
n for Exam Type 2 diabetes mellitus   
   
                                                            Performed By: #### U  
RMA, CMP, LIPID, CBCNO, PSAS, TEST ####  
[facility]  
[address] [place] [address] [place]   
   
                                                    C reactive protein [Mass/vol  
ume] in Serum or PlasmaOrdered By: Paul Crum on   
2024   
   
                      CRP [Mass/Vol] 22.7 mg/dL High       0.0-0.5    [facility]  
   
                                                    C-Reactive Proteinon   
024   
   
                      C-Reactive Protein 22.7 mg/dL High       0.0-0.5    [facility]   
Physician   
Group  
   
                                        Comment on above:   Result Comment: PERF  
ORMED BY:  
[address] [place] [place] [address]  
[phone]  
PATHOLOGIST MEDICAL DIRECTOR  
VIKAS MARTINEZ M.D.   
   
                                                            Performed By: #### U  
RMA, CMP, LIPID, CBCNO, PSAS, TEST ####  
[facility]  
[address]   
   
                                                    Calcium [Mass/volume] in Ser  
um or PlasmaOrdered By: Josie Marissa on   
2024   
   
                      Calcium [Mass/Vol] 8.6 mg/dL             8.6-10.3   [facility]  
   
                                        Comment on above:   Order Comment: Reaso  
n for Exam Type 2 diabetes mellitus   
   
                                                            Performed By: #### U  
RMA, CMP, LIPID, CBCNO, PSAS, TEST ####  
[facility]  
[address]   
   
                                                    Carbon dioxide, total [Moles  
/volume] in Serum or PlasmaOrdered By: Josie   
Marissa on   
2024   
   
                      CO2 [Moles/Vol] 27.1 mmol/L            21.0-31.0  [facility]  
   
                                        Comment on above:   Order Comment: Reaso  
n for Exam Type 2 diabetes mellitus   
   
                                                            Performed By: #### U  
RMA, CMP, LIPID, CBCNO, PSAS, TEST ####  
[facility]  
[address] [place] [address] [place]   
   
                                                    Chloride [Moles/volume] in S  
lorraine or PlasmaOrdered By: Josie Marissa on   
2024   
   
                      Chloride [Moles/Vol] 98 mmol/L                  [facility]  
   
                                        Comment on above:   Order Comment: Reaso  
n for Exam Type 2 diabetes mellitus   
   
                                                            Performed By: #### U  
RMA, CMP, LIPID, CBCNO, PSAS, TEST ####  
[facility]  
[address] [place] [address] [place]   
   
                                                    Creatinine [Mass/volume] in   
Serum or PlasmaOrdered By: Josie Bethele on   
2024   
   
                      Creatinine [Mass/Vol] 0.67 mg/dL Low        0.70-1.30  [facility]  
   
                                        Comment on above:   Order Comment: Reaso  
n for Exam Type 2 diabetes mellitus   
   
                                                            Performed By: #### U  
RMA, CMP, LIPID, CBCNO, PSAS, TEST ####  
[facility]  
[address] [place] [address] [place]   
   
                                                    Glucose [Mass/volume] in Ser  
um or PlasmaOrdered By: Josie Ann on   
2024   
   
                      Glucose [Mass/Vol] 151 mg/dL  High            [facility]  
   
                                        Comment on above:   ADA recommended refe  
rence rangeRandom Glucose Reference Range   
is dependent on time and content of last meal. Glucose of more   
than 200 mg/dL in a nonstressed, ambulatory subject supports   
the diagnosis of Diabetes Mellitus.   
   
                                                            Order Comment: Reaso  
n for Exam Type 2 diabetes mellitus   
   
                                                            Result Comment: Rand  
om Glucose Reference Range is dependent on   
time and  
content of last meal. Glucose of more than 200 mg/dL in a  
nonstressed, ambulatory subject supports the diagnosis of  
Diabetes Mellitus.  
ADA recommended reference range   
   
                                                            Performed By: #### U  
RMA, CMP, LIPID, CBCNO, PSAS, TEST ####  
[address]   
   
                                                    No Panel InformationOrdered   
By: Josie Ann on 2024   
   
                      Estimated GFR (CKD-EPI) > 60.0 mL/Min                       
  [facility]  
   
                                                    Pharmacy Creatinine   
Clearance (Chem 142.70                                          [facility]  
   
                                                    Potassium [Moles/volume] in   
Serum or PlasmaOrdered By: Josie Ann on   
2024   
   
                      Potassium [Moles/Vol] 4.3 mmol/L            3.5-5.1    [facility]  
   
                                        Comment on above:   Order Comment: Reaso  
n for Exam Type 2 diabetes mellitus   
   
                                                            Performed By: #### U  
RMA, CMP, LIPID, CBCNO, PSAS, TEST ####  
[facility]  
[address] [place] [address] [place]   
   
                                                    Serum or plasma anion gap de  
terminationOrdered By: Josie Ann on 2024  
   
   
                      Anion gap [Moles/Vol] 12.2 mmol/L            6.0-15.0   [facility]  
   
                                        Comment on above:   Order Comment: Reaso  
n for Exam Type 2 diabetes mellitus   
   
                                                            Performed By: #### U  
RMA, CMP, LIPID, CBCNO, PSAS, TEST ####  
[place] [address] [place]   
   
                                                    Sodium [Moles/volume] in Ser  
um or PlasmaOrdered By: Joise Ann on   
2024   
   
                      Sodium [Moles/Vol] 133 mmol/L Low        136-145    [facility]  
   
                                        Comment on above:   Order Comment: Reaso  
n for Exam Type 2 diabetes mellitus   
   
                                                            Performed By: #### U  
RMA, CMP, LIPID, CBCNO, PSAS, TEST ####  
[address]   
   
                                                    Urea nitrogen [Mass/volume]   
in Serum or PlasmaOrdered By: Josie Ann on   
2024   
   
                                                    Urea nitrogen   
[Mass/Vol]      14 mg/dL                                    [facility]  
   
                                        Comment on above:   Order Comment: Reaso  
n for Exam Type 2 diabetes mellitus   
   
                                                            Performed By: #### U  
RMA, CMP, LIPID, CBCNO, PSAS, TEST ####  
[address]   
   
                                                    Automated basophil %Ordered   
By: Jeff Onofre on 2024   
   
                      Basophils/100 WBC (Bld) 1.5 %                 .          [facility]  
   
                                        Comment on above:   Performed By: #### U  
RMA, CMP, LIPID, CBCNO, PSAS, TEST ####  
[address]   
   
                                                    Automated basophil countOrde  
red By: Jeff Onofre on 2024   
   
                      Basophils (Bld) [#/Vol] 0.1 10*3/uL            0.0-0.2      
[facility]  
   
                                        Comment on above:   Result Comment: PERF  
ORMED BY:  
[place] [address]  
[phone]  
PATHOLOGIST MEDICAL DIRECTOR  
VIKAS MARTINEZ M.D.   
   
                                                            Performed By: #### U  
RMA, CMP, LIPID, CBCNO, PSAS, TEST ####  
[address]   
   
                                                    Automated blood monocyte cou  
ntOrdered By: Jeff Onofre on 2024   
   
                      Monocytes (Bld) [#/Vol] 1.2 10*3/uL High       0.0-0.8      
[facility]  
   
                                        Comment on above:   Performed By: #### U  
RMA, CMP, LIPID, CBCNO, PSAS, TEST ####  
[address]   
   
                                                    Automated eosinophil %Ordere  
d By: Jeff Onofre on 2024   
   
                                                    Eosinophils/100 WBC   
(Bld)           1.1 %                           .               [facility]  
   
                                        Comment on above:   Performed By: #### U  
RMA, CMP, LIPID, CBCNO, PSAS, TEST ####  
[address]   
   
                                                    Automated eosinophil countOr  
dered By: Jeff Onofre on 2024   
   
                                                    Eosinophils (Bld)   
[#/Vol]         0.1 10*3/uL                     0.0-0.45        [facility]  
   
                                        Comment on above:   Performed By: #### U  
RMA, CMP, LIPID, CBCNO, PSAS, TEST ####  
[address]   
   
                                                    Automated monocyte %Ordered   
By: Jeff Onofre on 2024   
   
                      Monocytes/100 WBC (Bld) 14.0 %                .          F  
[facility]  
   
                                        Comment on above:   Performed By: #### U  
RMA, CMP, LIPID, CBCNO, PSAS, TEST ####  
[address]   
   
                                                    Automated neutrophil %Ordere  
d By: Jeff Onofre on 2024   
   
                                                    Neutrophils/100 WBC   
(Bld)           68.5 %                          .               [facility]  
   
                                        Comment on above:   Performed By: #### U  
RMA, CMP, LIPID, CBCNO, PSAS, TEST ####  
[address]   
   
                                                    Basic Metabolic Panelon    
   
                      Anion gap [Moles/Vol] 12.4 mmol/L Normal     6.0-15.0   [facility]  
[facility]   
Physician   
Group  
   
                                        Comment on above:   Performed By: #### U  
RMA, CMP, LIPID, CBCNO, PSAS, TEST ####  
[address]   
   
                      Calcium [Mass/Vol] 8.2 mg/dL  Low        8.6-10.3   [facility]   
Physician   
Group  
   
                                        Comment on above:   Performed By: #### U  
RMA, CMP, LIPID, CBCNO, PSAS, TEST ####  
[address]   
   
                      Chloride [Moles/Vol] 99 mmol/L  Normal          The   
[facility]   
Physician   
Group  
   
                                        Comment on above:   Performed By: #### U  
RMA, CMP, LIPID, CBCNO, PSAS, TEST ####  
[address]   
   
                      CO2 [Moles/Vol] 29.1 mmol/L Normal     21.0-31.0  [facility]   
Physician   
Group  
   
                                        Comment on above:   Performed By: #### U  
RMA, CMP, LIPID, CBCNO, PSAS, TEST ####  
[address]   
   
                      Creatinine [Mass/Vol] 0.90 mg/dL Normal     0.70-1.30  The  
 [facility]   
Physician   
Group  
   
                                        Comment on above:   Performed By: #### U  
RMA, CMP, LIPID, CBCNO, PSAS, TEST ####  
[address]   
   
                                                    Creatinine Clr Calc   
Pharmacy        126.85          Normal                          [facility]   
Physician   
Group  
   
                                        Comment on above:   Result Comment: PERF  
ORMED BY:  
[place] [address]  
[phone]  
PATHOLOGIST MEDICAL DIRECTOR  
VIKAS MARTINEZ M.D.   
   
                                                            Performed By: #### U  
RMA, CMP, LIPID, CBCNO, PSAS, TEST ####  
[address]   
   
                                                    GFR/1.73 sq M.predicted   
MDRD (S/P/Bld) [Vol   
rate/Area]      mL/min/{1.73_m2} Normal                          [facility]   
Physician   
[facility]  
   
                                        Comment on above:   Performed By: #### U  
RMA, CMP, LIPID, CBCNO, PSAS, TEST ####  
[address]   
   
                      Glucose [Mass/Vol] 137 mg/dL  High            [facility]   
Physician   
Group  
   
                                        Comment on above:   Result Comment: Rand  
om Glucose Reference Range is dependent on  
   
time and  
content of last meal. Glucose of more than 200 mg/dL in a  
nonstressed, ambulatory subject supports the diagnosis of  
Diabetes Mellitus.  
ADA recommended reference range   
   
                                                            Performed By: #### U  
RMA, CMP, LIPID, CBCNO, PSAS, TEST ####  
[address]   
   
                      Potassium [Moles/Vol] 4.5 mmol/L Normal     3.5-5.1    The  
 [facility]   
Physician   
Group  
   
                                        Comment on above:   Performed By: #### U  
RMA, CMP, LIPID, CBCNO, PSAS, TEST ####  
[address]   
   
                      Sodium [Moles/Vol] 136 mmol/L Normal     136-145    [facility]   
Physician   
Group  
   
                                        Comment on above:   Performed By: #### U  
RMA, CMP, LIPID, CBCNO, PSAS, TEST ####  
[address]   
   
                                                    Urea nitrogen   
[Mass/Vol]      17 mg/dL        Normal          7-25            [facility]   
Physician   
Group  
   
                                        Comment on above:   Performed By: #### U  
RMA, CMP, LIPID, CBCNO, PSAS, TEST ####  
[address]   
   
                                                    C-Reactive Proteinon   
024   
   
                      C-Reactive Protein 12.2 mg/dL High       0.0-0.5    [facility]   
Physician   
Group  
   
                                        Comment on above:   Result Comment: PERF  
ORMED BY:  
[place] [address]  
[phone]  
PATHOLOGIST MEDICAL DIRECTOR  
VIKAS MARTINEZ M.D.   
   
                                                            Performed By: #### U  
RMA, CMP, LIPID, CBCNO, PSAS, TEST ####  
[address]   
   
                                                    Complete Blood Count Auto Di  
ffon 2024   
   
                                                    Mean Corpuscular HGB   
Conc            33.8 g/dL       Normal          32.5-35.6       [facility]   
Physician   
Group  
   
                                        Comment on above:   Performed By: #### U  
RMA, CMP, LIPID, CBCNO, PSAS, TEST ####  
[address]   
   
                      NRBC%      0.2 /100{WBC} Normal     0-0.5      [facility]   
Physician   
Group  
   
                                        Comment on above:   Performed By: #### U  
RMA, CMP, LIPID, CBCNO, PSAS, TEST ####  
[address]   
   
                                                    Erythrocyte distribution wid  
th [Ratio] by Automated countOrdered By: Jeff Onofre   
on 2024   
   
                                                    Erythrocyte   
distribution width   
(RBC) [Ratio]   13.3 %                          12.0-14.8       [facility]  
   
                                        Comment on above:   Performed By: #### U  
RMA, CMP, LIPID, CBCNO, PSAS, TEST ####  
[address]   
   
                                                    Erythrocytes [#/volume] in B  
lood by Automated countOrdered By: Jeff Onofre   
on   
2024   
   
                      RBC (Bld) [#/Vol] 5.03 10*6/uL            3.90-5.60  [facility]  
   
                                        Comment on above:   Performed By: #### U  
RMA, CMP, LIPID, CBCNO, PSAS, TEST ####  
[address]   
   
                                                    Hematocrit [Volume Fraction]  
 of Blood by Automated countOrdered By: Jeff Onofre   
on 2024   
   
                                                    Hematocrit (Bld)   
[Volume fraction] 44.1 %                          38.8-50.0       [facility]  
   
                                        Comment on above:   Performed By: #### U  
RMA, CMP, LIPID, CBCNO, PSAS, TEST ####  
[address]   
   
                                                    Hemoglobin [Mass/volume] in   
BloodOrdered By: Jeff Onofre on 2024   
   
                                                    Hemoglobin (Bld)   
[Mass/Vol]      14.9 g/dL                       13.0-17.0       [facility]  
   
                                        Comment on above:   Performed By: #### U  
RMA, CMP, LIPID, CBCNO, PSAS, TEST ####  
[address]   
   
                                                    Leukocytes [#/volume] correc  
bassam for nucleated erythrocytes in Blood by Automated  
   
counOrdered By: Jeff Onofre on 2024   
   
                                                    WBC corrected for nucl   
RBC Auto (Bld) [#/Vol] 8.5 10*3/uL                     4.1-10.5        [facility]  
   
                                                    Leukocytes [#/volume] in Blo  
od by Automated countOrdered By: Jeff Onofre on   
2024   
   
                      WBC (Bld) [#/Vol] 8.5 10*3/uL            4.1-10.5   [facility]  
   
                                        Comment on above:   Performed By: #### U  
RMA, CMP, LIPID, CBCNO, PSAS, TEST ####  
[facility]  
[address]   
   
                                                    Lymphocytes [#/volume] in Bl  
ood by Automated countOrdered By: Jeff Onofre on  
   
2024   
   
                                                    Lymphocytes (Bld)   
[#/Vol]         1.3 10*3/uL                     1.00-4.8        [facility]  
   
                                        Comment on above:   Performed By: #### U  
RMA, CMP, LIPID, CBCNO, PSAS, TEST ####  
[facility]  
[address] [place] [address]   
   
                                                    Lymphocytes/100 leukocytes i  
n Blood by Automated countOrdered By: Jeff Onofre on   
2024   
   
                                                    Lymphocytes/100 WBC   
(Bld)           14.9 %                          .               [facility]  
   
                                        Comment on above:   Performed By: #### U  
RMA, CMP, LIPID, CBCNO, PSAS, TEST ####  
[facility]  
[address] [place] [address]   
   
                                                    MCH [Entitic mass] by Automa  
bassam countOrdered By: Jeff Onofre on 2024   
   
                                                    MCH (RBC) [Entitic   
mass]           29.6 pg                         27.5-35.2       [facility]  
   
                                        Comment on above:   Performed By: #### U  
RMA, CMP, LIPID, CBCNO, PSAS, TEST ####  
[facility]  
[address] [place] [address]   
   
                                                    MCHC Auto (RBC) [Mass/Vol]Or  
dered By: Jeff Onofre on 2024   
   
                      MCHC (RBC) [Mass/Vol] 33.8 g/dL             32.5-35.6  [facility]  
   
                                                    MCV [Entitic volume] by Auto  
mated countOrdered By: Jeff nOofre on 2024  
  
   
                      MCV (RBC) [Entitic vol] 87.5 fL               83.5-101   F  
[facility]  
   
                                        Comment on above:   Performed By: #### U  
RMA, CMP, LIPID, CBCNO, PSAS, TEST ####  
[facility]  
[address] [place] [address]   
   
                                                    Neutrophils [#/volume] in Bl  
ood by Automated countOrdered By: Jeff Onofre on  
   
2024   
   
                                                    Neutrophils (Bld)   
[#/Vol]         5.8 10*3/uL                     1.8-7.7         [facility]  
   
                                        Comment on above:   Performed By: #### U  
RMA, CMP, LIPID, CBCNO, PSAS, TEST ####  
[facility]  
[address] [place] [address]   
   
                                                    Nucleated erythrocytes [Pres  
ence] in Blood by Automated countOrdered By: Jeff Onofre on 2024   
   
                                                    Nucleated RBC Auto Ql   
(Bld)           0.2 /100{WBC}                   0-0.5           [facility]  
   
                                                    Platelet mean volume [Entiti  
c volume] in Blood by Automated countOrdered By:   
Jeff Onofre on 2024   
   
                                                    Platelet mean volume   
(Bld) [Entitic vol] 7.8 fL                          6.6-10.1        [facility]  
   
                                        Comment on above:   Performed By: #### U  
RMA, CMP, LIPID, CBCNO, PSAS, TEST ####  
[facility]  
[address] [place] [address]   
   
                                                    Platelets [#/volume] in Bloo  
d by Automated countOrdered By: Jeff Onofre on   
2024   
   
                      Platelets (Bld) [#/Vol] 182 10*3/uL            150-450      
[facility]  
   
                                        Comment on above:   Performed By: #### U  
RMA, CMP, LIPID, CBCNO, PSAS, TEST ####  
[facility]  
[address] [place] [address]   
   
                                                    XR chest 1V portableon    
   
                                        XR chest 1V portable [facility]  
[address] [place] [address]  
  
XRay Report  
Signed  
  
Patient: Kaylynn Mims MR#: [id number]  
16  
: 1955   
Acct:[id number]  
  
Age/Sex: 68 / M ADM   
Date: 24  
  
Loc:  Room:   
0J4070-5 Type: ADM IN  
Attending Dr: Josie Ann MD  
Copies to: MD Josie Sanz MD  
  
  
  
Ordering Provider:   
Jeff Onofre MD  
Date of Service:   
24  
Accession #:   
([id number]) XR/XR   
chest 1V portable:   
PICC line   
verification  
  
  
  
  
SINGLE VIEW CHEST  
  
CLINICAL HISTORY:   
PICC line placement.  
  
COMPARISON: None  
  
FINDINGS:  
  
A right-sided PICC   
line is identified   
with its tip   
projecting over the   
right subclavian vein   
region.  
Cardiomegaly with   
vascular congestion   
is noted. No lung   
consolidation   
pneumothorax pleural   
effus  
ion or free air.  
  
  
ORDER #: [id number]   
XR/XR chest 1V   
portable  
IMPRESSION:  
  
A RIGHT-SIDED PICC   
LINE IS SEEN WITH ITS   
TIP PROJECTING OVER   
THE RIGHT SUBCLAVIAN   
VEIN REGION.  
  
Impression dictated   
by: Claudy Smith Jr., D.O.2024   
11:28 AM  
  
  
Dictation Location:   
[place] [address]  
  
  
  
Transcribed By: [facility]   
24 1128  
Dictated By: Claudy Smith Jr, DO   
24 1126  
  
Signed By:  
24 1128       Normal                                  [facility]   
Physician   
Group  
   
                                                    Bacterial blood cultureOrder  
ed By: Josie Ann on 2024   
   
                                                    Bacteria identified Cx   
Nom (Bld)       NO GROWTH 5 DAYS                                 [facility]  
   
                                                    Bacteria identified Cx   
Nom (Bld)       NO GROWTH 5 DAYS                                 [facility]  
   
                                                    Basic Metabolic Panelon    
   
                      Anion gap [Moles/Vol] 11.4 mmol/L Normal     6.0-15.0   Th  
e [facility]   
Physician   
Group  
   
                                        Comment on above:   Performed By: #### U  
RMA, CMP, LIPID, CBCNO, PSAS, TEST ####  
[facility]  
[address] [place] [address] [place]   
   
                      Calcium [Mass/Vol] 8.2 mg/dL  Low        8.6-10.3   [facility]   
Physician   
Group  
   
                                        Comment on above:   Performed By: #### U  
RMA, CMP, LIPID, CBCNO, PSAS, TEST ####  
[facility]  
[address] [place] [id number] [place]   
   
                      Chloride [Moles/Vol] 103 mmol/L Normal          The   
[facility]   
Physician   
Group  
   
                                        Comment on above:   Performed By: #### U  
RMA, CMP, LIPID, CBCNO, PSAS, TEST ####  
[address]   
   
                      CO2 [Moles/Vol] 25.9 mmol/L Normal     21.0-31.0  The [facility]   
Physician   
Group  
   
                                        Comment on above:   Performed By: #### U  
RMA, CMP, LIPID, CBCNO, PSAS, TEST ####  
[address]   
   
                      Creatinine [Mass/Vol] 0.85 mg/dL Normal     0.70-1.30  The  
 [facility]   
Physician   
Group  
   
                                        Comment on above:   Performed By: #### U  
RMA, CMP, LIPID, CBCNO, PSAS, TEST ####  
[address]   
   
                                                    Creatinine Clr Calc   
Pharmacy        133.88          Normal                          [facility]   
Physician   
Group  
   
                                        Comment on above:   Result Comment: PERF  
ORMED BY:  
[place] [address]  
[phone]  
PATHOLOGIST MEDICAL DIRECTOR  
VIKAS MARTINEZ M.D.   
   
                                                            Performed By: #### U  
RMA, CMP, LIPID, CBCNO, PSAS, TEST ####  
[address]   
   
                                                    GFR/1.73 sq M.predicted   
MDRD (S/P/Bld) [Vol   
rate/Area]      mL/min/{1.73_m2} Normal                          [facility]   
Physician   
Group  
   
                                        Comment on above:   Performed By: #### U  
RMA, CMP, LIPID, CBCNO, PSAS, TEST ####  
[address]   
   
                      Glucose [Mass/Vol] 146 mg/dL  High            [facility]   
Physician   
Group  
   
                                        Comment on above:   Result Comment: Rand  
om Glucose Reference Range is dependent on  
   
time and  
content of last meal. Glucose of more than 200 mg/dL in a  
nonstressed, ambulatory subject supports the diagnosis of  
Diabetes Mellitus.  
ADA recommended reference range   
   
                                                            Performed By: #### U  
RMA, CMP, LIPID, CBCNO, PSAS, TEST ####  
[address]   
   
                      Potassium [Moles/Vol] 4.3 mmol/L Normal     3.5-5.1    The  
 [facility]   
Physician   
Group  
   
                                        Comment on above:   Performed By: #### U  
RMA, CMP, LIPID, CBCNO, PSAS, TEST ####  
[address]   
   
                      Sodium [Moles/Vol] 136 mmol/L Normal     136-145    [facility]   
Physician   
Group  
   
                                        Comment on above:   Performed By: #### U  
RMA, CMP, LIPID, CBCNO, PSAS, TEST ####  
[address]   
   
                                                    Urea nitrogen   
[Mass/Vol]      20 mg/dL        Normal          7-25            [facility]   
Physician   
Group  
   
                                        Comment on above:   Performed By: #### U  
RMA, CMP, LIPID, CBCNO, PSAS, TEST ####  
[address]   
   
                                                    Blood Cultureon 2024   
   
                                                    Bacteria identified Cx   
Nom (Bld)                               NO GROWTH 5 DAYS  
PERFORMED BY:  
[place] [address]  
[phone]  
PATHOLOGIST MEDICAL   
DIRECTOR  
VIKAS MARTINEZ M.D.    Normal                                  [facility]   
Physician   
Group  
   
                                        Comment on above:   Performed By: #### U  
RMA, CMP, LIPID, CBCNO, PSAS, TEST ####  
[address]   
   
                                                    Bacteria identified Cx   
Nom (Bld)                               NO GROWTH 5 DAYS  
PERFORMED BY:  
[place] [address]  
[phone]  
PATHOLOGIST MEDICAL   
DIRECTOR  
VIKAS MARTINEZ M.D.    Normal                                  [facility]   
Physician   
Group  
   
                                        Comment on above:   Performed By: #### U  
RMA, CMP, LIPID, CBCNO, PSAS, TEST ####  
[address]   
   
                                                    Complete Blood Count Auto Di  
ffon 2024   
   
                      Basophils (Bld) [#/Vol] 0.1 10*3/uL Normal     0.0-0.2      
[facility]   
Physician   
Group  
   
                                        Comment on above:   Result Comment: PERF  
ORMED BY:  
[place] [address]  
[phone]  
PATHOLOGIST MEDICAL DIRECTOR  
VIKAS MARTINEZ M.D.   
   
                                                            Performed By: #### U  
RMA, CMP, LIPID, CBCNO, PSAS, TEST ####  
[address]   
   
                      Basophils/100 WBC (Bld) 0.6 %      Normal     .          T  
[facility]   
Physician   
Group  
   
                                        Comment on above:   Performed By: #### U  
RMA, CMP, LIPID, CBCNO, PSAS, TEST ####  
[address]   
   
                                                    Eosinophils (Bld)   
[#/Vol]         0.0 10*3/uL     Normal          0.0-0.45        [facility]   
Physician   
Group  
   
                                        Comment on above:   Performed By: #### U  
RMA, CMP, LIPID, CBCNO, PSAS, TEST ####  
[address]   
   
                                                    Eosinophils/100 WBC   
(Bld)           0.1 %           Normal          .               [facility]   
Physician   
Group  
   
                                        Comment on above:   Performed By: #### U  
RMA, CMP, LIPID, CBCNO, PSAS, TEST ####  
[address]   
   
                                                    Erythrocyte   
distribution width   
(RBC) [Ratio]   13.3 %          Normal          12.0-14.8       [facility]   
Physician   
Group  
   
                                        Comment on above:   Performed By: #### U  
RMA, CMP, LIPID, CBCNO, PSAS, TEST ####  
[address]   
   
                                                    Hematocrit (Bld)   
[Volume fraction] 43.3 %          Normal          38.8-50.0       [facility]   
Physician   
Group  
   
                                        Comment on above:   Performed By: #### U  
RMA, CMP, LIPID, CBCNO, PSAS, TEST ####  
[address]   
   
                                                    Hemoglobin (Bld)   
[Mass/Vol]      14.7 g/dL       Normal          13.0-17.0       [facility]   
Physician   
Group  
   
                                        Comment on above:   Performed By: #### U  
RMA, CMP, LIPID, CBCNO, PSAS, TEST ####  
[place] [id number] [place]   
   
                                                    Lymphocytes (Bld)   
[#/Vol]         0.8 10*3/uL     Low             1.00-4.8        [facility]   
Physician   
Group  
   
                                        Comment on above:   Performed By: #### U  
RMA, CMP, LIPID, CBCNO, PSAS, TEST ####  
[address]   
   
                                                    Lymphocytes/100 WBC   
(Bld)           9.3 %           Normal          .               [facility]   
Physician   
Group  
   
                                        Comment on above:   Performed By: #### U  
RMA, CMP, LIPID, CBCNO, PSAS, TEST ####  
[address]   
   
                                                    MCH (RBC) [Entitic   
mass]           29.7 pg         Normal          27.5-35.2       [facility]   
Physician   
Group  
   
                                        Comment on above:   Performed By: #### U  
RMA, CMP, LIPID, CBCNO, PSAS, TEST ####  
[address]   
   
                      MCV (RBC) [Entitic vol] 87.6 fL    Normal     83.5-101   T  
[facility]   
Physician   
Group  
   
                                        Comment on above:   Performed By: #### U  
RMA, CMP, LIPID, CBCNO, PSAS, TEST ####  
[address]   
   
                                                    Mean Corpuscular HGB   
Conc            33.9 g/dL       Normal          32.5-35.6       [facility]   
Physician   
Group  
   
                                        Comment on above:   Performed By: #### U  
RMA, CMP, LIPID, CBCNO, PSAS, TEST ####  
[address]   
   
                      Monocytes (Bld) [#/Vol] 0.9 10*3/uL High       0.0-0.8      
[facility]   
Physician   
Group  
   
                                        Comment on above:   Performed By: #### U  
RMA, CMP, LIPID, CBCNO, PSAS, TEST ####  
[address]   
   
                      Monocytes/100 WBC (Bld) 9.7 %      Normal     .          T  
[facility]   
Physician   
Group  
   
                                        Comment on above:   Performed By: #### U  
RMA, CMP, LIPID, CBCNO, PSAS, TEST ####  
[address]   
   
                                                    Neutrophils (Bld)   
[#/Vol]         7.3 10*3/uL     Normal          1.8-7.7         [facility]   
Physician   
Group  
   
                                        Comment on above:   Performed By: #### U  
RMA, CMP, LIPID, CBCNO, PSAS, TEST ####  
[address]   
   
                                                    Neutrophils/100 WBC   
(Bld)           80.3 %          Normal          .               [facility]   
Physician   
Group  
   
                                        Comment on above:   Performed By: #### U  
RMA, CMP, LIPID, CBCNO, PSAS, TEST ####  
[address]   
   
                      NRBC%      0.2 /100{WBC} Normal     0-0.5      The [facility]   
Physician   
Group  
   
                                        Comment on above:   Performed By: #### U  
RMA, CMP, LIPID, CBCNO, PSAS, TEST ####  
[address]   
   
                                                    Platelet mean volume   
(Bld) [Entitic vol] 7.6 fL          Normal          6.6-10.1        The [facility]   
Physician   
Group  
   
                                        Comment on above:   Performed By: #### U  
RMA, CMP, LIPID, CBCNO, PSAS, TEST ####  
[address]   
   
                      Platelets (Bld) [#/Vol] 180 10*3/uL Normal     150-450      
[facility]   
Physician   
Group  
   
                                        Comment on above:   Performed By: #### U  
RMA, CMP, LIPID, CBCNO, PSAS, TEST ####  
[address]   
   
                      RBC (Bld) [#/Vol] 4.95 10*6/uL Normal     3.90-5.60  [facility]   
Physician   
Group  
   
                                        Comment on above:   Performed By: #### U  
RMA, CMP, LIPID, CBCNO, PSAS, TEST ####  
[place] [address] [place]   
   
                      WBC (Bld) [#/Vol] 9.1 10*3/uL Normal     4.1-10.5   The [facility]   
Physician   
Group  
   
                                        Comment on above:   Performed By: #### U  
RMA, CMP, LIPID, CBCNO, PSAS, TEST ####  
[facility]  
[address] [id number] [facility]   
   
                                                    Aerobic Cultureon 2024  
   
   
                                        Aerobic Culture     Comment right knee   
culture #3  
ORGANISM:   
Streptococcus   
mitis/oralis grp   
(O:STRMITORGR)  
Comments  
Organism Not   
Routinely Tested for   
Susceptibilities  
Quantity of Growth  
Rare Growth  
*********************  
**********  
* This is a corrected   
result. *  
*********************  
**********  
A prior result that   
was reported as final   
has been changed.  
Strep mitis/oralis   
group added to report  
Comment right knee   
culture #3  
Anaerobic Culture   
Results  
No Anaerobes Isolated   
3 Days  
Comment right knee   
culture #3  
Gram Stain Result  
1+ White Blood Cells  
No Bacteria Seen  
PERFORMED BY:  
[place] [address]  
[phone]  
PATHOLOGIST MEDICAL   
DIRECTOR  
VIKAS MARTINEZ M.D.    Normal                                  [facility]   
Physician   
Group  
   
                                        Comment on above:   Performed By: #### U  
RMA, CMP, LIPID, CBCNO, PSAS, TEST ####  
[facility]  
[address] [id number] [facility]   
   
                                        Aerobic Culture     Comment right knee   
culture #2  
ORGANISM:   
Streptococcus   
mitis/oralis grp   
(O:STRMITORGR)  
Comments  
Organism Not   
Routinely Tested for   
Susceptibilities  
Quantity of Growth  
Rare Growth  
*********************  
**********  
* This is a corrected   
result. *  
*********************  
**********  
A prior result that   
was reported as final   
has been changed.  
Strep mitis/oralis   
group added to report  
  
  
Comment right knee   
culture #2  
Anaerobic Culture   
Results  
No Anaerobes Isolated   
3 Days  
Comment right knee   
culture #2  
Gram Stain Result  
2+ White Blood Cells  
No Bacteria Seen  
PERFORMED BY:  
[place] [id number] [phone]  
PATHOLOGIST MEDICAL   
DIRECTOR  
VIKAS Art                                  [facility]   
Physician   
Group  
   
                                        Comment on above:   Performed By: #### U  
RMA, CMP, LIPID, CBCNO, PSAS, TEST ####  
[address]   
   
                                        Aerobic Culture     Comment right knee   
culture #1  
ORGANISM:   
Streptococcus   
mitis/oralis grp   
(O:STRMITORGR)  
Comments  
Organism Not   
Routinely Tested for   
Susceptibilities  
Quantity of Growth  
Rare Growth  
*********************  
**********  
* This is a corrected   
result. *  
*********************  
**********  
A prior result that   
was reported as final   
has been changed.  
Strep mitis/oralis   
group added to report  
  
Comment right knee   
culture #1  
Anaerobic Culture   
Results  
No Anaerobes Isolated   
3 Days  
Comment right knee   
culture #1  
Gram Stain Result  
1+ White Blood Cells  
No Bacteria Seen  
PERFORMED BY:  
[place] [address]  
[phone]  
PATHOLOGIST MEDICAL   
DIRECTOR  
VIKAS MARTINEZ M.D.    Normal                                  [facility]   
Physician   
Group  
   
                                        Comment on above:   Performed By: #### U  
RMA, CMP, LIPID, CBCNO, PSAS, TEST ####  
[address]   
   
                                                    Basic Metabolic Panelon 08-2  
   
   
                      Anion gap [Moles/Vol] 11.6 mmol/L Normal     6.0-15.0     
[facility]   
Physician   
Group  
   
                                        Comment on above:   Performed By: #### U  
RMA, CMP, LIPID, CBCNO, PSAS, TEST ####  
[address]   
   
                      Calcium [Mass/Vol] 8.4 mg/dL  Low        8.6-10.3   [facility]   
Physician   
Group  
   
                                        Comment on above:   Performed By: #### U  
RMA, CMP, LIPID, CBCNO, PSAS, TEST ####  
[address]   
   
                      Chloride [Moles/Vol] 102 mmol/L Normal          The   
[facility]   
Physician   
Group  
   
                                        Comment on above:   Performed By: #### U  
RMA, CMP, LIPID, CBCNO, PSAS, TEST ####  
[facility]  
[address]   
   
                      CO2 [Moles/Vol] 25.2 mmol/L Normal     21.0-31.0  The [facility]   
Physician   
Group  
   
                                        Comment on above:   Performed By: #### U  
RMA, CMP, LIPID, CBCNO, PSAS, TEST ####  
[facility]  
[address]   
   
                      Creatinine [Mass/Vol] 0.72 mg/dL Normal     0.70-1.30  The  
 [facility]   
Physician   
Group  
   
                                        Comment on above:   Performed By: #### U  
RMA, CMP, LIPID, CBCNO, PSAS, TEST ####  
[address]   
   
                                                    Creatinine Clr Calc   
Pharmacy        142.25          Normal                          [facility]   
Physician   
Group  
   
                                        Comment on above:   Result Comment: PERF  
ORMED BY:  
[place] [address]  
[phone]  
PATHOLOGIST MEDICAL DIRECTOR  
VIKAS MARTINEZ M.D.   
   
                                                            Performed By: #### U  
RMA, CMP, LIPID, CBCNO, PSAS, TEST ####  
[address]   
   
                                                    GFR/1.73 sq M.predicted   
MDRD (S/P/Bld) [Vol   
rate/Area]      mL/min/{1.73_m2} Normal                          [facility]   
Physician   
Group  
   
                                        Comment on above:   Performed By: #### U  
RMA, CMP, LIPID, CBCNO, PSAS, TEST ####  
[address]   
   
                      Glucose [Mass/Vol] 142 mg/dL  High            [facility]   
Physician   
Group  
   
                                        Comment on above:   Result Comment: Rand  
 Glucose Reference Range is dependent on  
   
time and  
content of last meal. Glucose of more than 200 mg/dL in a  
nonstressed, ambulatory subject supports the diagnosis of  
Diabetes Mellitus.  
ADA recommended reference range   
   
                                                            Performed By: #### U  
RMA, CMP, LIPID, CBCNO, PSAS, TEST ####  
[address]   
   
                      Potassium [Moles/Vol] 3.8 mmol/L Normal     3.5-5.1    The  
 [facility]   
Physician   
Group  
   
                                        Comment on above:   Performed By: #### U  
RMA, CMP, LIPID, CBCNO, PSAS, TEST ####  
[address]   
   
                      Sodium [Moles/Vol] 135 mmol/L Low        136-145    [facility]   
Physician   
Group  
   
                                        Comment on above:   Performed By: #### U  
RMA, CMP, LIPID, CBCNO, PSAS, TEST ####  
[address]   
   
                                                    Urea nitrogen   
[Mass/Vol]      18 mg/dL        Normal          7-25            [facility]   
Physician   
Group  
   
                                        Comment on above:   Performed By: #### U  
RMA, CMP, LIPID, CBCNO, PSAS, TEST ####  
[address]   
   
                                                    Complete Blood Count Auto Di  
ffon 2024   
   
                      Basophils (Bld) [#/Vol] 0.0 10*3/uL Normal     0.0-0.2      
[facility]   
Physician   
[facility]  
   
                                        Comment on above:   Result Comment: PERF  
ORMED BY:  
[place] [address]  
[phone]  
PATHOLOGIST MEDICAL DIRECTOR  
VIKAS MARTINEZ M.D.   
   
                                                            Performed By: #### U  
RMA, CMP, LIPID, CBCNO, PSAS, TEST ####  
[address]   
   
                      Basophils/100 WBC (Bld) 0.5 %      Normal     .          T  
[facility]   
Physician   
Group  
   
                                        Comment on above:   Performed By: #### U  
RMA, CMP, LIPID, CBCNO, PSAS, TEST ####  
[address]   
   
                                                    Eosinophils (Bld)   
[#/Vol]         0.0 10*3/uL     Normal          0.0-0.45        [facility]   
Physician   
Group  
   
                                        Comment on above:   Performed By: #### U  
RMA, CMP, LIPID, CBCNO, PSAS, TEST ####  
[address]   
   
                                                    Eosinophils/100 WBC   
(Bld)           0.4 %           Normal          .               [facility]   
Physician   
Group  
   
                                        Comment on above:   Performed By: #### U  
RMA, CMP, LIPID, CBCNO, PSAS, TEST ####  
[address]   
   
                                                    Erythrocyte   
distribution width   
(RBC) [Ratio]   13.5 %          Normal          12.0-14.8       [facility]   
Physician   
Group  
   
                                        Comment on above:   Performed By: #### U  
RMA, CMP, LIPID, CBCNO, PSAS, TEST ####  
[address]   
   
                                                    Hematocrit (Bld)   
[Volume fraction] 43.0 %          Normal          38.8-50.0       [facility]   
Physician   
Group  
   
                                        Comment on above:   Performed By: #### U  
RMA, CMP, LIPID, CBCNO, PSAS, TEST ####  
[address]   
   
                                                    Hemoglobin (Bld)   
[Mass/Vol]      14.7 g/dL       Normal          13.0-17.0       [facility]   
Physician   
Group  
   
                                        Comment on above:   Performed By: #### U  
RMA, CMP, LIPID, CBCNO, PSAS, TEST ####  
[address]   
   
                                                    Lymphocytes (Bld)   
[#/Vol]         1.5 10*3/uL     Normal          1.00-4.8        [facility]   
Physician   
Group  
   
                                        Comment on above:   Performed By: #### U  
RMA, CMP, LIPID, CBCNO, PSAS, TEST ####  
[address]   
   
                                                    Lymphocytes/100 WBC   
(Bld)           17.6 %          Normal          .               [facility]   
Physician   
Group  
   
                                        Comment on above:   Performed By: #### U  
RMA, CMP, LIPID, CBCNO, PSAS, TEST ####  
[address]   
   
                                                    MCH (RBC) [Entitic   
mass]           29.9 pg         Normal          27.5-35.2       [facility]   
Physician   
Group  
   
                                        Comment on above:   Performed By: #### U  
RMA, CMP, LIPID, CBCNO, PSAS, TEST ####  
[address]   
   
                      MCV (RBC) [Entitic vol] 87.5 fL    Normal     83.5-101   T  
he [facility]   
Physician   
Group  
   
                                        Comment on above:   Performed By: #### U  
RMA, CMP, LIPID, CBCNO, PSAS, TEST ####  
[address]   
   
                                                    Mean Corpuscular HGB   
Conc            34.2 g/dL       Normal          32.5-35.6       [facility]   
Physician   
Group  
   
                                        Comment on above:   Performed By: #### U  
RMA, CMP, LIPID, CBCNO, PSAS, TEST ####  
[address]   
   
                      Monocytes (Bld) [#/Vol] 0.8 10*3/uL Normal     0.0-0.8      
[facility]   
Physician   
Group  
   
                                        Comment on above:   Performed By: #### U  
RMA, CMP, LIPID, CBCNO, PSAS, TEST ####  
[address]   
   
                      Monocytes/100 WBC (Bld) 10.0 %     Normal     .          [facility]   
Physician   
Group  
   
                                        Comment on above:   Performed By: #### U  
RMA, CMP, LIPID, CBCNO, PSAS, TEST ####  
[address]   
   
                                                    Neutrophils (Bld)   
[#/Vol]         5.9 10*3/uL     Normal          1.8-7.7         [facility]   
Physician   
[facility]  
   
                                        Comment on above:   Performed By: #### U  
RMA, CMP, LIPID, CBCNO, PSAS, TEST ####  
[address]   
   
                                                    Neutrophils/100 WBC   
(Bld)           71.5 %          Normal          .               [facility]   
Physician   
Group  
   
                                        Comment on above:   Performed By: #### U  
RMA, CMP, LIPID, CBCNO, PSAS, TEST ####  
[address]   
   
                      NRBC%      0.1 /100{WBC} Normal     0-0.5      [facility]   
Physician   
Group  
   
                                        Comment on above:   Performed By: #### U  
RMA, CMP, LIPID, CBCNO, PSAS, TEST ####  
[address]   
   
                                                    Platelet mean volume   
(Bld) [Entitic vol] 7.8 fL          Normal          6.6-10.1        [facility]   
Physician   
Group  
   
                                        Comment on above:   Performed By: #### U  
RMA, CMP, LIPID, CBCNO, PSAS, TEST ####  
[facility]  
[address]   
   
                      Platelets (Bld) [#/Vol] 175 10*3/uL Normal     150-450      
[facility]   
Physician   
Group  
   
                                        Comment on above:   Performed By: #### U  
RMA, CMP, LIPID, CBCNO, PSAS, TEST ####  
[facility]  
[address]   
   
                      RBC (Bld) [#/Vol] 4.91 10*6/uL Normal     3.90-5.60  [facility]   
Physician   
Group  
   
                                        Comment on above:   Performed By: #### U  
RMA, CMP, LIPID, CBCNO, PSAS, TEST ####  
[address]   
   
                      WBC (Bld) [#/Vol] 8.3 10*3/uL Normal     4.1-10.5   [facility]   
Physician   
Group  
   
                                        Comment on above:   Performed By: #### U  
RMA, CMP, LIPID, CBCNO, PSAS, TEST ####  
[address]   
   
                                                    ECG 12 lead ECGon 2024  
   
   
                                        ECG 12 lead ECG     [facility] [place]  
[address] [place] [address]  
  
Electrocardiograph   
Report  
Signed  
  
Patient: Kaylynn Mims MR#: [id number]  
16  
: 1955   
Acct:[id number]  
  
Age/Sex: 68 / M ADM   
Date: 24  
  
Loc:  Room:   
[address] [place] [address] Type: ADM IN  
Attending Dr: Josie Ann MD  
  
Ordering Provider:   
Eze Gordon MD  
Date of Service:   
  
Accession #:   
([id number] ECG/ECG   
12 lead ECG: pre-op  
  
  
Copies to:  
  
  
Test Reason :  
Blood Pressure : */*   
mmHG  
Vent. Rate : 72 BPM   
Atrial Rate : 72 BPM  
P-R Int : 168 ms QRS   
Dur : 102 ms  
QT Int : 416 ms P-R-T   
Axes : 30 21 -12   
degrees  
QTcB Int : 455 ms  
  
Normal sinus rhythm  
  
Non-specific change   
in ST segment in  
No previous ECGs   
available  
Confirmed by   
JESSICA VELASCO MD ([id number]) on   
2024 8:59:42 PM  
  
Referred By:   
Electronically Signed   
By: JESSICA VELASCO MD  
  
  
  
Transcribed By: MUS  
Signed By Jessica Velasco MD 0  
24        Normal                                  [facility]   
Physician   
Group  
   
                                                    Gram stain for investigation  
 of transfusion reactionOrdered By: Jeff Onofre   
on   
2024   
   
                                                    Microscopic observation   
Gram stain Nom (Unsp   
spec)           St. mitis/oralis grp Abnormal                        [facility]  
   
                                                    Microscopic observation   
Gram stain Nom (Unsp   
spec)           St. mitis/oralis grp Abnormal                        [facility]  
   
                                                    Microscopic observation   
Gram stain Nom (Unsp   
spec)           St. mitis/oralis grp Abnormal                        [facility]  
   
                                                    US venous duplex LE RTon    
   
                                        US venous duplex LE RT [facility] [place] [address]  
  
Ultrasound Report  
Signed  
  
Patient: Kaylynn Mims MR#: [id number]  
16  
: 1955   
Acct:[id number]  
  
Age/Sex: 68 / M ADM   
Date: 24  
  
Loc:  Room:   
[address] [place] [address] Type: ADM IN  
Attending Dr: Josie Ann MD  
  
Ordering Provider:   
MAYRA Martinez  
Date of Service:   
24  
Accession #:   
([id number] US/US   
venous duplex LE RT:   
leg pain and swelling  
  
  
Copies to: MAYRA Martinez MD  
  
  
RIGHT LOWER EXTREMITY   
VENOUS DUPLEX  
  
INDICATION: Painful   
swollen right leg  
  
Unilateral right   
lower extremity   
venous duplex Doppler   
study was obtained   
utilizing B-mode,   
color-  
flow and spectral   
Doppler.  
  
FINDINGS: The right   
common femoral,   
femoral, and   
popliteal veins   
showed adequate   
compressibility,  
color-flow and   
augmentation. The   
right posterior   
tibial and peroneal   
veins were   
compressible, as  
well as proximal   
greater saphenous   
vein. The   
contralateral left   
common femoral vein   
was  
compressible with   
color-flow and   
augmentation.  
  
  
ORDER #: [id number]   
US/US venous duplex   
LE RT  
IMPRESSION:  
  
NO EVIDENCE OF DEEP   
VENOUS THROMBOSIS IN   
THE RIGHT LOWER   
EXTREMITY. NO   
SUPERFICIAL   
THROMBOPHLEBITIS  
WAS NOTED.  
  
Impression dictated   
by: Neo Franco M.D.2024 9:51 AM  
  
  
Dictation Location:   
[place] [address]  
  
Tech: Bridget Luz  
  
Transcribed By: MARBELLA   
24  
Dictated By: Neo Franco MD 24  
  
Signed By:  
24       Normal                                  [facility]   
Physician   
Group  
   
                                                    Aerobic Cultureon 2024  
   
   
                                        Aerobic Culture     Comment R knee  
ORGANISM:   
Streptococcus   
mitis/oralis grp   
(O:STRMITORGR)  
Comments  
Organism Not   
Routinely Tested for   
Susceptibilities  
Quantity of Growth  
Light Growth  
  
Comment R knee  
No Anaerobes Isolated   
3 Days  
Comment R knee  
Gram Stain Result  
3+ White Blood Cells  
No Bacteria Seen  
PERFORMED BY:  
[place] [address]  
[phone]  
PATHOLOGIST MEDICAL   
DIRECTOR  
VIKAS MARTINEZ M.D.    Normal                                  [facility]   
Physician   
Group  
   
                                        Comment on above:   Performed By: #### U  
RMA, CMP, LIPID, CBCNO, PSAS, TEST ####  
[address]   
   
                                        Aerobic Culture     No Growth 2 Days  
No Anaerobes Isolated   
3 Days  
Gram Stain Result  
Rare White Blood   
Cells  
4+ Red Blood Cells  
No Bacteria Seen  
PERFORMED BY:  
[place] [address]  
[phone]  
PATHOLOGIST MEDICAL   
DIRECTOR  
VIKAS MARTINEZ M.D.    Normal                                  [facility]   
Physician   
Group  
   
                                        Comment on above:   Performed By: #### U  
RMA, CMP, LIPID, CBCNO, PSAS, TEST ####  
[address]   
   
                                                    Alanine aminotransferase [En  
zymatic activity/volume] in Serum or PlasmaOrdered   
By:   
Daniela Sanchez on 2024   
   
                                                    ALT [Catalytic   
activity/Vol]   38 U/L                                      [facility]  
   
                                        Comment on above:   Performed By: #### U  
RMA, CMP, LIPID, CBCNO, PSAS, TEST ####  
[place] [address] [place]   
   
                                                    Albumin [Mass/volume] in Ser  
um or Plasma by Bromocresol green (BCG) dye binding   
methoOrdered By: Daniela Sanchez on 2024   
   
                                                    Albumin BCG dye   
[Mass/Vol]      3.9 g/dL                        3.5-5.7         [facility]  
   
                                                    Alkaline phosphatase [Enzyma  
tic activity/volume] in Serum or PlasmaOrdered By:   
Daniela   
[place] on 2024   
   
                                                    ALP [Catalytic   
activity/Vol]   60 U/L                                    [facility]  
   
                                        Comment on above:   Performed By: #### U  
RMA, CMP, LIPID, CBCNO, PSAS, TEST ####  
[facility]  
[address]   
   
                                                    Aspartate aminotransferase [  
Enzymatic activity/volume] in Serum or PlasmaOrdered  
By:   
Daniela [place] on 2024   
   
                                                    AST [Catalytic   
activity/Vol]   19 U/L                          13-39           [facility]  
   
                                        Comment on above:   Performed By: #### U  
RMA, CMP, LIPID, CBCNO, PSAS, TEST ####  
[address]   
   
                                                    Automated basophil %Ordered   
By: Daniela [place]imore on 2024   
   
                      Basophils/100 WBC (Bld) 0.5 %      Normal     .          F  
[facility]  
   
                                        Comment on above:   Performed By: #### U  
RMA, CMP, LIPID, CBCNO, PSAS, TEST ####  
[address]   
   
                                                    Automated basophil countOrde  
red By: Daniela Mayimore on 2024   
   
                      Basophils (Bld) [#/Vol] 0.0 10*3/uL Normal     0.0-0.2      
[facility]  
   
                                        Comment on above:   Performed By: #### U  
RMA, CMP, LIPID, CBCNO, PSAS, TEST ####  
[facility]  
[address] [place] [address]   
   
                                                    Automated blood monocyte cou  
ntOrdered By: Daniela Laloimore on 2024   
   
                      Monocytes (Bld) [#/Vol] 0.5 10*3/uL Normal     0.0-0.8      
[facility]  
   
                                        Comment on above:   Performed By: #### U  
RMA, CMP, LIPID, CBCNO, PSAS, TEST ####  
[facility]  
[address] [place] [address]   
   
                                                    Automated eosinophil %Ordere  
d By: Daniela Laura on 2024   
   
                                                    Eosinophils/100 WBC   
(Bld)           0.1 %           Normal          .               [facility]  
   
                                        Comment on above:   Performed By: #### U  
RMA, CMP, LIPID, CBCNO, PSAS, TEST ####  
[address]   
   
                                                    Automated eosinophil countOr  
dered By: Daniela Sanchez on 2024   
   
                                                    Eosinophils (Bld)   
[#/Vol]         0.0 10*3/uL     Normal          0.0-0.45        [facility]  
   
                                        Comment on above:   Performed By: #### U  
RMA, CMP, LIPID, CBCNO, PSAS, TEST ####  
[address]   
   
                                                    Automated monocyte %Ordered   
By: Daniela Sanchez on 2024   
   
                      Monocytes/100 WBC (Bld) 5.1 %      Normal     .          F  
[facility]  
   
                                        Comment on above:   Performed By: #### U  
RMA, CMP, LIPID, CBCNO, PSAS, TEST ####  
[address]   
   
                                                    Automated neutrophil %Ordere  
d By: Daniela Sanchez on 2024   
   
                                                    Neutrophils/100 WBC   
(Bld)           88.3 %          Normal          .               [facility]  
   
                                        Comment on above:   Performed By: #### U  
RMA, CMP, LIPID, CBCNO, PSAS, TEST ####  
[address]   
   
                                                    Bacterial blood cultureOrder  
ed By: Daniela Sanchez on 2024   
   
                                                    Bacteria identified Cx   
Nom (Bld)       NO GROWTH 5 DAYS                                 [facility]  
   
                                                    Bilirubin.total [Mass/volume  
] in Serum or PlasmaOrdered By: Daniela Sanchez on   
2024   
   
                      Bilirubin [Mass/Vol] 1.5 mg/dL  High       0.3-1.0    [facility]  
   
                                        Comment on above:   Samples from patient  
s who have taken Naproxen have shown   
spurious elevation in Total Bilirubin levels. A metabolite of   
Naproxen, O-desmethylnaproxen, has been shown to interfere   
with the Jendrassik-Grof method for measuring Total Bilirubin.   
   
                                                            Result Comment: Samp  
les from patients who have taken Naproxen   
have shown  
spurious elevation in Total Bilirubin levels. A metabolite  
of Naproxen, O-desmethylnaproxen, has been shown to  
interfere with the Jendrassik-Grof method for measuring  
Total Bilirubin.   
   
                                                            Performed By: #### U  
RMA, CMP, LIPID, CBCNO, PSAS, TEST ####  
[facility]  
[address] [place] [id number] [facility]   
   
                                                    Blood Cultureon 2024   
   
                                                    Bacteria identified Cx   
Nom (Bld)                               NO GROWTH 5 DAYS  
PERFORMED BY:  
[place] [address]  
[phone]  
PATHOLOGIST MEDICAL   
DIRECTOR  
VIKAS MARTINEZ M.D.    Normal                                  [facility]   
Physician   
Group  
   
                                        Comment on above:   Performed By: #### L  
ACTIC, CUBLD ####  
[facility]  
[address]   
   
                                                    Bacteria identified Cx   
Nom (Bld)                               Results called  
at 0209 on 24  
Gram Stain  
Gram Positive Cocci   
in Chains  
ORGANISM:   
Streptococcus   
mitis/oralis grp   
(O:STRMITORGR)  
Aerobic Lori Charge   
(Strep)  
---------------------  
---- SUSCEPTIBILITY   
---------------------  
---  
ORGANISM:   
O:STRMITORGR  
ANTIBIOTIC   
INTERPRETATION LORI  
Ampicillin I 0.5  
Cefepime S 0.5  
Ceftriaxone S <0.25  
Erythromycin R >0.5  
Penicillin I 0.25  
Vancomycin S 0.5  
Results called  
at 0209 on 24  
Staphylococcus aureus   
DNA [Presence] by ZOË   
with non-probe   
detection in Positive   
blood culture  
Not detected  
Bacteroides fragilis   
DNA [Presence] by ZOË   
with non-probe   
detection in Positive   
blood culture  
Not detected  
Candida auris DNA   
[Presence] by ZOË   
with non-probe   
detection in Positive   
blood culture  
Not detected  
Candida albicans DNA   
[Presence] by ZOË   
with non-probe   
detection in Positive   
blood culture  
Not detected  
Acinetobacter   
calcoaceticus-marielena  
ii complex DNA   
[Presence] by ZOË   
with non-probe   
detection in Positive   
blood culture  
Not detected  
Cryptococcus   
neoformans or gattii   
9002  
Not detected  
Cephalosporin   
resistance blaCTX-M   
gene [Presence] by   
Molecular method  
Not Applicable  
Escherichia coli  
Not detected  
Enterobacterales DNA   
[Presence] by ZOË   
with non-probe   
detection in Positive   
blood culture  
Not detected  
Enterobacter cloacae   
complex DNA   
[Presence] by ZOË   
with non-probe   
detection in Positive   
blood culture  
Not detected  
Staphylococcus   
epidermidis DNA   
[Presence] by ZOË   
with non-probe   
detection in Positive   
blood culture  
Not detected  
Enterococcus faecalis   
DNA [Presence] by ZOË   
with non-probe   
detection in Positive   
blood culture  
Not detected  
Enterococcus faecium   
DNA [Presence] by ZOË   
with non-probe   
detection in Positive   
blood culture  
Not detected  
Candida glabrata DNA   
[Presence] by ZOË   
with non-probe   
detection in Positive   
blood culture  
Not detected  
Haemophilus   
influenzae (reported   
as H flu)  
Not detected  
Carbapenem resistance   
blaIMP gene   
[Presence] by   
Molecular method  
Not Applicable  
Klebsiella aerogenes   
DNA [Presence] by ZOË   
with non-probe   
detection in Positive   
blood culture  
Not detected  
Klebsiella   
pneumoniae+Klebsiella   
variicola+Klebsiella   
quasipneumoniae DNA   
[Presence] by ZOË   
with non-probe   
detection in Positive   
blood culture  
Not detected  
Klebsiella oxytoca   
DNA [Presence] by ZOË   
with non-probe   
detection in Positive   
blood culture  
Not detected  
Carbapenem resistance   
blaKPC gene   
[Presence] by   
Molecular method  
Not Applicable  
Nereida krusei DNA   
[Presence] by ZOË   
with non-probe   
detection in Positive   
blood culture  
Not detected  
Listeria   
monocytogenes   
(reported as   
listeriosis)  
Not detected  
Staphylococcus   
lugdunensis DNA   
[Presence] by ZOË   
with non-probe   
detection in Positive   
blood culture  
Not detected  
Methicillin   
resistance mecA+mecC   
genes+SCCmec+OrfX   
junction [Presence]   
by Molecular method  
Not Applicable  
Carbapenem resistance   
blaNDM gene   
[Presence] by   
Molecular method  
Not Applicable  
Neisseria   
meningitidis -   
reported as   
meningococcal disease  
Not detected  
Carbapenem resistance   
cherri OXA-48-like gene   
[Presence] by   
Molecular method  
Not Applicable  
Candida parapsilosis   
DNA [Presence] by ZOË   
with non-probe   
detection in Positive   
blood culture  
Not detected  
Streptococcus   
pneumoniae - reported   
at [facility]  
Not detected  
Proteus sp DNA   
[Presence] by ZOË   
with non-probe   
detection in Positive   
blood culture  
Not detected  
Pseudomonas   
aeruginosa DNA   
[Presence] by ZOË   
with non-probe   
detection in Positive   
blood culture  
Not detected  
Salmonella sp DNA   
[Presence] by ZOË   
with non-probe   
detection in Positive   
blood culture  
Not detected  
Serratia marcescens   
DNA [Presence] by ZOË   
with non-probe   
detection in Positive   
blood culture  
Not detected  
Staphylococcus sp DNA   
[Presence] by ZOË   
with non-probe   
detection in Positive   
blood culture  
Not detected  
Stenotrophomonas   
maltophilia DNA   
[Presence] by ZOË   
with non-probe   
detection in Positive   
blood culture  
Not detected  
Group A   
(Streptococcus   
pyogenes) [id number]  
Not detected  
Group B Strep   
(Streptococcus   
agalactiae)  
Not detected  
Streptococcus sp DNA   
[Presence] by ZOË   
with non-probe   
detection in Positive   
blood culture  
Detected  
Candida tropicalis   
DNA [Presence] by ZOË   
with non-probe   
detection in Positive   
blood culture  
Not detected  
Vancomycin resistance   
Aidan + vanB genes   
[Presence] by   
Molecular method  
Not Applicable  
Carbapenem resistance   
blaVIM gene   
[Presence] by   
Molecular method  
Not Applicable  
Colistin resistance   
mcr-1 gene [Presence]   
by Molecular method  
Not Applicable  
Methicillin   
resistance mecA+mecC   
genes [Presence] in   
Isolate or Specimen   
by Molecular genetics   
method  
Not Applicable  
RESIS. GENE COMMENT 1  
Antimicrobial   
resistance can occur   
via multiple  
RESIS. GENE COMMENT 2  
mechanisms. A Not   
Detected result for   
antimicrobial  
RESIS. GENE COMMENT 3  
resistance gene(s)   
does not indicate   
antimicrobial  
RESIS. GENE COMMENT 4  
susceptibility.  
S = SUSCEPTIBLE I =   
INTERMEDIATE R =   
RESISTANT  
BLANK = DATA NO (more   
content not   
included)...        Normal                                  [facility]   
Physician   
Group  
   
                                        Comment on above:   Performed By: #### L  
ACTIC, CUBLD ####  
[address]   
   
                                                    Body fluid crystal identific  
ation by light microscopyOrdered By: Jeff Onofre  
 on   
2024   
   
                                                    Crystals LM Nom (Body   
fld)            None seen                       None Seen       [facility]  
   
                                                    C reactive protein [Mass/vol  
ume] in Serum or PlasmaOrdered By: Daniela Sanchez   
on   
2024   
   
                      CRP [Mass/Vol] 12.3 mg/dL High       0.0-0.5    [facility]  
   
                                                    C-Reactive Proteinon   
024   
   
                      C-Reactive Protein 12.3 mg/dL High       0.0-0.5    [facility]   
Physician   
Group  
   
                                        Comment on above:   Result Comment: PERF  
ORMED BY:  
[place] [address]  
[phone]  
PATHOLOGIST MEDICAL DIRECTOR  
VIKAS MARTINEZ M.D.   
   
                                                            Performed By: #### U  
RMA, CMP, LIPID, CBCNO, PSAS, TEST ####  
[facility]  
[address]   
   
                                                    Calcium [Mass/volume] in Ser  
um or PlasmaOrdered By: Daniela Sanchez on   
2024   
   
                      Calcium [Mass/Vol] 8.6 mg/dL  Normal     8.6-10.3   [facility]  
   
                                        Comment on above:   Performed By: #### U  
RMA, CMP, LIPID, CBCNO, PSAS, TEST ####  
[facility]  
[address] [place] [address]   
   
                                                    Carbon dioxide, total [Moles  
/volume] in Serum or PlasmaOrdered By: Daniela Sanchez   
on 2024   
   
                      CO2 [Moles/Vol] 23.1 mmol/L Normal     21.0-31.0  [facility]  
   
                                        Comment on above:   Performed By: #### U  
RMA, CMP, LIPID, CBCNO, PSAS, TEST ####  
[facility]  
[address] [place] [address]   
   
                                                    Cell Count Diff,Synovial Flu  
idon 2024   
   
                                                    Appearance, Synovial   
Fluid                     Cloudy                    Critically   
abnormal                  Clear                     [facility]   
Physician   
Group  
   
                                        Comment on above:   Order Comment: Reaso  
n for Exam Type 2 diabetes mellitus   
   
                                                            Performed By: #### U  
RMA, CMP, LIPID, CBCNO, PSAS, TEST ####  
[facility]  
[address] [place] [address]   
   
                                Color, Synovial Fluid Yellow          Critically  
   
abnormal                  [address]               [facility]   
Physician   
Group  
   
                                        Comment on above:   Order Comment: Reaso  
n for Exam Type 2 diabetes mellitus   
   
                                                            Performed By: #### U  
RMA, CMP, LIPID, CBCNO, PSAS, TEST ####  
[facility]  
[address] [place] [address]   
   
                                                    Color, Synovial   
Supernatant     Yellow          Normal                          [facility]   
Physician   
Group  
   
                                        Comment on above:   Order Comment: Reaso  
n for Exam Type 2 diabetes mellitus   
   
                                                            Result Comment: The   
reference interval and other method   
performance  
specifications have not been established for this body  
fluid. The test result must be integrated into the clinical  
context for interpretation.   
   
                                                            Performed By: #### U  
RMA, CMP, LIPID, CBCNO, PSAS, TEST ####  
[facility]  
[address] [place] [address] [place]   
   
                                                    Eosinophils,Synovial   
Fluid           0 %             Normal          0-2             [facility]   
Physician   
Group  
   
                                        Comment on above:   Order Comment: Reaso  
n for Exam Type 2 diabetes mellitus   
   
                                                            Result Comment: PERF  
ORMED BY:  
[address]  
[place] [address]  
[phone]  
PATHOLOGIST MEDICAL DIRECTOR  
VIKAS MARTINEZ M.D.   
   
                                                            Performed By: #### U  
RMA, CMP, LIPID, CBCNO, PSAS, TEST ####  
[facility]  
[address]   
   
                                                    Lymphocytes, Synovial   
Fluid           7 %             Normal          0-74            [facility]   
Physician   
Group  
   
                                        Comment on above:   Order Comment: Reaso  
n for Exam Type 2 diabetes mellitus   
   
                                                            Performed By: #### U  
RMA, CMP, LIPID, CBCNO, PSAS, TEST ####  
[facility]  
[address]   
   
                                                    Monocyte/Histiocyte,Syn  
ov.Fld.         1 %             Normal          0-69            [facility]   
Physician   
Group  
   
                                        Comment on above:   Order Comment: Reaso  
n for Exam Type 2 diabetes mellitus   
   
                                                            Performed By: #### U  
RMA, CMP, LIPID, CBCNO, PSAS, TEST ####  
[address]   
   
                                                    Neutrophils, Synovial   
Fluid           92 %            High            0-24            [facility]   
Physician   
Group  
   
                                        Comment on above:   Order Comment: Reaso  
n for Exam Type 2 diabetes mellitus   
   
                                                            Performed By: #### U  
RMA, CMP, LIPID, CBCNO, PSAS, TEST ####  
[facility]  
[address]   
   
                      RBC, Synovial Fluid 6370 /uL   High       0-0        [facility]   
Physician   
Group  
   
                                        Comment on above:   Order Comment: Reaso  
n for Exam Type 2 diabetes mellitus   
   
                                                            Performed By: #### U  
RMA, CMP, LIPID, CBCNO, PSAS, TEST ####  
[address]   
   
                      TNC, Synovial Fluid 23602 /uL  High       0-150      [facility]   
Physician   
Group  
   
                                        Comment on above:   Order Comment: Reaso  
n for Exam Type 2 diabetes mellitus   
   
                                                            Result Comment: The   
reference interval and other method   
performance  
specifications have not been established for this body  
fluid. The test result must be integrated into the clinical  
context for interpretation.   
   
                                                            Performed By: #### U  
RMA, CMP, LIPID, CBCNO, PSAS, TEST ####  
[address]   
   
                                                    Chloride [Moles/volume] in S  
lorraine or PlasmaOrdered By: Daniela Sanchez on   
2024   
   
                      Chloride [Moles/Vol] 102 mmol/L Normal          [facility]  
   
                                        Comment on above:   Performed By: #### U  
RMA, CMP, LIPID, CBCNO, PSAS, TEST ####  
[address]   
   
                                                    Complete Blood Count Auto Di  
ffon 2024   
   
                                                    Mean Corpuscular HGB   
Conc            34.1 g/dL       Normal          32.5-35.6       [facility]   
Physician   
Group  
   
                                        Comment on above:   Performed By: #### U  
RMA, CMP, LIPID, CBCNO, PSAS, TEST ####  
[address]   
   
                      Monocytes/100 WBC (Bld) 22.07 %    High       0.00-20.00 T  
[facility]   
Physician   
Group  
   
                                        Comment on above:   Result Comment: For   
adults in ED, MDW > 20.0 may be associated  
   
with a higher  
risk of sepsis during the first 12 hrs of hospital admission   
   
                                                            Performed By: #### U  
RMA, CMP, LIPID, CBCNO, PSAS, TEST ####  
[address]   
   
                      NRBC%      0.8 /100{WBC} High       0-0.5      [facility]   
Physician   
Group  
   
                                        Comment on above:   Performed By: #### U  
RMA, CMP, LIPID, CBCNO, PSAS, TEST ####  
[address]   
   
                                                    Comprehensive Metabolic Pane  
guerline 2024   
   
                      Albumin [Mass/Vol] 3.9 g/dL   Normal     3.5-5.7    [facility]   
Physician   
Group  
   
                                        Comment on above:   Performed By: #### U  
RMA, CMP, LIPID, CBCNO, PSAS, TEST ####  
[address]   
   
                                                    Creatinine Clr Calc   
Pharmacy        143.91          Normal                          [facility]   
Physician   
Group  
   
                                        Comment on above:   Performed By: #### U  
RMA, CMP, LIPID, CBCNO, PSAS, TEST ####  
[address]   
   
                                                    GFR/1.73 sq M.predicted   
MDRD (S/P/Bld) [Vol   
rate/Area]      mL/min/{1.73_m2} Normal                          [facility]   
Physician   
Group  
   
                                        Comment on above:   Performed By: #### U  
RMA, CMP, LIPID, CBCNO, PSAS, TEST ####  
[address]   
   
                                                    Creatinine [Mass/volume] in   
Serum or PlasmaOrdered By: Daniela Sanchez on   
2024   
   
                      Creatinine [Mass/Vol] 0.68 mg/dL Low        0.70-1.30  [facility]  
   
                                        Comment on above:   Performed By: #### U  
RMA, CMP, LIPID, CBCNO, PSAS, TEST ####  
[address]   
   
                                                    Crystals,Synovial Fluidon    
   
                      Crystals,Synovial Fluid None Seen  Normal     None Seen  T  
[facility]   
Physician   
Group  
   
                                        Comment on above:   Order Comment: Reaso  
n for Exam Type 2 diabetes mellitus   
   
                                                            Result Comment: PERF  
ORMED BY:  
[place] [address]  
[phone]  
PATHOLOGIST MEDICAL DIRECTOR  
VIKAS MARTINEZ M.D.   
   
                                                            Performed By: #### U  
RMA, CMP, LIPID, CBCNO, PSAS, TEST ####  
[address]   
   
                                                    Determination of appearance   
of body fluidOrdered By: Jeff Onofre on   
2024   
   
                      Appearance (Body fld) Cloudy     Abnormal   Clear      [facility]  
   
                                                    Erythrocyte Sedimentation Ra  
gina 2024   
   
                      ESR (Bld) [Velocity] 32 mm/h    High       0-19       The   
[facility]   
Physician   
Group  
   
                                        Comment on above:   Result Comment: PERF  
ORMED BY:  
[place] [address]  
[phone]  
PATHOLOGIST MEDICAL DIRECTOR  
VIKAS MARTINEZ M.D.   
   
                                                            Performed By: #### U  
RMA, CMP, LIPID, CBCNO, PSAS, TEST ####  
[address]   
   
                                                    Erythrocyte distribution wid  
th [Ratio] by Automated countOrdered By: Daniela Sanchez   
on 2024   
   
                                                    Erythrocyte   
distribution width   
(RBC) [Ratio]   13.3 %          Normal          12.0-14.8       [facility]  
   
                                        Comment on above:   Performed By: #### U  
RMA, CMP, LIPID, CBCNO, PSAS, TEST ####  
[facility]  
[address]   
   
                                                    Erythrocyte sedimentation ra  
te by Photometric methodOrdered By: Daniela Sanchez  
 on   
2024   
   
                                                    ESR Photometric method   
(Bld) [Velocity] 32 mm/hr        High            0-19            [facility]  
   
                                                    Erythrocytes [#/volume] in B  
lood by Automated countOrdered By: Daniela Sanchez   
on   
2024   
   
                      RBC (Bld) [#/Vol] 5.50 10*6/uL Normal     3.90-5.60  [facility]  
   
                                        Comment on above:   Performed By: #### U  
RMA, CMP, LIPID, CBCNO, PSAS, TEST ####  
[facility]  
[address]   
   
                                                    Evaluation of color of body   
fluidOrdered By: Jeff Onofre on 2024   
   
                      Color (Body fld) Yellow     Abnormal   [facility]  
   
                                                    Glucose [Mass/volume] in Ser  
um or PlasmaOrdered By: Daniela Sanchez on   
2024   
   
                      Glucose [Mass/Vol] 169 mg/dL  High            [facility]  
   
                                        Comment on above:   ADA recommended refe  
rence rangeRandom Glucose Reference Range   
is dependent on time and content of last meal. Glucose of more   
than 200 mg/dL in a nonstressed, ambulatory subject supports   
the diagnosis of Diabetes Mellitus.   
   
                                                            Result Comment: Rand  
om Glucose Reference Range is dependent on   
time and  
content of last meal. Glucose of more than 200 mg/dL in a  
nonstressed, ambulatory subject supports the diagnosis of  
Diabetes Mellitus.  
ADA recommended reference range   
   
                                                            Performed By: #### U  
RMA, CMP, LIPID, CBCNO, PSAS, TEST ####  
[facility]  
[address] [place] [address] [place]   
   
                                                    Glucose [Mass/volume] in Syn  
ovial fluidOrdered By: Jeff Onofre on 2024  
   
   
                                                    Glucose (Syn fld)   
[Mass/Vol]      80 mg/dL                                        [facility]  
   
                                        Comment on above:   No reference range e  
stablished   
   
                                                    Glucose, Synovial Fluidon    
   
                      Glucose, Synovial Fluid 80 mg/dL   Normal                T  
[facility]   
Physician   
Group  
   
                                        Comment on above:   Order Comment: Reaso  
n for Exam Type 2 diabetes mellitus   
   
                                                            Result Comment: No r  
eference range established  
PERFORMED BY:  
[place] [address]  
[phone]  
PATHOLOGIST MEDICAL DIRECTOR  
VIKAS MARTINEZ M.D.   
   
                                                            Performed By: #### U  
RMA, CMP, LIPID, CBCNO, PSAS, TEST ####  
[facility]  
[address] [place] [address]   
   
                                                    Gram Stainon 2024   
   
                                                    Microscopic observation   
Gram stain Nom (Unsp   
spec)                                   Comment R knee  
Gram Stain Result  
3+ White Blood Cells  
No Bacteria Seen  
PERFORMED BY:  
[place] [address]  
[phone]  
PATHOLOGIST MEDICAL   
DIRECTOR  
VIKAS MARTINEZ M.D.    Normal                                  [facility]   
Physician   
Group  
   
                                        Comment on above:   Performed By: #### U  
RMA, CMP, LIPID, CBCNO, PSAS, TEST ####  
[address]   
   
                                                    Microscopic observation   
Gram stain Nom (Unsp   
spec)                                   Gram Stain Result  
Rare White Blood   
Cells  
4+ Red Blood Cells  
No Bacteria Seen  
PERFORMED BY:  
[place] [address]  
[phone]  
PATHOLOGIST MEDICAL   
DIRECTOR  
VIKAS MARTINEZ M.D.    Normal                                  [facility]   
Physician   
Group  
   
                                        Comment on above:   Performed By: #### U  
RMA, CMP, LIPID, CBCNO, PSAS, TEST ####  
[facility]  
[address] [place] [address]   
   
                                                    Gram stain for investigation  
 of transfusion reactionOrdered By: Jeff Onofre   
on   
2024   
   
                                                    Microscopic observation   
Gram stain Nom (Unsp   
spec)           St. mitis/oralis grp Abnormal                        [facility]  
   
                                                    Gram stain for investigation  
 of transfusion reactionOrdered By: Josie Ann   
on   
2024   
   
                                                    Microscopic observation   
Gram stain Nom (Unsp   
spec)                                                           [facility]  
   
                                                    Microscopic observation   
Gram stain Nom (Unsp   
spec)                                   No Anaerobes Isolated   
3 Days                                                      [facility]  
   
                                                    Hematocrit [Volume Fraction]  
 of Blood by Automated countOrdered By: Daniela Sanchez   
on 2024   
   
                                                    Hematocrit (Bld)   
[Volume fraction] 47.8 %          Normal          38.8-50.0       [facility]  
   
                                        Comment on above:   Performed By: #### U  
RMA, CMP, LIPID, CBCNO, PSAS, TEST ####  
[facility]  
[address]   
   
                                                    Hemoglobin [Mass/volume] in   
BloodOrdered By: Daniela Sanchez on 2024   
   
                                                    Hemoglobin (Bld)   
[Mass/Vol]      16.3 g/dL       Normal          13.0-17.0       [facility]  
   
                                        Comment on above:   Performed By: #### U  
RMA, CMP, LIPID, CBCNO, PSAS, TEST ####  
[facility]  
[address]   
   
                                                    Lab Allan Total Protein, Flon  
 2024   
   
                                                    Lab Allan Total Protein,   
Fl              4.4 g/dL        Normal          .               [facility]   
Physician   
Group  
   
                                        Comment on above:   Order Comment: Reaso  
n for Exam Type 2 diabetes mellitus   
   
                                                            Result Comment: ____  
_____________________________________  
: BODY FLUID TYPE : TOTAL PROTEIN :  
:_________________:_______________________:  
: Amniotic Fluid : <0.4 :  
:_________________:_______________________:  
: : Nonmalignant: <3.0 :  
: Ascitic Fluid : Malignant: >3.0 :  
:_________________:_______________________:  
: Bile, Clear : <0.9 :  
:_________________:_______________________:  
: Bile, Yellow : 0.2 - 0.6 :  
:_________________:_______________________:  
: Lymph : 2.2 - 6.0 :  
:_________________:_______________________:  
: Human Milk : 1.9 - 2.0 :  
:_________________: ______________________:  
: Nasal Secretion : 0.1 - 3.5 :  
:_________________:_______________________:  
: Pancreatic : 0.0 - 0.1 :  
: Juice : (post stimulation) :  
:_________________:_______________________:  
: : Transudate: <0.3 :  
: Pleural Fluid : Exudate: >0.3 :  
:_________________:_______________________:  
: Saliva : 0.1 - 0.2 :  
: (Mixed Glands) : :  
:_________________:_______________________:  
: Synovial Fluid : <2.5 :  
:_________________:_______________________:  
: Tears : 0.8 - 0.9 :  
:_________________:_______________________:  
Slana WJustino. Reference Intervals  
for Adults and Children 2008. Ninth  
Edition (V9.1) [employer],  
[facility]; [place]: 2009.  
Performed at: [facility] [id number]  
: Alejandro Salomon PhD, Phone: [phone]  
PERFORMED BY:  
[place] [address]  
[phone]  
PATHOLOGIST MEDICAL DIRECTOR  
VIKAS MARTINEZ M.D.   
   
                                                            Performed By: #### U  
RMA, CMP, LIPID, CBCNO, PSAS, TEST ####  
[facility]  
[address] [place] [address] [place]   
   
                                                    Lactate [Moles/volume] in Se  
rum or PlasmaOrdered By: Daniela Bullimore on   
2024   
   
                      Lactate [Moles/Vol] 1.3 mmol/L            0.5-2.2    [facility]  
   
                                        Comment on above:   Result Comment: PERF  
ORMED BY:  
[place] [address]  
[phone]  
PATHOLOGIST MEDICAL DIRECTOR  
VIKAS MARTINEZ M.D.   
   
                                                            Performed By: #### L  
ACTJULIA SULLIVANLD ####  
[facility]  
[address] [place] [address]   
   
                                                    Leukocytes [#/volume] correc  
bassam for nucleated erythrocytes in Blood by Automated  
   
counOrdered By: Daniela Bullimore on 2024   
   
                                                    WBC corrected for nucl   
RBC Auto (Bld) [#/Vol] 9.4 10*3/uL                     4.1-10.5        [facility]  
   
                                                    Leukocytes [#/volume] in Blo  
od by Automated countOrdered By: Daniela Bullimore on  
   
2024   
   
                      WBC (Bld) [#/Vol] 9.4 10*3/uL Normal     4.1-10.5   [facility]  
   
                                        Comment on above:   Performed By: #### U  
RMA, CMP, LIPID, CBCNO, PSAS, TEST ####  
[facility]  
[address] [place] [address] [place]   
   
                                                    Lymphocytes [#/volume] in Bl  
ood by Automated countOrdered By: Daniela Bullimore   
on   
2024   
   
                                                    Lymphocytes (Bld)   
[#/Vol]         0.6 10*3/uL     Low             1.00-4.8        [facility]  
   
                                        Comment on above:   Performed By: #### U  
RMA, CMP, LIPID, CBCNO, PSAS, TEST ####  
[facility]  
[address]   
   
                                                    Lymphocytes/100 leukocytes i  
n Blood by Automated countOrdered By: Daniela Sanchez on   
2024   
   
                                                    Lymphocytes/100 WBC   
(Bld)           6.0 %           Normal          .               [facility]  
   
                                        Comment on above:   Performed By: #### U  
RMA, CMP, LIPID, CBCNO, PSAS, TEST ####  
[facility]  
[address] [place] [address]   
   
                                                    MCH [Entitic mass] by Automa  
bassam countOrdered By: Daniela Sanchez on 2024   
   
                                                    MCH (RBC) [Entitic   
mass]           29.6 pg         Normal          27.5-35.2       [facility]  
   
                                        Comment on above:   Performed By: #### U  
RMA, CMP, LIPID, CBCNO, PSAS, TEST ####  
[address]   
   
                                                    MCHC Auto (RBC) [Mass/Vol]Or  
dered By: Daniela Sanchez on 2024   
   
                      MCHC (RBC) [Mass/Vol] 34.1 g/dL             32.5-35.6  [facility]  
   
                                                    MCV [Entitic volume] by Auto  
mated countOrdered By: Daniela Sanchez on   
2024   
   
                      MCV (RBC) [Entitic vol] 86.9 fL    Normal     83.5-101   F  
[facility]  
   
                                        Comment on above:   Performed By: #### U  
RMA, CMP, LIPID, CBCNO, PSAS, TEST ####  
[facility]  
[address] [place] [address]   
   
                                                    Manual body fluid eosinophil  
s/100 leukocytesOrdered By: Jeff Onofre on   
2024   
   
                                                    Eosinophils/100 WBC   
Manual cnt (Body fld) 0 %                             0-2             [facility]  
   
                                                    Manual body fluid erythrocyt  
es count (number/volume)Ordered By: Jeff Onofre   
on   
2024   
   
                                                    RBC Manual cnt (Body   
fld) [#/Vol]    6370 /uL        High            0-0             [facility]  
   
                                                    Manual body fluid lymphocyte  
s/100 leukocytesOrdered By: Jeff Onofre on   
2024   
   
                                                    Lymphocytes/100 WBC   
Manual cnt (Body fld) 7 %                             0-74            [facility]  
   
                                                    Monocyte distribution width   
[Entitic volume] in Blood by AutomatedOrdered By:   
Daniela Sanchez on 2024   
   
                                                    Monocyte distribution   
width Auto (Bld)   
[Entitic vol]   22.07 %         High            0.00-20.00      [facility]  
   
                                        Comment on above:   For adults in ED, MD  
W > 20.0 may be associated with a higher   
risk of sepsis during the first 12 hrs of hospital admission   
   
                                                    Neutrophils [#/volume] in Bl  
ood by Automated countOrdered By: Daniela Sanchez   
on   
2024   
   
                                                    Neutrophils (Bld)   
[#/Vol]         8.4 10*3/uL     High            1.8-7.7         [facility]  
   
                                        Comment on above:   Performed By: #### U  
RMA, CMP, LIPID, CBCNO, PSAS, TEST ####  
[facility]  
[address] [place] [address]   
   
                                                    Neutrophils/100 WBC Manual c  
nt (Body fld)Ordered By: Jeff Onofre on   
2024   
   
                                                    Neutrophils/100 WBC   
(Body fld)      92 %            High            0-24            [facility]  
   
                                                    No Panel InformationOrdered   
By: Jeff Onofre on 2024   
   
                                                    Synovial Fluid   
Supernatant Color Yellow                                          [facility]  
   
                                        Comment on above:   The reference interv  
al and other method performance   
specifications have not been established for this body fluid.   
The test result must be integrated into the clinical context   
for interpretation.   
   
                                                    Synovial Fluid Tot   
Nucleated Cells 29286 /uL       High            0-150           [facility]  
   
                                        Comment on above:   The reference interv  
al and other method performance   
specifications have not been established for this body fluid.   
The test result must be integrated into the clinical context   
for interpretation.   
   
                                                    No Panel InformationOrdered   
By: Daniela Sanchez on 2024   
   
                                                    Bacterial ID (NA   
Multiplex Assay) St. mitis/oralis grp Abnormal                        [facility]  
   
                      Estimated GFR (CKD-EPI) > 60.0 mL/Min                       
  [facility]  
   
                                                    Pharmacy Creatinine   
Clearance (Chem 143.91                                          [facility]  
   
                                                    Nucleated erythrocytes [Pres  
ence] in Blood by Automated countOrdered By: Daniela Sanchez on 2024   
   
                                                    Nucleated RBC Auto Ql   
(Bld)           0.8 /100{WBC}   High            0-0.5           [facility]  
   
                                                    Platelet mean volume [Entiti  
c volume] in Blood by Automated countOrdered By:   
Daniela Sanchez on 2024   
   
                                                    Platelet mean volume   
(Bld) [Entitic vol] 8.0 fL          Normal          6.6-10.1        [facility]  
   
                                        Comment on above:   Performed By: #### U  
RMA, CMP, LIPID, CBCNO, PSAS, TEST ####  
[facility]  
[address]   
   
                                                    Platelets [#/volume] in Bloo  
d by Automated countOrdered By: Daniela Sanchez on   
2024   
   
                      Platelets (Bld) [#/Vol] 170 10*3/uL Normal     150-450      
[facility]  
   
                                        Comment on above:   Performed By: #### U  
RMA, CMP, LIPID, CBCNO, PSAS, TEST ####  
[facility]  
[address] [place] [address] [place]   
   
                                                    Potassium [Moles/volume] in   
Serum or PlasmaOrdered By: Daniela Sanchez on   
2024   
   
                      Potassium [Moles/Vol] 4.0 mmol/L Normal     3.5-5.1    [facility]  
   
                                        Comment on above:   Performed By: #### U  
RMA, CMP, LIPID, CBCNO, PSAS, TEST ####  
[facility]  
[address] [place] [address] [place]   
   
                                                    Protein [Mass/volume] in Bod  
y fluidOrdered By: Jeff Onofre on 2024   
   
                                                    Protein (Body fld)   
[Mass/Vol]      See comment                                     [facility]  
   
                                        Comment on above:   Testing sent to [facility].No reference range   
established   
   
                                                    Protein (Body fld)   
[Mass/Vol]      4.4 g/dL                        .               [facility]  
   
                                        Comment on above:   ____________________  
_____________________ : BODY FLUID TYPE :   
TOTAL PROTEIN : :_________________:_______________________: :   
Amniotic Fluid : <0.4 :   
:_________________:_______________________: : : Nonmalignant:   
<3.0 : : Ascitic Fluid : Malignant: >3.0 :   
:_________________:_______________________: : Bile, Clear :   
<0.9 : :_________________:_______________________: : Bile,   
Yellow : 0.2 - 0.6 :   
:_________________:_______________________: : Lymph : 2.2 -   
6.0 : :_________________:_______________________: : Human Milk   
: 1.9 - 2.0 : :_________________: ______________________: :   
Nasal Secretion : 0.1 - 3.5 :   
:_________________:_______________________: : Pancreatic : 0.0   
- 0.1 : : Juice : (post stimulation) :   
:_________________:_______________________: : : Transudate:   
<0.3 : : Pleural Fluid : Exudate: >0.3 :   
:_________________:_______________________: : Saliva : 0.1 -   
0.2 : : (Mixed Glands) : :   
:_________________:_______________________: : Synovial Fluid :   
<2.5 : :_________________:_______________________: : Tears :   
0.8 - 0.9 : :_________________:_______________________: Tianna   
WJustino V. Reference Intervals for Adults and Children   
2008. Ninth Edition (V9.1) [employer], [facility];   
[place]: 2009.Performed at: [address] [id number] Director: Alejandro Salomon PhD, Phone: [phone]   
   
                                                    Protein [Mass/volume] in Ser  
um or PlasmaOrdered By: Daniela Sanchez on   
2024   
   
                      Protein [Mass/Vol] 6.8 g/dL              6.4-8.9    [facility]  
   
                                        Comment on above:   Performed By: #### U  
RMA, CMP, LIPID, CBCNO, PSAS, TEST ####  
[facility]  
[address]   
   
                                                    Serum globulin measurement b  
y calculation (mass/volume)Ordered By: Daniela Sanchez   
on 2024   
   
                      Globulin (S) [Mass/Vol] 2.9 g/dL                         [facility]  
   
                                        Comment on above:   Performed By: #### U  
RMA, CMP, LIPID, CBCNO, PSAS, TEST ####  
[facility]  
[address] [place] [address]   
   
                                                    Serum or plasma albumin/glob  
ulin mass ratioOrdered By: Daniela Bullimore on   
2024   
   
                                                    Albumin/Globulin [Mass   
ratio]          1.3 {ratio}                                     [facility]  
   
                                        Comment on above:   Performed By: #### U  
RMA, CMP, LIPID, CBCNO, PSAS, TEST ####  
[address]   
   
                                                    Serum or plasma anion gap de  
terminationOrdered By: Daniela Bullimore on   
2024   
   
                      Anion gap [Moles/Vol] 13.9 mmol/L Normal     6.0-15.0   [facility]  
   
                                        Comment on above:   Performed By: #### U  
RMA, CMP, LIPID, CBCNO, PSAS, TEST ####  
[address]   
   
                                                    Sodium [Moles/volume] in Ser  
um or PlasmaOrdered By: Daniela Bullimore on   
2024   
   
                      Sodium [Moles/Vol] 135 mmol/L Low        136-145    [facility]  
   
                                        Comment on above:   Performed By: #### U  
RMA, CMP, LIPID, CBCNO, PSAS, TEST ####  
[facility]  
[address] [place] [address]   
   
                                                    Synovial fluid differential   
cell countOrdered By: Jeff Onofre on 2024   
   
                                                    Differential panel (Syn   
fld)            1 %                             0-69            [facility]  
   
                                                    Total Protein, Synovial Flui  
don 2024   
   
                                                    Total Protein, Synovial   
Fluid                           Normal                          [facility]   
Physician   
Group  
   
                                        Comment on above:   Order Comment: Reaso  
n for Exam Type 2 diabetes mellitus   
   
                                                            Result Comment: Test  
ing sent to Reference Laboratory.  
No reference range established  
PERFORMED BY:  
[place] [address]  
[phone]  
PATHOLOGIST MEDICAL DIRECTOR  
VIKAS MARTINEZ M.D.   
   
                                                            Performed By: #### U  
RMA, CMP, LIPID, CBCNO, PSAS, TEST ####  
[address]   
   
                                                    Urea nitrogen [Mass/volume]   
in Serum or PlasmaOrdered By: Daniela Bullimore on   
2024   
   
                                                    Urea nitrogen   
[Mass/Vol]      15 mg/dL        Normal          7-25            [facility]  
   
                                        Comment on above:   Performed By: #### U  
RMA, CMP, LIPID, CBCNO, PSAS, TEST ####  
[address]   
   
                                                    XR knee RT 4V*on 2024   
   
                                        XR knee RT 4V*      [facility] [place] [place] [address]  
  
XRay Report  
Signed  
  
Patient: Kaylynn Mims MR#: [id number]  
16  
: 1955   
Acct:[id number]  
  
Age/Sex: 68 / M ADM   
Date: 24  
  
Loc: ER Room: Type:   
[facility] ER  
Attending Dr:  
Copies to: MAYRA Martinez  
  
  
  
Ordering Provider:   
MAYRA Martinez  
Date of Service:   
24  
Accession #:   
([id number] XR/XR   
knee RT 4V*:   
Extremity Injury,   
Lower  
  
  
  
  
XR knee RT 4V*   
2024 11:19 AM  
  
SIGNS AND SYMPTOMS:   
Right knee pain and   
swelling  
  
PROTOCOL: Frontal,   
lateral, and oblique   
radiographs of the   
right knee  
  
COMPARISON: 2024  
  
FINDINGS:  
  
There is narrowing of   
the weightbearing   
joint spaces and   
temporal joint space   
which I lateral  
spurring. Fracture.   
There is a large   
joint effusion. No   
soft tissue swelling.  
  
  
ORDER #: [id number]   
XR/XR knee RT 4V*  
IMPRESSION:  
  
No fracture.  
  
Tricompartmental   
degenerative changes   
are redemonstrated   
with a large joint   
effusion. This is  
worsened compared to   
the prior exam.  
  
Impression dictated   
by: Jesus Dukes M.D.2024 12:03   
PM  
  
  
Dictation Location:   
[place] [address]  
  
  
  
Transcribed By: [facility]   
24 1203  
Dictated By: Jesus Dukes II, MD   
24 1149  
  
Signed By:  
24 1203       Normal                                  [facility]   
Physician   
Group  
   
                                                    XR knee RT 4V*on 2024   
   
                                        XR knee RT 4V*      [facility] [place] [id number]  
  
XRay Report  
Signed  
  
Patient: Kaylynn Mims MR#: [id number]  
16  
: 1955   
Acct:[id number]  
  
Age/Sex: 68 / M ADM   
Date: 24  
  
Loc: [facility] Room:   
Type: REG CLI  
Attending Dr: To Khan APRN  
Copies to: SANJANA Samuel  
  
  
  
Ordering Provider:   
SANJANA Samuel  
Date of Service:   
24  
Accession #:   
([id number] XR/XR   
knee RT 4V*: M25.561   
- Pain in right knee  
  
  
  
  
RIGHT KNEE - 4 views  
  
CLINICAL HISTORY:   
Jaundice right knee   
pain with increasing   
pain over 2 years.  
  
COMPARISON: None  
  
FINDINGS:  
  
Moderate degenerative   
changes with medial   
weightbearing joint   
space narrowing. No   
acute bony  
process. Moderate   
joint effusion.  
  
  
ORDER #: [id number]   
XR/XR knee RT 4V*  
IMPRESSION:  
  
MODERATE DEGENERATIVE   
CHANGES OF THE RIGHT   
KNEE WITHOUT ACUTE   
BONY PROCESS.  
  
Impression dictated   
by: Claudy Smith Jr., D.[email or web address]   
2:42 PM  
  
  
Dictation Location:   
[place] [address]  
  
  
  
Transcribed By: [facility]   
24 1442  
Dictated By: Claudy Smith Jr, DO   
24 1441  
  
Signed By:  
24 1442       Normal                                  [facility]   
Physician   
Group  
   
                                                    XR knee LT 4V*on 05-   
   
                                        XR knee LT 4V*      [facility] [place] [address]  
  
XRay Report  
Signed  
  
Patient: Kaylynn Mims MR#: [id number]  
16  
: 1955   
Acct:[id number]  
  
Age/Sex: 68 / M ADM   
Date: 05/15/24  
  
Loc: [facility] Room:   
Type: REG CLI  
Attending Dr: To Khan APRN  
Copies to: SANJANA Samuel  
  
  
  
Ordering Provider:   
SANJANA Samuel  
Date of Service:   
05/15/24  
Accession #:   
([id number] XR/XR   
knee LT 4V*: M25.562   
- Pain in left knee  
  
  
  
  
XR knee LT 4V*   
5/15/2024 10:14 AM  
  
SIGNS AND SYMPTOMS:   
Medial and posterior   
left knee pain  
  
PROTOCOL: Frontal,   
lateral, and oblique   
radiographs of the   
left knee  
  
COMPARISON: None  
  
FINDINGS:  
  
There is   
tricompartmental   
joint space loss,   
greatest in the   
medial weightbearing   
compartment. There  
is spurring of the   
tibial spines, tibial   
plateaus, and femoral   
condyles. No joint   
effusion. No soft  
tissue swelling.   
There is a 15 mm   
suspected ossific   
loose body in the   
anterior aspect of   
the joint  
space.  
  
  
ORDER #: [id number]   
XR/XR knee LT 4V*  
IMPRESSION:  
  
Tricompartmental   
degenerative changes   
are noted in the left   
knee greatest in the   
medial  
weightbearing   
compartment.  
  
No fracture.  
  
There is a 15 mm   
suspected ossific   
loose body in the   
anterior aspect of   
the joint space.  
  
Impression dictated   
by: Jesus Dukes M.D.5/15/2024 3:02 PM  
  
  
Dictation Location:   
[place] [address]  
  
  
  
Transcribed By: [facility]   
05/15/24 1502  
Dictated By: Jesus Dukes II, MD   
05/15/24 1500  
  
Signed By:  
05/15/24 1502       Normal                                  [facility]   
Physician   
Group  
   
                                                    A1C with Estimated Average G  
polodavid 2024   
   
                      Glucose [Mass/Vol] 137 mg/dL  Normal                [facility]   
Physician   
Group  
   
                                        Comment on above:   Result Comment: PERF  
ORMED BY:  
[place] [address]  
[phone]  
PATHOLOGIST MEDICAL DIRECTOR  
VIKAS MARTINEZ M.D.   
   
                                                            Performed By: #### A  
1C WTH eA ####  
[facility]  
[address] [place] [address]   
   
                                                    Alanine aminotransferase [En  
zymatic activity/volume] in Serum or PlasmaOrdered   
By:   
Elvira Harper on 2024   
   
                                                    ALT [Catalytic   
activity/Vol]   31 U/L          Normal          7-52            [facility]  
   
                                        Comment on above:   Order Comment: Reaso  
n for Exam Type 2 diabetes mellitus   
   
                                                            Performed By: #### U  
RMA, CMP, LIPID, CBCNO, PSAS, TEST ####  
[facility]  
[address] [place] [address] [place]   
   
                                                    Albumin [Mass/volume] in Ser  
um or Plasma by Bromocresol green (BCG) dye binding   
methoOrdered By: Elvira Harper on 2024   
   
                                                    Albumin BCG dye   
[Mass/Vol]      4.4 g/dL                        3.5-5.7         [facility]  
   
                                                    Alkaline phosphatase [Enzyma  
tic activity/volume] in Serum or PlasmaOrdered By:   
Elvira Harper on 2024   
   
                                                    ALP [Catalytic   
activity/Vol]   68 U/L          Normal                    [facility]  
   
                                        Comment on above:   Order Comment: Reaso  
n for Exam Type 2 diabetes mellitus   
   
                                                            Performed By: #### U  
RMA, CMP, LIPID, CBCNO, PSAS, TEST ####  
[facility]  
[address]   
   
                                                    Aspartate aminotransferase [  
Enzymatic activity/volume] in Serum or PlasmaOrdered  
By:   
Elvira Harper on 2024   
   
                                                    AST [Catalytic   
activity/Vol]   22 U/L          Normal          13-39           [facility]  
   
                                        Comment on above:   Order Comment: Reaso  
n for Exam Type 2 diabetes mellitus   
   
                                                            Performed By: #### U  
RMA, CMP, LIPID, CBCNO, PSAS, TEST ####  
[facility]  
[address]   
   
                                                    Bilirubin.total [Mass/volume  
] in Serum or PlasmaOrdered By: Elvira Harper on   
2024   
   
                      Bilirubin [Mass/Vol] 1.4 mg/dL  High       0.3-1.0    [facility]  
   
                                        Comment on above:   Samples from patient  
s who have taken Naproxen have shown   
spurious elevation in Total Bilirubin levels. A metabolite of   
Naproxen, O-desmethylnaproxen, has been shown to interfere   
with the Jendrassik-Grof method for measuring Total Bilirubin.   
   
                                                            Order Comment: Reaso  
n for Exam Type 2 diabetes mellitus   
   
                                                            Result Comment: Samp  
les from patients who have taken Naproxen   
have shown  
spurious elevation in Total Bilirubin levels. A metabolite  
of Naproxen, O-desmethylnaproxen, has been shown to  
interfere with the Jendrassik-Grof method for measuring  
Total Bilirubin.   
   
                                                            Performed By: #### U  
RMA, CMP, LIPID, CBCNO, PSAS, TEST ####  
[facility]  
[address]   
   
                                                    Calcium [Mass/volume] in Ser  
um or PlasmaOrdered By: Elvira Harper on 2024   
   
                      Calcium [Mass/Vol] 8.8 mg/dL  Normal     8.6-10.3   [facility]  
   
                                        Comment on above:   Order Comment: Reaso  
n for Exam Type 2 diabetes mellitus   
   
                                                            Performed By: #### U  
RMA, CMP, LIPID, CBCNO, PSAS, TEST ####  
[facility]  
[address] [place] [id number] [place]   
   
                                                    Carbon dioxide, total [Moles  
/volume] in Serum or PlasmaOrdered By: Elvira Harper   
on   
2024   
   
                      CO2 [Moles/Vol] 29.3 mmol/L Normal     21.0-31.0  [facility]  
   
                                        Comment on above:   Order Comment: Reaso  
n for Exam Type 2 diabetes mellitus   
   
                                                            Performed By: #### U  
RMA, CMP, LIPID, CBCNO, PSAS, TEST ####  
[facility]  
[address] [place] [id number] [place]   
   
                                                    Chloride [Moles/volume] in S  
lorraine or PlasmaOrdered By: Elvira Harper on 2024  
   
   
                      Chloride [Moles/Vol] 104 mmol/L Normal          [facility]  
   
                                        Comment on above:   Order Comment: Reaso  
n for Exam Type 2 diabetes mellitus   
   
                                                            Performed By: #### U  
RMA, CMP, LIPID, CBCNO, PSAS, TEST ####  
[facility]  
[address] [place] [id number] [place]   
   
                                                    Cholesterol [Mass/volume] in  
 Serum or PlasmaOrdered By: Elvira Harper on   
2024   
   
                      Cholesterol [Mass/Vol] 166 mg/dL  Normal     140-200    [facility]  
   
                                        Comment on above:   Chol less than 200 m  
g/dl low riskChol 201-239 mg/dl borderline  
   
riskChol 240 mg/dl and greater high risk   
   
                                                            Order Comment: Reaso  
n for Exam Type 2 diabetes mellitus   
   
                                                            Result Comment: Chol  
 less than 200 mg/dl low risk  
Chol 201-239 mg/dl borderline risk  
Chol 240 mg/dl and greater high risk   
   
                                                            Performed By: #### U  
RMA, CMP, LIPID, CBCNO, PSAS, TEST ####  
[facility]  
[address] [place] [id number] [place]   
   
                                                    Cholesterol in LDL Calc [Mas  
s/Vol]Ordered By: Elvira Harper on 2024   
   
                                                    Cholesterol in LDL   
[Mass/Vol]      100 mg/dL                       0-100           [facility]  
   
                                        Comment on above:   LDL ATP III CLASSIFI  
CATIONLDL less than 100 mg/dL OptimalLDL   
100-129 mg/dL Near or above optimalLDL 130-159 mg/dL   
Borderline highLDL 160-189 mg/dL HighLDL greater than 189   
mg/dL Very high   
   
                                                    Cholesterol in VLDL Calc [Ma  
ss/Vol]Ordered By: Elvira Harper on 2024   
   
                                                    Cholesterol in VLDL   
[Mass/Vol]      23 mg/dL                                        [facility]  
   
                                                    Comprehensive Metabolic Pane  
guerline 2024   
   
                      Albumin [Mass/Vol] 4.4 g/dL   Normal     3.5-5.7    [facility]   
Physician   
Group  
   
                                        Comment on above:   Order Comment: Reaso  
n for Exam Type 2 diabetes mellitus   
   
                                                            Performed By: #### U  
RMA, CMP, LIPID, CBCNO, PSAS, TEST ####  
[facility]  
[address]   
   
                                                    GFR/1.73 sq M.predicted   
MDRD (S/P/Bld) [Vol   
rate/Area]      mL/min/{1.73_m2} Normal                          [facility]   
Physician   
Group  
   
                                        Comment on above:   Order Comment: Reaso  
n for Exam Type 2 diabetes mellitus   
   
                                                            Performed By: #### U  
RMA, CMP, LIPID, CBCNO, PSAS, TEST ####  
[facility]  
[address] [place] [address]   
   
                                                    Creatinine [Mass/volume] in   
Serum or PlasmaOrdered By: Elvira Harper on   
2024   
   
                      Creatinine [Mass/Vol] 0.79 mg/dL Normal     0.70-1.30  [facility]  
   
                                        Comment on above:   Order Comment: Reaso  
n for Exam Type 2 diabetes mellitus   
   
                                                            Performed By: #### U  
RMA, CMP, LIPID, CBCNO, PSAS, TEST ####  
[facility]  
[address] [place] [address]   
   
                                                    Erythrocyte distribution wid  
th [Ratio] by Automated countOrdered By: Elvira Harper on   
2024   
   
                                                    Erythrocyte   
distribution width   
(RBC) [Ratio]   13.5 %          Normal          12.0-14.8       [facility]  
   
                                        Comment on above:   Order Comment: Reaso  
n for Exam Type 2 diabetes mellitus   
   
                                                            Performed By: #### U  
RMA, CMP, LIPID, CBCNO, PSAS, TEST ####  
[facility]  
[address] [place] [address] [place]   
   
                                                    Erythrocytes [#/volume] in B  
lood by Automated countOrdered By: Elvira Harper on   
2024   
   
                      RBC (Bld) [#/Vol] 5.82 10*6/uL High       3.90-5.60  [facility]  
   
                                        Comment on above:   Order Comment: Reaso  
n for Exam Type 2 diabetes mellitus   
   
                                                            Performed By: #### U  
RMA, CMP, LIPID, CBCNO, PSAS, TEST ####  
[facility]  
[address] [id number] [place]   
   
                                                    Glucose [Mass/volume] in Ser  
um or PlasmaOrdered By: Elvira Harper on 2024   
   
                      Glucose [Mass/Vol] 128 mg/dL  High            [facility]  
   
                                        Comment on above:   ADA recommended refe  
rence rangeRandom Glucose Reference Range   
is dependent on time and content of last meal. Glucose of more   
than 200 mg/dL in a nonstressed, ambulatory subject supports   
the diagnosis of Diabetes Mellitus.   
   
                                                            Order Comment: Reaso  
n for Exam Type 2 diabetes mellitus   
   
                                                            Result Comment: Rand  
om Glucose Reference Range is dependent on   
time and  
content of last meal. Glucose of more than 200 mg/dL in a  
nonstressed, ambulatory subject supports the diagnosis of  
Diabetes Mellitus.  
ADA recommended reference range   
   
                                                            Performed By: #### U  
RMA, CMP, LIPID, CBCNO, PSAS, TEST ####  
[facility]  
[address] [place] [address] [place]   
   
                                                    Glucose mean value [Mass/vol  
ume] in Blood Estimated from glycated   
hemoglobinOrdered   
By: Elvira Harper on 2024   
   
                                                    Average glucose   
Estimated from glycated   
hemoglobin (Bld)   
[Mass/Vol]      137 mg/dL                                       [facility]  
   
                                                    Hematocrit [Volume Fraction]  
 of Blood by Automated countOrdered By: Elvira Harper  
on   
2024   
   
                                                    Hematocrit (Bld)   
[Volume fraction] 50.0 %          Normal          38.8-50.0       [facility]  
   
                                        Comment on above:   Order Comment: Reaso  
n for Exam Type 2 diabetes mellitus   
   
                                                            Performed By: #### U  
RMA, CMP, LIPID, CBCNO, PSAS, TEST ####  
[facility]  
[address] [id number] [place]   
   
                                                    Hemoglobin A1c percentageOrd  
ered By: Elvira Harper on 2024   
   
                                                    HbA1c (Bld) [Mass   
fraction]       6.4 %           High            4.3-5.6         [facility]  
   
                                        Comment on above:   Increased risk for d  
iabetes: 5.7 - 6.4diabetes: >6.4glycemic   
control for adults with diabetes: <7.0   
   
                                                            Result Comment: Incr  
eased risk for diabetes: 5.7 - 6.4  
diabetes: >6.4  
glycemic control for adults with diabetes: <7.0   
   
                                                            Performed By: #### A  
1C [facility] eA ####  
[address]   
   
                                                    Hemoglobin [Mass/volume] in   
BloodOrdered By: Elvira Harper on 2024   
   
                                                    Hemoglobin (Bld)   
[Mass/Vol]      16.6 g/dL       Normal          13.0-17.0       [facility]  
   
                                        Comment on above:   Order Comment: Reaso  
n for Exam Type 2 diabetes mellitus   
   
                                                            Performed By: #### U  
RMA, CMP, LIPID, CBCNO, PSAS, TEST ####  
[place] [id number] [facility]   
   
                                                    Hemogram CBC Without Diffon   
2024   
   
                                                    Mean Corpuscular HGB   
Conc            33.1 g/dL       Normal          32.5-35.6       [facility]   
Physician   
Group  
   
                                        Comment on above:   Order Comment: Reaso  
n for Exam Type 2 diabetes mellitus   
   
                                                            Performed By: #### U  
RMA, CMP, LIPID, CBCNO, PSAS, TEST ####  
[facility]  
[address] [place], [place] [id number] [facility]   
   
                      WBC (Bld) [#/Vol] 5.3 10*3/uL Normal     4.1-10.5   [facility]   
Physician   
Group  
   
                                        Comment on above:   Order Comment: Reaso  
n for Exam Type 2 diabetes mellitus   
   
                                                            Performed By: #### U  
RMA, CMP, LIPID, CBCNO, PSAS, TEST ####  
[place] [id number] [place]   
   
                                                    Leukocytes [#/volume] correc  
bassam for nucleated erythrocytes in Blood by Automated  
   
counOrdered By: Elvira Harper on 2024   
   
                                                    WBC corrected for nucl   
RBC Auto (Bld) [#/Vol] 5.3 10*3/uL                     4.1-10.5        [facility]  
   
                                                    Lipid Panelon 2024   
   
                                                    LDL   
Cholesterol,Calculated 100 mg/dL       Normal          0-100           [facility]   
Physician   
Group  
   
                                        Comment on above:   Order Comment: Reaso  
n for Exam Type 2 diabetes mellitus   
   
                                                            Result Comment: LDL   
ATP III CLASSIFICATION  
LDL less than 100 mg/dL Optimal  
-129 mg/dL Near or above optimal  
-159 mg/dL Borderline high  
-189 mg/dL High  
LDL greater than 189 mg/dL Very high   
   
                                                            Performed By: #### U  
RMA, CMP, LIPID, CBCNO, PSAS, TEST ####  
[facility]  
[address]   
   
                      Triglyceride w/Reflex 117 mg/dL  Normal     0-149      The  
 [facility]   
Physician   
Group  
   
                                        Comment on above:   Order Comment: Reaso  
n for Exam Type 2 diabetes mellitus   
   
                                                            Result Comment: TRIG  
 ATP III CLASSIFICATION  
TRIG less than 150 mg/dL Normal  
TRIG 150-199 mg/dL Borderline high  
TRIG 200-500 mg/dL High  
TRIG greater than 500 mg/dL Very high  
Standard traceable to the Center for Disease Conrtrol and  
Prevention (CDC) test method.   
   
                                                            Performed By: #### U  
RMA, CMP, LIPID, CBCNO, PSAS, TEST ####  
[facility]  
[address]   
   
                      VLDL CHOLESTEROL 23 mg/dL   Normal                [facility]   
Physician   
Group  
   
                                        Comment on above:   Order Comment: Reaso  
n for Exam Type 2 diabetes mellitus   
   
                                                            Performed By: #### U  
RMA, CMP, LIPID, CBCNO, PSAS, TEST ####  
[facility]  
[address]   
   
                                                    MCH [Entitic mass] by Automa  
bassam countOrdered By: Elvira Harper on 2024   
   
                                                    MCH (RBC) [Entitic   
mass]           28.5 pg         Normal          27.5-35.2       [facility]  
   
                                        Comment on above:   Order Comment: Reaso  
n for Exam Type 2 diabetes mellitus   
   
                                                            Performed By: #### U  
RMA, CMP, LIPID, CBCNO, PSAS, TEST ####  
[facility]  
[address]   
   
                                                    MCHC Auto (RBC) [Mass/Vol]Or  
dered By: Elvira Harper on 2024   
   
                      MCHC (RBC) [Mass/Vol] 33.1 g/dL             32.5-35.6  [facility]  
   
                                                    MCV [Entitic volume] by Auto  
mated countOrdered By: Elvira Harper on 2024   
   
                      MCV (RBC) [Entitic vol] 85.9 fL    Normal     83.5-101   F  
[facility]  
   
                                        Comment on above:   Order Comment: Reaso  
n for Exam Type 2 diabetes mellitus   
   
                                                            Performed By: #### U  
RMA, CMP, LIPID, CBCNO, PSAS, TEST ####  
[facility]  
[address]   
   
                                                    Microalbumin [Mass/volume] i  
n UrineOrdered By: Elvira Harper on 2024   
   
                                                    Albumin DL <= 20 mg/L   
(U) [Mass/Vol]  1.1 mg/dL       Normal          0.0-1.8         [facility]  
   
                                        Comment on above:   Order Comment: Reaso  
n for Exam Type 2 diabetes mellitus   
   
                                                            Result Comment: PERF  
ORMED BY:  
[place] [address]  
[phone]  
PATHOLOGIST MEDICAL DIRECTOR  
VIKAS MARTINEZ M.D.   
   
                                                            Performed By: #### U  
RMA, CMP, LIPID, CBCNO, PSAS, TEST ####  
[facility]  
[address] [place] [address]   
   
                                                    No Panel InformationOrdered   
By: Elvira Harper on 2024   
   
                      Estimated GFR (CKD-EPI) > 60.0 mL/Min                       
  [facility]  
   
                                                    Pharmacy Creatinine   
Clearance (Chem N/A                                             [facility]  
   
                                                    PSA Screen (Yearly Only)on 0  
2024   
   
                                                    PSA Screen (Yearly   
Only)           0.970 ng/mL     Normal          0.000-4.000     [facility]   
Physician   
Group  
   
                                        Comment on above:   Order Comment: Reaso  
n for Exam Prostate cancer screening  
Is patient <50 yrs? Medicare does not pay <50.: N  
Is Medicare the insurance?: Y   
   
                                                            Result Comment: Seri  
al tumor marker results determined by   
assays using  
different manufacturers or methods may not be comparable.  
[facility]  and method:  
[employer] DXI, CHEMILUMINESCENT IMMUNOASSAY.  
PERFORMED BY:  
[place] [address]  
[phone]  
PATHOLOGIST MEDICAL DIRECTOR  
VIKAS MARTINEZ M.D.   
   
                                                            Performed By: #### U  
RMA, CMP, LIPID, CBCNO, PSAS, TEST ####  
[place] [id number] [facility]   
   
                                                    Platelet mean volume [Entiti  
c volume] in Blood by Automated countOrdered By:   
Elvira Harper on 2024   
   
                                                    Platelet mean volume   
(Bld) [Entitic vol] 7.9 fL          Normal          6.6-10.1        [facility]  
   
                                        Comment on above:   Order Comment: Reaso  
n for Exam Type 2 diabetes mellitus   
   
                                                            Result Comment: PERF  
ORMED BY:  
[place] [address]  
[phone]  
PATHOLOGIST MEDICAL DIRECTOR  
VIKAS MARTINEZ M.D.   
   
                                                            Performed By: #### U  
RMA, CMP, LIPID, CBCNO, PSAS, TEST ####  
[facility]  
[address] [place] [address]   
   
                                                    Platelets [#/volume] in Bloo  
d by Automated countOrdered By: Elvira Harper on   
2024   
   
                      Platelets (Bld) [#/Vol] 170 10*3/uL Normal     150-450      
[facility]  
   
                                        Comment on above:   Order Comment: Reaso  
n for Exam Type 2 diabetes mellitus   
   
                                                            Performed By: #### U  
RMA, CMP, LIPID, CBCNO, PSAS, TEST ####  
[facility]  
[address] [place] [address]   
   
                                                    Potassium [Moles/volume] in   
Serum or PlasmaOrdered By: Elvira Harper on   
2024   
   
                      Potassium [Moles/Vol] 4.6 mmol/L Normal     3.5-5.1    [facility]  
   
                                        Comment on above:   Order Comment: Reaso  
n for Exam Type 2 diabetes mellitus   
   
                                                            Performed By: #### U  
RMA, CMP, LIPID, CBCNO, PSAS, TEST ####  
[facility]  
[address] [place] [address]   
   
                                                    Prostate specific Ag [Mass/v  
olume] in Serum or PlasmaOrdered By: Elvira Harper on  
   
2024   
   
                                                    Prostate specific Ag   
[Mass/Vol]      0.970 ng/mL                     0.000-4.000     [facility]  
   
                                        Comment on above:   Serial tumor marker   
results determined by assays using   
different manufacturers or methods may not be   
comparable.[facility]  and   
method:[employer] DXI, CHEMILUMINESCENT IMMUNOASSAY.   
   
                                                    Protein [Mass/volume] in Ser  
um or PlasmaOrdered By: Elvira Harper on 2024   
   
                      Protein [Mass/Vol] 6.7 g/dL   Normal     6.4-8.9    [facility]  
   
                                        Comment on above:   Order Comment: Reaso  
n for Exam Type 2 diabetes mellitus   
   
                                                            Performed By: #### U  
RMA, CMP, LIPID, CBCNO, PSAS, TEST ####  
[facility]  
[address]   
   
                                                    Serum globulin measurement b  
y calculation (mass/volume)Ordered By: Elvira Harper   
on   
2024   
   
                      Globulin (S) [Mass/Vol] 2.3 g/dL   Normal                [facility]  
   
                                        Comment on above:   Order Comment: Reaso  
n for Exam Type 2 diabetes mellitus   
   
                                                            Performed By: #### U  
RMA, CMP, LIPID, CBCNO, PSAS, TEST ####  
[facility]  
[address]   
   
                                                    Serum or plasma albumin/glob  
ulin mass ratioOrdered By: Elvira Harper on   
2024   
   
                                                    Albumin/Globulin [Mass   
ratio]          1.9 {ratio}     Normal                          [facility]  
   
                                        Comment on above:   Order Comment: Reaso  
n for Exam Type 2 diabetes mellitus   
   
                                                            Performed By: #### U  
RMA, CMP, LIPID, CBCNO, PSAS, TEST ####  
[facility]  
[address] [place] [address]   
   
                                                    Serum or plasma anion gap de  
terminationOrdered By: Elvira Harper on 2024   
   
                      Anion gap [Moles/Vol] 8.3 mmol/L Normal     6.0-15.0   [facility]  
   
                                        Comment on above:   Order Comment: Reaso  
n for Exam Type 2 diabetes mellitus   
   
                                                            Performed By: #### U  
RMA, CMP, LIPID, CBCNO, PSAS, TEST ####  
[facility]  
[address]   
   
                                                    Serum or plasma high density  
 lipoprotein (HDL) cholesterol measurementOrdered   
By:   
Elvira Harper on 2024   
   
                                                    Cholesterol in HDL   
[Mass/Vol]      43 mg/dL        Normal          23-92           [facility]  
   
                                        Comment on above:   HDL CHOL ATP-III CLA  
SSIFICATION Cardiovascular RiskHDL > or   
equal to 60 mg/dL LOWHDL < 40 mg/dL HIGH   
   
                                                            Order Comment: Reaso  
n for Exam Type 2 diabetes mellitus   
   
                                                            Result Comment: HDL   
CHOL ATP-III CLASSIFICATION  
Cardiovascular  
Risk  
HDL > or equal to 60 mg/dL LOW  
HDL < 40 mg/dL HIGH   
   
                                                            Performed By: #### U  
RMA, CMP, LIPID, CBCNO, PSAS, TEST ####  
[facility]  
[address]   
   
                                                    Serum or plasma total choles  
terol/high density lipoprotein (HDL) cholesterol   
mass   
ratOrdered By: Elvira Harper on 2024   
   
                                                    Cholesterol.total/Fernanda  
sterol in HDL [Mass   
ratio]          3.9 {ratio}     Normal          <5.0            [facility]  
   
                                        Comment on above:   Order Comment: Reaso  
n for Exam Type 2 diabetes mellitus   
   
                                                            Result Comment: PERF  
ORMED BY:  
[place] [address]  
[phone]  
PATHOLOGIST MEDICAL DIRECTOR  
VIKAS MARTINEZ M.D.   
   
                                                            Performed By: #### U  
RMA, CMP, LIPID, CBCNO, PSAS, TEST ####  
[facility]  
[address] [place] [address] [place]   
   
                                                    Sodium [Moles/volume] in Ser  
um or PlasmaOrdered By: Elvira Harper on 2024   
   
                      Sodium [Moles/Vol] 137 mmol/L Normal     136-145    [facility]  
   
                                        Comment on above:   Order Comment: Reaso  
n for Exam Type 2 diabetes mellitus   
   
                                                            Performed By: #### U  
RMA, CMP, LIPID, CBCNO, PSAS, TEST ####  
[facility]  
[address] [place] [address]   
   
                                                    Testosteroneon 2024   
   
                      Testosterone 4.30 ng/mL Normal     1.75-7.81  [facility]   
Physician   
Group  
   
                                        Comment on above:   Order Comment: Reaso  
n for Exam Low testosterone   
   
                                                            Result Comment: PERF  
ORMED BY:  
[place] [address]  
[phone]  
PATHOLOGIST MEDICAL DIRECTOR  
VIKAS MARTINEZ M.D.   
   
                                                            Performed By: #### U  
RMA, CMP, LIPID, CBCNO, PSAS, TEST ####  
[facility]  
[address] [place] [address] [place]   
   
                                                    Testosterone [Mass/volume] i  
n Serum or PlasmaOrdered By: Elvira Harper on   
2024   
   
                      Testosterone [Mass/Vol] 4.30 ng/mL            1.75-7.81  F  
[facility]  
   
                                                    Triglyceride [Mass/volume] i  
n Serum or PlasmaOrdered By: Elvira Harper on   
2024   
   
                      Triglyceride [Mass/Vol] 117 mg/dL             0-149      F  
[facility]  
   
                                        Comment on above:   TRIG ATP III CLASSIF  
ICATIONTRIG less than 150 mg/dL NormalTRIG  
   
150-199 mg/dL Borderline highTRIG 200-500 mg/dL High TRIG   
greater than 500 mg/dL Very highStandard traceable to the   
Center for Disease Conrtrol and Prevention (CDC) test method.   
   
                                                    Urea nitrogen [Mass/volume]   
in Serum or PlasmaOrdered By: Elvira Harper on   
2024   
   
                                                    Urea nitrogen   
[Mass/Vol]      14 mg/dL        Normal          7-25            [facility]  
   
                                        Comment on above:   Order Comment: Reaso  
n for Exam Type 2 diabetes mellitus   
   
                                                            Performed By: #### U  
RMA, CMP, LIPID, CBCNO, PSAS, TEST ####  
[facility]  
[address]   
   
                                                    XR shoulder RT min 2V*on    
   
                                        XR shoulder RT min 2V* [facility]  
[address] [place] [address]  
  
XRay Report  
Signed  
  
Patient: Kaylynn Mims MR#: [id number]  
16  
: 1955   
Acct:[id number]  
  
Age/Sex: 68 / M ADM   
Date: 24  
  
[place] [facility] [place] Type:   
[facility]  
Attending Dr: Gucci Maxwell MD  
Copies to: Gucci Maxwell MD  
  
  
  
Ordering Provider:   
Gucci Maxwell MD  
Date of Service:   
24  
Accession #:   
([id number]) XR/XR   
shoulder RT min 2V*:   
M25.511 - Pain in   
right shoulder  
  
  
  
  
4 views RIGHT   
shoulder plain film  
  
HISTORY: RIGHT   
shoulder pain for 2   
months.  
  
COMPARISON: None  
  
ACUTE FINDINGS: None  
  
DEGENERATIVE CHANGE:   
Extensive   
acromioclavicular   
degeneration with   
inferior marginal   
spurring.  
  
SOFT TISSUE FINDINGS:   
Unremarkable  
  
JOINT EFFUSION: None  
  
POSTOP CHANGES: None  
  
BONY MINERALIZATION:   
Adequate  
  
  
ORDER #: [id number]   
XR/XR shoulder RT min   
2V*  
IMPRESSION: Extensive   
acromioclavicular   
degenerative change.  
  
Impression dictated   
by: Neo Perales M.D.3/7/2024 2:07 PM  
  
  
Dictation Location:   
[place] [address]  
  
  
  
Transcribed By: [facility]   
24 1407  
Dictated By:   
Neo Perales DO   
24 1406  
  
Signed By:  
24 1407       Normal                                  [facility]   
Physician   
Group  
   
                                                    A1C HEMOGLOBINon 2023   
   
                                                    HbA1c (Bld) [Mass   
fraction]       5.8 %                                           [facility]   
[facility]   
[employer]  
Other Phone:   
[phone]  
   
                                                    HbA1c (Bld) [Mass fraction]o  
n 2023   
   
                      A1C HEMOGLOBIN                                  [facility] [place]  
t   
[employer]   
[employer]poration  
Other Phone:   
[phone]  
   
                                                    A1C HEMOGLOBINon 08-   
   
                                                    HbA1c (Bld) [Mass   
fraction]       5.7 %                                           [facility]   
[facility]   
[facility]  
Other Phone:   
[phone]  
   
                                                    HbA1c (Bld) [Mass fraction]o  
n 08-   
   
                      A1C HEMOGLOBIN                                  [facility] [place]  
t   
Professional   
[employer]poration  
Other Phone:   
[phone]  
   
                                                    URINE DRUG SCREEN (IN-HOUSE)  
on 08-   
   
                                                    URINE DRUG SCREEN   
(IN-HOUSE)                                                      [facility]   
[facility]   
[employer]  
Other Phone:   
[phone]  
   
                                                    CBC AUTO DIFFon 2023   
   
                      BASO #     0.0 103/ul Normal     0.0-0.1    [facility]  
   
                                        Comment on above:   Performed By: #### C  
BC ####  
[facility]  
[address] [place] [address]  
Dr. Dariana Hollingsworth   
   
                      Basophils/100 WBC (Bld) 0.5 %      Normal     0.2-2.0    [facility]  
   
                                        Comment on above:   Performed By: #### C  
BC ####  
[facility]  
[address] [place] [address]  
Dr. Dariana Hollingsworth   
   
                      EO #       0.1 103/ul Normal     0.0-0.7    [facility]  
   
                                        Comment on above:   Performed By: #### C  
BC ####  
[facility]  
[address] [place] [address]  
Dr. Dariana Hollingsworth   
   
                                                    Eosinophils/100 WBC   
(Bld)           1.8 %           Normal          0.9-7.0         [facility]  
   
                                        Comment on above:   Performed By: #### C  
BC ####  
[facility]  
[address] [place] [address]  
Dr. Dariana Hollingsworth   
   
                                                    Erythrocyte   
distribution width   
(RBC) [Ratio]   12.6 %          Normal          11.0-15.0       [facility]  
   
                                        Comment on above:   Performed By: #### C  
BC ####  
[facility]  
[address] [place] [address]  
Dr. Dariana Hollingsworth   
   
                                                    Hematocrit (Bld)   
[Volume fraction] 50.9 %          Normal          42.0-54.0       [facility]  
   
                                        Comment on above:   Performed By: #### C  
BC ####  
[facility]  
[address] [place] [address]  
Dr. Dariana Hollingsworth   
   
                                                    Hemoglobin (Bld)   
[Mass/Vol]      16.6 g/dL       Normal          14.0-18.0       [facility]  
   
                                        Comment on above:   Performed By: #### C  
BC ####  
[facility]  
[address] [place] [address]  
Dr. Dariana Hollingsworth   
   
                      IG #       0.03 10e3/ul Normal     0.00-0.03  [facility]  
   
                                        Comment on above:   Performed By: #### C  
BC ####  
[facility]  
[address] [place] [address]  
Dr. Dariana Hollingsworth   
   
                      IG %       0.5 %      Normal     0.0-0.5    [facility]  
   
                                        Comment on above:   Performed By: #### C  
BC ####  
[facility]  
[address] [place] [address]  
Dr. Dariana Hollingsworth   
   
                      LYMPH #    1.4 103/ul Normal     1.2-3.8    [facility]  
   
                                        Comment on above:   Performed By: #### C  
BC ####  
[facility]  
[address] [place] [address]  
Dr. Dariana Hollingsworth   
   
                                                    Lymphocytes/100 WBC   
(Bld)           22.8 %          Normal          20.5-60.0       [facility]  
   
                                        Comment on above:   Performed By: #### C  
BC ####  
[facility]  
[address] [place] [address]  
Dr. Dariana Hollingsworth   
   
                      MANUAL DIFF REQ NO         Normal                [facility]  
   
                                        Comment on above:   Performed By: #### C  
BC ####  
[facility]  
[address] [place] [address]  
Dr. Dariana Hollingsworth   
   
                                                    MCH (RBC) [Entitic   
mass]           28.2 pg         Normal          25.9-34.0       [facility]  
   
                                        Comment on above:   Performed By: #### C  
BC ####  
[facility]  
[address] [place] [address]  
Dr. Dariana Hollingsworth   
   
                      MCHC (RBC) [Mass/Vol] 32.6 g/dL  Normal     29.9-35.2  [facility]  
   
                                        Comment on above:   Performed By: #### C  
BC ####  
[facility]  
[address] [place] [address]  
Dr. Dariana Hollingsworth   
   
                      MCV (RBC) [Entitic vol] 86.4 fL    Normal     80.0-94.0  [facility]  
   
                                        Comment on above:   Performed By: #### C  
BC ####  
[facility]  
[address] [place] [address]  
Dr. Dariana Hollingsworth   
   
                      MONO #     0.5 103/ul Normal     0.3-0.8    [facility]  
   
                                        Comment on above:   Performed By: #### C  
BC ####  
[facility]  
[address] [place] [address]  
Dr. Dariana Hollingsworth   
   
                      Monocytes/100 WBC (Bld) 8.7 %      Normal     1.7-12.0   [facility]  
   
                                        Comment on above:   Performed By: #### C  
BC ####  
[facility]  
[address] [place] [address]  
Dr. Dariana Hollingsworth   
   
                      NEUT #     4.1 103/ul Normal     1.4-6.5    [facility]  
   
                                        Comment on above:   Performed By: #### C  
BC ####  
[facility]  
[address] [place] [address]  
Dr. Dariana Hollingsworth   
   
                                                    Neutrophils/100 WBC   
(Bld)           65.7 %          Normal          43.0-75.0       [facility]  
   
                                        Comment on above:   Performed By: #### C  
BC ####  
[facility]  
[address] [place] [address]  
Dr. Dariana Hollingsworth   
   
                                                    Platelet mean volume   
(Bld) [Entitic vol] 9.3 fL          Critically low  9.5-13.5        [facility]  
   
                                        Comment on above:   Performed By: #### C  
BC ####  
[facility]  
[address] [place] [address]  
Dr. Dariana Hollingsworth   
   
                      PLT        176 103/ul Normal     150-450    [facility]  
   
                                        Comment on above:   Performed By: #### C  
BC ####  
[facility]  
[address] [place] [address]  
Dr. Dariana Hollingsworth   
   
                      RBC        5.89 106/ul Normal     4.70-6.10  [facility]  
   
                                        Comment on above:   Performed By: #### C  
BC ####  
[facility]  
[address] [place] [address]  
Dr. Dariana Hollingsworth   
   
                      WBC        6.2 103/ul Normal     4.0-11.0   [facility]  
   
                                        Comment on above:   Performed By: #### C  
BC ####  
[facility]  
[address] [place] [address]  
Dr. Dariana Hollingsworth   
   
                                                    GLYCOHEMOGLOBIN A1Con 2023   
   
                      ADA RECOMMENDATION SEE BELOW  Normal                [facility]  
   
                                        Comment on above:   Result Comment: ADA   
RECOMMENDED LIMIT 4.0 - 6.0 ADA   
THERAPEUTIC TARGET < 7.0 ACTION SUGGESTED > 7.0   
   
                                                            Performed By: #### A  
1C ####  
[facility]  
[address] [place] [address]  
Dr. Dariana Hollingsworth   
   
                      Glucose [Mass/Vol] 126 mg/dL  Normal                [facility]  
   
                                        Comment on above:   Performed By: #### A  
1C ####  
[facility]  
[address] [place] [address]  
Dr. Dariana Hollingsworth   
   
                                                    HbA1c (Bld) [Mass   
fraction]       6.0 %           Normal          4.5-6.2         [facility]  
   
                                        Comment on above:   Performed By: #### A  
1C ####  
[facility]  
[address] [place] [address]  
Dr. Dariana Hollingsworth   
   
                                                    MICROALBUMIN, RAND URon 04-1  
3-2023   
   
                      mALB       2.0 mg/L   Normal     <=30.0     [facility]  
   
                                        Comment on above:   Performed By: #### M  
ALBR ####  
[facility]  
[address] [place] [address]  
Dr. Dariana Hollingsworth   
   
                                                    PROF 14(COMP METB)on   
023   
   
                      Albumin [Mass/Vol] 3.6 g/dL   Normal     3.4-5.0    [facility]  
   
                                        Comment on above:   Performed By: #### C  
MP ####  
[facility]  
[address] [place] [address]  
Dr. Dariana Hollingsworth   
   
                                                    Albumin/Globulin [Mass   
ratio]          1.0 {ratio}     Normal                          [facility]  
   
                                        Comment on above:   Performed By: #### C  
MP ####  
[facility]  
[address] [place] [address]  
Dr. Dariana Hollingsworth   
   
                                                    ALP [Catalytic   
activity/Vol]   82 U/L          Normal                    [facility]  
   
                                        Comment on above:   Performed By: #### C  
MP ####  
[facility]  
[address] [place] [address]  
Dr. Dariana Hollingsworth   
   
                                                    ALT [Catalytic   
activity/Vol]   46 U/L          Normal          16-63           [facility]  
   
                                        Comment on above:   Performed By: #### C  
MP ####  
[facility]  
[address] [place] [address]  
Dr. Dariana Hollingsworth   
   
                      Anion gap [Moles/Vol] 11.2 mmol/L Normal                [facility]  
   
                                        Comment on above:   Performed By: #### C  
MP ####  
[facility]  
[address] [place] [address]  
Dr. Dariana Hollingsworth   
   
                                                    AST [Catalytic   
activity/Vol]   24 U/L          Normal          15-37           [facility]  
   
                                        Comment on above:   Performed By: #### C  
MP ####  
[facility]  
[address] [place] [address]  
Dr. Dariana Hollingsworth   
   
                      Bilirubin [Mass/Vol] 1.2 mg/dL  Critically high 0.2-1.0     
 [facility]  
   
                                        Comment on above:   Performed By: #### C  
MP ####  
[facility]  
[address] [place] [address]  
Dr. Dariana Hollingsworth   
   
                      Calcium [Mass/Vol] 8.9 mg/dL  Normal     8.5-10.1   [facility]  
   
                                        Comment on above:   Performed By: #### C  
MP ####  
[facility]  
[address] [place] [address]  
Dr. Dariana Hollingsworth   
   
                      Chloride [Moles/Vol] 104 mmol/L Normal          [facility]  
   
                                        Comment on above:   Performed By: #### C  
MP ####  
[facility]  
[address] [place] [address]  
Dr. Dariaan Hollingsworth   
   
                      CO2 [Moles/Vol] 25.2 mmol/L Normal     21.0-32.0  [facility]  
   
                                        Comment on above:   Performed By: #### C  
MP ####  
[facility]  
[address] [address]  
Dr. Dariana Hollingsworth   
   
                      Creatinine [Mass/Vol] 0.83 mg/dL Normal     0.70-1.30  [facility]  
   
                                        Comment on above:   Performed By: #### C  
MP ####  
[facility]  
[address] [address]  
Dr. Dariana Hollingsworth   
   
                      EGFR-AF AMERICAN >60        Normal     >=60       [facility]  
   
                                        Comment on above:   Performed By: #### C  
MP ####  
[facility]  
[address] [address]  
Dr. Dariana Hollingsworth   
   
                      EGFR-NON AF AMERICAN >60        Normal     >=60       [facility]  
   
                                        Comment on above:   Performed By: #### C  
MP ####  
[facility]  
[address] [address]  
Dr. Dariana Hollingsworth   
   
                      Globulin (S) [Mass/Vol] 3.7 g/dL   Normal                [facility]  
   
                                        Comment on above:   Performed By: #### C  
MP ####  
[facility]  
[address] [address]  
Dr. Dariana Hollingsworth   
   
                      Glucose [Mass/Vol] 133 mg/dL  Critically high      [facility]  
   
                                        Comment on above:   Performed By: #### C  
MP ####  
[facility]  
[address] [address]  
Dr. Dariana Hollingsworth   
   
                      Potassium [Moles/Vol] 4.4 mmol/L Normal     3.5-5.1    [facility]  
 [facility]  
   
                                        Comment on above:   Performed By: #### C  
MP ####  
[facility]  
[address] [address]  
Dr. Dariana Hollingsworth   
   
                      Protein [Mass/Vol] 7.3 g/dL   Normal     6.4-8.2    [facility]  
   
                                        Comment on above:   Performed By: #### C  
MP ####  
[facility]  
[address] [address]  
Dr. Dariana Hollingsworth   
   
                      Sodium [Moles/Vol] 136 mmol/L Normal     136-145    [facility]  
   
                                        Comment on above:   Performed By: #### C  
MP ####  
[facility]  
[address] [place], [place] [address]  
Dr. Daraina Hollingsworth   
   
                                                    Urea nitrogen   
[Mass/Vol]      13.0 mg/dL      Normal          7.0-18.0        [facility]  
   
                                        Comment on above:   Performed By: #### C  
MP ####  
[facility]  
[address] [place], [place] [address]  
Dr. Dariana Hollingsworth   
   
                                                    Urea   
nitrogen/Creatinine   
[Mass ratio]    15.7 mg/mg      Normal                          [facility]  
   
                                        Comment on above:   Performed By: #### C  
MP ####  
[facility]  
[address] [place], [place] [address]  
Dr. Dariana Hollingsworth   
   
                                                    XR CSPINE MIN 4 VIEWSon    
   
                                        XR CSPINE MIN 4 VIEWS EXAMINATION: XR   
CSPINE MIN 4 VIEWS  
HISTORY: Cervical   
segmental dysfunction  
COMPARISON: No   
relevant comparison   
available.  
FINDINGS:  
BONES: No significant   
spondylosis,   
scoliosis, fracture,   
or visible bony   
lesion.  
DISC SPACES: Mild   
narrowing C5-C6.   
Suspect mild foramen   
narrowing C5-C6  
bilaterally.  
PARASPINOUS:   
Negative. No   
paraspinous   
abnormality is seen.  
OTHER: Negative.  
IMPRESSION:  
1. C5-C6 moderate   
degenerative disc   
disease and suspected   
mild-moderate foramen  
narrowing   
bilaterally. Consider   
MRI for further   
evaluation if   
symptoms persist.  
Electronically   
authenticated by:   
KAYLYNN GOMEZ Date:   
2023-02-10 07:11    Normal                                  [facility]  
  
  
  
Vital Signs  
  
  
                          Date Time    Vital Sign   Value        Performing   
Clinician                               Facility  
   
                                                    2024   
08:050400          Body height         187.96 cm           DO Elvira Harper  
Work Phone:   
[phone]                          [facility]  
   
                                                    2024   
08:                              Body mass index   
(BMI) [Ratio]             41.5 kg/m2                DO Elvira Harper  
Work Phone:   
[phone]                          [facility]  
   
                                                    2024   
08:05040          Body temperature    98.3 [degF]         DO Elviraandres Harper  
Work Phone:   
[phone]                          [facility]  
   
                                                    2024   
08:          Body weight         146.59 kg           DO Elviraamilcar Harper  
Work Phone:   
[phone]                          [facility]  
   
                                                    2024   
08:                              Diastolic blood   
pressure                  58 mm[Hg]                 DO Elvira Harper  
Work Phone:   
[phone]                          [facility]  
   
                                                    2024   
08:          Heart rate          97 /min             DO Elvira Harper  
Work Phone:   
[phone]                          [facility]  
   
                                                    2024   
08:          Respiratory rate    20 /min             DO Elvira Harper  
Work Phone:   
[phone]                          [facility]  
   
                                                    2024   
08:                              SaO2% (BldA) [Mass   
fraction]                 98 %                      DO Elvira Harper  
Work Phone:   
[phone]                          [facility]  
   
                                                    2024   
08:                              Systolic blood   
pressure                  102 mm[Hg]                DO Elvira Harper  
Work Phone:   
[phone]                          [facility]  
   
                                                    2024   
11:          Body temperature    98.8 [degF]         DO Elvira Harper  
Work Phone:   
[phone]                          [facility]  
   
                                                    2024   
11:                              Diastolic blood   
pressure                  82 mm[Hg]                 DO Elvira Harper  
Work Phone:   
[phone]                          [facility]  
   
                                                    2024   
11:          Heart rate          75 /min             DO Elvira Harper  
Work Phone:   
[phone]                          [facility]  
   
                                                    2024   
11:          Respiratory rate    18 /min             DO Elvira Harper  
Work Phone:   
[phone]                          [facility]  
   
                                                    2024   
11:                              SaO2% (BldA) [Mass   
fraction]                 96 %                      DO Elvira Harper  
Work Phone:   
[phone]                          [facility]  
   
                                                    2024   
11:                              Systolic blood   
pressure                  177 mm[Hg]                DO Elvira Harper  
Work Phone:   
[phone]                          [facility]  
   
                                                    2024   
05:          Body weight         158.5 kg            DO Elvira Harper  
Work Phone:   
[phone]                          [facility]  
   
                                                    2024   
13:          Body height         189.23 cm           DO Elvira Harper  
Work Phone:   
[phone]                          [facility]  
   
                                                    2024   
15:                              Inhaled oxygen flow   
rate                      2 L/min                   DO Elvira Harper  
Work Phone:   
[phone]                          [facility]  
   
                                                    2024   
17:          Body temperature    98.3 [degF]         DO Elvira Harper  
Work Phone:   
[phone]                          [facility]  
   
                                                    2024   
17:                              Diastolic blood   
pressure                  67 mm[Hg]                 DO Elvira Harper  
Work Phone:   
[phone]                          [facility]  
   
                                                    2024   
17:          Heart rate          75 /min             DO Elvira Harper  
Work Phone:   
[phone]                          [facility]  
   
                                                    2024   
17:          Respiratory rate    18 /min             DO Elvira Harper  
Work Phone:   
[phone]                          [facility]  
   
                                                    2024   
17:                              SaO2% (BldA) [Mass   
fraction]                 95 %                      DO Elvira Harper  
Work Phone:   
[phone]                          [facility]  
   
                                                    2024   
17:                              Systolic blood   
pressure                  148 mm[Hg]                DO Elvira Harper  
Work Phone:   
[phone]                          [facility]  
   
                                                    2024   
11:          Body height         189.23 cm           DO Elvira Harper  
Work Phone:   
[phone]                          [facility]  
   
                                                    2024   
11:          Body weight         160.57 kg           DO Elvira Harper  
Work Phone:   
[phone]                          [facility]  
   
                                                    2024   
14:          Body height         187.96 cm           DO Elvira Harper  
Work Phone:   
[phone]                          [facility]  
   
                                                    2024   
14:                              Body mass index   
(BMI) [Ratio]             45.4 kg/m2                DO Elvira Harper  
Work Phone:   
[phone]                          [facility]  
   
                                                    2024   
14:          Body weight         160.57 kg           DO Elvira Harper  
Work Phone:   
[phone]                          [facility]  
   
                                                    2024   
14:                              Diastolic blood   
pressure                  82 mm[Hg]                 DO Elvira Harper  
Work Phone:   
[phone]                          [facility]  
   
                                                    2024   
14:          Heart rate          68 /min             DO Elvira Harper  
Work Phone:   
[phone]                          [facility]  
   
                                                    2024   
14:                              SaO2% (BldA) [Mass   
fraction]                 97 %                      DO Elvira Harper  
Work Phone:   
[phone]                          [facility]  
   
                                                    2024   
14:                              Systolic blood   
pressure                  144 mm[Hg]                DO Elvira Harper  
Work Phone:   
[phone]                          [facility]  
   
                                                    2024   
09:          Body height         187.96 cm           DO Elvira Harper  
Work Phone:   
[phone]                          [facility]  
   
                                                    2024   
09:                              Body mass index   
(BMI) [Ratio]             45.7 kg/m2                DO Elvira Harper  
Work Phone:   
[phone]                          [facility]  
   
                                                    2024   
09:          Body weight         161.47 kg           DO Elvira Harper  
Work Phone:   
[phone]                          [facility]  
   
                                                    2024   
09:                              Diastolic blood   
pressure                  82 mm[Hg]                 DO Elvira Harper  
Work Phone:   
[phone]                          [facility]  
   
                                                    2024   
09:          Heart rate          68 /min             DO Elvira Harper  
Work Phone:   
[phone]                          [facility]  
   
                                                    2024   
09:          Respiratory rate    18 /min             DO Elvira Harper  
Work Phone:   
[phone]                          [facility]  
   
                                                    2024   
09:                              SaO2% (BldA) [Mass   
fraction]                 98 %                      DO Elvira Harper  
Work Phone:   
[phone]                          [facility]  
   
                                                    2024   
09:                              Systolic blood   
pressure                  144 mm[Hg]                DO Elvira Harper  
Work Phone:   
[phone]                          [facility]  
   
                                                    2024   
07:          Body height         187.96 cm           DO Elvira Harper  
Work Phone:   
[phone]                          [facility]  
   
                                                    2024   
07:                              Body mass index   
(BMI) [Ratio]             46.3 kg/m2                DO Elvira Harper  
Work Phone:   
[phone]                          [facility]  
   
                                                    2024   
07:          Body weight         163.74 kg           DO Elvira Harper  
Work Phone:   
[phone]                          [facility]  
   
                                                    2024   
07:                              Diastolic blood   
pressure                  78 mm[Hg]                 DO Elvira Harper  
Work Phone:   
[phone]                          [facility]  
   
                                                    2024   
07:          Heart rate          76 /min             DO Elvira Harper  
Work Phone:   
[phone]                          [facility]  
   
                                                    2024   
07:          Respiratory rate    18 /min             DO Elvira Harper  
Work Phone:   
[phone]                          [facility]  
   
                                                    2024   
07:                              SaO2% (BldA) [Mass   
fraction]                 94 %                      DO Elvira Harper  
Work Phone:   
[phone]                          [facility]  
   
                                                    2024   
07:                              Systolic blood   
pressure                  138 mm[Hg]                DO Elvira Harper  
Work Phone:   
[phone]                          [facility]  
   
                                                    05-   
09:          Body height         187.96 cm           DO Elvira Harper  
Work Phone:   
[phone]                          [facility]  
   
                                                    05-   
09:                              Body mass index   
(BMI) [Ratio]             46.4 kg/m2                DO Elvira Harper  
Work Phone:   
[phone]                          [facility]  
   
                                                    05-   
09:          Body weight         163.97 kg           DO Elvira Harper  
Work Phone:   
[phone]                          [facility]  
   
                                                    05-   
09:                              Diastolic blood   
pressure                  78 mm[Hg]                 DO Elvira Harper  
Work Phone:   
[phone]                          [facility]  
   
                                                    05-   
09:          Heart rate          74 /min             DO Elvira Harper  
Work Phone:   
[phone]                          [facility]  
   
                                                    05-   
09:          Respiratory rate    18 /min             DO Elvira Harper  
Work Phone:   
[phone]                          [facility]  
   
                                                    05-   
09:                              SaO2% (BldA) [Mass   
fraction]                 94 %                      DO Elvira Harper  
Work Phone:   
[phone]                          [facility]  
   
                                                    05-   
09:                              Systolic blood   
pressure                  142 mm[Hg]                DO Elvira Harper  
Work Phone:   
[phone]                          [facility]  
   
                                                    2024   
08:          Body height         187.96 cm           DO Elvira Harper  
Work Phone:   
[phone]                          [facility]  
   
                                                    2024   
08:                              Body mass index   
(BMI) [Ratio]             47 kg/m2                  DO Elvira Harper  
Work Phone:   
[phone]                          [facility]  
   
                                                    2024   
08:          Body weight         166.01 kg           DO Elvira Harper  
Work Phone:   
[phone]                          [facility]  
   
                                                    2024   
13:          Body height         187.96 cm           DO Elvira Harper  
Work Phone:   
[phone]                          [facility]  
   
                                                    2024   
13:                              Body mass index   
(BMI) [Ratio]             47 kg/m2                  DO Elvira Harper  
Work Phone:   
[phone]                          [facility]  
   
                                                    2024   
13:          Body weight         166.09 kg           DO Elvira Harper  
Work Phone:   
[phone]                          [facility]  
   
                                                    2024   
13:                              Diastolic blood   
pressure                  84 mm[Hg]                 DO Elvira Harper  
Work Phone:   
[phone]                          [facility]  
   
                                                    2024   
13:          Heart rate          72 /min             DO Elvira Harper  
Work Phone:   
[phone]                          [facility]  
   
                                                    2024   
13:          Respiratory rate    18 /min             DO Elvira Harper  
Work Phone:   
[phone]                          [facility]  
   
                                                    2024   
13:                              SaO2% (BldA) [Mass   
fraction]                 96 %                      DO Elvira Harper  
Work Phone:   
[phone]                          [facility]  
   
                                                    2024   
13:                              Systolic blood   
pressure                  132 mm[Hg]                DO Elvira Harper  
Work Phone:   
[phone]                          [facility]  
   
                                                    2023   
10:          Body height         187.96 cm           Elviraamilcar Harper  
Other Phone:   
[phone]                           [employer]  
Other Phone:   
[phone]  
   
                                                    2023   
10:                              Body mass index   
(BMI) [Ratio]             46.91 kg/m2               Elviraamilcar Harper  
Other Phone:   
[phone]                           [employer]  
Other Phone:   
[phone]  
   
                                                    2023   
10:          Body weight         165.75 kg           Elviraamilcar Harper  
Other Phone:   
[phone]                           [employer]  
Other Phone:   
[phone]  
   
                                                    2023   
10:                              Diastolic blood   
pressure                  78 mm[Hg]                 Elvira Harper  
Other Phone:   
[phone]                           [employer]  
Other Phone:   
[phone]  
   
                                                    2023   
10:          Respiratory rate    18 /min             Elvira Harper  
Other Phone:   
[phone]                           [employer]  
Other Phone:   
[phone]  
   
                                                    2023   
10:                              SaO2% (BldA) [Mass   
fraction]                 97 %                      Elvira Meredith  
Other Phone:   
[phone]                           [employer]  
Other Phone:   
[phone]  
   
                                                    2023   
10:                              Systolic blood   
pressure                  128 mm[Hg]                Elvira Meredith  
Other Phone:   
[phone]                           [employer]  
Other Phone:   
[phone]  
   
                                                    08-   
09:          Body height         187.96 cm           Elvira Harper  
Other Phone:   
[phone]                           [employer]  
Other Phone:   
[phone]  
   
                                                    08-   
09:                              Body mass index   
(BMI) [Ratio]             47.06 kg/m2               Elviraandres Harper  
Other Phone:   
[phone]                           [employer]  
Other Phone:   
[phone]  
   
                                                    08-   
09:          Body weight         166.29 kg           Elviraamilcar Harper  
Other Phone:   
[phone]                           [employer]  
Other Phone:   
[phone]  
   
                                                    08-   
09:                              Diastolic blood   
pressure                  78 mm[Hg]                 Elvira Meredith  
Other Phone:   
[phone]                           [employer]  
Other Phone:   
[phone]  
   
                                                    08-   
09:          Respiratory rate    18 /min             Elviraandres Harper  
Other Phone:   
[phone]                           [employer]  
Other Phone:   
[phone]  
   
                                                    08-   
09:                              SaO2% (BldA) [Mass   
fraction]                 97 %                      Elvira Meredith  
Other Phone:   
[phone]                           [employer]  
Other Phone:   
[phone]  
   
                                                    08-   
09:                              Systolic blood   
pressure                  136 mm[Hg]                Elvira Meredith  
Other Phone:   
[phone]                           [employer]  
Other Phone:   
[phone]  
  
  
  
Encounters  
  
  
                          Encounter Date Encounter Type Care Provider Facility  
   
                                                    Start: 2024  
End: 2024           ambulatory                DO Elvira A Harper  
Work Phone:   
[phone]                          [facility]  
Work Phone:   
[phone]  
   
                                                    Start: 2024  
End: 2024                         Patient encounter   
procedure                               DO Elvira Harper  
Work Phone:   
[phone]                          [facility]   
[facility] [place]  
Work Phone:   
[phone]  
   
                                Start: 2024 Non-patient / Non-visit DO Alyssia  
andres Harper  
Work Phone:   
[phone]                          [facility]   
[facility]   
[facility] [place]  
Work Phone:   
[phone]  
   
                                Start: 2024 Non-patient / Non-visit DO Alyssia  
andres Harper  
Work Phone:   
[phone]                          [facility]   
[facility]ious   
Disease  
Work Phone:   
[phone]  
   
                                Start: 2024 Non-patient / Non-visit DO Alyssia  
andres Harper  
Work Phone:   
[phone]                          [facility]   
[facility] [place]   
Orthopedics  
Work Phone:   
[phone]  
   
                                                    Start: 2024  
End: 2024                         Evaluation and   
management of inpatient                 DO Elvira Harper  
Work Phone:   
[phone]                          [facility] [place] [facility]   
Surg  
Work Phone:   
[phone]  
   
                                                    Start: 2024  
End: 2024           ambulatory                DO Elvira A Harper  
Work Phone:   
[phone]                          [facility]  
Work Phone:   
[phone]  
   
                                                    Start: 2024  
End: 2024                         Patient encounter   
procedure                               DO Elvira Harper  
Work Phone:   
[phone]                          [facility]   
[facility]   
[facility] [place]  
Work Phone:   
[phone]  
   
                                                    Start: 2024  
End: 2024           ambulatory                DO Elvira A Harper  
Work Phone:   
[phone]                          [facility]  
Work Phone:   
[phone]  
   
                                                    Start: 2024  
End: 2024                         Patient encounter   
procedure                               DO Elvira Harper  
Work Phone:   
[phone]                          [facility]   
[facility] [place]  
Work Phone:   
[phone]  
   
                                Start: 2024 Non-patient / Non-visit DO Alyssia  
andres Harper  
Work Phone:   
[phone]                          [facility] [place]  
Work Phone:   
[phone]  
   
                                                    Start: 2024  
End: 2024           ambulatory                DO Elvira A Harper  
Work Phone:   
[phone]                          [facility]  
Work Phone:   
[phone]  
   
                                                    Start: 2024  
End: 2024                         Patient encounter   
procedure                               DO Elvira Harper  
Work Phone:   
[phone]                          [facility] [place]  
Work Phone:   
[phone]  
   
                                                    Start: 05-  
End: 05-     ambulatory          [facility] Facility:[facility]  
   
                                                    Start: 05-  
End: 05-                         Patient encounter   
procedure                               DO Elvira Harper  
Work Phone:   
[phone]                          [facility]   
[facility]  
   
                                                    Start: 2024  
End: 2024                         Patient encounter   
procedure                               DO Elvira Harper  
Work Phone:   
[phone]                          [facility]  
   
                                                    Start: 2024  
End: 2024           ambulatory                DO Elvira A Harper  
Work Phone:   
[phone]                          [facility]  
Work Phone:   
[phone]  
   
                                                    Start: 2024  
End: 2024     ambulatory          Gucci Maxwell        Facility:[facility]  
   
                                                    Start: 2024  
End: 2024                         Patient encounter   
procedure                               DO Elvira Harper  
Work Phone:   
[phone]                          [facility]   
[facility]   
Orthopedics  
Work Phone:   
[phone]  
   
                                                    Start: 2024  
End: 2024                         Patient encounter   
procedure                               DO Elvira Meredith  
Work Phone:   
[phone]                          [facility] [place]  
Work Phone:   
[phone]  
   
                                                    Start: 2023  
End: 2023           ambulatory                Elvira Harper  
Other Phone:   
[phone]                           [employer]   
[employer]  
Other Phone:   
[phone]  
   
                                        Start: 2023   Patient encounter   
procedure                 Elvira Harper              [facility]   
[place]  
   
                                                    Start: 08-  
End: 08-           ambulatory                Elvira Harper  
Other Phone:   
[phone]                           [employer]   
[employer]  
Other Phone:   
[phone]  
   
                                        Start: 08-   Office outpatient ne  
w   
45 minutes                Elvira Harper              [facility]   
[place]  
   
                                                    Start: 2023  
End: 2023     ambulatory          DR BELINDA MAHAJAN    Facility:[facility]  
   
                                                    Start: 2023  
End: 02-     ambulatory          DR DOCTOR ORELLANA      Facility:[facility]  
  
  
  
Procedures  
  
  
                          Date         Procedure    Procedure Detail Performing   
Clinician  
   
                          Start: 2024 Plain chest X-ray              DO Gl  
oria Meredith  
[employer] Phone:   
[phone]  
   
                                        Start: 2024   Blood culture for ba  
cteria,   
including anaerobic screen                           DO Elvira Meredith  
Work Phone:   
[phone]  
   
                                        Start: 2024   Investigation of   
transfusion reaction                                DO Elvira Harper  
Work Phone:   
[phone]  
   
                          Start: 2024 Arthroscopy of knee              DO   
Elvira Meredith  
Work Phone:   
[phone]  
   
                                        Start: 2024   Bacterial ID (NA Mul  
tiplex   
Assay)                                              DO Elvira Harper  
Work Phone:   
[phone]  
   
                                        Start: 2024   Blood culture for ba  
cteria,   
including anaerobic screen                           DO Elvira Meredith  
Work Phone:   
[phone]  
   
                                        Start: 2024   Investigation of   
transfusion reaction                                DO Elvira Harper  
Work Phone:   
[phone]  
   
                                        Start: 2024   Duplex scan of lower  
 limb   
veins                                               DO [email or web address] [email or web address]  
Work Phone:   
[phone]  
   
                          Start: 2024 X-ray of right knee              DO   
[email or web address]  
Work Phone:   
[phone]  
   
                          Start: 2024 X-ray of right knee              DO   
[email or web address] [email or web address]  
Work Phone:   
[phone]  
   
                                        Start: 05-   Radiologic examinati  
on of   
knee                                                DO [email or web address] [email or web address]  
Work Phone:   
[phone]  
   
                                        Start: 2024   Plain X-ray of right  
   
shoulder                                            DO Elvira Team [employer] Phone:   
[phone]  
   
                          Start: 2023 PSA screening              DR DOCTOR ORELLANA  
   
                                        Comment on above:   Performed By: #### P  
SAD ####  
[facility]  
[address] [place] [address]  
Dr. Dariana Hollingsworth   
  
  
  
Plan of Treatment  
  
  
                          Date         Care Activity Detail       Author  
   
                                                    Start:   
2024                                                  [facility]  
   
                                                    Start:   
2024                                                  [facility]  
   
                                                    Start:   
2024                                                  [facility]  
   
                                                    Start:   
2024                              Blood culture for   
bacteria, including   
anaerobic screen          Blood Culture             [facility]  
   
                                                    Start:   
2024          Hospital admission                      [facility]  
   
                                                    Start:   
2024                              Referral to infectious   
diseases physician                                  [facility]  
   
                                                    Start:   
2024                                                  [facility]  
   
                                                    Start:   
2024                              Microbial culture, body   
fluid                                               [facility]  
   
                                                    Start:   
2024                              Physical therapy   
procedure                                           [facility]  
   
                                                    Start:   
2024                              Referral to occupational   
therapist                                           [facility]  
   
                                                    Start:   
2024          Consultation                            [facility]  
   
                                                    Start:   
2024          Hospital admission                      [facility]  
   
                                                    Start:   
2024                                                  [facility]  
   
                                                    Start:   
2024                              Bacteria identified in   
Blood by Culture                                    [facility]  
   
                                                    Start:   
2024                              Blood culture for   
bacteria, including   
anaerobic screen          Blood Culture             [facility]  
   
                                                    Start:   
2024                              Insertion of Infusion   
Device into Upper Vein,   
Percutaneous Approach                   Insertion of Infusion   
Device into Upper Vein,   
Percutaneous Approach                   [facility]  
   
                                                    Start:   
2024                              Irrigation of Joints   
using Irrigating   
Substance, Percutaneous   
Endoscopic Approach                     Irrigation of Joints   
using Irrigating   
Substance, Percutaneous   
Endoscopic Approach                     [facility]  
   
                                                    Start:   
2024                              Duplex scan of lower   
limb veins                US venous duplex LE RT    [facility]  
   
                                                    Start:   
2024                              US Lower extremity vein   
- right                                             [facility]  
   
                                                    Start:   
2024                                                  [facility]  
   
                                                            Bacteria identified   
in   
Unspecified specimen by   
Aerobe culture                                      [facility]  
   
                                                            Bacteria identified   
in   
Unspecified specimen by   
Anaerobe culture                                    [facility]  
   
                                                            Glucose measurement   
estimated from glycated   
hemoglobin                                          [facility]  
   
                                       Patient Education Know your [facility]  
Work Phone:   
[phone]  
   
                                       Patient referral              [facility]  
Work Phone:   
[phone]  
   
                                       XR Knee - right 4 Views              [facility]  
  
  
  
Immunizations  
  
  
                      Immunization Date Immunization Notes      Care Provider Fa  
cility  
   
                          2023   Prevnar 20                Elvira Harper  
Other Phone:   
[phone]                           [facility]  
   
                                        2023          zoster vaccine   
recombinant                                         Elvira Harper  
Other Phone:   
[phone]                           [facility]  
   
                                        10-          COVID-19 Pfizer   
(bivalent)                                          Elvira Harper  
Other Phone:   
[phone]                           [facility]  
   
                                        2021          COVID-19 Vaccine Pfi  
zer   
- Documentation Purposes   
Only                                                Elvira Harper  
Other Phone:   
[phone]                           [facility]  
   
                                        2021          COVID-19 Vaccine Pfi  
zer   
- Documentation Purposes   
Only                                                Elvira Harper  
Other Phone:   
[phone]                           [facility]  
   
                                        2021          COVID-19 Vaccine Pfi  
zer   
- Documentation Purposes   
Only                                                Elvira Harper  
Other Phone:   
[phone]                           [facility]  
  
  
  
Payers  
  
  
                          Date         Payer Category Payer        Policy ID  
   
                          2024   Medicare                  [id number]  
   
                          2024   Self-pay                  [id number]  
[id number]  
   
                          1960   Medicare                  [id number]  
   
                          1956   Unknown                   [id number]  
[id number]  
   
                          1956   Unknown                   [id number] [id number]  
[id number]  
   
                                       Unknown      Moni BC/TITUS [id number]   
[id number]  
   
                                       Unknown                   [id number] [id number]  
[id number]  
   
                                       Unknown                   [id number] 2.16.8  
[id number]  
   
                                       Unknown                   [id number] [id number]  
[id number]  
   
                                       Unknown                   [id number] [id number]  
[id number]  
   
                                       Unknown                   [id number] [id number]  
[id number]  
  
  
  
Social History  
  
  
                          Date         Type         Detail       Facility  
   
                                       Sex Assigned At Birth              [facility]  
Other Phone:   
[phone]  
   
                          Start: 1955 Sex Assigned At Birth Male         [facility]  
   
                                                    Start: 2024  
End: 2024                         Tobacco smoking status   
NHIS                                    Never smoked tobacco   
(finding)                               [facility]  
  
  
  
Medical Equipment  
  
  
                                Procedure Code  Equipment Code  Equipment Origin  
al   
Text                      Equipment Identifier      Dates  
   
                                                                OneTouch Delica   
Lancets 33G                                           
   
                                                                OneTouch Delica   
Lancets                                             Start:   
2024  
End: 2024  
   
                                                                OneTouch Delica   
Lancets                                             Start:   
2024  
End: 2024  
   
                                                                OneTouch Delica   
Lancets                                             Start:   
2024  
End: 2024  
   
                                                                OneTouch Delica   
Lancets                                             Start:   
2024  
End: 2024  
   
                                                                OneTouch Delica   
Lancets                                             Start:   
2024  
End: 2024  
   
                                                                OneTouch Delica   
Lancets                                             Start:   
2024  
End: 2024  
   
                                                                OneTouch Delica   
Lancets                                             Start:   
2024  
End: 2024  
   
                                                                OneTouch Delica   
Lancets                                             Start:   
2024  
End: 2024  
   
                                                                OneTouch Delica   
Lancets                                             Start:   
2024  
End: 2024  
  
  
  
Goals  
  
  
                                Date            Patient Goal    Desired Activity  
/State  
   
                                                                  
  
  
  
Functional Status  
  
  
                          Date         Assessment   Result       Facility  
   
                                2024      Functional status Patient is Pro  
gressing Toward   
Baseline                                [facility]  
Work Phone: [phone]  
   
                          2024   Functional status Patient Not at Baseline  
 [facility]  
Work Phone: [phone]  
  
  
  
Mental Status  
  
  
                          Date         Assessment   Result       Facility  
   
                                2024      Cognitive function Cognitive Sta  
tus Patient at   
Baseline                                [facility]  
Work Phone: [phone]  
   
                                2024      Cognitive function Cognitive Sta  
tus Patient at   
Baseline                                [facility]  
Work Phone: [phone]  
  
  
  
Clinical Notes 08- to 2024  
  
  
                                Note Date & Type Note            Facility  
  
  
  
                                        2024 Progress note   
  
  
  
   
                                           
   
                                        Note Date/Time      2024   
12:37pm  
   
                                                    [facility]  
ENTER  
[address] [place] [address]  
  
Hospitalist Progress Note  
Signed with Addenda  
  
Patient: Kaylynn Mims MR#: [id number]  
[id number]  
: 1955 Acct:[id number]  
  
Age/Sex: 68 / M Adm Date:   
4  
Loc:  Room: [address] [place] [address] Type: ADM IN  
Attending Dr: Josie Ann MD  
  
Copies to: ~  
  
  
  
**ADDENDUM**1  
Uncontrolled blood pressure, patient does not have a   
history of hypertension, valsartan started, today   
the dose increased  
  
Addendum Documented By: Josie Ann MD 24   
141  
Addendum Signed By: <Electronically signed by Josie Ann MD> 24 141  
  
  
  
Date of Service:  
2024  
  
  
Subjective  
Subjective  
Narrative:  
Patient has been seen and examined today. He   
complains of worsening pain overnight, sharp, like   
sensation, currently complains 8 out of 10, however   
clinically he does not appear to be in distress  
Patient underwent irrigation of the right knee on   
, cultures pending, so far negative  
Blood cultures strep viridans 1 out of 2  
Synovial fluid culture strep mitis  
  
  
  
Physical exam:  
General -awake, alert, oriented ?3, not in acute   
distress  
Cardiovascular -S1 with S2, no murmurs, no rubs, no   
gallops  
Pulmonary - clear to auscultation bilaterally  
Gastrointestinal - abdomen is soft, nondistended,   
nontender, bowel sounds positive, there is no   
rigidity, no rebound  
Right knee in dressing  
Extremities -no edema  
Neurological -no focal neurological dysfunction   
noted  
  
  
  
Exam  
Physical Exam  
Vital Signs:  
  
  
  
  
Temp Pulse Resp BP Pulse Ox O2 Del Method O2 Flow   
Rate  
  
36.8 C 75 17 172/85 H 95 Room Air 2  
  
24 12:05 24 12:05 24 12:05   
24 12:05 24 12:05 24 12:05   
24 15:21  
  
  
  
  
Objective  
Lab Results  
  
24 05:35  
  
24 05:35  
  
Microbiology Results  
Microbiology  
  
24 10:30 Knee,Right - Other Aerobic Culture -   
Preliminary  
Rare normal skin zack  
2 Days  
24 10:30 Knee,Right - Other Anaerobic Culture   
- Preliminary  
24 10:30 Knee,Right - Other Gram Stain - Final  
24 10:28 Knee,Right - Other Aerobic Culture -   
Final  
No Growth 2 Days  
24 10:28 Knee,Right - Other Anaerobic Culture   
- Preliminary  
24 10:28 Knee,Right - Other Gram Stain - Final  
24 10:29 Knee,Right - Other Aerobic Culture -   
Final  
Rare normal skin zack  
2 Days  
24 10:29 Knee,Right - Other Anaerobic Culture   
- Preliminary  
24 10:29 Knee,Right - Other Gram Stain - Final  
24 19:45 Blood - Left Hand Blood Culture -   
Preliminary  
St. mitis/oralis grp  
24 19:45 Blood - Left Hand Bacterial ID (NA   
Multiplex Assay) - Final  
24 23:00 Joint Fluid - Knee, Right Aerobic   
Culture - Final  
St. mitis/oralis grp  
24 23:00 Joint Fluid - Knee, Right Anaerobic   
Culture - Preliminary  
No Anaerobes Isolated 2 Days  
24 23:00 Joint Fluid - Knee, Right Gram Stain   
- Final  
24 19:15 Joint Fluid - Knee, Right Aerobic   
Culture - Final  
No Growth 2 Days  
24 19:15 Joint Fluid - Knee, Right Anaerobic   
Culture - Final  
No Anaerobes Isolated 3 Days  
24 19:15 Joint Fluid - Knee, Right Gram Stain   
- Final  
24 19:55 Blood - Right Hand Blood Culture -   
Preliminary  
No Growth 2 Days  
  
  
  
Meds  
Allergies and Active Meds  
Allergies  
  
metformin Allergy (Unknown, Verified 24 08:53)  
back spasms  
Penicillins Allergy (Unknown, Verified 24   
08:53)  
rash  
  
  
Active Meds:  
Active Medications  
  
  
  
Generic Name Dose Route Start Last Admin  
  
Trade Name Freq PRN Reason Stop Dose Admin  
  
Acetaminophen 650 mg 24 17:56 24 08:21  
  
Acetaminophen 325 Mg Tablet PO 25 17:55 650 mg  
  
Q4H PRN Administration  
  
Pain Scale 1 - 5  
  
Acetaminophen 1,000 mg 24 12:00 24 12:23  
  
Acetaminophen 500 Mg Tablet PO 25 11:59 1,000   
mg  
  
Q8H NIHARIKA Administration  
  
Ascorbic Acid 500 mg 24 17:00 24 07:58  
  
Ascorbic Acid 500 Mg Tablet PO 25 16:59 500 mg  
  
BID.WITH.MEALS NIHARIKA Administration  
  
Docusate Sodium 200 mg 24 17:56  
  
Docusate 100 Mg Capsule PO 25 17:55  
  
BID PRN  
  
Constipation  
  
Enoxaparin Sodium 40 mg 24 10:00 24   
09:42  
  
Enoxaparin 40 Mg/0.4 Ml Syringe SUBCUT 25   
09:59 40 mg  
  
DAILY@1000 NIHARIKA Administration  
  
Ferrous Sulfate 324 mg 24 17:00 24 07:57  
  
Ferrous Sulfate 324 Mg Tablet.Dr PO 25 16:59   
324 mg  
  
BID.WITH.MEALS NIHARIKA Administration  
  
Hydralazine HCl 10 mg 24 17:56  
  
Hydralazine 20 Mg/Ml Vial IV-PUSH 25 17:55  
  
Q4H PRN  
  
if SBP > 185  
  
Hydromorphone HCl 0.5 mg 24 09:38  
  
Hydromorphone 0.5 Mg/0.5 Ml Syringe IV-PUSH  
  
Q4H PRN  
  
pain  
  
Ceftriaxone Sodium 2 gm in 50 mls @ 100 mls/hr   
24 14:00 24 15:44  
  
Rocephin  mls/hr  
  
Q24H NIHARIKA Administration  
  
Mineral Oil 1 each 24 11:33  
  
Mineral Oil (Orange) 1 Each Enema MN  
  
ONCE PRN  
  
Constipation  
  
Naloxone HCl 0.4 mg 24 11:33  
  
Naloxone Hcl 0.4 Mg/Ml Vial IV-PUSH 25 11:32  
  
Q2M PRN  
  
Opioid Reversal  
  
Oxycodone HCl 5 mg 24 17:56 24 22:50  
  
Oxycodone Ir 5 Mg Tablet PO 5 mg  
  
Q4HR PRN Administration  
  
Pain  
  
Oxycodone HCl 5 mg 24 11:33 24 12:24  
  
Oxycodone Ir 5 Mg Tablet PO 5 mg  
  
Q4HR PRN Administration  
  
Pain Scale 6 - 10  
  
Pantoprazole Sodium 40 mg 24 09:00 24   
08:37  
  
Pantoprazole 40 Mg Tablet.Dr PO 25 08:59 40 mg  
  
DAILY NIHARIKA Administration  
  
Polyethylene Glycol 17 gm 24 09:00 24   
08:37  
  
Polyethylene Glycol 3350 17 Gm Powd.Pack PO 24   
08:59 17 gm  
  
DAILY NIHARIKA Administration  
  
Prochlorperazine Edisylate 5 mg 24 17:56  
  
Prochlorperazine Edisylate 10 Mg/2 Ml Vial IV-PUSH   
25 17:55  
  
Q4H PRN  
  
Nausea And Vomiting  
  
Senna/Docusate Sodium 2 tab 24 09:00 24   
08:37  
  
Sennosides/Docusate 8.6-50mg 1 Tab Tablet PO   
24 08:59 2 tab  
  
DAILY NIHARIKA Administration  
  
Sodium Chloride 0 ml 24 14:00 24 05:33  
  
Sodium Chloride 0.9 % 10 Ml Syringe IV-PUSH 25   
13:59 Not Given  
  
QSHIFT NIHARIKA  
  
Sodium Chloride 0 ml 24 13:44 24 23:45  
  
Sodium Chloride 0.9 % 10 Ml Syringe IV-PUSH 25   
13:43 10 ml  
  
PRN PRN Administration  
  
Flush  
  
Tramadol HCl 50 mg 24 11:33 24 01:16  
  
Tramadol 50 Mg Tablet PO 25 11:32 50 mg  
  
Q6H PRN Administration  
  
Pain Scale 1 - 5  
  
Valsartan 80 mg 24 13:10 24 08:37  
  
Valsartan 80 Mg Tablet PO 25 13:09 80 mg  
  
DAILY NIHARIKA Administration  
  
  
  
  
A&P - Hospitalist  
Assessment/Plan  
(1) Septic arthritis of knee, right:  
  
Plan  
1. Suspected acute right knee septic arthritis,   
status post irrigation on   
Continue with Rocephin therapy, cultures reviewed,   
discussed with ID  
Blood cultures strep viridans 1 out of 2, repeated   
cultures pending  
Appreciate orthopedic input, will reevaluate today  
Continue with pain management  
  
2. DVT prophylaxis SCDs Lovenox  
  
  
Documented By: Josie Ann MD 24 1235  
Signed By: <Electronically signed by Josie Ann MD>  
24 1237  
   
  
[facility]  
Work Phone: [phone]- Progress note  
  
  
  
  
                                        Author              Josie Ann  
[facility]  
2024 2:16pm  
   
                                        Note Date/Time      2024 2:  
16pm  
   
                                                    [facility]  
ENTER  
[address] [place] [address]  
  
Hospitalist Progress Note  
Signed  
  
Patient: Kaylynn Mims MR#: [id number]  
[id number]  
: 1955 Acct:[id number]  
  
Age/Sex: 68 / M Adm Date:   
4  
Loc:  Room: [address] [place] [address] Type: ADM IN  
Attending Dr: Josie Ann MD  
  
Copies to: ~  
  
  
Date of Service:  
2024  
  
  
Subjective  
Subjective  
Narrative:  
Patient has been seen and examined today. He was ambulated in the hallway with a
   
walker, however still complains of significant pain requiring opioids.  
Otherwise denies any abdominal pain, passing flatus, no nausea no vomiting, 
denies   
any shortness of breath any chest pain  
  
  
Physical exam:  
General -awake, alert, oriented ?3, not in acute distress  
Cardiovascular -S1 with S2, no murmurs, no rubs, no gallops  
Pulmonary - clear to auscultation bilaterally  
Gastrointestinal - abdomen is soft, nondistended, nontender, bowel sounds 
positive,   
there is no rigidity, no rebound  
Right knee in dressing, I did not undress, it take a look at the knee, still 
some   
swelling noted, incision site looks quite well, without any drainage without any
   
erythema, no obvious skin signs of infection  
Extremities -no edema  
Neurological -no focal neurological dysfunction noted  
  
  
  
Exam  
Physical Exam  
Vital Signs:  
  
  
  
  
Temp Pulse Resp BP Pulse Ox O2 Del Method O2 Flow Rate  
  
36.5 C 79 20 175/80 H 95 Room Air 2  
  
24 12:08 24 12:08 24 12:08 24 12:08 24 12:08 
24   
12:08 24 15:21  
  
  
  
  
Objective  
Lab Results  
  
24 05:35  
  
24 05:25  
  
Microbiology Results  
Microbiology  
  
24 13:40 Blood - Right Antecubital Blood Culture - Preliminary  
No Growth 2 Days  
24 13:41 Blood - Right Hand Blood Culture - Preliminary  
No Growth 2 Days  
24 10:30 Knee,Right - Other Aerobic Culture - Final  
St. mitis/oralis [facility]  
24 10:30 Knee,Right - Other Anaerobic Culture - Final  
24 10:30 Knee,Right - Other Gram Stain - Final  
24 10:29 Knee,Right - Other Aerobic Culture - Final  
St. mitis/oralis [facility]  
24 10:29 Knee,Right - Other Anaerobic Culture - Final  
24 10:29 Knee,Right - Other Gram Stain - Final  
24 10:28 Knee,Right - Other Aerobic Culture - Final  
St. mitis/oralis [facility]  
24 10:28 Knee,Right - Other Anaerobic Culture - Final  
24 10:28 Knee,Right - Other Gram Stain - Final  
24 23:00 Joint Fluid - Knee, Right Aerobic Culture - Final  
St. mitis/oralis [facility]  
24 23:00 Joint Fluid - Knee, Right Anaerobic Culture - Final  
No Anaerobes Isolated 3 Days  
24 23:00 Joint Fluid - Knee, Right Gram Stain - Final  
24 19:45 Blood - Left Hand Blood Culture - Preliminary  
St. mitis/oralis [facility]  
24 19:45 Blood - Left Hand Bacterial ID (NA Multiplex Assay) - Final  
24 19:55 Blood - Right Hand Blood Culture - Preliminary  
No Growth 3 Days  
  
  
  
Meds  
Allergies and Active Meds  
Allergies  
  
metformin Allergy (Unknown, Verified 24 08:53)  
back spasms  
Penicillins Allergy (Unknown, Verified 24 08:53)  
rash  
  
  
Active Meds:  
Active Medications  
  
  
  
Generic Name Dose Route Start Last Admin  
  
Trade Name Freq PRN Reason Stop Dose Admin  
  
Acetaminophen 650 mg 24 17:56 24 08:21  
  
Acetaminophen 325 Mg Tablet PO 25 17:55 650 mg  
  
Q4H PRN Administration  
  
Pain Scale 1 - 5  
  
Acetaminophen 1,000 mg 24 12:00 24 12:14  
  
Acetaminophen 500 Mg Tablet PO 25 11:59 1,000 mg  
  
Q8H NIHARIKA Administration  
  
Ascorbic Acid 500 mg 24 17:00 24 09:21  
  
Ascorbic Acid 500 Mg Tablet PO 25 16:59 500 mg  
  
BID.WITH.MEALS NIHARIKA Administration  
  
Docusate Sodium 200 mg 24 17:56  
  
Docusate 100 Mg Capsule PO 25 17:55  
  
BID PRN  
  
Constipation  
  
Enoxaparin Sodium 40 mg 24 10:00 24 09:21  
  
Enoxaparin 40 Mg/0.4 Ml Syringe SUBCUT 25 09:59 40 mg  
  
DAILY@1000 NIHARIKA Administration  
  
Ferrous Sulfate 324 mg 24 17:00 24 09:21  
  
Ferrous Sulfate 324 Mg Tablet.Dr PO 25 16:59 324 mg  
  
BID.WITH.MEALS NIHARIKA Administration  
  
Heparin Sodium (Porcine) 0 unit 24 14:00 24 05:21  
  
Heparin-Lock 500 Unit/5 Ml Syringe IV-PUSH 25 13:59 500 unit  
  
QSHIFT NIHARIKA Administration  
  
Hydralazine HCl 10 mg 24 17:56  
  
Hydralazine 20 Mg/Ml Vial IV-PUSH 25 17:55  
  
Q4H PRN  
  
if SBP > 185  
  
Hydromorphone HCl 0.5 mg 24 09:38  
  
Hydromorphone 0.5 Mg/0.5 Ml Syringe IV-PUSH  
  
Q4H PRN  
  
pain  
  
Ceftriaxone Sodium 2 gm in 50 mls @ 100 mls/hr 24 14:00 24 16:50  
  
Rocephin IV Infused  
  
Q24H [facility] Infusion  
  
Mineral Oil 1 each 24 11:33  
  
Mineral Oil (Orange) 1 Each Enema MN  
  
ONCE PRN  
  
Constipation  
  
Naloxone HCl 0.4 mg 24 11:33  
  
Naloxone Hcl 0.4 Mg/Ml Vial IV-PUSH 25 11:32  
  
Q2M PRN  
  
Opioid Reversal  
  
Oxycodone HCl 5 mg 24 11:33 24 12:24  
  
Oxycodone Ir 5 Mg Tablet PO 5 mg  
  
Q4HR PRN Administration  
  
Pain Scale 6 - 10  
  
Oxycodone HCl 10 mg 24 12:38 24 13:30  
  
Oxycodone Ir 5 Mg Tablet PO 10 mg  
  
Q4HR PRN Administration  
  
Pain  
  
Pantoprazole Sodium 40 mg 24 09:00 24 09:20  
  
Pantoprazole 40 Mg Tablet.Dr PO 25 08:59 40 mg  
  
DAILY [facility] Administration  
  
Polyethylene Glycol 17 gm 24 09:00 24 09:22  
  
Polyethylene Glycol 3350 17 Gm Powd.Pack PO 24 08:59 17 gm  
  
DAILY [facility] Administration  
  
Prochlorperazine Edisylate 5 mg 24 17:56  
  
Prochlorperazine Edisylate 10 Mg/2 Ml Vial IV-PUSH 25 17:55  
  
Q4H PRN  
  
Nausea And Vomiting  
  
Senna/Docusate Sodium 2 tab 24 21:00 24 09:22  
  
Sennosides/Docusate 8.6-50mg 1 Tab Tablet PO 24 20:59 2 tab  
  
BID [facility] Administration  
  
Sodium Chloride 0 ml 24 14:00 24 05:29  
  
Sodium Chloride 0.9 % 10 Ml Syringe IV-PUSH 25 13:59 10 ml  
  
QSHIFT [facility] Administration  
  
Sodium Chloride 0 ml 24 13:44 24 23:45  
  
Sodium Chloride 0.9 % 10 Ml Syringe IV-PUSH 25 13:43 10 ml  
  
PRN PRN Administration  
  
Flush  
  
Tramadol HCl 50 mg 24 11:33 24 12:15  
  
Tramadol 50 Mg Tablet PO 25 11:32 50 mg  
  
Q6H PRN Administration  
  
Pain Scale 1 - 5  
  
Valsartan 160 mg 24 09:00  
  
Valsartan 160 Mg Tablet PO 25 08:59  
  
DAILY [facility]AR[facility]A  
  
  
  
  
A&P - Hospitalist  
Assessment/Plan  
(1) Septic arthritis of knee, right:  
  
Plan  
1. Suspected acute right knee septic arthritis, status post irrigation on   
Continue with Rocephin therapy, cultures reviewed, discussed with ID  
Appreciate orthopedic input,  
Continue with pain management, quite difficult to control,  
  
2. DVT prophylaxis SCDs Lovenox  
  
Plan to discharge in a.m.  
  
  
Documented By: Josie Ann MD 24  
Signed By: <Electronically signed by Josie Ann MD>  
24  
   
  
[facility]  
Work Phone: [phone]- Progress note  
  
  
  
  
                                        Author              Paul Crum  
[facility]  
2024 10:56am  
   
                                        Note Date/Time      2024 10  
:56am  
   
                                                    [facility]  
ENTER  
[address] [place] [address]  
  
Infect. Disease Progress Note  
Signed  
  
Patient: Kaylynn Mims MR#: [id number]  
[id number]  
: 1955 Acct:[id number]  
  
Age/Sex: 68 / M Adm Date:   
4  
Loc:  Room: [address] [place] [address] Type: ADM IN  
Attending Dr: Josie Ann MD  
  
Copies to: ~  
  
  
Date of Service:  
2024  
  
  
Subjective  
Interval history:  
Patient still endorses he is having a significant mount of pain at the right 
knee   
itself. States he had a cold sweat last night but remains afebrile. Patient just
   
feels he should be able to put more weight and do more with his right knee as he
   
tells me it feels just as bad as it did when he came to the hospital  
  
Exam  
Physical Exam  
Vital Signs:  
  
Vital Signs  
  
  
  
Temp Pulse Resp BP Pulse Ox O2 Del Method  
  
24 08:00 Room Air  
  
24 07:24 98.8 F 72 12 188/107 H 97 Room Air  
  
24 04:00 86 18 168/83 H 97  
  
24 00:00 76 18 145/69 H 95  
  
24 20:00 98.9 F 82 18 171/89 H 97 Room Air  
  
24 20:00 Room Air  
  
24 16:33 97.6 F 83 18 171/85 H 95 Room Air  
  
24 12:05 98.3 F 75 17 172/85 H 95 Room Air  
  
  
  
Const  
General: cooperative, uncomfortable and no acute distress  
Orientation: oriented x3  
HEENT  
Head: normal to inspection  
Ears: hearing grossly normal bilaterally  
Mouth: oral mucosae normal  
Eyes  
General: appearance normal, both eyes and all related structures  
Neck  
Neck: normal visual inspection  
Chest  
Chest palpation & inspection: normal inspection of the chest  
Resp  
Effort & Inspection: normal respiratory effort  
Cardio  
Rate: regular rate  
Rhythm: regular rhythm  
GI  
Inspection: normal to inspection  
Palpation: soft and nontender  
Auscultation: normal bowel sounds  
Skin  
General: no rashes or lesions noted  
Neuro  
General: patient oriented x3  
Extrem  
General: abnormal to inspection (R knee wrapped)  
Other:  
Wrap not taken down. Patient endorses that he having a lot of knee pain.  
  
Objective  
Labs  
CBC/BMP:  
CBC, BMP  
  
  
  
24  
  
05:25  
  
Sodium 133 L  
  
Potassium 4.3  
  
Chloride 98  
  
Carbon Dioxide 27.1  
  
Anion Gap 12.2  
  
BUN 14  
  
Creatinine 0.67 L  
  
Calcium 8.6  
  
  
  
Labs:  
  
  
  
  
24  
  
05:25  
  
BUN 14  
  
Creatinine 0.67 L  
  
  
  
Microbiology  
Microbiology:  
Microbiology - Results from entire visit  
  
24 10:30 Knee,Right - Other Aerobic Culture - Final  
St. mitis/oralis [facility]  
24 10:30 Knee,Right - Other Anaerobic Culture - Final  
24 10:30 Knee,Right - Other Gram Stain - Final  
24 10:29 Knee,Right - Other Aerobic Culture - Final  
St. mitis/oralis [facility]  
24 10:29 Knee,Right - Other Anaerobic Culture - Final  
24 10:29 Knee,Right - Other Gram Stain - Final  
24 10:28 Knee,Right - Other Aerobic Culture - Final  
St. mitis/oralis [facility]  
24 10:28 Knee,Right - Other Anaerobic Culture - Final  
24 10:28 Knee,Right - Other Gram Stain - Final  
24 23:00 Joint Fluid - Knee, Right Aerobic Culture - Final  
St. mitis/oralis [facility]  
24 23:00 Joint Fluid - Knee, Right Anaerobic Culture - Final  
No Anaerobes Isolated 3 Days  
24 23:00 Joint Fluid - Knee, Right Gram Stain - Final  
24 19:45 Blood - Left Hand Blood Culture - Preliminary  
St. mitis/oralis grp  
24 19:45 Blood - Left Hand Bacterial ID (NA Multiplex Assay) - Final  
24 19:55 Blood - Right Hand Blood Culture - Preliminary  
No Growth 3 Days  
24 13:40 Blood - Right Antecubital Blood Culture - Preliminary  
No Growth 1 Day  
24 13:41 Blood - Right Hand Blood Culture - Preliminary  
No Growth 1 Day  
24 19:15 Joint Fluid - Knee, Right Aerobic Culture - Final  
No Growth 2 Days  
24 19:15 Joint Fluid - Knee, Right Anaerobic Culture - Final  
No Anaerobes Isolated 3 Days  
24 19:15 Joint Fluid - Knee, Right Gram Stain - Final  
  
  
  
Allergies and Medications  
Allergies and Active Meds  
Allergies  
  
metformin Allergy (Unknown, Verified 24 08:53)  
back spasms  
Penicillins Allergy (Unknown, Verified 24 08:53)  
rash  
  
  
Active Medications  
  
Acetaminophen (Acetaminophen 325 Mg Tablet) 650 mg PO Q4H PRN  
PRN Reason: Pain Scale 1 - 5  
Stop: 25 17:55  
Last Admin: 24 08:21 Dose: 650 mg  
  
Acetaminophen (Acetaminophen 500 Mg Tablet) 1,000 mg PO Q8H [facility]  
Stop: 25 11:59  
Last Admin: 24 04:02 Dose: 1,000 mg  
  
Ascorbic Acid (Ascorbic Acid 500 Mg Tablet) 500 mg PO BID.WITH.MEALS [facility]  
Stop: 25 16:59  
Last Admin: 24 09:21 Dose: 500 mg  
  
Docusate Sodium (Docusate 100 Mg Capsule) 200 mg PO BID PRN  
PRN Reason: Constipation  
Stop: 25 17:55  
Enoxaparin Sodium (Enoxaparin 40 Mg/0.4 Ml Syringe) 40 mg SUBCUT DAILY@1000 [facility]  
Stop: 25 09:59  
Last Admin: 24 09:21 Dose: 40 mg  
  
Ferrous Sulfate (Ferrous Sulfate 324 Mg Tablet.Dr) 324 mg PO BID.WITH.MEALS [facility]  
Stop: 25 16:59  
Last Admin: 24 09:21 Dose: 324 mg  
  
Heparin Sodium (Porcine) (Heparin-Lock 500 Unit/5 Ml Syringe) 0 unit IV-PUSH 
QSHIFT   
[facility]  
Stop: 25 13:59  
Last Admin: 24 05:21 Dose: 500 unit  
  
Hydralazine HCl (Hydralazine 20 Mg/Ml Vial) 10 mg IV-PUSH Q4H PRN  
PRN Reason: if SBP > 185  
Stop: 25 17:55  
Hydromorphone HCl (Hydromorphone 0.5 Mg/0.5 Ml Syringe) 0.5 mg IV-PUSH Q4H PRN  
PRN Reason: pain  
Ceftriaxone Sodium (Rocephin) 2 gm in 50 mls @ 100 mls/hr IV Q24H [facility]  
Last Infusion: 24 16:50 Dose: Infused  
  
Mineral Oil (Mineral Oil (Orange) 1 Each Enema) 1 each MN ONCE PRN  
PRN Reason: Constipation  
Naloxone HCl (Naloxone Hcl 0.4 Mg/Ml Vial) 0.4 mg IV-PUSH Q2M PRN  
PRN Reason: Opioid Reversal  
Stop: 25 11:32  
Oxycodone HCl (Oxycodone Ir 5 Mg Tablet) 5 mg PO Q4HR PRN  
PRN Reason: Pain Scale 6 - 10  
Last Admin: 24 12:24 Dose: 5 mg  
  
Oxycodone HCl (Oxycodone Ir 5 Mg Tablet) 10 mg PO Q4HR PRN  
PRN Reason: Pain  
Last Admin: 24 09:20 Dose: 10 mg  
  
Pantoprazole Sodium (Pantoprazole 40 Mg Tablet.Dr) 40 mg PO DAILY [facility]  
Stop: 25 08:59  
Last Admin: 24 09:20 Dose: 40 mg  
  
Polyethylene Glycol (Polyethylene Glycol 3350 17 Gm Powd.Pack) 17 gm PO DAILY 
[facility]  
Stop: 24 08:59  
Last Admin: 24 09:22 Dose: 17 gm  
  
Prochlorperazine Edisylate (Prochlorperazine Edisylate 10 Mg/2 Ml Vial) 5 mg IV-
PUSH   
Q4H PRN  
PRN Reason: Nausea And Vomiting  
Stop: 25 17:55  
Senna/Docusate Sodium (Sennosides/Docusate 8.6-50mg 1 Tab Tablet) 2 tab PO BID 
[facility]  
Stop: 24 20:59  
Last Admin: 24 09:22 Dose: 2 tab  
  
Sodium Chloride (Sodium Chloride 0.9 % 10 Ml Syringe) 0 ml IV-PUSH QSHIFT [facility]  
Stop: 25 13:59  
Last Admin: 24 05:29 Dose: 10 ml  
  
Sodium Chloride (Sodium Chloride 0.9 % 10 Ml Syringe) 0 ml IV-PUSH PRN PRN  
PRN Reason: Flush  
Stop: 25 13:43  
Last Admin: 24 23:45 Dose: 10 ml  
  
Tramadol HCl (Tramadol 50 Mg Tablet) 50 mg PO Q6H PRN  
PRN Reason: Pain Scale 1 - 5  
Stop: 25 11:32  
Last Admin: 24 01:16 Dose: 50 mg  
  
Valsartan (Valsartan 80 Mg Tablet) 80 mg PO DAILY [facility]  
Stop: 25 13:09  
Last Admin: 24 09:21 Dose: 80 mg  
  
  
  
  
A&P - Infectious Disease  
Assessment/Plan  
(1) Septic arthritis of knee, right:  
(2) Positive blood culture:  
(3) Streptococcus viridans infection:  
  
Plan  
Patient's cultures all have grown strep mitis/oralis. Follow-up blood cultures 
are   
negative. Patient's white count has remained normal. He also remains afebrile.   
Patient concerned over the significant knee pain he continues to have. Charge 
nurse   
reach out to orthopedics for them to come back and perhaps reevaluate the 
patient.   
From an infection standpoint patient on appropriate antibiotics. He has a PICC 
line   
in the right upper extremity. He is planning to get daily IV ceftriaxone at 
[facility].  
  
  
Documented By: Paul Crum MD 24  
Signed By: <Electronically signed by MD Paul Crum>  
24  
   
  
[facility]  
Work Phone: [phone]- [facility] note[facility]- Progress [facility]  
  
  
  
  
                                        Author              Paul Crum  
[facility]  
2024 9:16am  
   
                                        Note Date/Time      2024 9:  
16am  
   
                                                    [facility]  
ENTER  
[address] [place] [address]  
  
Infect. Disease Progress Note  
Signed  
  
Patient: Kaylynn Mims MR#: [id number]  
[id number]  
: 1955 Acct:[id number]  
  
Age/Sex: 68 / M Adm Date:   
4  
Loc: 4N Room: 3P1549-4 Type: ADM IN  
Attending Dr: Josie Ann MD  
  
Copies to: ~  
  
  
Date of Service:  
2024  
  
  
Subjective  
Interval history:  
Patient feels better overall but expresses concern about where he is going to 
have   
ongoing antibiotics due to cost.  
  
Exam  
Physical Exam  
Vital Signs:  
  
  
  
  
Temp Pulse Resp BP Pulse Ox O2 Del Method O2 Flow Rate  
  
98.5 F 75 17 172/77 H 97 Room Air 2  
  
24 07:43 24 07:43 24 07:43 24 07:43 24 07:43 
24   
07:59 24 15:21  
  
  
  
Const  
General: cooperative and no acute distress  
Orientation: oriented x3  
HEENT  
Head: normal to inspection  
Ears: hearing grossly normal bilaterally  
Mouth: oral mucosae normal  
Eyes  
General: appearance normal, both eyes and all related structures  
Neck  
Neck: normal visual inspection  
Chest  
Chest palpation & inspection: normal inspection of the chest  
Resp  
Effort & Inspection: normal respiratory effort  
Cardio  
Rate: regular rate  
Rhythm: regular rhythm  
GI  
Inspection: normal to inspection  
Palpation: soft and nontender  
Auscultation: normal bowel sounds  
Skin  
General: no rashes or lesions noted  
Neuro  
General: patient oriented x3  
Extrem  
General: abnormal to inspection (R knee wrapped)  
  
Objective  
Labs  
CBC/BMP:  
CBC, BMP  
  
  
  
24  
  
05:35  
  
Corrected WBC 8.5  
  
Uncorrected WBC Count 8.5  
  
RBC 5.03  
  
Hgb 14.9  
  
Hct 44.1  
  
Plt Count 182  
  
Sodium 136  
  
Potassium 4.5  
  
Chloride 99  
  
Carbon Dioxide 29.1  
  
Anion Gap 12.4  
  
BUN 17  
  
Creatinine 0.90  
  
Calcium 8.2 L  
  
  
  
Labs:  
  
  
  
  
24  
  
05:35  
  
BUN 17  
  
Creatinine 0.90  
  
  
  
Microbiology  
Microbiology:  
Microbiology - Results from entire visit  
  
24 19:45 Blood - Left Hand Blood Culture - Preliminary  
Presumptive Viridans Strep  
24 19:45 Blood - Left Hand Bacterial ID (NA Multiplex Assay) - Final  
24 19:55 Blood - Right Hand Blood Culture - Preliminary  
No Growth 2 Days  
24 10:28 Knee,Right - Other Aerobic Culture - Preliminary  
No Growth 1 Day  
24 10:28 Knee,Right - Other Anaerobic Culture - Preliminary  
No Anaerobes Isolated 1 Day  
24 10:28 Knee,Right - Other Gram Stain - Final  
24 10:29 Knee,Right - Other Aerobic Culture - Preliminary  
No Growth 1 Day  
24 10:29 Knee,Right - Other Anaerobic Culture - Preliminary  
No Anaerobes Isolated 1 Day  
24 10:29 Knee,Right - Other Gram Stain - Final  
24 10:30 Knee,Right - Other Aerobic Culture - Preliminary  
Rare normal skin zack  
1 Day  
24 10:30 Knee,Right - Other Anaerobic Culture - Preliminary  
No Anaerobes Isolated 1 Day  
24 10:30 Knee,Right - Other Gram Stain - Final  
24 23:00 Joint Fluid - Knee, Right Aerobic Culture - Preliminary  
St. mitis/oralis grp  
24 23:00 Joint Fluid - Knee, Right Anaerobic Culture - Preliminary  
No Anaerobes Isolated 1 Day  
24 23:00 Joint Fluid - Knee, Right Gram Stain - Final  
24 19:15 Joint Fluid - Knee, Right Aerobic Culture - Final  
No Growth 2 Days  
24 19:15 Joint Fluid - Knee, Right Anaerobic Culture - Preliminary  
No Anaerobes Isolated 2 Days  
24 19:15 Joint Fluid - Knee, Right Gram Stain - Final  
  
  
  
Allergies and Medications  
Allergies and Active Meds  
Allergies  
  
metformin Allergy (Unknown, Verified 24 08:53)  
back spasms  
Penicillins Allergy (Unknown, Verified 24 08:53)  
rash  
  
  
Active Medications  
  
Acetaminophen (Acetaminophen 325 Mg Tablet) 650 mg PO Q4H PRN  
PRN Reason: Pain Scale 1 - 5  
Stop: 25 17:55  
Last Admin: 24 08:21 Dose: 650 mg  
  
Acetaminophen (Acetaminophen 500 Mg Tablet) 1,000 mg PO Q8H [facility]  
Stop: 25 11:59  
Last Admin: 24 04:15 Dose: Not Given  
  
Ascorbic Acid (Ascorbic Acid 500 Mg Tablet) 500 mg PO BID.WITH.MEALS [facility]  
Stop: 25 16:59  
Last Admin: 24 07:58 Dose: 500 mg  
  
Docusate Sodium (Docusate 100 Mg Capsule) 200 mg PO BID PRN  
PRN Reason: Constipation  
Stop: 25 17:55  
Enoxaparin Sodium (Enoxaparin 40 Mg/0.4 Ml Syringe) 40 mg SUBCUT DAILY@1000 [facility]  
Stop: 25 09:59  
Last Admin: 24 11:05 Dose: 40 mg  
  
Ferrous Sulfate (Ferrous Sulfate 324 Mg Tablet.) 324 mg PO BID.WITH.MEALS [facility]  
Stop: 25 16:59  
Last Admin: 24 07:57 Dose: 324 mg  
  
Hydralazine HCl (Hydralazine 20 Mg/Ml Vial) 10 mg IV-PUSH Q4H PRN  
PRN Reason: if SBP > 185  
Stop: 25 17:55  
Hydromorphone HCl (Hydromorphone 1 Mg/Ml Syringe) 0.5 mg IV-PUSH Q4H PRN  
PRN Reason: pain  
Ceftriaxone Sodium (Rocephin) 2 gm in 50 mls @ 100 mls/hr IV Q24H [facility]  
Last Admin: 24 15:44 Dose: 100 mls/hr  
  
Mineral Oil (Mineral Oil (Orange) 1 Each Enema) 1 each MN ONCE PRN  
PRN Reason: Constipation  
Naloxone HCl (Naloxone Hcl 0.4 Mg/Ml Vial) 0.4 mg IV-PUSH Q2M PRN  
PRN Reason: Opioid Reversal  
Stop: 25 11:32  
Oxycodone HCl (Oxycodone Ir 5 Mg Tablet) 5 mg PO Q4HR PRN  
PRN Reason: Pain  
Last Admin: 24 22:50 Dose: 5 mg  
  
Oxycodone HCl (Oxycodone Ir 5 Mg Tablet) 5 mg PO Q4HR PRN  
PRN Reason: Pain Scale 6 - 10  
Last Admin: 24 08:38 Dose: 5 mg  
  
Pantoprazole Sodium (Pantoprazole 40 Mg Tablet.) 40 mg PO DAILY [facility]  
Stop: 25 08:59  
Last Admin: 24 08:37 Dose: 40 mg  
  
Polyethylene Glycol (Polyethylene Glycol 3350 17 Gm Powd.Pack) 17 gm PO DAILY 
[facility]  
Stop: 24 08:59  
Last Admin: 24 08:37 Dose: 17 gm  
  
Prochlorperazine Edisylate (Prochlorperazine Edisylate 10 Mg/2 Ml Vial) 5 mg IV-
PUSH   
Q4H PRN  
PRN Reason: Nausea And Vomiting  
Stop: 25 17:55  
Senna/Docusate Sodium (Sennosides/Docusate 8.6-50mg 1 Tab Tablet) 2 tab PO DAILY
 [facility]  
Stop: 24 08:59  
Last Admin: 24 08:37 Dose: 2 tab  
  
Sodium Chloride (Sodium Chloride 0.9 % 10 Ml Syringe) 0 ml IV-PUSH QSHIFT [facility]  
Stop: 25 13:59  
Last Admin: 24 05:33 Dose: Not Given  
  
Sodium Chloride (Sodium Chloride 0.9 % 10 Ml Syringe) 0 ml IV-PUSH PRN PRN  
PRN Reason: Flush  
Stop: 25 13:43  
Last Admin: 24 23:45 Dose: 10 ml  
  
Tramadol HCl (Tramadol 50 Mg Tablet) 50 mg PO Q6H PRN  
PRN Reason: Pain Scale 1 - 5  
Stop: 25 11:32  
Last Admin: 24 01:16 Dose: 50 mg  
  
Valsartan (Valsartan 80 Mg Tablet) 80 mg PO DAILY [facility]  
Stop: 25 13:09  
Last Admin: 24 08:37 Dose: 80 mg  
  
  
  
  
A&P - Infectious Disease  
Assessment/Plan  
(1) Septic arthritis of knee, right:  
(2) Positive blood culture:  
(3) Streptococcus viridans infection:  
  
Plan  
Stillmore strep from blood culture which would likely be the strep mitis oralis 
that was   
cultured from the joint fluid. Ceftriaxone should suffice. Once dailyneeded. 
Patient   
lives closest to [facility] about 12 miles and expressesconcern about 
getting   
there once daily. His sister is currently in the room anddid offer for him to 
stay at   
her house which is closer to [facility]. Patient states 
he   
only has Medicare and cannot afford IV antibiotics at home. Ultimately will need
 to   
get IV ceftriaxone once daily. 24more days required  
  
  
Documented By: Paul Crum MD [id number]  
Signed By: <Electronically signed by MD Paul Crum>  
24  
   
  
[facility]  
Work Phone: [phone]- Progress note  
  
  
  
  
                                        Author              Josie Ann  
[facility]  
2024 1:08pm  
   
                                        Note Date/Time      2024 1:  
08pm  
   
                                                    [facility]  
ENTER  
[address] [place] [address]  
  
Hospitalist Progress Note  
Signed  
  
Patient: Kaylynn Mims MR#: [id number]  
[id number]  
: 1955 Acct:[id number]  
  
Age/Sex: 68 / M Adm Date:   
4  
Loc: 4N Room: 9N8142-8 Type: ADM IN  
Attending Dr: Josie Ann MD  
  
Copies to: ~  
  
  
Date of Service:  
2024  
  
  
Subjective  
Subjective  
Narrative:  
  
Patient underwent irrigation of the right knee on , cultures pending, 
so far   
negative  
Blood cultures strep viridans 1 out of 2  
  
Patient is doing better, the pain improved after the surgery  
  
  
Physical exam:  
General -awake, alert, oriented ?3, not in acute distress  
Cardiovascular -S1 with S2, no murmurs, no rubs, no gallops  
Pulmonary - clear to auscultation bilaterally  
Gastrointestinal - abdomen is soft, nondistended, nontender, bowel sounds 
positive,   
there is no rigidity, no rebound  
Extremities -no edema  
Neurological -no focal neurological dysfunction noted  
  
  
  
Exam  
Physical Exam  
Vital Signs:  
  
  
  
  
Temp Pulse Resp BP Pulse Ox O2 Del Method O2 Flow Rate  
  
36.6 C 75 18 173/78 H 97 Room Air 2  
  
24 12:05 24 12:05 24 12:05 24 12:05 24 12:05 
24   
12:05 24 12:00  
  
  
  
  
Objective  
Lab Results  
  
24 05:51  
  
24 05:51  
  
Microbiology Results  
Microbiology  
  
24 10:30 Knee,Right - Other Aerobic Culture - Preliminary  
Rare normal skin zack  
1 Day  
24 10:30 Knee,Right - Other Anaerobic Culture - Preliminary  
No Anaerobes Isolated 1 Day  
24 10:28 Knee,Right - Other Aerobic Culture - Preliminary  
No Growth 1 Day  
24 10:28 Knee,Right - Other Anaerobic Culture - Preliminary  
No Anaerobes Isolated 1 Day  
24 10:29 Knee,Right - Other Aerobic Culture - Preliminary  
No Growth 1 Day  
24 10:29 Knee,Right - Other Anaerobic Culture - Preliminary  
No Anaerobes Isolated 1 Day  
24 23:00 Joint Fluid - Knee, Right Aerobic Culture - Preliminary  
24 23:00 Joint Fluid - Knee, Right Anaerobic Culture - Preliminary  
No Anaerobes Isolated 1 Day  
24 23:00 Joint Fluid - Knee, Right Gram Stain - Final  
24 19:15 Joint Fluid - Knee, Right Aerobic Culture - Final  
No Growth 2 Days  
24 19:15 Joint Fluid - Knee, Right Anaerobic Culture - Preliminary  
No Anaerobes Isolated 2 Days  
24 19:15 Joint Fluid - Knee, Right Gram Stain - Final  
24 19:45 Blood - Left Hand Blood Culture - Preliminary  
Presumptive Viridans Strep  
24 19:45 Blood - Left Hand Bacterial ID (NA Multiplex Assay) - Final  
24 19:55 Blood - Right Hand Blood Culture - Preliminary  
No Growth 1 Day  
  
  
  
Meds  
Allergies and Active Meds  
Allergies  
  
metformin Allergy (Unknown, Verified 24 08:53)  
back spasms  
Penicillins Allergy (Unknown, Verified 24 08:53)  
rash  
  
  
Active Meds:  
Active Medications  
  
  
  
Generic Name Dose Route Start Last Admin  
  
Trade Name Freq PRN Reason Stop Dose Admin  
  
Acetaminophen 650 mg 24 17:56 24 08:21  
  
Acetaminophen 325 Mg Tablet PO 25 17:55 650 mg  
  
Q4H PRN Administration  
  
Pain Scale 1 - 5  
  
Acetaminophen 1,000 mg 24 12:00 24 12:15  
  
Acetaminophen 500 Mg Tablet PO 25 11:59 1,000 mg  
  
Q8H [facility] Administration  
  
Ascorbic Acid 500 mg 24 17:00 24 08:20  
  
Ascorbic Acid 500 Mg Tablet PO 25 16:59 500 mg  
  
BID.WITH.MEALS [facility] Administration  
  
Docusate Sodium 200 mg 24 17:56  
  
Docusate 100 Mg Capsule PO 25 17:55  
  
BID PRN  
  
Constipation  
  
Enoxaparin Sodium 40 mg 24 10:00 24 11:05  
  
Enoxaparin 40 Mg/0.4 Ml Syringe SUBCUT 25 09:59 40 mg  
  
DAILY@1000 [facility] Administration  
  
Ferrous Sulfate 324 mg 24 17:00 24 08:20  
  
Ferrous Sulfate 324 Mg Tablet.Dr PO 25 16:59 324 mg  
  
BID.WITH.MEALS [facility] Administration  
  
Hydralazine HCl 10 mg 24 17:56  
  
Hydralazine 20 Mg/Ml Vial IV-PUSH 25 17:55  
  
Q4H PRN  
  
if SBP > 185  
  
Hydromorphone HCl 0.25 mg 24 17:56  
  
Hydromorphone 1 Mg/Ml Syringe IV-PUSH  
  
Q4H PRN  
  
pain  
  
Vancomycin HCl 1.5 gm/ 530 mls @ 353.333 mls/hr 24 08:00 24 08:39  
  
Dextrose IV 25 07:59 353.33 mls/hr  
  
Q12H [facility] Administration  
  
Cefepime HCl 2 gm in 50 mls @ 100 mls/hr 24 10:45 24 11:30  
  
Maxipime  mls/hr  
  
Q8H [facility] Administration  
  
Mineral Oil 1 each 24 11:33  
  
Mineral Oil (Or[place]) 1 Each Enema MN  
  
ONCE PRN  
  
Constipation  
  
Naloxone HCl 0.4 mg 24 11:33  
  
Naloxone Hcl 0.4 Mg/Ml Vial IV-PUSH 25 11:32  
  
Q2M PRN  
  
Opioid Reversal  
  
Oxycodone HCl 5 mg 24 17:56  
  
Oxycodone Ir 5 Mg Tablet PO  
  
Q4HR PRN  
  
Pain  
  
Oxycodone HCl 5 mg 24 11:33  
  
Oxycodone Ir 5 Mg Tablet PO  
  
Q4HR PRN  
  
Pain Scale 6 - 10  
  
Pantoprazole Sodium 40 mg 24 09:00 24 08:20  
  
Pantoprazole 40 Mg Tablet. PO 25 08:59 40 mg  
  
DAILY NIHARIKA Administration  
  
Polyethylene Glycol 17 gm 24 09:00 24 08:21  
  
Polyethylene Glycol 3350 17 Gm Powd.Pack PO 24 08:59 17 gm  
  
DAILY NIHARIKA Administration  
  
Prochlorperazine Edisylate 5 mg 24 17:56  
  
Prochlorperazine Edisylate 10 Mg/2 Ml Vial IV-PUSH 25 17:55  
  
Q4H PRN  
  
Nausea And Vomiting  
  
Senna/Docusate Sodium 2 tab 24 09:00 24 08:20  
  
Sennosides/Docusate 8.6-50mg 1 Tab Tablet PO 24 08:59 2 tab  
  
DAILY NIHARIKA Administration  
  
Sodium Chloride 0 ml 24 14:00 24 05:28  
  
Sodium Chloride 0.9 % 10 Ml Syringe IV-PUSH 25 13:59 Not Given  
  
QSHIFT [facility]  
  
Tramadol HCl 50 mg 24 11:33 24 11:06  
  
Tramadol 50 Mg Tablet PO 25 11:32 50 mg  
  
Q6H PRN Administration  
  
Pain Scale 1 - 5  
  
Vancomycin HCl 1 each 24 18:36  
  
Vancomycin - Pharmacy Dosing 1 Each Miscell IV  
  
ONCE PRN  
  
ZZ.Pharmacy Consult  
  
Protocol  
  
  
  
  
A&P - Hospitalist  
Assessment/Plan  
(1) Septic arthritis of knee, right:  
  
Plan  
1. Suspected right knee septic arthritis, status post irrigation on   
Continue with empiric ntibiotic therapy, ID consulted  
Synovial fluid cultures so far negative  
Blood cultures strep viridans 1 out of 2, repeated cultures pending  
Appreciate orthopedic input  
Continue with pain management  
  
2. DVT prophylaxis SCDs for now  
  
  
Documented By: Josie Ann MD 24 1307  
Signed By: <Electronically signed by Josie Ann MD>  
24 1836  
   
  
[facility]  
Work Phone: [phone]- Consult note  
  
  
  
  
                                        Author              Paul Crum  
[facility]  
2024 9:03am  
   
                                        Note Date/Time      2024 9:  
09am  
   
                                                    [facility]  
ENTER  
[address] [place] [address]  
  
Infect. Disease Consult Note  
Signed  
  
Patient: Kaylynn Mims MR#: [id number]  
[id number]  
: 1955 Acct:[id number]  
  
Age/Sex: 68 / M Adm Date:   
4  
Loc: 4N Room: 6Q2947-5 Type: ADM IN  
Attending Dr: Josie Ann MD  
  
Copies to: DO Paul Baig MD Ruta Semaskiene, MD~  
  
  
HPI  
Data of Consult  
Consult date:  
24  
  
Requesting Physician:  
Josie Ann MD  
  
Primary Care Provider:  
Elvira Harper DO  
  
Consult Narrative  
History of present illness:  
Mr. Mims is a 68 year old male who is status post right knee irrigation and 
lavage   
after concern for septic arthritis became apparent. He has bilateral 
osteoarthritis.   
He recently had an injection of steroids in his right knee and shortly after 
that the   
knee became exquisitely worse with pain and swelling. Blood cultures were drawn 
and   
are positive for Streptococcus viridans. Aspiration of the joint fluid confirmed
   
infection. Intraoperative cultures pending.  
  
CC: Josie Ann MD  
  
  
[facility]  
Surgical History (Reviewed 24 @ 14:38 by GARRET Chong)  
  
History of facial surgery  
jaw  
  
  
Family History (Reviewed 24 @ 14:38 by GARRET Chong)  
Brother Heart disease  
Legacy FamHx Relation: Brother(s)  
Family history of mental disorder  
Legacy FamHx Relation: Brother(s); Legacy FamHx Problem: Diagnosed with Mental   
Illness  
Father   
Family/Other   
Legacy FamHx Problem: father, --cirrhosis of the liver:mother,   
--multiple health issues:1 sister, --pancreatic cancer  
Mother   
Heart disease  
Sister Cancer  
Legacy FamHx Problem: Diagnosed with Cancer  
Heart disease  
  
  
Social History  
Smoking Status: Never smoker  
Substance Use Type: None  
  
Allergies and Medications  
Allergies and Active Meds  
Allergies  
  
metformin Allergy (Unknown, Verified 24 08:53)  
back spasms  
Penicillins Allergy (Unknown, Verified 24 08:53)  
rash  
  
  
Active Medications  
  
Acetaminophen (Acetaminophen 325 Mg Tablet) 650 mg PO Q4H PRN  
PRN Reason: Pain Scale 1 - 5  
Stop: 25 17:55  
Last Admin: 24 08:21 Dose: 650 mg  
  
Acetaminophen (Acetaminophen 500 Mg Tablet) 1,000 mg PO Q8H [facility]  
Stop: 25 11:59  
Last Admin: 24 03:10 Dose: Not Given  
  
Ascorbic Acid (Ascorbic Acid 500 Mg Tablet) 500 mg PO BID.WITH.MEALS [facility]  
Stop: 25 16:59  
Last Admin: 24 08:20 Dose: 500 mg  
  
Docusate Sodium (Docusate 100 Mg Capsule) 200 mg PO BID PRN  
PRN Reason: Constipation  
Stop: 25 17:55  
Enoxaparin Sodium (Enoxaparin 40 Mg/0.4 Ml Syringe) 40 mg SUBCUT DAILY@1000 [facility]  
Stop: 25 09:59  
Ferrous Sulfate (Ferrous Sulfate 324 Mg Tablet.Dr) 324 mg PO BID.WITH.MEALS [facility]  
Stop: 25 16:59  
Last Admin: 24 08:20 Dose: 324 mg  
  
Hydralazine HCl (Hydralazine 20 Mg/Ml Vial) 10 mg IV-PUSH Q4H PRN  
PRN Reason: if SBP > 185  
Stop: 25 17:55  
Hydromorphone HCl (Hydromorphone 1 Mg/Ml Syringe) 0.25 mg IV-PUSH Q4H PRN  
PRN Reason: pain  
Vancomycin HCl 1.5 gm/ (Dextrose) 530 mls @ 353.333 mls/hr IV Q12H [facility]  
Stop: 25 07:59  
Last Admin: 24 08:39 Dose: 353.33 mls/hr  
  
Cefepime HCl (Maxipime) 1 gm in 50 mls @ 12.5 mls/hr IV Q8H [facility]  
Last Infusion: 24 03:45 Dose: Infused  
  
Mineral Oil (Mineral Oil (Orange) 1 Each Enema) 1 each MN ONCE PRN  
PRN Reason: Constipation  
Naloxone HCl (Naloxone Hcl 0.4 Mg/Ml Vial) 0.4 mg IV-PUSH Q2M PRN  
PRN Reason: Opioid Reversal  
Stop: 25 11:32  
Oxycodone HCl (Oxycodone Ir 5 Mg Tablet) 5 mg PO Q4HR PRN  
PRN Reason: Pain  
Oxycodone HCl (Oxycodone Ir 5 Mg Tablet) 5 mg PO Q4HR PRN  
PRN Reason: Pain Scale 6 - 10  
Pantoprazole Sodium (Pantoprazole 40 Mg Tablet.Dr) 40 mg PO DAILY [facility]  
Stop: 25 08:59  
Last Admin: 24 08:20 Dose: 40 mg  
  
Polyethylene Glycol (Polyethylene Glycol 3350 17 Gm Powd.Pack) 17 gm PO DAILY 
[facility]  
Stop: 24 08:59  
Last Admin: 24 08:21 Dose: 17 gm  
  
Prochlorperazine Edisylate (Prochlorperazine Edisylate 10 Mg/2 Ml Vial) 5 mg IV-
PUSH   
Q4H PRN  
PRN Reason: Nausea And Vomiting  
Stop: 25 17:55  
Senna/Docusate Sodium (Sennosides/Docusate 8.6-50mg 1 Tab Tablet) 2 tab PO DAILY
 [facility]  
Stop: 24 08:59  
Last Admin: 24 08:20 Dose: 2 tab  
  
Sodium Chloride (Sodium Chloride 0.9 % 10 Ml Syringe) 0 ml IV-PUSH QSHIFT [facility]  
Stop: 25 13:59  
Last Admin: 24 05:28 Dose: Not Given  
  
Tramadol HCl (Tramadol 50 Mg Tablet) 50 mg PO Q6H PRN  
PRN Reason: Pain Scale 1 - 5  
Stop: 25 11:32  
Last Admin: 24 01:29 Dose: 50 mg  
  
Vancomycin HCl (Vancomycin - Pharmacy Dosing 1 Each Miscell) 1 each IV ONCE PRN;
   
Protocol  
PRN Reason: ZZ.Pharmacy Consult  
  
  
  
Exam  
Physical Exam  
Vital Signs:  
  
  
Vital Signs  
  
  
  
Temp Pulse Pulse Resp BP BP Pulse Ox  
  
24 07:46 97.9 F 72 16 169/84 H 97  
  
24 04:00 97.9 F 70 16 133/68 95  
  
24 23:59 97.9 F 74 18 177/83 H 98  
  
24 20:40  
  
24 19:06 98.6 F 72 17 182/61 H 95  
  
24 17:44  
  
24 17:42 66 17 153/69 H 97  
  
24 16:21 66 17 143/73 H 96  
  
24 16:00  
  
24 15:51 72 17 142/71 H 96  
  
24 15:21 68 17 157/70 H 96  
  
24 14:51 70 17 163/73 H 94 L  
  
24 14:21 73 17 162/80 H 95  
  
24 14:06 68 17 159/85 H 96  
  
24 13:51 69 17 143/72 H 96  
  
24 13:36 68 17 141/73 H 97  
  
24 13:21 67 17 152/78 H 97  
  
24 12:30  
  
24 12:06 70 16 142/72 H 95  
  
24 11:45 97.4 F L 69 16 135/70 95  
  
24 11:35 73 14 147/72 H 98  
  
24 11:30 69 16 145/73 H 98  
  
24 11:25 69 16 142/77 H 98  
  
24 11:20 98.4 F 69 14 141/75 H 95  
  
24 09:07 98.1 F 73 18 144/85 H 96  
  
  
  
  
O2 Del Method O2 Flow Rate  
  
24 07:46 Room Air  
  
24 04:00 Room Air  
  
24 23:59 Room Air  
  
24 20:40 Room Air  
  
24 19:06  
  
24 17:44 Room Air  
  
24 17:42 Room Air  
  
24 16:21 Room Air  
  
24 16:00 Room Air  
  
24 15:51 Room Air  
  
24 15:21 Nasal Cannula 2  
  
24 14:51 Nasal Cannula 2  
  
24 14:21 Nasal Cannula 2  
  
24 14:06 Nasal Cannula 2  
  
24 13:51 Nasal Cannula 3  
  
24 13:36 Nasal Cannula 3  
  
24 13:21 Nasal Cannula 3  
  
24 12:30 Nasal Cannula 3  
  
24 12:06  
  
24 11:45  
  
24 11:35  
  
24 11:30  
  
24 11:25  
  
24 11:20 Simple Mask 8  
  
24 09:07 Room Air  
  
  
Intake and Output  
  
  
  
24  
  
23:59 07:59 15:59  
  
Intake Total 1130 / 1810 750 / 750  
  
Output Total 1800 / 1800  
  
Balance 1130 / 1650 -1050 / -1050  
  
Intake:  
  
 / 1260 50 / 50  
  
Cefepime 1Gm-*Ns* 1 gm In 50 ml 50 / 100 50 / 50  
  
@ 12.5 mls/hr IV Q8H [facility] Rx#:  
  
[id number]  
  
Vancomycin 1.5 gm In Dextrose 5 530 / 1060  
  
% in Water 500 ml @ 353.333  
  
mls/hr IV Q12H [facility] Rx#:[id number]  
  
Oral 550 / 550 700 / 700  
  
Output:  
  
Urine 1800 / 1800  
  
Other:  
  
# Unmeasured Voids 2  
  
Weight 346 lb 12.594 oz  
  
Date of Last Bowel Movement 24  
  
  
Patient Weight  
  
  
  
24  
  
23:59  
  
Weight 346 lb 12.594 oz  
  
  
  
Const  
General: cooperative and no acute distress  
Orientation: oriented x3  
HEENT  
Head: normal to inspection  
Ears: hearing grossly normal bilaterally  
Mouth: oral mucosae normal  
Eyes  
General: appearance normal, both eyes and all related structures  
Neck  
Neck: normal visual inspection  
Chest  
Chest palpation & inspection: normal inspection of the chest  
Resp  
Effort & Inspection: normal respiratory effort  
Cardio  
Rate: regular rate  
Rhythm: regular rhythm  
GI  
Inspection: normal to inspection  
Palpation: soft and nontender  
Auscultation: normal bowel sounds  
Skin  
General: no rashes or lesions noted  
Neuro  
General: patient oriented x3  
Extrem  
General: abnormal to inspection (R knee wrapped)  
  
Results - Infectious Disease  
Labs  
  
24 05:51  
  
24 05:51  
  
Labs:  
  
Laboratory Results - last 24 hr  
  
  
  
24  
  
05:51  
  
Corrected WBC 9.1  
  
Uncorrected WBC Count 9.1  
  
RBC 4.95  
  
Hgb 14.7  
  
Hct 43.3  
  
MCV 87.6  
  
MCH 29.7  
  
MCHC 33.9  
  
RDW 13.3  
  
Plt Count 180  
  
MPV 7.6  
  
Neut % (Auto) 80.3  
  
Lymph % (Auto) 9.3  
  
Mono % (Auto) 9.7  
  
Eos % (Auto) 0.1  
  
Baso % (Auto) 0.6  
  
Nucleat RBC Rel Count 0.2  
  
Neut # (Auto) 7.3  
  
Lymph # (Auto) 0.8 L  
  
Mono # (Auto) 0.9 H  
  
Eos # (Auto) 0.0  
  
Baso # (Auto) 0.1  
  
PHA Creatinine Clear 133.88  
  
Sodium 136  
  
Potassium 4.3  
  
Chloride 103  
  
Carbon Dioxide 25.9  
  
Anion Gap 11.4  
  
BUN 20  
  
Creatinine 0.85  
  
Est GFR (CKD-EPI) > 60.0  
  
Glucose 146 H  
  
Calcium 8.2 L  
  
  
  
  
Laboratory Tests  
  
  
  
24  
  
23:00  
  
Synovial RBC 6370 H  
  
Synovial Tot Nuc Cell 83666 H  
  
Synovial Neutrophils 92 H  
  
Synovial Crystals None seen  
  
  
  
Microbiology Results  
Microbiology Narrative:  
  
  
  
  
  
24 19:45 Blood Culture - Preliminary  
  
Blood - Left Hand Presumptive Viridans Strep  
  
Bacterial ID (NA Multiplex Assay) - Final  
  
24 19:55 Blood Culture - Preliminary  
  
Blood - Right Hand No Growth 1 Day  
  
24 10:30 Aerobic Culture - Pending  
  
Knee,Right - Other Anaerobic Culture - Pending  
  
Gram Stain - Pending  
  
24 10:29 Aerobic Culture - Pending  
  
Knee,Right - Other Anaerobic Culture - Pending  
  
Gram Stain - Pending  
  
24 10:28 Aerobic Culture - Pending  
  
Knee,Right - Other Anaerobic Culture - Pending  
  
Gram Stain - Pending  
  
24 19:15 Aerobic Culture - Preliminary  
  
Joint Fluid - Knee, Right No Growth 1 Day  
  
Anaerobic Culture - Preliminary  
  
No Anaerobes Isolated 1 Day  
  
Gram Stain -  
  
Gram Stain Final   
3+ White Blood Cells  
No Bacteria Seen Final  
  
24 23:00 Aerobic Culture - Pending  
  
Joint Fluid - Knee, Right Anaerobic Culture - Pending  
  
Gram Stain -  
  
Gram Stain Final 24-  
Rare White Blood Cells  
4+ Red Blood Cells  
No Bacteria Seen Final  
  
  
  
  
A&P - Infectious Disease  
(1) Septic arthritis of knee, right:  
(2) Positive blood culture:  
(3) Streptococcus viridans infection:  
  
Plan  
Patient's blood culture 1 of 2 sets is positive for presumptive viridans strep. 
I   
presume this organism is likely what is infecting the knee however given the   
injection so I will cover for potential healthcare associated pathogens. 
Therefore   
vancomycin and cefepime to be maintained at this time. Patient likelywill 
require   
PICC line. Gram stain did not show any bacteria but did have whitecells. Cell 
count   
consistent with septic arthritis. Follow-up on intraoperative cultures.  
  
  
Documented By: Paul Crum MD 24 0855  
Signed By: <Electronically signed by MD Paul Crum>  
24 0903  
   
  
[facility]  
Work Phone: [phone]- Progress note  
  
  
  
  
                                        Author              Jeff Milliganisle  
[facility]  
2024 8:06am  
   
                                        Note Date/Time      2024 8:  
06am  
   
                                                    [facility]  
ENTER  
[address] [place] [address]  
  
Orthopedic Progress Note  
Signed  
  
Patient: Kaylynn Mims MR#: [id number]  
[id number]  
: 1955 Acct:[id number]  
  
Age/Sex: 68 / M Adm Date:   
4  
Loc: 4N Room: [address] [place] [address] Type: ADM IN  
Attending Dr: Josie Ann MD  
  
Copies to: ~  
  
  
Date of Service:  
2024  
  
  
Subjective  
Subjective  
Interval History:  
Patient resting comfortably in bed this morning. He reports that he is sore in 
the   
knee but it feels better than what it did prior to surgery.  
Nursing reports no acute events overnight  
  
Exam  
Physical Exam  
Vital Signs:  
  
  
  
  
Temp Pulse Resp BP Pulse Ox O2 Del Method O2 Flow Rate  
  
97.9 F 72 16 169/84 H 97 Room Air 2  
  
24 07:46 24 07:46 24 07:46 24 07:46 24 07:46 
24   
07:46 24 15:21  
  
  
  
Narrative:  
Right knee Ace wrap and dressing is clean dry and intact. Passively I can move 
his   
knee in a short arc of motion without any discomfort. Neurovascular intact 
distally  
  
Objective  
Labs  
Labs:  
Laboratory Results - last 24 hr  
  
  
  
24  
  
05:51  
  
Corrected WBC 9.1  
  
Uncorrected WBC Count 9.1  
  
RBC 4.95  
  
Hgb 14.7  
  
Hct 43.3  
  
MCV 87.6  
  
MCH 29.7  
  
MCHC 33.9  
  
RDW 13.3  
  
Plt Count 180  
  
MPV 7.6  
  
Neut % (Auto) 80.3  
  
Lymph % (Auto) 9.3  
  
Mono % (Auto) 9.7  
  
Eos % (Auto) 0.1  
  
Baso % (Auto) 0.6  
  
Nucleat RBC Rel Count 0.2  
  
Neut # (Auto) 7.3  
  
Lymph # (Auto) 0.8 L  
  
Mono # (Auto) 0.9 H  
  
Eos # (Auto) 0.0  
  
Baso # (Auto) 0.1  
  
PHA Creatinine Clear 133.88  
  
Sodium 136  
  
Potassium 4.3  
  
Chloride 103  
  
Carbon Dioxide 25.9  
  
Anion Gap 11.4  
  
BUN 20  
  
Creatinine 0.85  
  
Est GFR (CKD-EPI) > 60.0  
  
Glucose 146 H  
  
Calcium 8.2 L  
  
  
  
Micro  
Microbiology Results:  
Microbiology  
  
24 19:45 Blood - Left Hand Blood Culture - Preliminary  
Presumptive Viridans Strep  
24 19:45 Blood - Left Hand Bacterial ID (NA Multiplex Assay) - Final  
24 19:55 Blood - Right Hand Blood Culture - Preliminary  
No Growth 1 Day  
24 19:15 Joint Fluid - Knee, Right Aerobic Culture - Preliminary  
No Growth 1 Day  
24 19:15 Joint Fluid - Knee, Right Anaerobic Culture - Preliminary  
No Anaerobes Isolated 1 Day  
24 19:15 Joint Fluid - Knee, Right Gram Stain - Final  
  
  
  
Assessment / Plan  
Assessment and plan  
(1) Septic arthritis of knee, right:  
Code(s):  
M00.9 - Pyogenic arthritis, unspecified  
  
Plan  
POD 1 sp R knee washout for septic arthritis  
1. Pain control  
2. DVT prophylaxis: SCDs bilaterally and I will defer to the hospitalist team 
onany   
chemical prophylaxis  
3. Infectious disease consulted. Antibiotics per ID. Intraoperative cultures 
pending.   
Blood culture from  is growing strep viridans  
4. PT/OT: WBAT and range of motion as tolerated right lower extremity  
5. Keep dressing in place until 2-week postop follow-up in the office. Upon   
discharge, needs set up with outpatient physical therapy to continue working on 
range   
of motion.  
6. Orthopedic surgery will sign off. Please call with any questions or concerns  
  
  
Documented By: Jeff Onofre MD 24 08  
03  
Signed By: <Electronically signed by Jeff Onofre MD>  
24 0806  
   
  
[facility]  
Work Phone: [phone]- Progress note  
  
  
  
  
                                        Author              Josie nAn  
[facility]  
2024 7:23pm  
   
                                        Note Date/Time      2024 11  
:42am  
   
                                                    [facility]  
ENTER  
[address] [place] [address]  
  
Hospitalist Progress Note  
Signed with Addenda  
  
Patient: Kaylynn Mims MR#: [id number]  
[id number]  
: 1955 Acct:[id number]  
  
Age/Sex: 68 / M Adm Date:   
4  
Loc:  Room: 5T4868-0 Type: ADM IN  
Attending Dr: Josie Ann MD  
  
Copies to: ~  
  
  
  
**ADDENDUM**1  
Patient has been seen and examined today. Patient is doing better. After the 
surgery   
his pain is improved. He was able to ambulate with quite reasonably good pain   
control.  
Otherwise he denies any complaints. No chest pain no shortness of breath no   
palpitations no dizziness  
  
Addendum Documented By: Josie Ann MD 24  
Addendum Signed By: <Electronically signed by Josie Ann MD> 24  
  
  
  
Date of Service:  
2024  
  
  
Subjective  
Subjective  
Narrative:  
  
Patient underwent irrigation of the right knee, cultures pending, so far 
negative  
  
  
Physical exam:  
General -awake, alert, oriented ?3, not in acute distress  
Cardiovascular -S1 with S2, no murmurs, no rubs, no gallops  
Pulmonary - clear to auscultation bilaterally  
Gastrointestinal - abdomen is soft, nondistended, nontender, bowel sounds 
positive,   
there is no rigidity, no rebound  
Extremities -no edema  
Neurological -no focal neurological dysfunction noted  
  
  
  
Exam  
Physical Exam  
Vital Signs:  
  
  
  
  
Temp Pulse Resp BP Pulse Ox O2 Del Method  
  
36.7 C 73 18 144/85 H 96 Room Air  
  
24 09:07 24 09:07 24 09:07 24 09:07 24 09:07 
24   
09:07  
  
  
  
  
Objective  
Lab Results  
  
24 06:26  
  
24 06:26  
  
Microbiology Results  
Microbiology  
  
24 19:15 Joint Fluid - Knee, Right Aerobic Culture - Preliminary  
No Growth 1 Day  
24 19:15 Joint Fluid - Knee, Right Anaerobic Culture - Preliminary  
No Anaerobes Isolated 1 Day  
24 19:15 Joint Fluid - Knee, Right Gram Stain - Final  
24 23:00 Joint Fluid - Knee, Right Gram Stain - Final  
  
  
  
Meds  
Allergies and Active Meds  
Allergies  
  
metformin Allergy (Unknown, Verified 24 08:53)  
back spasms  
Penicillins Allergy (Unknown, Verified 24 08:53)  
rash  
  
  
Active Meds:  
Active Medications  
  
  
  
Generic Name Dose Route Start Last Admin  
  
Trade Name Freq PRN Reason Stop Dose Admin  
  
Acetaminophen 650 mg 24 17:56  
  
Acetaminophen 325 Mg Tablet PO 25 17:55  
  
Q4H PRN  
  
Pain Scale 1 - 5  
  
Acetaminophen 1,000 mg 24 11:45  
  
Acetaminophen 500 Mg Tablet PO 25 11:44  
  
Q8H [facility]  
  
Ascorbic Acid 500 mg 24 17:00  
  
Ascorbic Acid 500 Mg Tablet PO 25 16:59  
  
BID.WITH.MEALS [facility]  
  
Docusate Sodium 200 mg 24 17:56  
  
Docusate 100 Mg Capsule PO 25 17:55  
  
BID PRN  
  
Constipation  
  
Droperidol 1.25 mg 24 09:41  
  
Droperidol 5 Mg/2 Ml Vial IV-PUSH 24 12:42  
  
ONCE PRN  
  
Nausea And Vomiting  
  
Ferrous Sulfate 324 mg 24 17:00  
  
Ferrous Sulfate 324 Mg Tablet.Dr PO 25 16:59  
  
BID.WITH.MEALS [facility]  
  
Hydralazine HCl 10 mg 24 17:56  
  
Hydralazine 20 Mg/Ml Vial IV-PUSH 25 17:55  
  
Q4H PRN  
  
if SBP > 185  
  
Hydromorphone HCl 0.25 mg 24 17:56  
  
Hydromorphone 1 Mg/Ml Syringe IV-PUSH  
  
Q4H PRN  
  
pain  
  
Hydromorphone HCl 0.5 mg 24 09:41  
  
Hydromorphone 0.5 Mg/0.5 Ml Syringe IV-PUSH 24 12:42  
  
Q5M PRN  
  
Pain  
  
Vancomycin HCl 1.5 gm/ 530 mls @ 353.333 mls/hr 24 08:00 24 08:09  
  
Dextrose IV 25 07:59 353.33 mls/hr  
  
Q12H [facility] Administration  
  
Cefepime HCl 1 gm in 50 mls @ 12.5 mls/hr 24 06:30 24 08:09  
  
Maxipime IV 12.5 mls/hr  
  
Q8H [facility] Administration  
  
Lactated Ringer's 1,000 mls @ 20 mls/hr 24 08:03 24 09:17  
  
Lactated Ringers IV 24 08:02 20 mls/hr  
  
.Q24H ONE Administration  
  
Ketorolac Tromethamine 15 mg 24 22:00 24 05:30  
  
Ketorolac Tromethamine 15 Mg/Ml Vial IV-PUSH 24 14:01 15 mg  
  
Q8HR [facility] Administration  
  
Ketorolac Tromethamine 15 mg 24 11:33  
  
Ketorolac Tromethamine 15 Mg/Ml Vial IV-PUSH 24 11:34  
  
ONCE ONE  
  
Labetalol HCl 10 mg 24 09:41  
  
Labetalol 100 Mg/20 Ml Vial IV-PUSH 24 12:42  
  
Q10M PRN  
  
Hypertension  
  
Mineral Oil 1 each 24 11:33  
  
Mineral Oil (Orange) 1 Each Enema MN  
  
ONCE PRN  
  
Constipation  
  
Naloxone HCl 0.4 mg 24 11:33  
  
Naloxone Hcl 0.4 Mg/Ml Vial IV-PUSH 25 11:32  
  
Q2M PRN  
  
Opioid Reversal  
  
Oxycodone HCl 5 mg 24 17:56  
  
Oxycodone Ir 5 Mg Tablet PO  
  
Q4HR PRN  
  
Pain  
  
Oxycodone HCl 5 mg 24 11:33  
  
Oxycodone Ir 5 Mg Tablet PO  
  
Q4HR PRN  
  
Pain Scale 6 - 10  
  
Pantoprazole Sodium 40 mg 24 09:00  
  
Pantoprazole 40 Mg Tablet. PO 25 08:59  
  
DAILY [facility]  
  
Polyethylene Glycol 17 gm 24 09:00  
  
Polyethylene Glycol 3350 17 Gm Powd.Pack PO 24 08:59  
  
DAILY [facility]  
  
Prochlorperazine Edisylate 5 mg 24 17:56  
  
Prochlorperazine Edisylate 10 Mg/2 Ml Vial IV-PUSH 25 17:55  
  
Q4H PRN  
  
Nausea And Vomiting  
  
Senna/Docusate Sodium 2 tab 24 09:00  
  
Sennosides/Docusate 8.6-50mg 1 Tab Tablet PO 24 08:59  
  
DAILY [facility]  
  
Sodium Chloride 0 ml 24 14:00  
  
Sodium Chloride 0.9 % 10 Ml Syringe IV-PUSH 25 13:59  
  
QSHIFT [facility]  
  
Tramadol HCl 50 mg 24 11:33  
  
Tramadol 50 Mg Tablet PO 25 11:32  
  
Q6H PRN  
  
Pain Scale 1 - 5  
  
Vancomycin HCl 1 each 24 18:36  
  
Vancomycin - Pharmacy Dosing 1 Each Miscell IV  
  
ONCE PRN  
  
ZZ.Pharmacy Consult  
  
Protocol  
  
  
  
  
A&P - Hospitalist  
Assessment/Plan  
(1) Septic arthritis of knee, right:  
  
Plan  
1. Suspected right knee septic arthritis, status post irrigation on   
Continue with empiric ntibiotic therapy, ID consulted  
Appreciate orthopedic input  
Continue with pain management  
  
2. DVT prophylaxis SCDs for now  
  
  
Documented By: Josie Ann MD 24 1141  
Signed By: <Electronically signed by Josie Ann MD>  
24 1142  
   
  
[facility]  
Work Phone: [phone]- History and physical note  
  
  
  
  
                                        Author              Josie Ann  
[facility]  
2024 11:41am  
   
                                        Note Date/Time      2024 6:  
33pm  
   
                                                    [facility]  
ENTER  
[address] [place] [address]  
  
Hospitalist H&P  
Signed  
  
Patient: Kaylynn Mims MR#: [id number]  
[id number]  
: 1955 Acct:[id number]  
  
Age/Sex: 68 / M Adm Date:   
4  
Loc:  Room: 5B7555-2 Type: ADM IN  
Attending Dr: Josie Ann MD  
  
Copies to: Elvira Harper, DO Josie Ann MD~  
  
  
HPI  
DATE OF EXAMINATION: 24  
CHIEF COMPLAINT: Right knee pain  
HISTORY OF PRESENT ILLNESS:  
68 years old male presented with complaints of right knee pain. Patient does 
have   
history of osteoarthritis and got injection into his left knee previously, on   
Wednesday, which is 5 days ago patient got injection into his left and right 
knee   
with he thinks with steroids by Dr. Mora. That day patient was doing quite 
well. He   
was walking and done some shopping. However the next day the pain started to 
come, on   
Friday he was not able to ambulate normally. The pain was quite severe. He went 
to   
[place] emergency room and was prescribed some pain medications. And was sent 
home.   
The pain continued so he came to emergency room. He denied any fevers any 
chills. He   
denied any injury or any trauma. He denied any recent infections. X-ray in 
emergency   
room was done which showed large effusions with degenerative changes, effusions 
are   
worse compared to 2024.  
  
10 systems are reviewed and are negative apart as mentioned H&P  
  
General -patient is awake alert oriented ?3, does not appear to be in distress  
HEENT -normal oropharyngeal mucosa without any ulcers or exudates  
Cardiovascular -S1 plus S2, with regular rate, without any murmurs, gallops, 
rubs  
Pulmonary -clear to auscultation bilaterally  
Gastrointestinal -abdomen is soft, nondistended, nontender, bowel sounds 
positive, no   
rigidity, no rebound  
Genitourinary -no flank tenderness  
Musculoskeletal -no back tenderness,  
Right knee is quite swollen and warmer than the left, but there is no redness, 
no   
skin lesions, no signs of trauma  
Neurological -no facial asymmetry noted, pupils are equal and symmetrical,   
extraocular muscle intact, no nystagmus, motor function 5 out of 5 in upper and 
lower   
extremities, sensation 5 out of 5 in upper and lower extremities, finger to nose
 test   
performed well without any dysmetria, reflexes symmetrical bilaterally in lower   
extremities, speech is clear, no tremors noted, gait deferred  
Skin -no significant ulcers, no rash noted  
Extremities - no edema in bilateral lower extremities noted  
Psychiatry - appropriate affect  
  
  
  
[facility]  
Surgical History (Reviewed 24 @ 14:38 by GARRET Chong)  
  
History of facial surgery  
jaw  
  
  
Family History (Reviewed 24 @ 14:38 by GARRET Chong)  
Brother Heart disease  
Legacy FamHx Relation: Brother(s)  
Family history of mental disorder  
Legacy FamHx Relation: Brother(s); Legacy FamHx Problem: Diagnosed with Mental   
Illness  
Father   
Family/Other   
Legacy FamHx Problem: father, --cirrhosis of the liver:mother,   
--multiple health issues:1 sister, --pancreatic cancer  
Mother   
Heart disease  
Sister Cancer  
Legacy FamHx Problem: Diagnosed with Cancer  
Heart disease  
  
  
Social History  
Smoking Status: Never smoker  
Substance Use Type: None  
  
Meds  
Medications and Allergies  
Allergies  
  
metformin Allergy (Unknown, Verified 24 08:53)  
back spasms  
Penicillins Allergy (Unknown, Verified 24 08:53)  
rash  
  
  
Home Medications  
  
blood sugar diagnostic #50 ea 05/15/24 [Rx Confirmed 24]  
testosterone cypionate 200 mg/mL intramuscular oil 200 mg IM Q2W 24 days #2 mL   
24 [Rx Confirmed 24]  
diclofenac sodium 75 mg tablet,delayed release 75 mg PO BID 30 days #60 tabs 
24   
[Rx Confirmed 24]  
nabumetone 750 mg tablet 750 mg PO BID PRN pain 24 [History Confirmed 
24]  
  
  
  
Exam  
Physical Exam  
Vital Signs:  
  
  
  
  
Temp Pulse Resp BP Pulse Ox O2 Del Method  
  
36.8 C 75 18 148/67 H 95 Room Air  
  
24 17:08 24 17:08 24 17:08 24 17:08 24 17:08 
24   
17:08  
  
  
  
  
Results - Hospitalist H&P  
Lab Results  
Labs:  
Laboratory Last Values  
  
  
  
Corrected WBC 9.4 X10E3/uL (4.1-10.5) 24 16:43  
  
Uncorrected WBC Count 9.4 x10E3/uL (4.1-10.5) 24 16:43  
  
RBC 5.50 X10E6/uL (3.90-5.60) 24 16:43  
  
Hgb 16.3 g/dL (13.0-17.0) 24 16:43  
  
Hct 47.8 % (38.8-50.0) 24 16:43  
  
MCV 86.9 fl (83.5-101) 24 16:43  
  
MCH 29.6 pg (27.5-35.2) 24 16:43  
  
MCHC 34.1 g/dL (32.5-35.6) 24 16:43  
  
RDW 13.3 % (12.0-14.8) 24 16:43  
  
Plt Count 170 x10E3/uL (150-450) 24 16:43  
  
MPV 8.0 fl (6.6-10.1) 24 16:43  
  
Neut % (Auto) 88.3 % (.) 24 16:43  
  
Lymph % (Auto) 6.0 % (.) 24 16:43  
  
Mono % (Auto) 5.1 % (.) 24 16:43  
  
Eos % (Auto) 0.1 % (.) 24 16:43  
  
Baso % (Auto) 0.5 % (.) 24 16:43  
  
Nucleat RBC Rel Count 0.8 /100 WBC (0-0.5) H 24 16:43  
  
Neut # (Auto) 8.4 x10E3/uL (1.8-7.7) H 24 16:43  
  
Lymph # (Auto) 0.6 x10E3/uL (1.00-4.8) L 24 16:43  
  
Mono # (Auto) 0.5 x10E3/uL (0.0-0.8) 24 16:43  
  
Eos # (Auto) 0.0 x10E3/uL (0.0-0.45) 24 16:43  
  
Baso # (Auto) 0.0 x10E3/uL (0.0-0.2) 24 16:43  
  
Monocyte Dist Width 22.07 % (0.00-20.00) H 24 16:43  
  
ESR 32 mm/hr (0-19) H 24 16:43  
  
PHA Creatinine Clear 143.91 24 16:43  
  
Sodium 135 mmol/L (136-145) L 24 16:43  
  
Potassium 4.0 mmol/L (3.5-5.1) 24 16:43  
  
Chloride 102 mmol/L () 24 16:43  
  
Carbon Dioxide 23.1 mmol/L (21.0-31.0) 24 16:43  
  
Anion Gap 13.9 mEq/L (6.0-15.0) 24 16:43  
  
BUN 15 mg/dL (7-25) 24 16:43  
  
Creatinine 0.68 mg/dL (0.70-1.30) L 24 16:43  
  
Est GFR (CKD-EPI) > 60.0 mL/Min 24 16:43  
  
Glucose 169 mg/dL () H 24 16:43  
  
Calcium 8.6 mg/dL (8.6-10.3) 24 16:43  
  
Total Bilirubin 1.5 mg/dl (0.3-1.0) H 24 16:43  
  
AST 19 U/L (13-39) 24 16:43  
  
ALT 38 U/L (7-52) 24 16:43  
  
Alkaline Phosphatase 60 U/L () 24 16:43  
  
C-Reactive Prot, Quant 12.3 mg/dL (0.0-0.5) H 24 16:43  
  
Total Protein 6.8 gm/dL (6.4-8.9) 24 16:43  
  
Albumin 3.9 gm/dL (3.5-5.7) 24 16:43  
  
Globulin 2.9 gm/dL 24 16:43  
  
Albumin/Globulin Ratio 1.3 24 16:43  
  
  
  
  
Assessment & Plan  
Assessment/Plan  
(1) Septic arthritis of knee, right:  
  
Plan  
1. Right knee intractable pain to extended he is not able to ambulate associated
with   
large knee effusions by clinical exam and on x-ray, status post intra-articular   
injection on , per patient with steroids  
Will start empiric antibiotic therapy,  
We will get in touch with orthopedic doctor regarding the patient  
N.p.o. after midnight if he needs any surgical intervention  
Pain management with IV for intractable pain  
  
2. DVT prophylaxis SCDs for now  
IP vs OBS Justification  
Based on differential dx, clinical care plan, and risk of adverse events, if   
untreated, in my clinical judgement this patient requires an acute care setting 
as:   
INPATIENT because of an expectation of an over 2 midnight stay.  
Estimated length of stay (# of days): 5  
  
  
Documented By: Josie Ann MD 24 182  
Signed By: <Electronically signed by Josie Ann MD>  
24 1141  
   
  
[facility]  
Work Phone: [phone]- Progress note  
  
  
  
  
                                        Author              Jeff Onofre  
[facility]  
2024 10:04am  
   
                                        Note Date/Time      2024 10  
:04am  
   
                                                    [facility]  
ENTER  
[address] [place] [address]  
  
Orthopedic Progress Note  
Signed  
  
Patient: Kaylynn Mims MR#: [id number]  
[id number]  
: 1955 Acct:[id number]  
  
Age/Sex: 68 / M Adm Date:   
4  
Loc:  Room: [address] [place] [address] Type: ADM IN  
Attending Dr: Josie Ann MD  
  
Copies to: ~  
  
  
Date of Service:  
2024  
  
  
Subjective  
Subjective  
Interval History:  
Patient is resting comfortably in bed this morning. He reports that while the 
pain   
improved slightly after the aspiration yesterday and has returned and he still 
has   
significant pain with any sort of range of motion of the knee.  
Nursing reports no acute events overnight  
  
Exam  
Physical Exam  
Vital Signs:  
  
  
  
  
Temp Pulse Resp BP Pulse Ox O2 Del Method  
  
98.1 F 73 18 144/85 H 96 Room Air  
  
24 09:07 24 09:07 24 09:07 24 09:07 24 09:07 
24   
09:07  
  
  
  
Narrative:  
Right knee has a very large effusion. With any passive range of motion of the 
right   
knee causes significant pain. Tenderness diffusely about the knee. Minimal to no
  
erythema noted. There is some skin discoloration within the pretibial region   
bilaterally but no pitting edema noted.  
  
Objective  
Labs  
Labs:  
Laboratory Results - last 24 hr  
  
  
  
24  
  
16:43 19:15 19:45  
  
Corrected WBC 9.4  
  
Uncorrected WBC Count 9.4  
  
RBC 5.50  
  
Hgb 16.3  
  
Hct 47.8  
  
MCV 86.9  
  
MCH 29.6  
  
MCHC 34.1  
  
RDW 13.3  
  
Plt Count 170  
  
MPV 8.0  
  
Neut % (Auto) 88.3  
  
Lymph % (Auto) 6.0  
  
Mono % (Auto) 5.1  
  
Eos % (Auto) 0.1  
  
Baso % (Auto) 0.5  
  
Nucleat RBC Rel Count 0.8 H  
  
Neut # (Auto) 8.4 H  
  
Lymph # (Auto) 0.6 L  
  
Mono # (Auto) 0.5  
  
Eos # (Auto) 0.0  
  
Baso # (Auto) 0.0  
  
Monocyte Dist Width 22.07 H  
  
ESR 32 H  
  
PHA Creatinine Clear 143.91  
  
Sodium 135 L  
  
Potassium 4.0  
  
Chloride 102  
  
Carbon Dioxide 23.1  
  
Anion Gap 13.9  
  
BUN 15  
  
Creatinine 0.68 L  
  
Est GFR (CKD-EPI) > 60.0  
  
Glucose 169 H  
  
Lactic Acid 1.3  
  
Calcium 8.6  
  
Total Bilirubin 1.5 H  
  
AST 19  
  
ALT 38  
  
Alkaline Phosphatase 60  
  
C-Reactive Prot, Quant 12.3 H  
  
Total Protein 6.8  
  
Albumin 3.9  
  
Globulin 2.9  
  
Albumin/Globulin Ratio 1.3  
  
Synovial Color Cancelled  
  
Synovial Appearance Cancelled  
  
Synov Supernatant Color Cancelled  
  
Synovial RBC Cancelled  
  
Synovial Tot Nuc Cell Cancelled  
  
Synovial Mononuclear Cancelled  
  
Synovial Neutrophils Cancelled  
  
Synovial Eosinophils Cancelled  
  
Synov Monos/Histiocyte Cancelled  
  
Synovial LE Cells Cancelled  
  
Synovial Lymphocytes % Cancelled  
  
Synovial Other Cells % Cancelled  
  
Synovial Crystals Cancelled  
  
Synovial Glucose  
  
Synovial Total Protein  
  
Synovial Fluid Comment Cancelled  
  
  
  
  
24  
  
23:00 06:26  
  
Corrected WBC 8.3  
  
Uncorrected WBC Count 8.3  
  
RBC 4.91  
  
Hgb 14.7  
  
Hct 43.0  
  
MCV 87.5  
  
MCH 29.9  
  
MCHC 34.2  
  
RDW 13.5  
  
Plt Count 175  
  
MPV 7.8  
  
Neut % (Auto) 71.5  
  
Lymph % (Auto) 17.6  
  
Mono % (Auto) 10.0  
  
Eos % (Auto) 0.4  
  
Baso % (Auto) 0.5  
  
Nucleat RBC Rel Count 0.1  
  
Neut # (Auto) 5.9  
  
Lymph # (Auto) 1.5  
  
Mono # (Auto) 0.8  
  
Eos # (Auto) 0.0  
  
Baso # (Auto) 0.0  
  
Monocyte Dist Width  
  
ESR  
  
PHA Creatinine Clear 142.25  
  
Sodium 135 L  
  
Potassium 3.8  
  
Chloride 102  
  
Carbon Dioxide 25.2  
  
Anion Gap 11.6  
  
BUN 18  
  
Creatinine 0.72  
  
Est GFR (CKD-EPI) > 60.0  
  
Glucose 142 H  
  
Lactic Acid  
  
Calcium 8.4 L  
  
Total Bilirubin  
  
AST  
  
ALT  
  
Alkaline Phosphatase  
  
C-Reactive Prot, Quant  
  
Total Protein  
  
Albumin  
  
Globulin  
  
Albumin/Globulin Ratio  
  
Synovial Color Yellow A  
  
Synovial Appearance Cloudy A  
  
Synov Supernatant Color Yellow  
  
Synovial RBC 6370 H  
  
Synovial Tot Nuc Cell 65164 H  
  
Synovial Mononuclear  
  
Synovial Neutrophils 92 H  
  
Synovial Eosinophils 0  
  
Synov Monos/Histiocyte 1  
  
Synovial LE Cells  
  
Synovial Lymphocytes % 7  
  
Synovial Other Cells %  
  
Synovial Crystals None seen  
  
Synovial Glucose 80  
  
Synovial Total Protein  
  
Synovial Fluid Comment  
  
  
  
Micro  
Microbiology Results:  
Microbiology  
  
24 23:00 Joint Fluid - Knee, Right Gram Stain - Final  
24 19:15 Joint Fluid - Knee, Right Gram Stain - Final  
  
  
  
Assessment / Plan  
Assessment and plan  
(1) Septic arthritis of knee, right:  
Code(s):  
M00.9 - Pyogenic arthritis, unspecified  
  
Plan  
Right knee septic arthritis  
  
We discussed the patient's aspiration results this morning. I explained to him 
that   
with a cell count of greater than 79,000 with 92% PMNs is certainly concerning 
for   
infection especially given his history as well as his elevated inflammatory 
labs. As   
a result, I recommended a washout of the knee today. We discussed this procedure
in   
detail including its risk and benefits. Ultimately patient agreed to proceed 
after   
all of his questions and concerns were answered and addressed. Informed consent 
was   
obtained.  
  
Remain n.p.o. Plan for right knee washout/lavage later this morning.  
  
  
Documented By: Jeff Onofre MD 08/20/24 10  
02  
Signed By: <Electronically signed by Jeff Onofre MD>  
24 [id number]  
   
  
[facility]  
Work Phone: [phone]- Consult note  
  
  
  
  
                                        Author              Jeff Onofre  
[facility]  
2024 11:12pm  
   
                                        Note Date/Time      2024 11  
:12pm  
   
                                                    [facility]  
ENTER  
[address] [place] [address]  
  
Orthopedic Consult Note  
Signed  
  
Patient: Kaylynn Mims MR#: [id number]  
[id number]  
: 1955 Acct:[id number]  
  
Age/Sex: 68 / M Adm Date:   
4  
Loc:  Room: [address] [place] [address] Type: ADM IN  
Attending Dr: Josie Ann MD  
  
Copies to: DO Jeff Baig MD Ruta Semaskiene, MD~  
  
  
History of Present Illness  
HPI  
Consult date:  
2024  
  
Requesting provider:  
Josie Ann MD  
  
History of present illness:  
Patient is a 68-year-old male with known bilateral knee osteoarthritis being 
treated   
by Dr. Woods. He had bilateral knee steroid injections a few days ago. He did 
really   
well initially for the first day. However, after that he began having 
significant   
pain and swelling to the point where he could not even put weight on the right 
knee.   
He says with any movement of the knee he has significant pain diffusely. He 
denies   
any trauma or injury to the knee. He denies any recent infections.  
I spoke with the emergency room physician and they were unable to obtain any 
fluid. I   
also spoke to lab who reported the small mount of fluid the ER sent was not 
enough   
for analysis.  
Currently the patient is resting comfortably in his bed. Has not had pain 
medications   
for many hours. He reports the knee is bothering him quite a bit ifhe moves it. 
He   
denies any history of gout or pseudogout.  
  
[facility]  
Surgical History (Reviewed 24 @ 14:38 by GARRET Chong)  
  
History of facial surgery  
jaw  
  
  
Family History (Reviewed 24 @ 14:38 by GARRET Chong)  
Brother Heart disease  
Legacy FamHx Relation: Brother(s)  
Family history of mental disorder  
Legacy FamHx Relation: Brother(s); Legacy FamHx Problem: Diagnosed with Mental   
Illness  
Father   
Family/Other   
Legacy FamHx Problem: father, --cirrhosis of the liver:mother,   
--multiple health issues:1 sister, --pancreatic cancer  
Mother   
Heart disease  
Sister Cancer  
Legacy FamHx Problem: Diagnosed with Cancer  
Heart disease  
  
  
Social History  
Smoking Status: Never smoker  
Substance Use Type: None  
  
Allergies & Medications  
Medications and Allergies  
Allergies  
  
metformin Allergy (Unknown, Verified 24 13:30)  
back spasms  
Penicillins Allergy (Unknown, Verified 24 13:30)  
rash  
  
  
Home Medications  
  
blood sugar diagnostic #50 ea 05/15/24 [Rx Confirmed 24]  
testosterone cypionate 200 mg/mL intramuscular oil 200 mg IM Q2W 24 days #2 mL   
24 [Rx Confirmed 24]  
diclofenac sodium 75 mg tablet,delayed release 75 mg PO BID 30 days #60 tabs 
24   
[Rx Confirmed 24]  
nabumetone 750 mg tablet 750 mg PO BID PRN pain 24 [History Confirmed 
24]  
  
  
  
Exam  
Physical Exam  
Vital Signs:  
  
  
  
  
Temp Pulse Resp BP Pulse Ox O2 Del Method  
  
97.8 F 72 18 177/79 H 97 Room Air  
  
24 23:01 24 22:50 24 23:01 24 23:01 24 23:01 
24   
23:01  
  
  
  
Narrative:  
Right knee has a very large effusion. With any passive range of motion of the 
right   
knee causes significant pain. Tenderness diffusely about the knee. Minimal to no
  
erythema noted. There is some skin discoloration within the pretibial region   
bilaterally but no pitting edema noted.  
  
Results - Orthopedics  
Lab Results  
  
24 16:43  
  
24 16:43  
  
Labs:  
Laboratory Results - Last 48 hrs.  
  
24 19:45: Lactic Acid 1.3  
24 19:15: Synovial Color Cancelled, Synovial Appearance Cancelled, Synov   
Supernatant Color Cancelled, Synovial RBC Cancelled, Synovial Tot Nuc Cell 
Cancelled,   
Synovial Mononuclear Cancelled, Synovial Neutrophils Cancelled, Synovial 
Eosinophils   
Cancelled, Synov Monos/Histiocyte Cancelled, Synovial LE Cells Cancelled, 
Synovial   
Lymphocytes % Cancelled, Synovial Other Cells % Cancelled, Synovial Crystals   
Cancelled, Synovial Fluid Comment Cancelled  
24 16:43: Corrected WBC 9.4, Uncorrected WBC Count 9.4, RBC 5.50, Hgb 
16.3, Hct   
47.8, MCV 86.9, MCH 29.6, MCHC 34.1, RDW 13.3, Plt Count 170, MPV 8.0,Neut % 
(Auto)   
88.3, Lymph % (Auto) 6.0, Mono % (Auto) 5.1, Eos % (Auto) 0.1, Baso % (Auto) 
0.5,   
Nucleat RBC Rel Count 0.8 H, Neut # (Auto) 8.4 H, Lymph # (Auto) 0.6 L, Mono # 
(Auto)   
0.5, Eos # (Auto) 0.0, Baso # (Auto) 0.0, Monocyte Dist Width 22.07 H, ESR 32 H,
PHA   
Creatinine Clear 143.91, Sodium 135 L, Potassium 4.0, Chloride 102, Carbon 
Dioxide   
23.1, Anion Gap 13.9, BUN 15, Creatinine 0.68 L, Est GFR (CKD-EPI) > 60.0, 
Glucose   
169 H, Calcium 8.6, Total Bilirubin 1.5 H, AST 19, ALT 38, Alkaline Phosphatase 
60,   
C-Reactive Prot, Quant12.3 H, Total Protein 6.8, Albumin 3.9, Globulin 2.9,   
Albumin/Globulin Ratio 1.3  
  
H & H  
  
  
  
24 Range/Units  
  
16:43  
  
Hgb 16.3 (13.0-17.0) g/dL  
  
Hct 47.8 (38.8-50.0) %  
  
  
All other labs are normal.  
  
Microbiology Results  
Microbiology:  
Microbiology - Results from entire visit  
  
24 19:15 Joint Fluid - Knee, Right Gram Stain - Final  
  
  
Imaging & Diagnostic Results  
Imaging/Diagnostics:  
Nonweightbearing x-rays of the right knee were independently reviewed today. 
These do   
demonstrate signs of osteoarthritis. There is certainly a large effusion noted. 
No   
acute osseous abnormalities are appreciated.  
  
Assessment/Plan  
(1) Effusion, right knee:  
Code(s):  
M25.461 - Effusion, right knee  
  
Plan  
Right knee effusion, concern for gout/pseudogout versus septic arthritis  
  
I had a long conversation with the patient regarding his presentation and 
history   
along with his exam of the right knee. At this point in time the amountof 
swelling   
that he has in his right knee is concerning and given the timing of the 
injection is   
also concerning from an infection standpoint. His ESR is 32 and his CRP is 12.3   
today. Given this information coupled with his history as well as his exam, I am
  
concerned for septic arthritis of the right knee. I recommended a right knee   
aspiration and analysis of the fluid. We discussed therisks and benefits of the   
procedure at length. All the patient's questions and concerns were answered and   
addressed. Informed consent was obtained.  
After consent was obtained, the right knee had approximately 160 cc of 
yellowishegg   
drop soup appearing fluid aspirated using sterile technique. Patient tolerated 
the   
aspiration well. This fluid will be sent for the following:  
Cell count with differential  
Crystals  
Gram stain  
Culture  
Protein  
Glucose  
Patient will remain n.p.o. after midnight. I will check on the results in the 
morning   
and if there is concern for infection then we will consider a washout ofthe 
knee. I   
did discuss this with the patient this evening.  
  
  
Documented By: Jeff Onofre MD 24  
Signed By: <Electronically signed by Jeff Onofre MD>  
24 2312  
   
  
[facility]  
Work Phone: [phone]- Evaluation note*   
  
                      Encounter Date Diagnosis  Assessment Notes Treatment Notes  
 Treatment   
Clinical Notes  
   
                                                Medicare annual   
wellness visit,   
initial (ICD-10 -   
Z00.00)                                             Personalized health   
advice was given to   
the beneficiary   
including a written   
plan for screenings   
discussed and   
provided. Advanced   
care planning   
reviewed and/or   
information given   
as requested.   
Additional   
counseling was   
provided here today   
in regards to, [ ].   
The above visit was   
performed by Melany PÉREZ   
under direct   
supervision of Dr. Elvira Harper DO.   
Document reviewed   
and amended by   
provider signed   
below.  
UTD on colonoscopy  
Due for screening   
lab work in April  
To get second   
shingrix vaccine   
and TdaP at   
pharmacy  
Declines flu   
vaccine  
                                          
   
                                                Type 2 diabetes   
mellitus (ICD-10 -   
E11.9)                                              Well controlled on   
current dose of   
glipizide ER, will   
continue  
                                          
   
                                                Prostate cancer   
screening (ICD-10 -   
Z12.5)                                                        
   
                                                Low testosterone   
(ICD-10 - E29.1)                                    I have personally   
reviewed the [place]   
report for this   
patient. I have   
considered the   
risks of abuse,   
dependence,   
addiction and   
diversion. I   
believe that it is   
clinically   
appropriate for   
this patient to be   
prescribed this   
medication based on   
documented   
diagnosis.  
                                          
  
  
[facility]  
Other Phone: [phone]- Evaluation note*   
  
                      Encounter Date Diagnosis  Assessment Notes Treatment Notes  
 Treatment   
Clinical Notes  
   
                                        10 Aug, 2023        Type 2 diabetes   
mellitus (ICD-10 -   
E11.9)                                              Will discontinue   
Januvia due to   
cost, will trial   
switching to   
glipizide ER 5mg   
daily. Plan to f/u   
in 3 months for AWV   
and recheck a1c. To   
call with any   
issues with med in   
the mean time.  
                                          
   
                                        10 Aug, 2023        Low testosterone   
(ICD-10 - E29.1)                                    Recent PSA and CBC.   
Plan to check CBC   
and testosterone   
level next visit  
                                          
   
                                        10 Aug, 2023        High risk   
medication use   
(ICD-10 - Z79.899)                                            
  
  
[facility]  
Other Phone: [phone] summary  
  
  
  
  
                                        Author              Josie Ann  
[facility]  
2024 2:20pm  
   
                                        Note Date/Time      2024 2:  
21pm  
   
                                                    [facility]  
ENTER  
[address] [place] [address]  
  
Discharge Summary  
Signed  
  
Patient: Kaylynn Mims MR#: [id number]  
[id number]  
: 1955 Acct:[id number]  
  
Age/Sex: 68 / M Adm Date:   
4  
Loc:  Room: [address] [place] [address]  
  
Attending Dr: Josie Ann MD  
  
Copies to: DO Josie Baig MD~  
  
  
Providers  
Date of Discharge: 24  
Discharging Provider: Josie Ann  
Primary Care Provider: Elvira Harper  
Consults:  
  
  
24 17:56  
Consult to Orthopedic Surgery Routine  
Comment:  
Consulting Provider: [facility]  
Reason For Exam: knee effusion  
Has Provider Been Notified: Yes  
Date of Notification: 24  
Time of Notification: 07:32  
  
24 17:57  
Consult to Occupational Therapy Routine  
Comment:  
Physician Instructions:  
Consult to OT for:: Evaluation and Treat  
Consult to Physical Therapy Routine  
Comment:  
Physician Instructions:  
Consult to PT for:: Evaluation and Treat  
  
24 11:33  
Consult to Infectious Diseases Routine  
Comment:  
Consulting Provider: HANG - Infectious Disease  
Reason For Exam: native R knee septic arthritis  
Has Provider Been Notified: Yes  
Date of Notification: 24  
Time of Notification: 11:42  
Consult to Occupational Therapy Routine  
Comment:  
Physician Instructions: WBAT and ROMAT RLE  
Consult to OT for:: Evaluation and Treat  
Comment: WBAT  
Consult to Physical Therapy Routine  
Comment:  
Physician Instructions:  
Consult to PT for:: Evaluation and Treat  
Comment: WBAT  
Extended Comment: WBAT and ROMAT RLE  
  
  
  
  
Discharge Diagnosis  
(1) Septic arthritis of knee, right:  
Final Diagnosis  
Final Discharge Diagnosis:  
Acute strep mitis/oralis septic arthritis of the right knee, status post 
incision and   
irrigation by Ortho team, discharged on Rocephin therapy through the right upper
  
extremity PICC line  
  
Newly diagnosed hypertension, started new medications  
  
  
  
Summary  
Hospital Course  
Hospital course:  
68 years old male presented with complaints of right knee pain. Few days ago 
hehad   
bilateral knee injection with steroids, 24 hours after he developed severe pain 
and   
swelling of the right knee. Patient was afebrile without leukocytosis,however he
did   
have significantly elevated CRP. Right knee drainage was done byan orthopedic 
doctor   
while in the emergency room and was sent for cultures. Patient was started on   
broad-spectrum antibiotics with vancomycin and cefepime, cultures revealed strep
  
mitis, with blood culture showing strep mitis as well. Repeated blood cultures 
remain   
negative. Antibiotics were adjusted Rocephin therapy and PICC line was placed in
the   
right upper extremity. Patient underwent incision and irrigation of the septic 
knee   
by orthopedic surgeon. ID was consulted. Patient was arranged outpatient 
infusion   
center to get IV antibiotics and physical therapy. With recommendations to 
follow-up   
with orthopedic doctor, ID as outpatient.  
Patient's blood pressure was noted to be elevated and he was started on 
valsartan and   
chlorthalidone therapy with recommendations to follow-up with primarycare doctor
for   
further evaluation and treatment. He was asymptomatic. Denies any nausea any   
headache, any vision problems any chest pain shortness of breath.  
On discharge she still had right knee pain, but it was better since admission. 
Denies   
any fevers. Denies any abdominal pain.  
  
Review of system:  
General - denies any fevers, dizziness, headache  
Pulmonary - denies any SOB, cough  
Gastrointestinal - denies any abdominal pain, any nausea, vomiting  
Cardiovascular - denies any chest pain, palpitations  
  
Physical exam:  
General -awake, alert, oriented ?3, not in acute distress  
Cardiovascular -S1 with S2, no murmurs, no rubs, no gallops  
Pulmonary - clear to auscultation bilaterally  
Gastrointestinal - abdomen is soft, slightly distended with positive bowel 
sounds, no   
tenderness  
Extremities -no edema  
Right knee in dressing  
Neurological -no focal neurological dysfunction noted  
  
The patient CARE and further plan was discussed with the patient and the 
patient's   
wife at the bedside. All questions answered. Patient expressed understanding and
was   
discharged home in a stable condition. The patient was given written and verbal   
instructions. The recommendations were made to follow-up as outpatient within 
one   
week.The patient was informed if his symptoms get worse to go back to emergency 
room   
or call his primary care physician office.  
  
Time spent on the discharge day 35 min.  
Time Spent with Patient  
Time spent providing/coordinating discharge services (# min): 35  
Surgeries and Procedures  
  
  
Operation Date: 24 12:30  
Actual Procedures  
p OR Right Knee Washout, Arthroscopy(Right) - Jeff Onofre II, MD  
  
  
  
Discharge Plan  
Discharge Plan  
Patient Disposition: Home  
  
Activity: No Activity Restriction  
  
Diet: Low-Sodium  
  
Additional Instructions:  
Weekly CRP to be done  
  
You were found to have elevated blood pressure, so blood pressure medications 
were   
initiated. Please follow-up with your family doctor for further evaluationand   
treatment and adjustment of your medications.  
  
  
Your next antibiotic will be at [facility] on 24 
at   
11:00am.  
  
Instructions: Know your Meds  
  
Prescriptions:  
New  
ceftriaxone 2 gram Recon Soln  
2 g IV Q24H 24 Days Qty: 24 0RF  
acetaminophen [Tylenol] 325 mg Tablet  
650 mg PO Q4H PRN (Reason: Pain Scale 1 - 5) Qty: 20 0RF  
ascorbic acid (vitamin C) [Vitamin C] 500 mg Tablet  
500 mg PO BID.WITH.MEALS Qty: 60 0RF  
oxycodone 5 mg Tablet  
5 mg PO Q4HR PRN (Reason: Pain Scale 6 - 10) 7 Days Qty: 20 0RF  
ferrous sulfate 324 mg (65 mg iron) Tablet,Delayed Release (Dr/Ec)  
324 mg PO BID.WITH.MEALS Qty: 0 0RF  
polyethylene glycol 3350 [HealthyLax] 17 gram Powder In Packet  
17 g PO DAILY Qty: 30 0RF  
sennosides-docusate sodium 8.6-50 mg Tablet  
2 tab PO BID PRN (Reason: constipation) Qty: 60 0RF  
valsartan 160 mg Tablet  
160 mg PO DAILY Qty: 30 3RF  
chlorthalidone 25 mg tablet  
25 mg PO DAILY Qty: 30 0RF  
  
Continued  
testosterone cypionate 200 mg/mL oil  
200 mg IM Q2W 24 Days Qty: 2 2RF  
nabumetone 750 mg tablet  
750 mg PO BID PRN (Reason: pain)  
(DME) blood sugar diagnostic Strip  
See Rx Instructions .Route Qty: 50 2RF  
Rx Instructions:  
As directed  
diclofenac sodium 75 mg tablet,delayed release (DR/EC)  
75 mg PO BID 30 Days Qty: 60 2RF  
  
Other Ambulatory Orders:  
DME Home Medical Equipment (Routine) Timeframe: 2024  
Location: Determined by Patient  
Ordered By: Jeff Onofre II  
Basic Metabolic Panel (Routine) Timeframe: 1 Week  
Location: Determined by Patient  
Ordered By: Josie Semaskiene  
C-Reactive Protein (QWEEK) Timeframe: 2024  
Location: Determined by Patient  
Ordered By: Josie Semaskiene  
C-Reactive Protein (QWEEK) Timeframe: 2024  
Location: Determined by Patient  
Ordered By: Josie Semaskiene  
C-Reactive Protein (QWEEK) Timeframe: 2024  
Location: Determined by Patient  
Ordered By: Josie Semaskiene  
C-Reactive Protein (Routine) Timeframe: 2024  
Location: Determined by Patient  
Ordered By: Josie Semaskiene  
C-Reactive Protein (Routine) Timeframe: 2024  
Location: Determined by Patient  
Ordered By: Josie Semaskiene  
C-Reactive Protein (Routine) Timeframe: 2024  
Location: Determined by Patient  
Ordered By: Josie Semaskiene  
  
Follow Up:  
Paul Crum MD [Active Staff] - (Call office on Monday to schedule follow-
upwith   
Dr. Crum in 2 weeks)  
Jeff Onofre II, MD [Active Staff] - 24 10:00 am (Follow-up with   
Orthopedic Surgery)  
Elvira Harper DO [Primary Care Provider] - 24 10:45 am (Follow-up with 
your   
Primary Care Provider, call office to reschedule if needed. )  
  
  
Exam  
Physical Exam  
Vital Signs:  
  
  
  
  
Temp Pulse Resp BP Pulse Ox O2 Del Method O2 Flow Rate  
  
37.1 C 75 18 177/82 H 96 Room Air 2  
  
24 11:41 24 11:41 24 11:41 24 11:41 24 11:41 
24   
11:41 24 15:21  
  
  
  
  
Diagnostic Studies  
Completed and Pending Studies  
Pending studies at discharge:  
  
  
24 19:45  
Blood Culture Stat  
  
24 13:40  
Blood Culture Routine  
  
  
Preliminary micro results at discharge  
  
  
  
24 13:40 Blood Culture - Preliminary  
  
Blood - Right Antecubital No Growth 3 Days  
  
24 13:41 Blood Culture - Preliminary  
  
Blood - Right Hand No Growth 3 Days  
  
24 19:55 Blood Culture - Preliminary  
  
Blood - Right Hand No Growth 4 Days  
  
  
  
  
Labs on day of discharge:  
  
  
24 14:52: C-Reactive Prot, Quant 22.7 H  
  
  
  
  
  
Documented By: Josie Ann MD 24 1416  
Signed By: <Electronically signed by Josie Ann MD>  
24 1420  
   
  
[facility]  
Work Phone: [phone] note*   
  
                          Diagnosis    Onset Date   Resolution   Status  
   
                          Right shoulder pain                           acute  
   
                          Type 2 diabetes mellitus                           acu  
te  
   
                          Bursitis of right shoulder                           a  
cute  
   
                          Right shoulder pain                           acute  
  
  
[facility]  
Work Phone: [phone] note*   
  
                          Diagnosis    Onset Date   Resolution   Status  
   
                          Bursitis of right shoulder                           a  
cute  
   
                          Right shoulder pain                           acute  
   
                          Effusion, left knee                           noneacti  
ve  
   
                          Left knee pain                           noneactive  
   
                          Arthritis of left knee                           chron  
ic  
   
                          Effusion, left knee                           noneacti  
ve  
  
  
[facility]  
Work Phone: [phone] note*   
  
                          Diagnosis    Onset Date   Resolution   Status  
   
                          Effusion, left knee                           noneacti  
ve  
   
                          Left knee pain                           noneactive  
   
                          Arthritis of left knee                           chron  
ic  
   
                          Effusion, left knee                           noneacti  
ve  
   
                          Iliotibial band syndrome                           non  
eactive  
   
                          Right knee pain                           noneactive  
  
  
[facility]  
Work Phone: [phone] note*   
  
                          Diagnosis    Onset Date   Resolution   Status  
   
                          Arthritis of left knee                           chron  
ic  
   
                          Effusion, left knee                           noneacti  
ve  
   
                          Iliotibial band syndrome                           non  
eactive  
   
                          Right knee pain                           noneactive  
   
                          Arthritis of knee, right                           chr  
onic  
   
                          Arthritis of left knee                           chron  
ic  
  
  
[facility]  
Work Phone: [phone] note*   
  
                          Diagnosis    Onset Date   Resolution   Status  
   
                          Iliotibial band syndrome                           non  
eactive  
   
                          Right knee pain                           noneactive  
   
                          Arthritis of knee, right                           chr  
onic  
   
                          Arthritis of left knee                           chron  
ic  
   
                          Effusion, right knee                           acute  
   
                          Intractable pain                           acute  
   
                          Pain and swelling of right knee                         
    acute  
   
                          Positive blood culture                           acute  
   
                          Septic arthritis of knee, right                         
    acute  
   
                          Streptococcus viridans infection                        
     acute  
  
  
[facility]  
Work Phone: [phone] note*   
  
                          Diagnosis    Onset Date   Resolution   Status  
   
                          Iliotibial band syndrome                           non  
eactive  
   
                          Right knee pain                           noneactive  
   
                          Arthritis of knee, right                           chr  
onic  
   
                          Arthritis of left knee                           chron  
ic  
   
                          Effusion, right knee                           resolve  
d  
   
                          Intractable pain                           resolved  
   
                          Pain and swelling of right knee                         
    resolved  
   
                          Positive blood culture                           resol  
maine  
   
                          Septic arthritis of knee, right                         
    resolved  
   
                          Streptococcus viridans infection                        
     resolved  
  
  
[facility]  
Work Phone: [phone] general Narrative - Reported*   
  
                                Type            Description     Date  
   
                                Medical History type 2 diabetes   
   
                                Surgical History jaw sx            
   
                                Hospitalization History see above         
  
  
[facility]  
Other Phone: [phone]  
  
Summary Purpose  
  
  
                                                      
  
  
  
Family History  
  
  
                          Relationship Condition    Age at Onset Recorded Date/T  
denise  
   
                          brother      Heart disease Unknown        
   
                                       Family history of mental disorder Unknown  
        
   
                          father            Unknown        
   
                          family member      Unknown        
   
                          Not Specified      Unknown        
   
                                       Heart disease Unknown        
   
                          sister       Malignant neoplasm Unknown        
  
  
  
                          Relationship Condition    Age at Onset Recorded Date/T  
denise  
   
                          brother      Heart disease Unknown        
   
                                       Family history of mental disorder Unknown  
        
   
                          father            Unknown        
   
                          family member      Unknown        
   
                          mother            Unknown        
   
                                       Heart disease Unknown        
   
                          sister       Malignant neoplasm Unknown        
  
  
  
Advance Directives  
  
  
                                Advance Directive Response        Recorded Date/  
Time  
   
                                Advance Directives No               11:42am  
  
  
  
                                Advance Directive Response        Recorded Date/  
Time  
   
                                Advance Directives No               9:39am  
  
  
  
Chief Complaint and Reason for Visit  
  
  
                                        Chief Complaint     BACK SPASMS  
CONSULT DR ELVIRA HARPER RT SHOULDER PAIN  
M25.511 - Pain in right shoulder  
z12.5 e29.1 e11.9  
   
                                        Reason for Visit    Right shoulder pain  
Type 2 diabetes mellitus  
Bursitis of right shoulder  
Right shoulder pain  
  
  
  
                                        Chief Complaint     CONSULT DR ELVIRA GUZMAN RT SHOULDER PAIN  
M25.511 - Pain in right shoulder  
z12.5 e29.1 e11.9  
l knee pain  
M25.562  
Lt knee pain and effusion  
   
                                        Reason for Visit    Bursitis of right sh  
oulder  
Right shoulder pain  
Effusion, left knee  
Left knee pain  
Arthritis of left knee  
Effusion, left knee  
  
  
  
                                        Chief Complaint     l knee pain  
M25.562  
Lt knee pain and effusion  
Amb Documentation  
knee leg hip pain  
   
                                        Reason for Visit    Effusion, left knee  
Left knee pain  
Arthritis of left knee  
Effusion, left knee  
Iliotibial band syndrome  
Right knee pain  
  
  
  
                                        Chief Complaint     l knee pain  
M25.562  
Lt knee pain and effusion  
Amb Documentation  
knee leg hip pain  
M25.561  
   
                                        Reason for Visit    Effusion, left knee  
Left knee pain  
Arthritis of left knee  
Effusion, left knee  
Iliotibial band syndrome  
Right knee pain  
  
  
  
                                        Chief Complaint     Lt knee pain and eff  
usion  
Amb Documentation  
knee leg hip pain  
M25.561  
B/L knee steroid injection  
   
                                        Reason for Visit    Arthritis of left kn  
ee  
Effusion, left knee  
Iliotibial band syndrome  
Right knee pain  
Arthritis of knee, right  
Arthritis of left knee  
  
  
  
                                        Chief Complaint     Lt knee pain and eff  
usion  
Amb Documentation  
knee leg hip pain  
M25.561  
B/L knee steroid injection  
rt knee swelling  
   
                                        Reason for Visit    Arthritis of left kn  
ee  
Effusion, left knee  
Iliotibial band syndrome  
Right knee pain  
Arthritis of knee, right  
Arthritis of left knee  
  
  
  
                                        Chief Complaint     Amb Documentation  
knee leg hip pain  
M25.561  
B/L knee steroid injection  
rt knee swelling  
   
                                        Reason for Visit    Iliotibial band synd  
bon  
Right knee pain  
Arthritis of knee, right  
Arthritis of left knee  
Effusion, right knee  
Intractable pain  
Pain and swelling of right knee  
Positive blood culture  
Septic arthritis of knee, right  
Streptococcus viridans infection  
  
  
  
                                        Chief Complaint     Amb Documentation  
knee leg hip pain  
M25.561  
B/L knee steroid injection  
rt knee swelling  
Amb Documentation  
septic arthritis  
   
                                        Reason for Visit    Iliotibial band synd  
bon  
Right knee pain  
Arthritis of knee, right  
Arthritis of left knee  
Effusion, right knee  
Intractable pain  
Pain and swelling of right knee  
Positive blood culture  
Septic arthritis of knee, right  
Streptococcus viridans infection  
  
  
  
Additional Source Comments  
  
  
  
                                                    PREGNANCY (unrecognized sect  
ion and content)  
   
                                                    No Pregnancy Status Records   
FoundNo Pregnancy Status Records Found  
  
  
  
  
                                                    INFORMATION SOURCE (unrecogn  
ized section and content)  
   
                                          
  
  
  
                                        DATE CREATED        AUTHOR  
   
                                2023                      [facility]  
  
  
  
                                DATE CREATED    AUTHOR          AUTHOR'S ORGANIZ  
ATION  
   
                                2024                      [facility]  
ysician Group  
  
  
  
  
  
                                                    REASON FOR VISIT (unrecogniz  
ed section and content)  
   
                                                    [facility]  
  
  
  
  
                                                    Care Teams (unrecognized sec  
tion and content)  
   
                                          
  
  
  
                                                    Team Status: Active   
   
                          Member       Role         Status       Tyesha Harper DO Primary Care Provider Active        
   
  
  
  
                                                    Team Status: Active   
   
                          Member       Role         Status       Tyesha Harper DO Primary Care Provider Active        
 Start: 2024  
  
   
                          SANJANA Samuel Attending Provider Active    
     Start: 2024  
  
  
  
  
                                                    Team Status: Inactive   
   
                          Member       Role         Status       Tyesha Harper DO Primary Care Provider Active        
 Start: 2024  
End: 2024  
   
                          SANJANA Samuel Attending Provider Active    
     Start: 2024  
End: 2024  
  
  
  
                                                    Team Status: Inactive   
   
                          Member       Role         Status       Tyesha Harper DO Primary Care Provider Active        
 Start: 2024  
End: 2024  
   
                          Matheus Woods DO Attending Provider Active         
Start: 2024  
End: 2024  
  
  
  
                                                    Team Status: Inactive   
   
                          Member       Role         Status       Tyesha Harper DO Primary Care Provider Active        
 Start: 2024  
End: 2024  
   
                          Daniela Sacnhez , FNP-BC Emergency Provider Active   
      Start: 2024  
End: 2024  
   
                                        Josie Ann MD Admit Provider, Att  
ending   
Provider                  Active                    Start: 2024  
End: 2024  
   
                          Gucci Maxwell MD Other Provider Active       Start: A  
ugust 2024  
End: 2024  
   
                          Ashley Rai MD Other Provider Active       Star  
t: 2024  
End: 2024  
   
                          Eduarda L Karissa , NP-C Other Provider Active         
Start: 2024  
End: 2024  
   
                          Ted Penny DO Other Provider Active       Start  
: 2024  
End: 2024  
   
                          Jeff Onofre II, MD Other Provider Active       S  
tart: 2024  
End: 2024  
   
                          Sai Butts DO Other Provider Active       Start:  
 2024  
End: 2024  
   
                          Paul Crum MD Other Provider Active       Start:   
2024  
End: 2024  
  
  
  
                                                    Team Status: Active   
   
                          Member       Role         Status       Dates  
   
                          Elvira Harper DO Primary Care Provider Active        
 Start: 2024  
  
   
                          Daniela Sanchez , P-BC Emergency Provider Active   
      Start: 2024  
  
   
                                        Josie Ann MD Admit Provider, Oth  
er   
Provider                  Active                    Start: 2024  
  
   
                          Jeff Onofre II, MD Attending Provider Active      
   Start: 2024  
  
  
  
  
                                                    Team Status: Active   
   
                          Member       Role         Status       Dates  
   
                          Elvira Harper DO Primary Care Provider Active        
 Start: 2024  
  
   
                          Daniela Sanchez , FNP-BC Emergency Provider Active   
      Start: 2024  
  
   
                                        Josie Ann MD Admit Provider, Oth  
er   
Provider                  Active                    Start: 2024  
  
   
                          Gucci Maxwell MD Other Provider Active       Start: A  
ugust 2024  
  
   
                          Ashley Rai MD Other Provider Active       Star  
t: 2024  
  
   
                          Eduarda L Karissa , NP-C Other Provider Active         
Start: 2024  
  
   
                          Ted A Hemalatha , DO Other Provider Active       Start  
: 2024  
  
   
                          Jeff Onofre II, MD Other Provider Active       S  
tart: 2024  
  
   
                          Sai Butts , DO Other Provider Active       Start:  
 2024  
  
   
                                        Paul Crum MD  Attending Provider,   
Other   
Provider                  Active                    Start: 2024  
  
  
  
  
                                                    Team Status: Inactive   
   
                          Member       Role         Status       Dates  
   
                                        Elvira Harper , DO Primary Care Provide  
r,   
Attending Provider        Active                    Start: 2024  
End: 2024  
  
  
  
                                                    Team Status: Inactive   
   
                          Member       Role         Status       Dates  
   
                          Elvira Harper , DO Primary Care Provider Active        
 Start: 2024  
End: 2024  
   
                          Gucci Maxwell MD Attending Provider Active       Star  
t: 2024  
End: 2024  
  
  
  
                                                    Team Status: Inactive   
   
                          Member       Role         Status       Dates  
   
                          Gucci Maxwell MD Attending Provider Active       Star  
t: 2024  
End: 2024  
   
                          Elvira Harper , DO Primary Care Provider Active        
 Start: 2024  
End: 2024  
  
  
  
                                                    Team Status: Inactive   
   
                          Member       Role         Status       Dates  
   
                                        Elvira Harper , DO Primary Care Provide  
r, Attending   
Provider                  Active                    Start: 2024  
End: 2024  
  
  
  
                                                    Team Status: Inactive   
   
                          Member       Role         Status       Dates  
   
                          Elvira Harper , DO Primary Care Provider Active        
 Start: May 15th, 2024  
End: May 15th, 2024  
   
                          SANJANA Samuel Attending Provider Active    
     Start: May 15th, 2024  
End: May 15th, 2024  
  
  
  
                                                    Team Status: Inactive   
   
                          Member       Role         Status       Dates  
   
                          Elvira Harper , DO Primary Care Provider Active        
 Start: May 23rd, 2024  
End: May 23rd, 2024  
   
                          Matheus Woods DO Attending Provider Active         
Start: May 23rd, 2024  
End: May 23rd, 2024  
  
  
  
                                                    Team Status: Active   
   
                          Member       Role         Status       Dates  
   
                          Elvira Harper , DO Primary Care Provider Active        
 Start: 2024  
  
   
                          Daniela Sanchez , [facility] Emergency Provider Active   
      Start: 2024  
  
   
                                        Josie Ann MD Admit Provider, Att  
ending   
Provider                  Active                    Start: 2024  
  
  
  
  
                                                    Team Status: Active   
   
                          Member       Role         Status       Dates  
   
                          Elvira Harper , DO Primary Care Provider Active        
 Start: 2024  
  
   
                          Maya Joaquin Attending Provider Active       Start:  
 2024  
  
  
  
  
                                                    Team Status: Inactive   
   
                          Member       Role         Status       Dates  
   
                                        Elvira A Harper , DO Primary Care Provide  
r,   
Attending Provider        Active                    Start: 2024  
End: 2024  
  
  
  
  
  
                                                    Goals (unrecognized section   
and content)  
   
                                                    Goals may be documented in a  
n alternate section  
  
FOR RECORDS PERTAINING TO PATIENTS WHO ARE OR HAVE BEEN ENROLLED IN A CHEMICAL 
DEPENDENCY/SUBSTANCEABUSE PROGRAM, SOME INFORMATION MAY BE OMITTED. This 
clinical summary was aggregated from multiple sources. Caution should be 
exercised in using it in the provision of clinical care. This summary normalizes
information from multiple sources, and as a consequence, information in this 
document may materially change the coding, format and clinical context of 
patient data. In addition, data may be omitted in some cases. CLINICAL DECISIONS
SHOULD BE BASED ON THE PRIMARY CLINICAL RECORDS. [facility] [employer] [facility]. provides 
no warranty or guarantee of the accuracy or completeness of information in this 
document.

## 2024-09-05 NOTE — PC.NURSE
1040: [facility]. Seated in recliner. Relays dyspnea with ambulating from door to dept.  VSS. PICC line to right upper arm intact and without s&s of infection. Flushes easily and good blood return. IV Rocephin 
initiated at this time. Wife at chairside. Pt. denies needs or c/o.
1120: Antibiotic infused without s&s of adverse reaction. PICC line flushed with saline and capped. Pt d/c'd amb. to home with wife.

## 2024-09-06 VITALS
SYSTOLIC BLOOD PRESSURE: 134 MMHG | HEART RATE: 79 BPM | DIASTOLIC BLOOD PRESSURE: 64 MMHG | OXYGEN SATURATION: 98 % | TEMPERATURE: 97.34 F

## 2024-09-06 NOTE — PC.NURSE
1040: Pt. to [facility] amb. accompanied by wife. Seated in recliner. VSS. IV Rocephin initiated as ordered. Picc line to RUE without s&s of infection or infiltration. Using sterile technique, picc line dressing change. Pt. tolerated without c/o.
1115: Infusion completed without s&s of adverse reaction. Pt. d/c'd amb. to home with wife.

## 2024-09-07 VITALS
OXYGEN SATURATION: 95 % | SYSTOLIC BLOOD PRESSURE: 112 MMHG | DIASTOLIC BLOOD PRESSURE: 80 MMHG | TEMPERATURE: 97.88 F | HEART RATE: 108 BPM

## 2024-09-08 ENCOUNTER — HOSPITAL ENCOUNTER (EMERGENCY)
Age: 69
Discharge: ANOTHER HOSPITAL | End: 2024-09-08
Payer: MEDICARE

## 2024-09-08 VITALS — OXYGEN SATURATION: 98 % | HEART RATE: 102 BPM

## 2024-09-08 VITALS — HEART RATE: 102 BPM | OXYGEN SATURATION: 98 %

## 2024-09-08 VITALS — OXYGEN SATURATION: 98 % | HEART RATE: 102 BPM | DIASTOLIC BLOOD PRESSURE: 67 MMHG | SYSTOLIC BLOOD PRESSURE: 103 MMHG

## 2024-09-08 VITALS — OXYGEN SATURATION: 97 % | HEART RATE: 96 BPM

## 2024-09-08 VITALS — HEART RATE: 93 BPM | OXYGEN SATURATION: 95 %

## 2024-09-08 VITALS — HEART RATE: 92 BPM

## 2024-09-08 VITALS — OXYGEN SATURATION: 99 % | HEART RATE: 99 BPM

## 2024-09-08 VITALS — OXYGEN SATURATION: 97 % | HEART RATE: 106 BPM

## 2024-09-08 VITALS — HEART RATE: 104 BPM | OXYGEN SATURATION: 96 %

## 2024-09-08 VITALS — HEART RATE: 95 BPM | OXYGEN SATURATION: 97 %

## 2024-09-08 VITALS — OXYGEN SATURATION: 95 % | HEART RATE: 94 BPM

## 2024-09-08 VITALS — SYSTOLIC BLOOD PRESSURE: 106 MMHG | OXYGEN SATURATION: 96 % | DIASTOLIC BLOOD PRESSURE: 65 MMHG | HEART RATE: 99 BPM

## 2024-09-08 VITALS — HEART RATE: 99 BPM | OXYGEN SATURATION: 95 %

## 2024-09-08 VITALS — HEART RATE: 100 BPM

## 2024-09-08 VITALS — OXYGEN SATURATION: 99 % | HEART RATE: 97 BPM

## 2024-09-08 VITALS — SYSTOLIC BLOOD PRESSURE: 113 MMHG | DIASTOLIC BLOOD PRESSURE: 63 MMHG | HEART RATE: 97 BPM | OXYGEN SATURATION: 98 %

## 2024-09-08 VITALS — HEART RATE: 103 BPM | SYSTOLIC BLOOD PRESSURE: 86 MMHG | DIASTOLIC BLOOD PRESSURE: 65 MMHG | OXYGEN SATURATION: 97 %

## 2024-09-08 VITALS — SYSTOLIC BLOOD PRESSURE: 117 MMHG | HEART RATE: 94 BPM | DIASTOLIC BLOOD PRESSURE: 66 MMHG | OXYGEN SATURATION: 96 %

## 2024-09-08 VITALS — BODY MASS INDEX: 41.5 KG/M2

## 2024-09-08 VITALS — HEART RATE: 96 BPM

## 2024-09-08 VITALS — OXYGEN SATURATION: 97 % | HEART RATE: 97 BPM

## 2024-09-08 VITALS — HEART RATE: 94 BPM | OXYGEN SATURATION: 97 %

## 2024-09-08 VITALS — HEART RATE: 98 BPM | OXYGEN SATURATION: 90 %

## 2024-09-08 VITALS — OXYGEN SATURATION: 94 % | HEART RATE: 101 BPM | DIASTOLIC BLOOD PRESSURE: 81 MMHG | SYSTOLIC BLOOD PRESSURE: 110 MMHG

## 2024-09-08 VITALS — SYSTOLIC BLOOD PRESSURE: 116 MMHG | DIASTOLIC BLOOD PRESSURE: 50 MMHG | OXYGEN SATURATION: 94 % | HEART RATE: 100 BPM

## 2024-09-08 VITALS — OXYGEN SATURATION: 95 % | HEART RATE: 95 BPM

## 2024-09-08 VITALS — OXYGEN SATURATION: 98 % | HEART RATE: 101 BPM

## 2024-09-08 VITALS — SYSTOLIC BLOOD PRESSURE: 96 MMHG | OXYGEN SATURATION: 93 % | DIASTOLIC BLOOD PRESSURE: 68 MMHG

## 2024-09-08 VITALS — DIASTOLIC BLOOD PRESSURE: 65 MMHG | HEART RATE: 96 BPM | OXYGEN SATURATION: 96 % | SYSTOLIC BLOOD PRESSURE: 104 MMHG

## 2024-09-08 VITALS — OXYGEN SATURATION: 95 % | DIASTOLIC BLOOD PRESSURE: 61 MMHG | SYSTOLIC BLOOD PRESSURE: 90 MMHG | HEART RATE: 96 BPM

## 2024-09-08 VITALS — HEART RATE: 105 BPM | OXYGEN SATURATION: 96 %

## 2024-09-08 VITALS — HEART RATE: 100 BPM | OXYGEN SATURATION: 99 %

## 2024-09-08 VITALS — OXYGEN SATURATION: 96 % | DIASTOLIC BLOOD PRESSURE: 72 MMHG | SYSTOLIC BLOOD PRESSURE: 101 MMHG | HEART RATE: 98 BPM

## 2024-09-08 VITALS — HEART RATE: 107 BPM | OXYGEN SATURATION: 98 % | SYSTOLIC BLOOD PRESSURE: 100 MMHG | DIASTOLIC BLOOD PRESSURE: 70 MMHG

## 2024-09-08 VITALS — HEART RATE: 100 BPM | OXYGEN SATURATION: 98 %

## 2024-09-08 VITALS — HEART RATE: 98 BPM

## 2024-09-08 VITALS — OXYGEN SATURATION: 96 % | HEART RATE: 107 BPM

## 2024-09-08 VITALS — HEART RATE: 94 BPM | OXYGEN SATURATION: 92 %

## 2024-09-08 VITALS — HEART RATE: 103 BPM | OXYGEN SATURATION: 91 %

## 2024-09-08 VITALS — DIASTOLIC BLOOD PRESSURE: 72 MMHG | SYSTOLIC BLOOD PRESSURE: 111 MMHG

## 2024-09-08 VITALS — HEART RATE: 91 BPM

## 2024-09-08 VITALS — DIASTOLIC BLOOD PRESSURE: 70 MMHG | HEART RATE: 102 BPM | SYSTOLIC BLOOD PRESSURE: 118 MMHG | OXYGEN SATURATION: 98 %

## 2024-09-08 VITALS — HEART RATE: 94 BPM | OXYGEN SATURATION: 95 %

## 2024-09-08 VITALS — OXYGEN SATURATION: 95 % | HEART RATE: 96 BPM

## 2024-09-08 VITALS — OXYGEN SATURATION: 92 % | HEART RATE: 101 BPM

## 2024-09-08 VITALS — OXYGEN SATURATION: 96 % | HEART RATE: 98 BPM

## 2024-09-08 VITALS — HEART RATE: 96 BPM | OXYGEN SATURATION: 98 %

## 2024-09-08 VITALS
OXYGEN SATURATION: 98 % | HEART RATE: 109 BPM | SYSTOLIC BLOOD PRESSURE: 111 MMHG | DIASTOLIC BLOOD PRESSURE: 72 MMHG | TEMPERATURE: 97.7 F

## 2024-09-08 VITALS — OXYGEN SATURATION: 99 % | HEART RATE: 95 BPM

## 2024-09-08 VITALS — OXYGEN SATURATION: 96 % | HEART RATE: 96 BPM

## 2024-09-08 VITALS — HEART RATE: 102 BPM | OXYGEN SATURATION: 86 %

## 2024-09-08 VITALS — OXYGEN SATURATION: 98 % | HEART RATE: 98 BPM

## 2024-09-08 VITALS — OXYGEN SATURATION: 95 %

## 2024-09-08 VITALS — HEART RATE: 90 BPM

## 2024-09-08 DIAGNOSIS — I26.99: Primary | ICD-10-CM

## 2024-09-08 DIAGNOSIS — R79.89: ICD-10-CM

## 2024-09-08 LAB
ADD MANUAL DIFF: NO
HCT VFR BLD CALC: 42.7 % (ref 42–54)
HEMATOCRIT: 42.7 % (ref 42–54)
HEMOGLOBIN: 14.1 G/DL (ref 14–18)
IMMATURE GRANULOCYTES ABS AUTO: 0.05 10^3/UL (ref 0–0.03)
IMMATURE GRANULOCYTES PCT AUTO: 0.7 % (ref 0–0.5)
INR: 1.15
LACTATE/LACTIC ACID: 1.8 MMOL/L (ref 0.4–2)
LYMPHOCYTES  ABSOLUTE AUTO: 1.3 10^3/UL (ref 1.2–3.8)
MCV RBC: 88.4 FL (ref 80–94)
MEAN CORPUSCULAR HEMOGLOBIN: 29.2 PG (ref 25.9–34)
MEAN CORPUSCULAR HGB CONC: 33 G/DL (ref 29.9–35.2)
MEAN CORPUSCULAR VOLUME: 88.4 FL (ref 80–94)
PARTIAL THROMBOPLASTIN TIME: 33.1 SEC (ref 22.3–36.2)
PLATELET # BLD: 235 10^3/UL (ref 150–450)
PLATELET COUNT: 235 10^3/UL (ref 150–450)
PROTHROMBIN TIME: 12 SEC (ref 9–11.6)
RED BLOOD COUNT: 4.83 10^6/UL (ref 4.7–6.1)
WBC # BLD: 7.6 10^3/UL (ref 4–11)
WHITE BLOOD COUNT: 7.6 10^3/UL (ref 4–11)

## 2024-09-08 PROCEDURE — 93005 ELECTROCARDIOGRAM TRACING: CPT

## 2024-09-08 PROCEDURE — 85378 FIBRIN DEGRADE SEMIQUANT: CPT

## 2024-09-08 PROCEDURE — 71045 X-RAY EXAM CHEST 1 VIEW: CPT

## 2024-09-08 PROCEDURE — 83605 ASSAY OF LACTIC ACID: CPT

## 2024-09-08 PROCEDURE — 84484 ASSAY OF TROPONIN QUANT: CPT

## 2024-09-08 PROCEDURE — 96376 TX/PRO/DX INJ SAME DRUG ADON: CPT

## 2024-09-08 PROCEDURE — 93306 TTE W/DOPPLER COMPLETE: CPT | Performed by: INTERNAL MEDICINE

## 2024-09-08 PROCEDURE — 96374 THER/PROPH/DIAG INJ IV PUSH: CPT

## 2024-09-08 PROCEDURE — 99285 EMERGENCY DEPT VISIT HI MDM: CPT

## 2024-09-08 PROCEDURE — 85025 COMPLETE CBC W/AUTO DIFF WBC: CPT

## 2024-09-08 PROCEDURE — 36415 COLL VENOUS BLD VENIPUNCTURE: CPT

## 2024-09-08 PROCEDURE — 71275 CT ANGIOGRAPHY CHEST: CPT

## 2024-09-08 PROCEDURE — 85610 PROTHROMBIN TIME: CPT

## 2024-09-08 PROCEDURE — 85730 THROMBOPLASTIN TIME PARTIAL: CPT

## 2024-09-08 NOTE — ECG_ITS
The [facility] 
                                        
                                       Test Date:    2024 
Pat Name:     KAYLYNN ALBRIGHT             Department:    
Patient ID:   [id number]               Room:         - 
Gender:       Male                     Technician:    
:          1955               Requested By: 1031 
Order Number: [id number]              Reading MD:   AVELINA ABARCA 
                                 Measurements 
Intervals                              Axis           
Rate:         103                      P:            -30 
IA:           190                      QRS:          36 
QRSD:         90                       T:            13 
QT:           330                                     
QTc:          389                                     
                           Interpretive Statements 
1120 Sinus tachycardia 
9140 **  abnormal rhythm ECG  ** 
No previous ECG available for comparison 
Electronically Signed On 2024 7:34:04 EDT by AVELINA ABARCA

## 2024-09-08 NOTE — XR_ITS
The [address] [address] 
     [phone] 
  
  
Patient Name: 
KAYLYNN ALBRIGHT 
  
MRN: [id number]    YOB: 1955    Sex: M 
Assigned Patient Location: ER 
Current Patient Location: ER 
Accession/Order Number: [id number] 
Exam Date: 9/08/2024  01:20    Report Date: 9/08/2024  02:51 
  
At the request of: 
FRANCISCO AMARAL   
  
Procedure:  XR chest 1V 
  
 EXAMINATION:XR chest 1V 
  
INDICATION:short of breath 
  
COMPARISON:Rib series dated 1/14/2022. 
  
TECHNIQUE:A single frontal view of the chest is submitted. 
  
FINDINGS: 
  
The cardiac silhouette is enlarged but stable. The pulmonary vascularity is  
within normal limits. No acute airspace disease is present in the lungs. There  
  
is no costophrenic angle blunting. 
  
ORDER #: [id number] XR/XR chest 1V  
IMPRESSION:  
   
 Stable appearance of the chest without acute cardiopulmonary process.  
   
   
Electronically authenticated by: VICKIE TREJO   Date: 9/08/2024  02:51

## 2024-09-08 NOTE — CT_ITS
The [address] [address] 
     [phone] 
  
  
Patient Name: 
KAYLYNN ALBRIGHT 
  
MRN: [id number]    YOB: 1955    Sex: M 
Assigned Patient Location: ER 
Current Patient Location: ER 
Accession/Order Number: [id number] 
Exam Date: 9/08/2024  02:17    Report Date: 9/08/2024  03:52 
  
At the request of: 
ADRIA AMARAL   
  
Procedure:  CT angio chest 
  
EXAM: CT angio chest  
  
HISTORY: short of breath . Midsternal chest pain.  
  
COMPARISON: Chest x-ray, 09/08/2024.  
  
TECHNIQUE: IV contrast enhanced CTA imaging the chest was performed using 100  
mL of Omnipaque 350 intravenous contrast. 
  
FINDINGS: Extensive acute pulmonary embolic disease is seen. The most central  
pulmonary embolus begins in the left main pulmonary artery at the bifurcation.  
  
Extensive additional more peripheral bilateral pulmonary emboli extending into  
  
segmental and subsegmental branches involving all lobes of both lungs. The  
heart is mildly enlarged with mild asymmetric right heart enlargement and mild  
  
flattening of the interventricular septum suggesting some right heart strain.  
There is minimal coronary arterial calcification. There is no pericardial  
effusion. 
  
The thoracic aorta and arch vessels appear normal. There is no dissection or  
aneurysm. 
  
The thyroid gland and esophagus are unremarkable. 
  
Motion artifact degrades the lungs. Mild nonspecific patchy ground-glass  
opacities favor hypoventilation and motion artifact over mild pneumonitis. No  
pulmonary infarct, consolidation, pleural effusion or pneumothorax is seen. 
  
Small hiatal hernia is noted. The upper abdomen is otherwise unremarkable. 
  
The bony thorax appears intact. 
  
Right PICC line extends into the SVC, with the tip obscured by IV contrast. 
  
ORDER #: [id number] CT/CT angio chest  
IMPRESSION:   
   
1. Extensive bilateral acute pulmonary embolic disease involving all lobes of   
both lungs with mild right heart strain, as above.  
2. Nonspecific mild patchy ground-glass opacities favor motion artifact and   
hypoinflation over mild nonspecific pneumonitis.  
3. Small hiatal hernia.  
   
Critical results were called by Dr. Mitesh Alegria to Adria Ghotra   
 At 9/8/2024 3:45 AM EDT.  
   
   
Electronically authenticated by: MITESH ALEGRIA   Date: 9/08/2024  03:52

## 2024-09-08 NOTE — ED_ITS
HPI - SOB/Dyspnea    
General    
Chief Complaint: Shortness of Breath/Dyspnea    
Time Seen by Provider: 09/08/24 00:29    
Source: patient    
Mode of arrival: ambulance    
Limitations: no limitations    
History of Present Illness    
HPI Narrative:     
patient presents complaining of acute onset of shortness of breath. Describes   
right knee aspiration 8/2024 at [facility]. found to have septic   
arthritis of the knee. blood and knee cultures positive for Strep viridans.    
Started on IV antibiotics since the procedure and is currently receiving   
antibiotics via PICC line. Tonight developed acute onset of shortness of breath   
and mild chest pain .States he feels weak. No nausea or abdominal pain    
Related Data    
                                Home Medications    
    
    
    
?Medication ?Instructions ?Recorded ?Confirmed    
     
ascorbic acid (vitamin C) 500 mg 500 mg PO BIDWM 09/08/24 09/08/24    
    
tablet       
     
chlorthalidone 25 mg tablet 25 mg PO DAILY 09/08/24 09/08/24    
     
diclofenac sodium 75 mg 75 mg PO Q12H 09/08/24 09/08/24    
    
tablet,delayed release       
     
polyethylene glycol 3350 17 17 g PO DAILY 09/08/24 09/08/24    
    
gram/dose oral powder (Purelax)       
     
testosterone cypionate 200 mg/mL 200 mg IM Q14D 09/08/24 09/08/24    
    
intramuscular oil       
     
valsartan 160 mg tablet 80 mg PO DAILY 09/08/24 09/08/24    
    
    
    
                                    Allergies    
    
    
    
Allergy/AdvReac Type Severity Reaction Status Date / Time    
     
Penicillins AdvReac Mild Hives Verified 08/17/24 09:45    
    
    
    
    
Review of Systems    
    
    
ROS      
    
 Status of ROS                          10 or more systems reviewed and unremark    
able except as noted in     
history and below       
    
    
PFSH    
PFSH    
Social History    
Little interest or pleasure in doing things:  not at all     
Feeling down, depressed, or hopeless:  not at all     
    
    
    
Exam    
Constitutional    
Vital Signs, click to edit/add:     
    
                                Last Vital Signs    
    
    
    
Temp  97.7 F   09/08/24 00:26    
     
Pulse  95 H  09/08/24 03:40    
     
Resp  16   09/08/24 03:40    
     
BP  101/72   09/08/24 03:31    
     
Pulse Ox  99   09/08/24 03:40    
     
O2 Del Method  Room Air, Nasal Cannula  09/08/24 00:28    
     
O2 Flow Rate  4   09/08/24 00:28    
    
    
    
    
Common normals: oriented x3 and no limitations    
General appearance: in distress (mild resp distress)    
HENMT    
Common normals: normocephalic and head/scalp atraumatic    
Eye    
Common normals: EOMs intact bilaterally and conjunctivae normal    
Respiratory    
Common normals: normal respiratory effort, no retractions, no use of accessory   
muscles and clear to auscultation bilaterally    
Cardio    
Common normals: S1 normal heart sound and S2 normal heart sound    
Rate: tachycardic    
GI    
Common normals: Normal to inspection, nondistended, normoactive bowel sounds   
present, soft to palpation and non-tender    
Extremity    
Other:     
mild-mod effusion right knee. neg warmth    
Neuro    
Common normals: oriented x3, CN's II-XII intact bilaterally, moves all   
extremities and no focal motor deficits    
Psych    
Appearance: grossly normal    
    
Course    
Vital Signs    
Vital signs:     
    
                                   Vital Signs    
    
    
    
Blood Pressure  111/72   09/08/24 00:24    
    
    
                                            
    
    
    
Temperature  97.7 F   09/08/24 00:26    
     
Pulse Rate  95 H  09/08/24 03:40    
     
Respiratory Rate  16   09/08/24 03:40    
     
Blood Pressure  101/72   09/08/24 03:31    
     
Pulse Oximetry  99   09/08/24 03:40    
     
Oxygen Delivery Method  Room Air, Nasal Cannula  09/08/24 00:28    
     
Oxygen Delivery Flow Rate  4   09/08/24 00:28    
    
    
    
    
    
MDM - SOB/Dyspnea    
MDM Narrative    
Medical decision making narrative:     
patient presents with acute onset of dyspnea. mild associated chest pain. EKG   
sinus tach. no acute changes. Troponin elevated at 424. Initially treated as   
NSTEMI. CTA chest results returned with extensive  bilat. acute pulmonary   
embolic disease involving all lobes of the lungs and mild heart strain. Patient   
and wife informed. Will plan to transfer as he will also need cardiology   
evaluation    
discussed with hospitalist at [facility] and patient accepted in transfer    
Lab Data    
Labs:     
    
                                   Lab Results    
    
    
    
  09/08/24 Range/Units    
    
  00:38     
     
WBC  7.6  (4.0-11.0)  10^3/uL    
     
RBC  4.83  (4.70-6.10)  10^6/uL    
     
Hgb  14.1  (14.0-18.0)  g/dL    
     
Hct  42.7  (42.0-54.0)  %    
     
MCV  88.4  (80.0-94.0)  fL    
     
MCH  29.2  (25.9-34.0)  pg    
     
MCHC  33.0  (29.9-35.2)  g/dL    
     
RDW  11.9  (11.0-15.0)  %    
     
Plt Count  235  (150-450)  10^3/uL    
     
MPV  9.9  (9.5-13.5)  fL    
     
Neut % (Auto)  69.8  (43.0-75.0)  %    
     
Lymph % (Auto)  17.1 L  (20.5-60.0)  %    
     
Mono % (Auto)  10.4  (1.7-12.0)  %    
     
Eos % (Auto)  1.6  (0.9-7.0)  %    
     
Baso % (Auto)  0.4  (0.2-2.0)  %    
     
Neut # (Auto)  5.3  (1.4-6.5)  10^3/uL    
     
Lymph # (Auto)  1.3  (1.2-3.8)  10^3/uL    
     
Mono # (Auto)  0.8  (0.3-0.8)  10^3/uL    
     
Eos # (Auto)  0.1  (0.0-0.7)  10^3/uL    
     
Baso # (Auto)  0.0  (0.0-0.1)  10^3/uL    
     
Abs Immat Gran (auto)  0.05 H  (0.00-0.03)  10^3/uL    
     
Imm/Tot Granulo (auto)  0.7 H  (0.0-0.5)  %    
     
PT  12.0 H  (9.0-11.6)  sec    
     
INR  1.15      
     
APTT  33.1  (22.3-36.2)  sec    
     
D-Dimer  13.04 H*  (<=0.59)  mg/L FEU    
     
Lactate  1.8  (0.4-2.0)  mmol/L    
     
Troponin I High Sens  424.6 H*  (4.0-76.1)  pg/mL    
    
    
    
    
    
Critical Care Time    
Critical Care Time    
Total Critical Care Time: 30    
    
Discharge Plan    
Discharge    
Chief Complaint: Shortness of Breath/Dyspnea    
    
Clinical Impression:    
 Pulmonary emboli, Elevated troponin    
    
    
[facility]

## 2024-09-08 NOTE — ED.SOB1
HPI - SOB/Dyspnea
General
Chief Complaint: Shortness of Breath/Dyspnea
Time Seen by Provider: 09/08/24 00:29
Source: patient
Mode of arrival: ambulance
Limitations: no limitations
History of Present Illness
HPI Narrative: 
patient presents complaining of acute onset of shortness of breath. Describes right knee aspiration 8/2024 at [facility]. found to have septic arthritis of the knee. blood and knee cultures positive for Strep viridans.  Started on IV 
antibiotics since the procedure and is currently receiving antibiotics via PICC line. Tonight developed acute onset of shortness of breath and mild chest pain .States he feels weak. No nausea or abdominal pain
Related Data
Home Medications

?Medication ?Instructions ?Recorded ?Confirmed
ascorbic acid (vitamin C) 500 mg 500 mg PO BIDWM 09/08/24 09/08/24
tablet   
chlorthalidone 25 mg tablet 25 mg PO DAILY 09/08/24 09/08/24
diclofenac sodium 75 mg 75 mg PO Q12H 09/08/24 09/08/24
tablet,delayed release   
polyethylene glycol 3350 17 17 g PO DAILY 09/08/24 09/08/24
gram/dose oral powder (Purelax)   
testosterone cypionate 200 mg/mL 200 mg IM Q14D 09/08/24 09/08/24
intramuscular oil   
valsartan 160 mg tablet 80 mg PO DAILY 09/08/24 09/08/24


Allergies

Allergy/AdvReac Type Severity Reaction Status Date / Time
Penicillins AdvReac Mild Hives Verified 08/17/24 09:45



Review of Systems
ROS  
 Status of ROS 10 or more systems reviewed and unremarkable except as noted in history and below   

PFSH
PFSH
Social History
Little interest or pleasure in doing things:  not at all 
Feeling down, depressed, or hopeless:  not at all 



Exam
Constitutional
Vital Signs, click to edit/add: 

Last Vital Signs

Temp  97.7 F   09/08/24 00:26
Pulse  95 H  09/08/24 03:40
Resp  16   09/08/24 03:40
BP  101/72   09/08/24 03:31
Pulse Ox  99   09/08/24 03:40
O2 Del Method  Room Air, Nasal Cannula  09/08/24 00:28
O2 Flow Rate  4   09/08/24 00:28



Common normals: oriented x3 and no limitations
General appearance: in distress (mild resp distress)
HENMT
Common normals: normocephalic and head/scalp atraumatic
Eye
Common normals: EOMs intact bilaterally and conjunctivae normal
Respiratory
Common normals: normal respiratory effort, no retractions, no use of accessory muscles and clear to auscultation bilaterally
Cardio
Common normals: S1 normal heart sound and S2 normal heart sound
Rate: tachycardic
GI
Common normals: Normal to inspection, nondistended, normoactive bowel sounds present, soft to palpation and non-tender
Extremity
Other: 
mild-mod effusion right knee. neg warmth
Neuro
Common normals: oriented x3, CN's II-XII intact bilaterally, moves all extremities and no focal motor deficits
Psych
Appearance: grossly normal

Course
Vital Signs
Vital signs: 

Vital Signs

Blood Pressure  111/72   09/08/24 00:24



Temperature  97.7 F   09/08/24 00:26
Pulse Rate  95 H  09/08/24 03:40
Respiratory Rate  16   09/08/24 03:40
Blood Pressure  101/72   09/08/24 03:31
Pulse Oximetry  99   09/08/24 03:40
Oxygen Delivery Method  Room Air, Nasal Cannula  09/08/24 00:28
Oxygen Delivery Flow Rate  4   09/08/24 00:28




MDM - SOB/Dyspnea
MDM Narrative
Medical decision making narrative: 
patient presents with acute onset of dyspnea. mild associated chest pain. EKG sinus tach. no acute changes. Troponin elevated at 424. Initially treated as NSTEMI. CTA chest results returned with extensive  bilat. acute pulmonary embolic disease 
involving all lobes of the lungs and mild heart strain. Patient and wife informed. Will plan to transfer as he will also need cardiology evaluation
discussed with hospitalist at [facility] and patient accepted in transfer
Lab Data
Labs: 

Lab Results

  09/08/24 Range/Units
  00:38 
WBC  7.6  (4.0-11.0)  10^3/uL
RBC  4.83  (4.70-6.10)  10^6/uL
Hgb  14.1  (14.0-18.0)  g/dL
Hct  42.7  (42.0-54.0)  %
MCV  88.4  (80.0-94.0)  fL
MCH  29.2  (25.9-34.0)  pg
MCHC  33.0  (29.9-35.2)  g/dL
RDW  11.9  (11.0-15.0)  %
Plt Count  235  (150-450)  10^3/uL
MPV  9.9  (9.5-13.5)  fL
Neut % (Auto)  69.8  (43.0-75.0)  %
Lymph % (Auto)  17.1 L  (20.5-60.0)  %
Mono % (Auto)  10.4  (1.7-12.0)  %
Eos % (Auto)  1.6  (0.9-7.0)  %
Baso % (Auto)  0.4  (0.2-2.0)  %
Neut # (Auto)  5.3  (1.4-6.5)  10^3/uL
Lymph # (Auto)  1.3  (1.2-3.8)  10^3/uL
Mono # (Auto)  0.8  (0.3-0.8)  10^3/uL
Eos # (Auto)  0.1  (0.0-0.7)  10^3/uL
Baso # (Auto)  0.0  (0.0-0.1)  10^3/uL
Abs Immat Gran (auto)  0.05 H  (0.00-0.03)  10^3/uL
Imm/Tot Granulo (auto)  0.7 H  (0.0-0.5)  %
PT  12.0 H  (9.0-11.6)  sec
INR  1.15  
APTT  33.1  (22.3-36.2)  sec
D-Dimer  13.04 H*  (<=0.59)  mg/L FEU
Lactate  1.8  (0.4-2.0)  mmol/L
Troponin I High Sens  424.6 H*  (4.0-76.1)  pg/mL




Critical Care Time
Critical Care Time
Total Critical Care Time: 30

Discharge Plan
Discharge
Chief Complaint: Shortness of Breath/Dyspnea

Clinical Impression:
 Pulmonary emboli, Elevated troponin


Patient Disposition: [facility]

## 2024-09-08 NOTE — XMS_ITS
Comprehensive CCD (C-CDA v2.1)  
  
                          Created on: 2024  
  
  
Kaylynn Mims  
External Reference #: CDR,PersonID:[id number]  
: 1955  
Sex: Male  
  
Demographics  
  
  
                                        Address             [address] [place] [address]  [address]  
   
                                        Home Phone          [phone]  
   
                                        Preferred Language  en  
   
                                        Marital Status        
   
                                        Episcopal Affiliation Unknown  
   
                                        Race                White  
   
                                        Ethnic Group        Not  or Lati  
no  
  
  
Author  
  
  
                                        Organization        [facility]  
  
  
Care Team Providers  
  
  
                                Care Team Member Name Role            Phone  
   
                                MISC, DR ACEVEDO Admitting       Unavailable  
   
                                MISC, DR ACEVEDO Consulting      Unavailable  
   
                                MISC, DR ACEVEDO Attending       Unavailable  
   
                                ZIEBER, DR KAYLYNN HOPKINS Consulting      Unavailable  
   
                                HOUSE, DR TREVINO Consulting      Unavailable  
   
                                HOUSE, DR TREVINO Attending       Unavailable  
   
                                HOUSE, DR TREVINO Admitting       Unavailable  
   
                                Harper, Elvira   Unavailable     [phone]  
   
                                MD Gucci Maxwell Attending Provider [phone]  
   
                                Harper, DO Elvira A Primary Care Provider [phone]   
   
                                Harper, DO Elvira A Attending Provider [phone]  
   
                                SANJANA Khan Attending Provider [phone]  
[phone]  
   
                                Harper, DO Elvira A Primary Care Provider [phone]   
   
                                Harper, DO Elvira A Primary Care Provider [phone]   
   
                                SANJANA Khan Attending Provider [phone]  
   
                                Laura [facility] Daniela BENITES Emergency Provider [phone]  
[phone]  
   
                                MD Josie Ann Admit Provider  [phone]  
   
                                MD Josie Ann Attending Provider [phone]  
   
                                MD Gucci Maxwell Other Provider  [phone]  
   
                                MD Ashley Rai Other Provider  [phone]  
[phone]  
   
                                GEOVANNY Hancock Other Provider  [phone]   
   
                                DO Ted Penny Other Provider  [phone]  
   
                                MD Jeff Onofre II Other Provider  [phone]   
   
                                DO Sai Butts A Other Provider  [phone]  
[phone]  
   
                                MD Paul Crum Other Provider  [phone]  
   
                                To Khan Admitting       Unavailable  
   
                                Binks, To Tavon Attending       Unavailable  
   
                                Harper, Elvira A Primary Care    Unavailable  
   
                                Olexa, Gucci   Admitting       Unavailable  
   
                                Olexa, Gucci   Attending       Unavailable  
   
                                Harper, Elvira A Primary Care    Unavailable  
   
                                Marissa, Josie Admitting       Unavailable  
   
                                Marissa, Josie Attending       Unavailable  
   
                                Alissa, Gucci   Consulting      Unavailable  
   
                                Harper, Elvira A Primary Care    Unavailable  
   
                                Ashley Rai Consulting      Unavailable  
   
                                Eduarda Hancock Consulting      Unavailable  
   
                                Ted Penny Consulting      Unavailable  
   
                                Jeff Onofre II Consulting      UnavailSai Toney Consulting      Unavailable  
   
                                Paul Crum  Consulting      Unavailable  
   
                                Harper, Elvira A Attending       Unavailable  
   
                                Harper, Elvira A Admitting       Unavailable  
   
                                Harper, Elvira A Primary Care    Unavailable  
   
                                To Khan Admitting       Unavailable  
   
                                To Khan Attending       Unavailable  
   
                                Harper, Elvira A Primary Care    Unavailable  
  
  
  
Allergies  
  
  
                                                    Allergy   
Classification                          Reported   
Allergen(s)               Allergy Type              Date of   
Onset                     Reaction(s)               Facility  
   
                                                    metFORMIN  
(2 sources)         metFORMIN           Drug Allergy          
4                         [facility]  
   
                                                    Penicillins   
(antibiotic)  
(2 sources)         Penicillins         Drug Allergy          
4                         [facility]  
   
                                                      
(9 sources)         metFORMIN           Drug Allergy          
4                         [facility]  
   
                                                      
(2 sources)  Penicillin   Drug Allergy              rash         [facility]  
Other Phone:   
[phone]  
   
                                                      
(8 sources)                             Penicillins;   
Translations:   
[Penicillins]                           Allergy to   
substance                                 
4                         [facility]  
   
                                                      
(1 source)          metFORMIN           Drug Allergy          
[address] [place] [address]   
Repository  
  
  
  
Medications  
Current Medications  
  
  
  
                      Medication Drug Class(es) Dates      Sig (Normalized) Sig   
(Original)  
   
                                                    acetaminophen 325 mg   
oral tablet  
(2 sources)                                         Start:   
2024                              take 2 tablets by   
mouth every four   
hours                                   Acetaminophen   
(Tylenol) 325 mg   
Tablet Active 650   
MG PO Q4H  12:00am  
   
                                                    ascorbic acid 500 mg   
oral tablet  
(2 sources)               Vitamin C                 Start:   
2024                              take 1 tablet by   
mouth twice daily   
at mealtime                             Ascorbic Acid   
(Vitamin C)   
(Vitamin C) 500 mg   
Tablet Active 500   
MG PO Twice daily   
with meals    
12:00am  
   
                                                    BD Syringe/Needle   
23G X 1  3 ML  
(1 source)                                          Start:   
2023                                          BD Syringe/Needle   
23G X 1  3 ML as   
directed SQ every 2   
weeks for 90 days   
 Active  
   
                                                    Blood Sugar   
Diagnostic  
(7 sources)                                         Start:   
05-                                          Blood Sugar   
Diagnostic Active 0   
.Route 50 May 15th,   
2024 12:00am As   
directed  
   
                                                    cefTRIAXone 2000 mg   
injection  
(2 sources)                             Cephalosporin   
Antibacterial                           Start:   
2024                              take 2 g   
intravenously   
every twenty-four   
hours                                   Ceftriaxone Active   
2 GM IV Q24H 24    
12:00am  
   
                                                    chlorthalidone 25 mg   
oral tablet  
(2 sources)                             Thiazide-like   
Diuretic                                Start:   
2024                              take 25 mg by   
mouth once daily                        Chlorthalidone   
Active 25 MG PO   
Daily  12:00am  
   
                                                    diclofenac sodium 75   
mg delayed release   
oral tablet  
(13 sources)                            Nonsteroidal   
Anti-inflammatory   
Drug                                    Start:   
05-  
End:   
2024                              take 75 mg by   
mouth twice daily                       Diclofenac Sodium   
Active 75 MG PO   
Twice daily 60    
9:30am  
   
                                                    docusate sodium 50   
mg / sennosides, usp   
8.6 mg oral tablet  
(2 sources)                                         Start:   
2024                              take 2 tablets by   
mouth twice daily                       Sennosides-Docusate   
Sodium Active 2 TAB   
PO Twice daily 60   
2024   
12:00am  
   
                                                    ferrous sulfate 324   
mg delayed release   
oral tablet  
(2 sources)                                         Start:   
2024                              take 324 mg by   
mouth twice daily   
at mealtime                             Ferrous Sulfate   
Active 324 MG PO   
Twice daily with   
meals 0 2024 12:00am  
   
                                                    nabumetone 750 mg   
oral tablet  
(6 sources)                             Nonsteroidal   
Anti-inflammatory   
Drug                                    Start:   
2024                              take 750 mg by   
mouth twice daily                       Nabumetone Active   
750 MG PO Twice   
daily 2024 12:00am  
  
  
  
                                                    Start: 2024  
End: 2024                                     Nabumetone Discontinued MG T  
ABLET 2024 12:00am   
2024 6:31pm  
  
  
  
                                                    oxyCODONE   
hydrochloride 5 mg   
oral tablet  
(2 sources)         Opioid Agonist      Start: 2024   take 5 mg by   
mouth every   
four hours                              Oxycodone Active 5   
MG PO Every 4   
hours 20 7 2024  
   
                                                    polyethylene glycol   
3350 33055 mg powder   
for oral solution  
(2 sources)     Osmotic Laxative Start: 08-                 Polyethylene   
Glycol 3350   
(Healthylax) 17   
gram Powder In   
Packet Active 17   
GM PO Daily    
12:00am  
   
                                                    valsartan 160 mg   
oral tablet  
(2 sources)                             Angiotensin 2   
Receptor Blocker          Start: 2024         take 160 mg   
by mouth   
once daily                              Valsartan Active   
160 MG PO Daily    
12:00am  
  
  
  
Completed/Discontinued Medications  
  
  
  
                      Medication Drug Class(es) Dates      Sig (Normalized) Sig   
(Original)  
   
                                                    glipiZIDE er 5 mg   
24 hr extended   
release oral tablet  
(20 sources)              Sulfonylurea              Start:   
2024  
End:   
2024                                          Glipizide   
Discontinued MG PO   
2024   
12:00am 2024 6:30pm  
  
  
  
                                                    Start: 2024  
End: 2024                         take 1 tablet by mouth once   
daily at mealtime                       Glipizide Discontinued 0 .ROUTE   
.COMPLEX 90 2024 8:21am   
2024 9:15am TAKE 1   
TABLET BY MOUTH EVERY DAY WITH FOOD  
   
                                                    Start: 2024  
End: 2024                                     Glipizide Discontinued MG PO  
 2024 12:00am 2024   
8:22am  
   
                                                    Start: 2024  
End: 2024                         take 1 tablet by mouth once   
daily at mealtime                       Glipizide Discontinued 1 TAB PO   
Daily 2024 1:00am   
2024 2:16pm   
FreeTextSi tablet with food   
Orally Once a day; Note: Source   
Status: Taking; Refills: 1; Qty: 90   
Tablet; Provider: Meredith ROGERS  
   
                                        Start: 08-   take 1 tablet by chrissy  
th every   
twenty-four hours                       glipiZIDE ER 5 MG 1 tablet with   
food Orally Once a day for 90 days   
10 Aug, 2023 Active  
  
  
  
                                                    meloxicam 15 mg   
oral tablet  
(15 sources)                            Nonsteroidal   
Anti-inflammatory   
Drug                                    Start:   
2024  
End:   
2024                              take 15 mg by   
mouth once daily                        Meloxicam   
Discontinued 15   
MG PO Daily May   
15th, 2024   
9:36am 2024   
9:16am On Hold:   
While on   
Diclofenac  
   
                                                    1 ml testosterone   
cypionate 200   
mg/ml injection  
(20 sources)              Androgen                  Start:   
2024  
End:   
2024                              inject 200 mg by   
intramuscular   
injection every   
other week                              Testosterone   
Cypionate   
Discontinued 200   
MG IM EVERY 2   
WEEKS 2 30 May   
3rd, 2024 8:40am   
2024   
7:48am  
  
  
  
                                                    Start: 2024  
End: 2024                         inject 1 mL by intramuscular   
injection every other week              Testosterone Cypionate   
Discontinued 200 MG SUBCUT EVERY 2   
WEEKS 2024 1:00am   
2024 9:44am   
FreeTextSig: INJECT 1 ML   
INTRAMUSCULARLY EVERY 2 WEEKS;   
Note: Source Status: Start;   
Refills: 2; Qty: 2 Milliliter;   
Provider: Meredith Felix (NPI:   
[id number])  
   
                                        Start: 08-   inject 1 mL by intra  
muscular   
injection every other week              Testosterone Cypionate 200 MG/ML 1   
mL Intramuscular every two weeks   
for 30 days 10 Aug, 2023 Active  
   
                                        Start: 08-   inject 1 mL by intra  
muscular   
injection every other week              Testosterone Cypionate 200 MG/ML 1   
mL Intramuscular every two weeks   
for 30 days 10 Aug, 2023 Active  
  
  
  
                                                    triamcinolone acetonide 1   
mg/ml topical cream  
(18 sources)              Corticosteroid            Start: 2024  
End: 2024                                     Triamcinolone Acetonide   
Discontinued 1 APPLIC   
TOPICAL Twice daily    
8:41am 2024   
9:44am  
  
  
  
                                                                Triamcinolone Ac  
etonide 0.1 % 1 application Externally twice a day Active  
  
  
  
Problems  
Active Problems  
  
  
                      Problem Classification Problem    Date       Documented Da  
te Episodic/Chronic  
   
                                                    Bacterial infection;   
unspecified site  
(10 sources)                            Microbiologic   
culture positive;   
Translations:   
[Bacteremia]                            Onset:   
2024                Episodic  
   
                                                    Diabetes mellitus   
without complication  
(14 sources)                            Type 2 diabetes   
mellitus;   
Translations: [Type   
2 diabetes mellitus   
without   
complications]                          Onset:   
2024                                          Chronic  
   
                                                    Infective arthritis   
and osteomyelitis   
(except that caused by   
tuberculosis or   
sexually transmitted   
disease)  
(5 sources)                             Knee pyogenic   
arthritis;   
Translations:   
[Pyogenic arthritis,   
unspecified]                            Onset:   
2024                Episodic  
   
                                                    Osteoarthritis  
(20 sources)                            Arthritis of knee;   
Translations:   
[Unilateral primary   
osteoarthritis, left   
knee]                                   2024          Chronic  
   
                                                    Other aftercare  
(2 sources)                             Other long term   
(current) drug   
therapy;   
Translations: [Other   
long term (current)   
drug therapy]                           Onset:   
2024                                          Episodic  
   
                                                    Other bone disease and   
musculoskeletal   
deformities  
(4 sources)                             Segmental and   
somatic dysfunction   
of cervical region;   
Translations: [SEG   
SOMATIC DYSF   
CERVICAL REGION]                        Onset:   
2023                                          Episodic  
   
                                                    Other connective   
tissue disease  
(1 source)                              Pain in right arm;   
Translations: [PAIN   
IN RIGHT ARM]                           Onset:   
2023                                          Episodic  
   
                                                    Other connective   
tissue disease  
(8 sources)                             Bursitis of right   
shoulder;   
Translations:   
[Bursitis of right   
shoulder]                               2024          Episodic  
   
                                                    Other connective   
tissue disease  
(2 sources)                             Bursitis of right   
shoulder;   
Translations:   
[Disorders of bursae   
and tendons in   
shoulder region,   
unspecified]                            2024          Episodic  
   
                                                    Other connective   
tissue disease  
(6 sources)                             Iliotibial band   
syndrome,   
unspecified leg;   
Translations: [Other   
disorders of muscle,   
ligament, and   
fascia]                                 2024          Episodic  
   
                                                    Other endocrine   
disorders  
(2 sources)                             Decreased   
testosterone level ;   
Translations:   
[Testicular   
hypofunction]                                               Chronic  
   
                                                    Other endocrine   
disorders  
(3 sources)                             Testicular   
hypofunction;   
Translations:   
[Testicular   
hypofunction]                           Onset:   
2024                                          Chronic  
   
                                                    Other nervous system   
disorders  
(1 source)                              Difficulty in   
walking, not   
elsewhere   
classified;   
Translations:   
[Difficulty in   
walking, not   
elsewhere   
classified]                             Onset:   
2024                                          Chronic  
   
                                                    Other nervous system   
disorders  
(1 source)                              Anesthesia of skin;   
Translations:   
[ANESTHESIA OF SKIN]                    Onset:   
2023                                          Episodic  
   
                                                    Other non-traumatic   
joint disorders  
(11 sources)                            Pain in right   
shoulder;   
Translations: [Right   
shoulder pain]                          2024          Episodic  
   
                                                    Other non-traumatic   
joint disorders  
(8 sources)                             Effusion, left knee;   
Translations:   
[Effusion of joint,   
lower leg]                              05-          Episodic  
   
                                                    Other non-traumatic   
joint disorders  
(11 sources)                            Pain in right knee;   
Translations: [Pain   
in joint, lower leg]                    Onset:   
2024                Episodic  
   
                                                    Other non-traumatic   
joint disorders  
(2 sources)                             Effusion of right   
knee joint;   
Translations:   
[Effusion, right   
knee]                                   2024          Episodic  
   
                                                    Other non-traumatic   
joint disorders  
(2 sources)                             Effusion, right   
knee; Translations:   
[Effusion of joint,   
lower leg]                              2024          Episodic  
   
                                                    Other screening for   
suspected conditions   
(not mental disorders   
or infectious disease)  
(10 sources)                            Encounter for   
screening for   
malignant neoplasm   
of prostate;   
Translations:   
[Decreased   
testosterone level ]                    Onset:   
2024                                          Episodic  
   
                                                    Residual codes;   
unclassified  
(2 sources)                             Pain; Translations:   
[Pain, unspecified]                     2024          Episodic  
   
                                                    Residual codes;   
unclassified  
(2 sources)                             Pain, unspecified;   
Translations:   
[Generalized pain]                      2024          Episodic  
   
                                                    Unclassified  
(1 source)                              Pain in right   
shoulder;   
Translations: [Pain   
in right shoulder]                      Onset:   
2024                                            
  
  
Past or Other Problems  
  
  
                      Problem Classification Problem    Date       Documented Da  
te Episodic/Chronic  
   
                                                    Other non-traumatic   
joint disorders  
(4 sources)                             Pain in left   
knee;   
Translations:   
[Pain in joint,   
lower leg]                              Onset:   
05-                05-                Episodic  
  
  
  
Results  
  
  
                          Test Name    Value        Interpretation Reference   
Range                                   Facility  
   
                                                    Basic Metabolic Panelon 08-2  
3-2024   
   
                                                    Creatinine Clr Calc   
Pharmacy        142.70          Normal                          [facility]   
[facility]  
   
                                        Comment on above:   Order Comment: Reaso  
n for Exam Type 2 diabetes mellitus   
   
                                                            Result Comment: PERF  
ORMED BY:  
[place] [address]  
[phone]  
PATHOLOGIST MEDICAL DIRECTOR  
VIKAS MARTINEZ M.D.   
   
                                                            Performed By: #### U  
RMA, CMP, LIPID, CBCNO, PSAS, TEST ####  
[facility]  
[address]   
   
                                                    GFR/1.73 sq M.predicted   
MDRD (S/P/Bld) [Vol   
rate/Area]      mL/min/{1.73_m2} Normal                          [facility]   
[facility]   
[facility]  
   
                                        Comment on above:   Order Comment: Reaso  
n for Exam Type 2 diabetes mellitus   
   
                                                            Performed By: #### U  
RMA, CMP, LIPID, CBCNO, PSAS, TEST ####  
[facility]  
[address] [place] [address] [place]   
   
                                                    C reactive protein [Mass/vol  
ume] in Serum or PlasmaOrdered By: Paul Crum on   
2024   
   
                      CRP [Mass/Vol] 22.7 mg/dL High       0.0-0.5    [facility]  
   
                                                    C-Reactive Proteinon   
024   
   
                      C-Reactive Protein 22.7 mg/dL High       0.0-0.5    [facility]   
[facility]   
[facility]  
   
                                        Comment on above:   Result Comment: PERF  
ORMED BY:  
[address] [place] [address]  
[phone]  
PATHOLOGIST MEDICAL DIRECTOR  
VIKAS MARTINEZ M.D.   
   
                                                            Performed By: #### U  
RMA, CMP, LIPID, CBCNO, PSAS, TEST ####  
[facility]  
[address]   
   
                                                    Calcium [Mass/volume] in Ser  
um or PlasmaOrdered By: Josie Marissa on   
2024   
   
                      Calcium [Mass/Vol] 8.6 mg/dL             8.6-10.3   [facility]  
   
                                        Comment on above:   Order Comment: Reaso  
n for Exam Type 2 diabetes mellitus   
   
                                                            Performed By: #### U  
RMA, CMP, LIPID, CBCNO, PSAS, TEST ####  
[facility]  
[address]   
   
                                                    Carbon dioxide, total [Moles  
/volume] in Serum or PlasmaOrdered By: Josie   
Marissa on   
2024   
   
                      CO2 [Moles/Vol] 27.1 mmol/L            21.0-31.0  [facility]  
   
                                        Comment on above:   Order Comment: Reaso  
n for Exam Type 2 diabetes mellitus   
   
                                                            Performed By: #### U  
RMA, CMP, LIPID, CBCNO, PSAS, TEST ####  
[facility]  
[address] [place] [address] [place]   
   
                                                    Chloride [Moles/volume] in S  
lorraine or PlasmaOrdered By: Josie Marissa on   
2024   
   
                      Chloride [Moles/Vol] 98 mmol/L                  [facility]  
   
                                        Comment on above:   Order Comment: Reaso  
n for Exam Type 2 diabetes mellitus   
   
                                                            Performed By: #### U  
RMA, CMP, LIPID, CBCNO, PSAS, TEST ####  
[facility]  
[address] [place] [address] [place]   
   
                                                    Creatinine [Mass/volume] in   
Serum or PlasmaOrdered By: Josie Bethele on   
2024   
   
                      Creatinine [Mass/Vol] 0.67 mg/dL Low        0.70-1.30  [facility]  
   
                                        Comment on above:   Order Comment: Reaso  
n for Exam Type 2 diabetes mellitus   
   
                                                            Performed By: #### U  
RMA, CMP, LIPID, CBCNO, PSAS, TEST ####  
[facility]  
[address] [place] [address] [place]   
   
                                                    Glucose [Mass/volume] in Ser  
um or PlasmaOrdered By: Josie Ann on   
2024   
   
                      Glucose [Mass/Vol] 151 mg/dL  High            [facility]  
   
                                        Comment on above:   ADA recommended refe  
rence rangeRandom Glucose Reference Range   
is dependent on time and content of last meal. Glucose of more   
than 200 mg/dL in a nonstressed, ambulatory subject supports   
the diagnosis of Diabetes Mellitus.   
   
                                                            Order Comment: Reaso  
n for Exam Type 2 diabetes mellitus   
   
                                                            Result Comment: Rand  
om Glucose Reference Range is dependent on   
time and  
content of last meal. Glucose of more than 200 mg/dL in a  
nonstressed, ambulatory subject supports the diagnosis of  
Diabetes Mellitus.  
ADA recommended reference range   
   
                                                            Performed By: #### U  
RMA, CMP, LIPID, CBCNO, PSAS, TEST ####  
[address]   
   
                                                    No Panel InformationOrdered   
By: Josie Ann on 2024   
   
                      Estimated GFR (CKD-EPI) > 60.0 mL/Min                       
  [facility]  
   
                                                    Pharmacy Creatinine   
Clearance (Chem 142.70                                          [facility]  
   
                                                    Potassium [Moles/volume] in   
Serum or PlasmaOrdered By: Josie Ann on   
2024   
   
                      Potassium [Moles/Vol] 4.3 mmol/L            3.5-5.1    [facility]  
   
                                        Comment on above:   Order Comment: Reaso  
n for Exam Type 2 diabetes mellitus   
   
                                                            Performed By: #### U  
RMA, CMP, LIPID, CBCNO, PSAS, TEST ####  
[facility]  
[address] [place] [address] [place]   
   
                                                    Serum or plasma anion gap de  
terminationOrdered By: Josie Ann on 2024  
   
   
                      Anion gap [Moles/Vol] 12.2 mmol/L            6.0-15.0   [facility]  
   
                                        Comment on above:   Order Comment: Reaso  
n for Exam Type 2 diabetes mellitus   
   
                                                            Performed By: #### U  
RMA, CMP, LIPID, CBCNO, PSAS, TEST ####  
[place] [address] [place]   
   
                                                    Sodium [Moles/volume] in Ser  
um or PlasmaOrdered By: Josie Ann on   
2024   
   
                      Sodium [Moles/Vol] 133 mmol/L Low        136-145    [facility]  
   
                                        Comment on above:   Order Comment: Reaso  
n for Exam Type 2 diabetes mellitus   
   
                                                            Performed By: #### U  
RMA, CMP, LIPID, CBCNO, PSAS, TEST ####  
[address]   
   
                                                    Urea nitrogen [Mass/volume]   
in Serum or PlasmaOrdered By: Josie Ann on   
2024   
   
                                                    Urea nitrogen   
[Mass/Vol]      14 mg/dL                                    [facility]  
   
                                        Comment on above:   Order Comment: Reaso  
n for Exam Type 2 diabetes mellitus   
   
                                                            Performed By: #### U  
RMA, CMP, LIPID, CBCNO, PSAS, TEST ####  
[address]   
   
                                                    Automated basophil %Ordered   
By: Jeff Onofre on 2024   
   
                      Basophils/100 WBC (Bld) 1.5 %                 .          [facility]  
   
                                        Comment on above:   Performed By: #### U  
RMA, CMP, LIPID, CBCNO, PSAS, TEST ####  
[address]   
   
                                                    Automated basophil countOrde  
red By: Jeff Onofre on 2024   
   
                      Basophils (Bld) [#/Vol] 0.1 10*3/uL            0.0-0.2      
[facility]  
   
                                        Comment on above:   Result Comment: PERF  
ORMED BY:  
[place] [address]  
[phone]  
PATHOLOGIST MEDICAL DIRECTOR  
VIKAS MARTINEZ M.D.   
   
                                                            Performed By: #### U  
RMA, CMP, LIPID, CBCNO, PSAS, TEST ####  
[address]   
   
                                                    Automated blood monocyte cou  
ntOrdered By: Jeff Onofre on 2024   
   
                      Monocytes (Bld) [#/Vol] 1.2 10*3/uL High       0.0-0.8      
[facility]  
   
                                        Comment on above:   Performed By: #### U  
RMA, CMP, LIPID, CBCNO, PSAS, TEST ####  
[address]   
   
                                                    Automated eosinophil %Ordere  
d By: Jeff Onofre on 2024   
   
                                                    Eosinophils/100 WBC   
(Bld)           1.1 %                           .               [facility]  
   
                                        Comment on above:   Performed By: #### U  
RMA, CMP, LIPID, CBCNO, PSAS, TEST ####  
[address]   
   
                                                    Automated eosinophil countOr  
dered By: Jeff Onofre on 2024   
   
                                                    Eosinophils (Bld)   
[#/Vol]         0.1 10*3/uL                     0.0-0.45        [facility]  
   
                                        Comment on above:   Performed By: #### U  
RMA, CMP, LIPID, CBCNO, PSAS, TEST ####  
[address]   
   
                                                    Automated monocyte %Ordered   
By: Jeff Onofre on 2024   
   
                      Monocytes/100 WBC (Bld) 14.0 %                .          F  
[facility]  
   
                                        Comment on above:   Performed By: #### U  
RMA, CMP, LIPID, CBCNO, PSAS, TEST ####  
[address]   
   
                                                    Automated neutrophil %Ordere  
d By: Jeff Onofre on 2024   
   
                                                    Neutrophils/100 WBC   
(Bld)           68.5 %                          .               [facility]  
   
                                        Comment on above:   Performed By: #### U  
RMA, CMP, LIPID, CBCNO, PSAS, TEST ####  
[address]   
   
                                                    Basic Metabolic Panelon    
   
                      Anion gap [Moles/Vol] 12.4 mmol/L Normal     6.0-15.0   [facility]  
[facility]   
Physician   
Group  
   
                                        Comment on above:   Performed By: #### U  
RMA, CMP, LIPID, CBCNO, PSAS, TEST ####  
[address]   
   
                      Calcium [Mass/Vol] 8.2 mg/dL  Low        8.6-10.3   [facility]   
Physician   
Group  
   
                                        Comment on above:   Performed By: #### U  
RMA, CMP, LIPID, CBCNO, PSAS, TEST ####  
[address]   
   
                      Chloride [Moles/Vol] 99 mmol/L  Normal          The   
[facility]   
Physician   
Group  
   
                                        Comment on above:   Performed By: #### U  
RMA, CMP, LIPID, CBCNO, PSAS, TEST ####  
[address]   
   
                      CO2 [Moles/Vol] 29.1 mmol/L Normal     21.0-31.0  [facility]   
Physician   
Group  
   
                                        Comment on above:   Performed By: #### U  
RMA, CMP, LIPID, CBCNO, PSAS, TEST ####  
[address]   
   
                      Creatinine [Mass/Vol] 0.90 mg/dL Normal     0.70-1.30  The  
 [facility]   
Physician   
Group  
   
                                        Comment on above:   Performed By: #### U  
RMA, CMP, LIPID, CBCNO, PSAS, TEST ####  
[address]   
   
                                                    Creatinine Clr Calc   
Pharmacy        126.85          Normal                          [facility]   
Physician   
Group  
   
                                        Comment on above:   Result Comment: PERF  
ORMED BY:  
[place] [address]  
[phone]  
PATHOLOGIST MEDICAL DIRECTOR  
VIKAS MARTINEZ M.D.   
   
                                                            Performed By: #### U  
RMA, CMP, LIPID, CBCNO, PSAS, TEST ####  
[address]   
   
                                                    GFR/1.73 sq M.predicted   
MDRD (S/P/Bld) [Vol   
rate/Area]      mL/min/{1.73_m2} Normal                          [facility]   
Physician   
[facility]  
   
                                        Comment on above:   Performed By: #### U  
RMA, CMP, LIPID, CBCNO, PSAS, TEST ####  
[address]   
   
                      Glucose [Mass/Vol] 137 mg/dL  High            [facility]   
[facility]   
[facility]  
   
                                        Comment on above:   Result Comment: Rand  
om Glucose Reference Range is dependent on  
   
time and  
content of last meal. Glucose of more than 200 mg/dL in a  
nonstressed, ambulatory subject supports the diagnosis of  
Diabetes Mellitus.  
ADA recommended reference range   
   
                                                            Performed By: #### U  
RMA, CMP, LIPID, CBCNO, PSAS, TEST ####  
[address]   
   
                      Potassium [Moles/Vol] 4.5 mmol/L Normal     3.5-5.1    The  
 [facility]   
Physician   
Group  
   
                                        Comment on above:   Performed By: #### U  
RMA, CMP, LIPID, CBCNO, PSAS, TEST ####  
[address]   
   
                      Sodium [Moles/Vol] 136 mmol/L Normal     136-145    [facility]   
Physician   
Group  
   
                                        Comment on above:   Performed By: #### U  
RMA, CMP, LIPID, CBCNO, PSAS, TEST ####  
[address]   
   
                                                    Urea nitrogen   
[Mass/Vol]      17 mg/dL        Normal          7-25            [facility]   
Physician   
Group  
   
                                        Comment on above:   Performed By: #### U  
RMA, CMP, LIPID, CBCNO, PSAS, TEST ####  
[address]   
   
                                                    C-Reactive Proteinon   
024   
   
                      C-Reactive Protein 12.2 mg/dL High       0.0-0.5    [facility]   
Physician   
Group  
   
                                        Comment on above:   Result Comment: PERF  
ORMED BY:  
[place] [address]  
[phone]  
PATHOLOGIST MEDICAL DIRECTOR  
VIKAS MARTINEZ M.D.   
   
                                                            Performed By: #### U  
RMA, CMP, LIPID, CBCNO, PSAS, TEST ####  
[address]   
   
                                                    Complete Blood Count Auto Di  
ffon 2024   
   
                                                    Mean Corpuscular HGB   
Conc            33.8 g/dL       Normal          32.5-35.6       [facility]   
Physician   
Group  
   
                                        Comment on above:   Performed By: #### U  
RMA, CMP, LIPID, CBCNO, PSAS, TEST ####  
[address]   
   
                      NRBC%      0.2 /100{WBC} Normal     0-0.5      [facility]   
Physician   
Group  
   
                                        Comment on above:   Performed By: #### U  
RMA, CMP, LIPID, CBCNO, PSAS, TEST ####  
[address]   
   
                                                    Erythrocyte distribution wid  
th [Ratio] by Automated countOrdered By: Jeff Onofre   
on 2024   
   
                                                    Erythrocyte   
distribution width   
(RBC) [Ratio]   13.3 %                          12.0-14.8       [facility]  
   
                                        Comment on above:   Performed By: #### U  
RMA, CMP, LIPID, CBCNO, PSAS, TEST ####  
[address]   
   
                                                    Erythrocytes [#/volume] in B  
lood by Automated countOrdered By: Jeff Onofre   
on   
2024   
   
                      RBC (Bld) [#/Vol] 5.03 10*6/uL            3.90-5.60  [facility]  
   
                                        Comment on above:   Performed By: #### U  
RMA, CMP, LIPID, CBCNO, PSAS, TEST ####  
[address]   
   
                                                    Hematocrit [Volume Fraction]  
 of Blood by Automated countOrdered By: Jeff Onofre   
on 2024   
   
                                                    Hematocrit (Bld)   
[Volume fraction] 44.1 %                          38.8-50.0       [facility]  
   
                                        Comment on above:   Performed By: #### U  
RMA, CMP, LIPID, CBCNO, PSAS, TEST ####  
[address]   
   
                                                    Hemoglobin [Mass/volume] in   
BloodOrdered By: Jeff Onofre on 2024   
   
                                                    Hemoglobin (Bld)   
[Mass/Vol]      14.9 g/dL                       13.0-17.0       [facility]  
   
                                        Comment on above:   Performed By: #### U  
RMA, CMP, LIPID, CBCNO, PSAS, TEST ####  
[address]   
   
                                                    Leukocytes [#/volume] correc  
bassam for nucleated erythrocytes in Blood by Automated  
   
counOrdered By: Jeff Onofre on 2024   
   
                                                    WBC corrected for nucl   
RBC Auto (Bld) [#/Vol] 8.5 10*3/uL                     4.1-10.5        [facility]  
   
                                                    Leukocytes [#/volume] in Blo  
od by Automated countOrdered By: Jeff Onofre on   
2024   
   
                      WBC (Bld) [#/Vol] 8.5 10*3/uL            4.1-10.5   [facility]  
   
                                        Comment on above:   Performed By: #### U  
RMA, CMP, LIPID, CBCNO, PSAS, TEST ####  
[facility]  
[address]   
   
                                                    Lymphocytes [#/volume] in Bl  
ood by Automated countOrdered By: Jeff Onofre on  
   
2024   
   
                                                    Lymphocytes (Bld)   
[#/Vol]         1.3 10*3/uL                     1.00-4.8        [facility]  
   
                                        Comment on above:   Performed By: #### U  
RMA, CMP, LIPID, CBCNO, PSAS, TEST ####  
[facility]  
[address] [place] [address]   
   
                                                    Lymphocytes/100 leukocytes i  
n Blood by Automated countOrdered By: Jeff Onofre on   
2024   
   
                                                    Lymphocytes/100 WBC   
(Bld)           14.9 %                          .               [facility]  
   
                                        Comment on above:   Performed By: #### U  
RMA, CMP, LIPID, CBCNO, PSAS, TEST ####  
[facility]  
[address] [place] [address]   
   
                                                    MCH [Entitic mass] by Automa  
bassam countOrdered By: Jeff Onofre on 2024   
   
                                                    MCH (RBC) [Entitic   
mass]           29.6 pg                         27.5-35.2       [facility]  
   
                                        Comment on above:   Performed By: #### U  
RMA, CMP, LIPID, CBCNO, PSAS, TEST ####  
[facility]  
[address] [place] [address]   
   
                                                    MCHC Auto (RBC) [Mass/Vol]Or  
dered By: Jeff Onofre on 2024   
   
                      MCHC (RBC) [Mass/Vol] 33.8 g/dL             32.5-35.6  [facility]  
   
                                                    MCV [Entitic volume] by Auto  
mated countOrdered By: Jeff Onofre on 2024  
  
   
                      MCV (RBC) [Entitic vol] 87.5 fL               83.5-101   F  
[facility]  
   
                                        Comment on above:   Performed By: #### U  
RMA, CMP, LIPID, CBCNO, PSAS, TEST ####  
[facility]  
[address] [place] [address]   
   
                                                    Neutrophils [#/volume] in Bl  
ood by Automated countOrdered By: Jeff Onofre on  
   
2024   
   
                                                    Neutrophils (Bld)   
[#/Vol]         5.8 10*3/uL                     1.8-7.7         [facility]  
   
                                        Comment on above:   Performed By: #### U  
RMA, CMP, LIPID, CBCNO, PSAS, TEST ####  
[facility]  
[address] [place] [address]   
   
                                                    Nucleated erythrocytes [Pres  
ence] in Blood by Automated countOrdered By: Jeff Onofre on 2024   
   
                                                    Nucleated RBC Auto Ql   
(Bld)           0.2 /100{WBC}                   0-0.5           [facility]  
   
                                                    Platelet mean volume [Entiti  
c volume] in Blood by Automated countOrdered By:   
Jeff Onofre on 2024   
   
                                                    Platelet mean volume   
(Bld) [Entitic vol] 7.8 fL                          6.6-10.1        [facility]  
   
                                        Comment on above:   Performed By: #### U  
RMA, CMP, LIPID, CBCNO, PSAS, TEST ####  
[facility]  
[address] [place] [address]   
   
                                                    Platelets [#/volume] in Bloo  
d by Automated countOrdered By: Jeff Onofre on   
2024   
   
                      Platelets (Bld) [#/Vol] 182 10*3/uL            150-450      
[facility]  
   
                                        Comment on above:   Performed By: #### U  
RMA, CMP, LIPID, CBCNO, PSAS, TEST ####  
[facility]  
[address] [place] [address]   
   
                                                    XR chest 1V portableon    
   
                                        XR chest 1V portable [facility]  
[address] [place] [address]  
  
XRay Report  
Signed  
  
Patient: Kaylynn Mims MR#: [id number]  
16  
: 1955   
Acct:[id number]  
  
Age/Sex: 68 / M ADM   
Date: 24  
  
Loc:  Room:   
2G2861-8 Type: ADM IN  
Attending Dr: Josie Ann MD  
Copies to: MD Josie Sanz MD  
  
  
  
Ordering Provider:   
Jeff Onofre MD  
Date of Service:   
24  
Accession #:   
([id number] XR/XR   
chest 1V portable:   
PICC line   
verification  
  
  
  
  
SINGLE VIEW CHEST  
  
CLINICAL HISTORY:   
PICC line placement.  
  
COMPARISON: None  
  
FINDINGS:  
  
A right-sided PICC   
line is identified   
with its tip   
projecting over the   
right subclavian vein   
region.  
Cardiomegaly with   
vascular congestion   
is noted. No lung   
consolidation   
pneumothorax pleural   
effus  
ion or free air.  
  
  
ORDER #: [id number]   
XR/XR chest 1V   
portable  
IMPRESSION:  
  
A RIGHT-SIDED PICC   
LINE IS SEEN WITH ITS   
TIP PROJECTING OVER   
THE RIGHT SUBCLAVIAN   
VEIN REGION.  
  
Impression dictated   
by: Claudy Smith Jr., D.O.2024   
11:28 AM  
  
  
Dictation Location:   
[place] [address]  
  
  
  
Transcribed By: [facility]   
24 1128  
Dictated By: Claudy Smith Jr, DO   
24 1126  
  
Signed By:  
24 1128       Normal                                  [facility]   
Physician   
Group  
   
                                                    Bacterial blood cultureOrder  
ed By: Josie Ann on 2024   
   
                                                    Bacteria identified Cx   
Nom (Bld)       NO GROWTH 5 DAYS                                 [facility]  
   
                                                    Bacteria identified Cx   
Nom (Bld)       NO GROWTH 5 DAYS                                 [facility]  
   
                                                    Basic Metabolic Panelon    
   
                      Anion gap [Moles/Vol] 11.4 mmol/L Normal     6.0-15.0   Th  
e [facility]   
Physician   
Group  
   
                                        Comment on above:   Performed By: #### U  
RMA, CMP, LIPID, CBCNO, PSAS, TEST ####  
[facility]  
[address] [place] [address] [place]   
   
                      Calcium [Mass/Vol] 8.2 mg/dL  Low        8.6-10.3   [facility]   
Physician   
Group  
   
                                        Comment on above:   Performed By: #### U  
RMA, CMP, LIPID, CBCNO, PSAS, TEST ####  
[facility]  
[address] [id number] [place]   
   
                      Chloride [Moles/Vol] 103 mmol/L Normal          The   
[facility]   
Physician   
Group  
   
                                        Comment on above:   Performed By: #### U  
RMA, CMP, LIPID, CBCNO, PSAS, TEST ####  
[address]   
   
                      CO2 [Moles/Vol] 25.9 mmol/L Normal     21.0-31.0  [facility]   
Physician   
Group  
   
                                        Comment on above:   Performed By: #### U  
RMA, CMP, LIPID, CBCNO, PSAS, TEST ####  
[address]   
   
                      Creatinine [Mass/Vol] 0.85 mg/dL Normal     0.70-1.30  The  
 [facility]   
Physician   
Group  
   
                                        Comment on above:   Performed By: #### U  
RMA, CMP, LIPID, CBCNO, PSAS, TEST ####  
[address]   
   
                                                    Creatinine Clr Calc   
Pharmacy        133.88          Normal                          The [facility]   
Physician   
Group  
   
                                        Comment on above:   Result Comment: PERF  
ORMED BY:  
[place] [address]  
[phone]  
PATHOLOGIST MEDICAL DIRECTOR  
VIKAS MARTINEZ M.D.   
   
                                                            Performed By: #### U  
RMA, CMP, LIPID, CBCNO, PSAS, TEST ####  
[address]   
   
                                                    GFR/1.73 sq M.predicted   
MDRD (S/P/Bld) [Vol   
rate/Area]      mL/min/{1.73_m2} Normal                          The [facility]   
Physician   
Group  
   
                                        Comment on above:   Performed By: #### U  
RMA, CMP, LIPID, CBCNO, PSAS, TEST ####  
[address]   
   
                      Glucose [Mass/Vol] 146 mg/dL  High            [facility]   
Physician   
Group  
   
                                        Comment on above:   Result Comment: Rand  
om Glucose Reference Range is dependent on  
   
time and  
content of last meal. Glucose of more than 200 mg/dL in a  
nonstressed, ambulatory subject supports the diagnosis of  
Diabetes Mellitus.  
ADA recommended reference range   
   
                                                            Performed By: #### U  
RMA, CMP, LIPID, CBCNO, PSAS, TEST ####  
[address]   
   
                      Potassium [Moles/Vol] 4.3 mmol/L Normal     3.5-5.1    The  
 [facility]   
Physician   
Group  
   
                                        Comment on above:   Performed By: #### U  
RMA, CMP, LIPID, CBCNO, PSAS, TEST ####  
[address]   
   
                      Sodium [Moles/Vol] 136 mmol/L Normal     136-145    The [facility]   
Physician   
Group  
   
                                        Comment on above:   Performed By: #### U  
RMA, CMP, LIPID, CBCNO, PSAS, TEST ####  
[address]   
   
                                                    Urea nitrogen   
[Mass/Vol]      20 mg/dL        Normal          7-25            The [facility]   
Physician   
Group  
   
                                        Comment on above:   Performed By: #### U  
RMA, CMP, LIPID, CBCNO, PSAS, TEST ####  
[address]   
   
                                                    Blood Cultureon 2024   
   
                                                    Bacteria identified Cx   
Nom (Bld)                               NO GROWTH 5 DAYS  
PERFORMED BY:  
[place] [address]  
[phone]  
PATHOLOGIST MEDICAL   
DIRECTOR  
VIKAS MARTINEZ M.D.    Normal                                  The [facility]   
Physician   
Group  
   
                                        Comment on above:   Performed By: #### U  
RMA, CMP, LIPID, CBCNO, PSAS, TEST ####  
[address]   
   
                                                    Bacteria identified Cx   
Nom (Bld)                               NO GROWTH 5 DAYS  
PERFORMED BY:  
[place] [address]  
[phone]  
PATHOLOGIST MEDICAL   
DIRECTOR  
VIKAS MARTINEZ M.D.    Normal                                  The [facility]   
Physician   
Group  
   
                                        Comment on above:   Performed By: #### U  
RMA, CMP, LIPID, CBCNO, PSAS, TEST ####  
[address]   
   
                                                    Complete Blood Count Auto Di  
ffon 2024   
   
                      Basophils (Bld) [#/Vol] 0.1 10*3/uL Normal     0.0-0.2      
The [facility]   
Physician   
Group  
   
                                        Comment on above:   Result Comment: PERF  
ORMED BY:  
[place] [address]  
[phone]  
PATHOLOGIST MEDICAL DIRECTOR  
VIKAS MARTINEZ M.D.   
   
                                                            Performed By: #### U  
RMA, CMP, LIPID, CBCNO, PSAS, TEST ####  
[address]   
   
                      Basophils/100 WBC (Bld) 0.6 %      Normal     .          T  
edgar [facility]   
Physician   
Group  
   
                                        Comment on above:   Performed By: #### U  
RMA, CMP, LIPID, CBCNO, PSAS, TEST ####  
[address]   
   
                                                    Eosinophils (Bld)   
[#/Vol]         0.0 10*3/uL     Normal          0.0-0.45        The [facility]   
Physician   
Group  
   
                                        Comment on above:   Performed By: #### U  
RMA, CMP, LIPID, CBCNO, PSAS, TEST ####  
[address]   
   
                                                    Eosinophils/100 WBC   
(Bld)           0.1 %           Normal          .               The [facility]   
Physician   
Group  
   
                                        Comment on above:   Performed By: #### U  
RMA, CMP, LIPID, CBCNO, PSAS, TEST ####  
[address]   
   
                                                    Erythrocyte   
distribution width   
(RBC) [Ratio]   13.3 %          Normal          12.0-14.8       The [facility]   
Physician   
Group  
   
                                        Comment on above:   Performed By: #### U  
RMA, CMP, LIPID, CBCNO, PSAS, TEST ####  
[address]   
   
                                                    Hematocrit (Bld)   
[Volume fraction] 43.3 %          Normal          38.8-50.0       The [facility]   
Physician   
Group  
   
                                        Comment on above:   Performed By: #### U  
RMA, CMP, LIPID, CBCNO, PSAS, TEST ####  
[address]   
   
                                                    Hemoglobin (Bld)   
[Mass/Vol]      14.7 g/dL       Normal          13.0-17.0       The [facility]   
Physician   
Group  
   
                                        Comment on above:   Performed By: #### U  
RMA, CMP, LIPID, CBCNO, PSAS, TEST ####  
[place] [id number] [place]   
   
                                                    Lymphocytes (Bld)   
[#/Vol]         0.8 10*3/uL     Low             1.00-4.8        The [facility]   
Physician   
Group  
   
                                        Comment on above:   Performed By: #### U  
RMA, CMP, LIPID, CBCNO, PSAS, TEST ####  
[address]   
   
                                                    Lymphocytes/100 WBC   
(Bld)           9.3 %           Normal          .               The [facility]   
Physician   
Group  
   
                                        Comment on above:   Performed By: #### U  
RMA, CMP, LIPID, CBCNO, PSAS, TEST ####  
[address]   
   
                                                    MCH (RBC) [Entitic   
mass]           29.7 pg         Normal          27.5-35.2       The [facility]   
Physician   
Group  
   
                                        Comment on above:   Performed By: #### U  
RMA, CMP, LIPID, CBCNO, PSAS, TEST ####  
[address]   
   
                      MCV (RBC) [Entitic vol] 87.6 fL    Normal     83.5-101   T  
[facility]   
Physician   
Group  
   
                                        Comment on above:   Performed By: #### U  
RMA, CMP, LIPID, CBCNO, PSAS, TEST ####  
[address]   
   
                                                    Mean Corpuscular HGB   
Conc            33.9 g/dL       Normal          32.5-35.6       The [facility]   
Physician   
Group  
   
                                        Comment on above:   Performed By: #### U  
RMA, CMP, LIPID, CBCNO, PSAS, TEST ####  
[address]   
   
                      Monocytes (Bld) [#/Vol] 0.9 10*3/uL High       0.0-0.8      
The [facility]   
Physician   
Group  
   
                                        Comment on above:   Performed By: #### U  
RMA, CMP, LIPID, CBCNO, PSAS, TEST ####  
[address]   
   
                      Monocytes/100 WBC (Bld) 9.7 %      Normal     .          T  
[facility]   
Physician   
Group  
   
                                        Comment on above:   Performed By: #### U  
RMA, CMP, LIPID, CBCNO, PSAS, TEST ####  
[address]   
   
                                                    Neutrophils (Bld)   
[#/Vol]         7.3 10*3/uL     Normal          1.8-7.7         The [facility]   
Physician   
Group  
   
                                        Comment on above:   Performed By: #### U  
RMA, CMP, LIPID, CBCNO, PSAS, TEST ####  
[address]   
   
                                                    Neutrophils/100 WBC   
(Bld)           80.3 %          Normal          .               The [facility]   
Physician   
Group  
   
                                        Comment on above:   Performed By: #### U  
RMA, CMP, LIPID, CBCNO, PSAS, TEST ####  
[address]   
   
                      NRBC%      0.2 /100{WBC} Normal     0-0.5      The [facility]   
Physician   
Group  
   
                                        Comment on above:   Performed By: #### U  
RMA, CMP, LIPID, CBCNO, PSAS, TEST ####  
[address]   
   
                                                    Platelet mean volume   
(Bld) [Entitic vol] 7.6 fL          Normal          6.6-10.1        The [facility]   
Physician   
Group  
   
                                        Comment on above:   Performed By: #### U  
RMA, CMP, LIPID, CBCNO, PSAS, TEST ####  
[address]   
   
                      Platelets (Bld) [#/Vol] 180 10*3/uL Normal     150-450      
The [facility]   
Physician   
Group  
   
                                        Comment on above:   Performed By: #### U  
RMA, CMP, LIPID, CBCNO, PSAS, TEST ####  
[address]   
   
                      RBC (Bld) [#/Vol] 4.95 10*6/uL Normal     3.90-5.60  The [facility]   
Physician   
Group  
   
                                        Comment on above:   Performed By: #### U  
RMA, CMP, LIPID, CBCNO, PSAS, TEST ####  
[place] [address] [place]   
   
                      WBC (Bld) [#/Vol] 9.1 10*3/uL Normal     4.1-10.5   The [facility]   
Physician   
Group  
   
                                        Comment on above:   Performed By: #### U  
RMA, CMP, LIPID, CBCNO, PSAS, TEST ####  
[facility]  
[address] [id number] [facility]   
   
                                                    Aerobic Cultureon 2024  
   
   
                                        Aerobic Culture     Comment right knee   
culture #3  
ORGANISM:   
Streptococcus   
mitis/oralis grp   
(O:STRMITORGR)  
Comments  
Organism Not   
Routinely Tested for   
Susceptibilities  
Quantity of Growth  
Rare Growth  
*********************  
**********  
* This is a corrected   
result. *  
*********************  
**********  
A prior result that   
was reported as final   
has been changed.  
Strep mitis/oralis   
group added to report  
Comment right knee   
culture #3  
Anaerobic Culture   
Results  
No Anaerobes Isolated   
3 Days  
Comment right knee   
culture #3  
Gram Stain Result  
1+ White Blood Cells  
No Bacteria Seen  
PERFORMED BY:  
[place] [address]  
[phone]  
PATHOLOGIST MEDICAL   
DIRECTOR  
VIKAS MARTINEZ M.D.    Normal                                  [facility]   
Physician   
Group  
   
                                        Comment on above:   Performed By: #### U  
RMA, CMP, LIPID, CBCNO, PSAS, TEST ####  
[facility]  
[address] [id number] [facility]   
   
                                        Aerobic Culture     Comment right knee   
culture #2  
ORGANISM:   
Streptococcus   
mitis/oralis grp   
(O:STRMITORGR)  
Comments  
Organism Not   
Routinely Tested for   
Susceptibilities  
Quantity of Growth  
Rare Growth  
*********************  
**********  
* This is a corrected   
result. *  
*********************  
**********  
A prior result that   
was reported as final   
has been changed.  
Strep mitis/oralis   
group added to report  
  
  
Comment right knee   
culture #2  
Anaerobic Culture   
Results  
No Anaerobes Isolated   
3 Days  
Comment right knee   
culture #2  
Gram Stain Result  
2+ White Blood Cells  
No Bacteria Seen  
PERFORMED BY:  
[place] [id number] [phone]  
PATHOLOGIST MEDICAL   
DIRECTOR  
VIKAS Art                                  [facility]   
Physician   
Group  
   
                                        Comment on above:   Performed By: #### U  
RMA, CMP, LIPID, CBCNO, PSAS, TEST ####  
[address]   
   
                                        Aerobic Culture     Comment right knee   
culture #1  
ORGANISM:   
Streptococcus   
mitis/oralis grp   
(O:STRMITORGR)  
Comments  
Organism Not   
Routinely Tested for   
Susceptibilities  
Quantity of Growth  
Rare Growth  
*********************  
**********  
* This is a corrected   
result. *  
*********************  
**********  
A prior result that   
was reported as final   
has been changed.  
Strep mitis/oralis   
group added to report  
  
Comment right knee   
culture #1  
Anaerobic Culture   
Results  
No Anaerobes Isolated   
3 Days  
Comment right knee   
culture #1  
Gram Stain Result  
1+ White Blood Cells  
No Bacteria Seen  
PERFORMED BY:  
[place] [address]  
[phone]  
PATHOLOGIST MEDICAL   
DIRECTOR  
VIKAS MARTINEZ M.D.    Normal                                  The [facility]   
Physician   
Group  
   
                                        Comment on above:   Performed By: #### U  
RMA, CMP, LIPID, CBCNO, PSAS, TEST ####  
[address]   
   
                                                    Basic Metabolic Panelon 08-2  
   
   
                      Anion gap [Moles/Vol] 11.6 mmol/L Normal     6.0-15.0     
[facility]   
Physician   
Group  
   
                                        Comment on above:   Performed By: #### U  
RMA, CMP, LIPID, CBCNO, PSAS, TEST ####  
[address]   
   
                      Calcium [Mass/Vol] 8.4 mg/dL  Low        8.6-10.3   The [facility]   
Physician   
Group  
   
                                        Comment on above:   Performed By: #### U  
RMA, CMP, LIPID, CBCNO, PSAS, TEST ####  
[address]   
   
                      Chloride [Moles/Vol] 102 mmol/L Normal          The   
[facility]   
Physician   
Group  
   
                                        Comment on above:   Performed By: #### U  
RMA, CMP, LIPID, CBCNO, PSAS, TEST ####  
[facility]  
[address]   
   
                      CO2 [Moles/Vol] 25.2 mmol/L Normal     21.0-31.0  The [facility]   
Physician   
Group  
   
                                        Comment on above:   Performed By: #### U  
RMA, CMP, LIPID, CBCNO, PSAS, TEST ####  
[facility]  
[address]   
   
                      Creatinine [Mass/Vol] 0.72 mg/dL Normal     0.70-1.30  The  
 [facility]   
Physician   
Group  
   
                                        Comment on above:   Performed By: #### U  
RMA, CMP, LIPID, CBCNO, PSAS, TEST ####  
[address]   
   
                                                    Creatinine Clr Calc   
Pharmacy        142.25          Normal                          The [facility]   
Physician   
Group  
   
                                        Comment on above:   Result Comment: PERF  
ORMED BY:  
[place] [address]  
[phone]  
PATHOLOGIST MEDICAL DIRECTOR  
VIKAS MARTINEZ M.D.   
   
                                                            Performed By: #### U  
RMA, CMP, LIPID, CBCNO, PSAS, TEST ####  
[address]   
   
                                                    GFR/1.73 sq M.predicted   
MDRD (S/P/Bld) [Vol   
rate/Area]      mL/min/{1.73_m2} Normal                          The [facility]   
Physician   
Group  
   
                                        Comment on above:   Performed By: #### U  
RMA, CMP, LIPID, CBCNO, PSAS, TEST ####  
[address]   
   
                      Glucose [Mass/Vol] 142 mg/dL  High            The [facility]   
Physician   
Group  
   
                                        Comment on above:   Result Comment: Rand  
 Glucose Reference Range is dependent on  
   
time and  
content of last meal. Glucose of more than 200 mg/dL in a  
nonstressed, ambulatory subject supports the diagnosis of  
Diabetes Mellitus.  
ADA recommended reference range   
   
                                                            Performed By: #### U  
RMA, CMP, LIPID, CBCNO, PSAS, TEST ####  
[address]   
   
                      Potassium [Moles/Vol] 3.8 mmol/L Normal     3.5-5.1    The  
 [facility]   
Physician   
Group  
   
                                        Comment on above:   Performed By: #### U  
RMA, CMP, LIPID, CBCNO, PSAS, TEST ####  
[address]   
   
                      Sodium [Moles/Vol] 135 mmol/L Low        136-145    The [facility]   
Physician   
Group  
   
                                        Comment on above:   Performed By: #### U  
RMA, CMP, LIPID, CBCNO, PSAS, TEST ####  
[address]   
   
                                                    Urea nitrogen   
[Mass/Vol]      18 mg/dL        Normal          7-25            The [facility]   
Physician   
Group  
   
                                        Comment on above:   Performed By: #### U  
RMA, CMP, LIPID, CBCNO, PSAS, TEST ####  
[address]   
   
                                                    Complete Blood Count Auto Di  
ffon 2024   
   
                      Basophils (Bld) [#/Vol] 0.0 10*3/uL Normal     0.0-0.2      
The [facility]   
Physician   
[facility]  
   
                                        Comment on above:   Result Comment: PERF  
ORMED BY:  
[place] [address]  
[phone]  
PATHOLOGIST MEDICAL DIRECTOR  
VIKAS MARTINEZ M.D.   
   
                                                            Performed By: #### U  
RMA, CMP, LIPID, CBCNO, PSAS, TEST ####  
[address]   
   
                      Basophils/100 WBC (Bld) 0.5 %      Normal     .          T  
edgar [facility]   
Physician   
Group  
   
                                        Comment on above:   Performed By: #### U  
RMA, CMP, LIPID, CBCNO, PSAS, TEST ####  
[address]   
   
                                                    Eosinophils (Bld)   
[#/Vol]         0.0 10*3/uL     Normal          0.0-0.45        The [facility]   
Physician   
Group  
   
                                        Comment on above:   Performed By: #### U  
RMA, CMP, LIPID, CBCNO, PSAS, TEST ####  
[address]   
   
                                                    Eosinophils/100 WBC   
(Bld)           0.4 %           Normal          .               The [facility]   
Physician   
Group  
   
                                        Comment on above:   Performed By: #### U  
RMA, CMP, LIPID, CBCNO, PSAS, TEST ####  
[address]   
   
                                                    Erythrocyte   
distribution width   
(RBC) [Ratio]   13.5 %          Normal          12.0-14.8       The [facility]   
Physician   
Group  
   
                                        Comment on above:   Performed By: #### U  
RMA, CMP, LIPID, CBCNO, PSAS, TEST ####  
[address]   
   
                                                    Hematocrit (Bld)   
[Volume fraction] 43.0 %          Normal          38.8-50.0       The [facility]   
Physician   
Group  
   
                                        Comment on above:   Performed By: #### U  
RMA, CMP, LIPID, CBCNO, PSAS, TEST ####  
[address]   
   
                                                    Hemoglobin (Bld)   
[Mass/Vol]      14.7 g/dL       Normal          13.0-17.0       The [facility]   
Physician   
Group  
   
                                        Comment on above:   Performed By: #### U  
RMA, CMP, LIPID, CBCNO, PSAS, TEST ####  
[address]   
   
                                                    Lymphocytes (Bld)   
[#/Vol]         1.5 10*3/uL     Normal          1.00-4.8        The [facility]   
Physician   
Group  
   
                                        Comment on above:   Performed By: #### U  
RMA, CMP, LIPID, CBCNO, PSAS, TEST ####  
[address]   
   
                                                    Lymphocytes/100 WBC   
(Bld)           17.6 %          Normal          .               The [facility]   
Physician   
Group  
   
                                        Comment on above:   Performed By: #### U  
RMA, CMP, LIPID, CBCNO, PSAS, TEST ####  
[address]   
   
                                                    MCH (RBC) [Entitic   
mass]           29.9 pg         Normal          27.5-35.2       The [facility]   
Physician   
Group  
   
                                        Comment on above:   Performed By: #### U  
RMA, CMP, LIPID, CBCNO, PSAS, TEST ####  
[address]   
   
                      MCV (RBC) [Entitic vol] 87.5 fL    Normal     83.5-101   T  
he [facility]   
Physician   
Group  
   
                                        Comment on above:   Performed By: #### U  
RMA, CMP, LIPID, CBCNO, PSAS, TEST ####  
[address]   
   
                                                    Mean Corpuscular HGB   
Conc            34.2 g/dL       Normal          32.5-35.6       The [facility]   
Physician   
Group  
   
                                        Comment on above:   Performed By: #### U  
RMA, CMP, LIPID, CBCNO, PSAS, TEST ####  
[address]   
   
                      Monocytes (Bld) [#/Vol] 0.8 10*3/uL Normal     0.0-0.8      
The [facility]   
Physician   
Group  
   
                                        Comment on above:   Performed By: #### U  
RMA, CMP, LIPID, CBCNO, PSAS, TEST ####  
[address]   
   
                      Monocytes/100 WBC (Bld) 10.0 %     Normal     .          [facility]   
Physician   
Group  
   
                                        Comment on above:   Performed By: #### U  
RMA, CMP, LIPID, CBCNO, PSAS, TEST ####  
[address]   
   
                                                    Neutrophils (Bld)   
[#/Vol]         5.9 10*3/uL     Normal          1.8-7.7         [facility]   
Physician   
[facility]  
   
                                        Comment on above:   Performed By: #### U  
RMA, CMP, LIPID, CBCNO, PSAS, TEST ####  
[address]   
   
                                                    Neutrophils/100 WBC   
(Bld)           71.5 %          Normal          .               [facility]   
Physician   
Group  
   
                                        Comment on above:   Performed By: #### U  
RMA, CMP, LIPID, CBCNO, PSAS, TEST ####  
[address]   
   
                      NRBC%      0.1 /100{WBC} Normal     0-0.5      [facility]   
Physician   
Group  
   
                                        Comment on above:   Performed By: #### U  
RMA, CMP, LIPID, CBCNO, PSAS, TEST ####  
[address]   
   
                                                    Platelet mean volume   
(Bld) [Entitic vol] 7.8 fL          Normal          6.6-10.1        [facility]   
Physician   
Group  
   
                                        Comment on above:   Performed By: #### U  
RMA, CMP, LIPID, CBCNO, PSAS, TEST ####  
[facility]  
[address]   
   
                      Platelets (Bld) [#/Vol] 175 10*3/uL Normal     150-450      
[facility]   
Physician   
Group  
   
                                        Comment on above:   Performed By: #### U  
RMA, CMP, LIPID, CBCNO, PSAS, TEST ####  
[facility]  
[address]   
   
                      RBC (Bld) [#/Vol] 4.91 10*6/uL Normal     3.90-5.60  [facility]   
Physician   
Group  
   
                                        Comment on above:   Performed By: #### U  
RMA, CMP, LIPID, CBCNO, PSAS, TEST ####  
[address]   
   
                      WBC (Bld) [#/Vol] 8.3 10*3/uL Normal     4.1-10.5   [facility]   
Phys[facility]   
Group  
   
                                        Comment on above:   Performed By: #### U  
RMA, CMP, LIPID, CBCNO, PSAS, TEST ####  
[address]   
   
                                                    ECG 12 lead ECGon 2024  
   
   
                                        ECG 12 lead ECG     [facility]  
[address] [place] [address]  
  
Electrocardiograph   
Report  
Signed  
  
Patient: Kaylynn Mims MR#: [id number]  
16  
: 1955   
Acct:[id number]  
  
Age/Sex: 68 / M ADM   
Date: 24  
  
Loc:  Room:   
[address] [place] [address] Type: ADM IN  
Attending Dr: Josie Ann MD  
  
Ordering Provider:   
Eze Gordon MD  
Date of Service:   
  
Accession #:   
([id number]) ECG/ECG   
12 lead ECG: pre-op  
  
  
Copies to:  
  
  
Test Reason :  
Blood Pressure : */*   
mmHG  
Vent. Rate : 72 BPM   
Atrial Rate : 72 BPM  
P-R Int : 168 ms QRS   
Dur : 102 ms  
QT Int : 416 ms P-R-T   
Axes : 30 21 -12   
degrees  
QTcB Int : 455 ms  
  
Normal sinus rhythm  
  
Non-specific change   
in ST segment in  
No previous ECGs   
available  
Confirmed by   
JESSICA VELASCO MD ([id number]) on   
2024 8:59:42 PM  
  
Referred By:   
Electronically Signed   
By: JESSICA VELASCO MD  
  
  
  
Transcribed By: MUS  
Signed By Jessica Velasco MD 0  
24        Normal                                  [facility]   
Physician   
Group  
   
                                                    Gram stain for investigation  
 of transfusion reactionOrdered By: Jeff Onofre   
on   
2024   
   
                                                    Microscopic observation   
Gram stain Nom (Unsp   
spec)           St. mitis/oralis grp Abnormal                        [facility]  
   
                                                    Microscopic observation   
Gram stain Nom (Unsp   
spec)           St. mitis/oralis grp Abnormal                        [facility]  
   
                                                    Microscopic observation   
Gram stain Nom (Unsp   
spec)           St. mitis/oralis grp Abnormal                        [facility]  
   
                                                    US venous duplex LE RTon    
   
                                        US venous duplex LE RT [facility] [place] [address]  
  
Ultrasound Report  
Signed  
  
Patient: Kaylynn Mims MR#: [id number]  
16  
: 1955   
Acct:[id number]  
  
Age/Sex: 68 / M ADM   
Date: 24  
  
Loc:  Room:   
[address] [place] [address] Type: ADM IN  
Attending Dr: Josie Ann MD  
  
Ordering Provider:   
MAYRA Martinez  
Date of Service:   
24  
Accession #:   
([id number] US/US   
venous duplex LE RT:   
leg pain and swelling  
  
  
Copies to: MAYRA Martinez MD  
  
  
RIGHT LOWER EXTREMITY   
VENOUS DUPLEX  
  
INDICATION: Painful   
swollen right leg  
  
Unilateral right   
lower extremity   
venous duplex Doppler   
study was obtained   
utilizing B-mode,   
color-  
flow and spectral   
Doppler.  
  
FINDINGS: The right   
common femoral,   
femoral, and   
popliteal veins   
showed adequate   
compressibility,  
color-flow and   
augmentation. The   
right posterior   
tibial and peroneal   
veins were   
compressible, as  
well as proximal   
greater saphenous   
vein. The   
contralateral left   
common femoral vein   
was  
compressible with   
color-flow and   
augmentation.  
  
  
ORDER #: [id number]   
US/US venous duplex   
LE RT  
IMPRESSION:  
  
NO EVIDENCE OF DEEP   
VENOUS THROMBOSIS IN   
THE RIGHT LOWER   
EXTREMITY. NO   
SUPERFICIAL   
THROMBOPHLEBITIS  
WAS NOTED.  
  
Impression dictated   
by: Neo Franco M.D.2024 9:51 AM  
  
  
Dictation Location:   
[place] [address]  
  
Tech: Bridget Luz  
  
Transcribed By: MARBELLA   
24  
Dictated By: Neo Franco MD 24  
  
Signed By:  
24       Normal                                  [facility]   
Physician   
Group  
   
                                                    Aerobic Cultureon 2024  
   
   
                                        Aerobic Culture     Comment R knee  
ORGANISM:   
Streptococcus   
mitis/oralis grp   
(O:STRMITORGR)  
Comments  
Organism Not   
Routinely Tested for   
Susceptibilities  
Quantity of Growth  
Light Growth  
  
Comment R knee  
No Anaerobes Isolated   
3 Days  
Comment R knee  
Gram Stain Result  
3+ White Blood Cells  
No Bacteria Seen  
PERFORMED BY:  
[place] [address]  
[phone]  
PATHOLOGIST MEDICAL   
DIRECTOR  
VIKAS MARTINEZ M.D.    Normal                                  [facility]   
Physician   
Group  
   
                                        Comment on above:   Performed By: #### U  
RMA, CMP, LIPID, CBCNO, PSAS, TEST ####  
[address]   
   
                                        Aerobic Culture     No Growth 2 Days  
No Anaerobes Isolated   
3 Days  
Gram Stain Result  
Rare White Blood   
Cells  
4+ Red Blood Cells  
No Bacteria Seen  
PERFORMED BY:  
[place] [address]  
[phone]  
PATHOLOGIST MEDICAL   
DIRECTOR  
VIKAS MARTINEZ M.D.    Normal                                  [facility]   
Physician   
Group  
   
                                        Comment on above:   Performed By: #### U  
RMA, CMP, LIPID, CBCNO, PSAS, TEST ####  
[address]   
   
                                                    Alanine aminotransferase [En  
zymatic activity/volume] in Serum or PlasmaOrdered   
By:   
Daniela Sanchez on 2024   
   
                                                    ALT [Catalytic   
activity/Vol]   38 U/L                                      [facility]  
   
                                        Comment on above:   Performed By: #### U  
RMA, CMP, LIPID, CBCNO, PSAS, TEST ####  
[place] [address] [place]   
   
                                                    Albumin [Mass/volume] in Ser  
um or Plasma by Bromocresol green (BCG) dye binding   
methoOrdered By: Daniela Sanchez on 2024   
   
                                                    Albumin BCG dye   
[Mass/Vol]      3.9 g/dL                        3.5-5.7         [facility]  
   
                                                    Alkaline phosphatase [Enzyma  
tic activity/volume] in Serum or PlasmaOrdered By:   
Daniela   
Bull[place] on 2024   
   
                                                    ALP [Catalytic   
activity/Vol]   60 U/L                                    [facility]  
   
                                        Comment on above:   Performed By: #### U  
RMA, CMP, LIPID, CBCNO, PSAS, TEST ####  
[facility]  
[address]   
   
                                                    Aspartate aminotransferase [  
Enzymatic activity/volume] in Serum or PlasmaOrdered  
By:   
Daniela Bullimore on 2024   
   
                                                    AST [Catalytic   
activity/Vol]   19 U/L                          13-39           [facility]  
   
                                        Comment on above:   Performed By: #### U  
RMA, CMP, LIPID, CBCNO, PSAS, TEST ####  
[address]   
   
                                                    Automated basophil %Ordered   
By: Daniela Mayimore on 2024   
   
                      Basophils/100 WBC (Bld) 0.5 %      Normal     .          F  
[facility]  
   
                                        Comment on above:   Performed By: #### U  
RMA, CMP, LIPID, CBCNO, PSAS, TEST ####  
[address]   
   
                                                    Automated basophil countOrde  
red By: Daniela Mayimore on 2024   
   
                      Basophils (Bld) [#/Vol] 0.0 10*3/uL Normal     0.0-0.2      
[facility]  
   
                                        Comment on above:   Performed By: #### U  
RMA, CMP, LIPID, CBCNO, PSAS, TEST ####  
[facility]  
[address] [place] [address]   
   
                                                    Automated blood monocyte cou  
ntOrdered By: Daniela Laloimore on 2024   
   
                      Monocytes (Bld) [#/Vol] 0.5 10*3/uL Normal     0.0-0.8      
[facility]  
   
                                        Comment on above:   Performed By: #### U  
RMA, CMP, LIPID, CBCNO, PSAS, TEST ####  
[facility]  
[address] [place] [address]   
   
                                                    Automated eosinophil %Ordere  
d By: Daniela Laura on 2024   
   
                                                    Eosinophils/100 WBC   
(Bld)           0.1 %           Normal          .               [facility]  
   
                                        Comment on above:   Performed By: #### U  
RMA, CMP, LIPID, CBCNO, PSAS, TEST ####  
[address]   
   
                                                    Automated eosinophil countOr  
dered By: Daniela Sanchez on 2024   
   
                                                    Eosinophils (Bld)   
[#/Vol]         0.0 10*3/uL     Normal          0.0-0.45        [facility]  
   
                                        Comment on above:   Performed By: #### U  
RMA, CMP, LIPID, CBCNO, PSAS, TEST ####  
[address]   
   
                                                    Automated monocyte %Ordered   
By: Daniela Sanchez on 2024   
   
                      Monocytes/100 WBC (Bld) 5.1 %      Normal     .          F  
[facility]  
   
                                        Comment on above:   Performed By: #### U  
RMA, CMP, LIPID, CBCNO, PSAS, TEST ####  
[address]   
   
                                                    Automated neutrophil %Ordere  
d By: Daniela Sanchez on 2024   
   
                                                    Neutrophils/100 WBC   
(Bld)           88.3 %          Normal          .               [facility]  
   
                                        Comment on above:   Performed By: #### U  
RMA, CMP, LIPID, CBCNO, PSAS, TEST ####  
[address]   
   
                                                    Bacterial blood cultureOrder  
ed By: Daniela Sanchez on 2024   
   
                                                    Bacteria identified Cx   
Nom (Bld)       NO GROWTH 5 DAYS                                 [facility]  
   
                                                    Bilirubin.total [Mass/volume  
] in Serum or PlasmaOrdered By: Daniela Sanchez on   
2024   
   
                      Bilirubin [Mass/Vol] 1.5 mg/dL  High       0.3-1.0    [facility]  
   
                                        Comment on above:   Samples from patient  
s who have taken Naproxen have shown   
spurious elevation in Total Bilirubin levels. A metabolite of   
Naproxen, O-desmethylnaproxen, has been shown to interfere   
with the Jendrassik-Grof method for measuring Total Bilirubin.   
   
                                                            Result Comment: Samp  
les from patients who have taken Naproxen   
have shown  
spurious elevation in Total Bilirubin levels. A metabolite  
of Naproxen, O-desmethylnaproxen, has been shown to  
interfere with the Jendrassik-Grof method for measuring  
Total Bilirubin.   
   
                                                            Performed By: #### U  
RMA, CMP, LIPID, CBCNO, PSAS, TEST ####  
[facility]  
[address] [place] [id number] [facility]   
   
                                                    Blood Cultureon 2024   
   
                                                    Bacteria identified Cx   
Nom (Bld)                               NO GROWTH 5 DAYS  
PERFORMED BY:  
[place] [address]  
[phone]  
PATHOLOGIST MEDICAL   
DIRECTOR  
VIKAS MARTINEZ M.D.    Normal                                  [facility]   
Physician   
Group  
   
                                        Comment on above:   Performed By: #### L  
ACTIC, CUBLD ####  
[facility]  
[address]   
   
                                                    Bacteria identified Cx   
Nom (Bld)                               Results called  
at 0209 on 24  
Gram Stain  
Gram Positive Cocci   
in Chains  
ORGANISM:   
Streptococcus   
mitis/oralis grp   
(O:STRMITORGR)  
Aerobic Olri Charge   
(Strep)  
---------------------  
---- SUSCEPTIBILITY   
---------------------  
---  
ORGANISM:   
O:STRMITORGR  
ANTIBIOTIC   
INTERPRETATION LORI  
Ampicillin I 0.5  
Cefepime S 0.5  
Ceftriaxone S <0.25  
Erythromycin R >0.5  
Penicillin I 0.25  
Vancomycin S 0.5  
Results called  
at 0209 on 24  
Staphylococcus aureus   
DNA [Presence] by ZOË   
with non-probe   
detection in Positive   
blood culture  
Not detected  
Bacteroides fragilis   
DNA [Presence] by ZOË   
with non-probe   
detection in Positive   
blood culture  
Not detected  
Candida auris DNA   
[Presence] by ZOË   
with non-probe   
detection in Positive   
blood culture  
Not detected  
Candida albicans DNA   
[Presence] by ZOË   
with non-probe   
detection in Positive   
blood culture  
Not detected  
Acinetobacter   
calcoaceticus-marielena  
ii complex DNA   
[Presence] by ZOË   
with non-probe   
detection in Positive   
blood culture  
Not detected  
Cryptococcus   
neoformans or gattii   
9002  
Not detected  
Cephalosporin   
resistance blaCTX-M   
gene [Presence] by   
Molecular method  
Not Applicable  
Escherichia coli  
Not detected  
Enterobacterales DNA   
[Presence] by ZOË   
with non-probe   
detection in Positive   
blood culture  
Not detected  
Enterobacter cloacae   
complex DNA   
[Presence] by ZOË   
with non-probe   
detection in Positive   
blood culture  
Not detected  
Staphylococcus   
epidermidis DNA   
[Presence] by ZOË   
with non-probe   
detection in Positive   
blood culture  
Not detected  
Enterococcus faecalis   
DNA [Presence] by ZOË   
with non-probe   
detection in Positive   
blood culture  
Not detected  
Enterococcus faecium   
DNA [Presence] by ZOË   
with non-probe   
detection in Positive   
blood culture  
Not detected  
Candida glabrata DNA   
[Presence] by ZOË   
with non-probe   
detection in Positive   
blood culture  
Not detected  
Haemophilus   
influenzae (reported   
as H flu)  
Not detected  
Carbapenem resistance   
blaIMP gene   
[Presence] by   
Molecular method  
Not Applicable  
Klebsiella aerogenes   
DNA [Presence] by ZOË   
with non-probe   
detection in Positive   
blood culture  
Not detected  
Klebsiella   
pneumoniae+Klebsiella   
variicola+Klebsiella   
quasipneumoniae DNA   
[Presence] by ZOË   
with non-probe   
detection in Positive   
blood culture  
Not detected  
Klebsiella oxytoca   
DNA [Presence] by ZOË   
with non-probe   
detection in Positive   
blood culture  
Not detected  
Carbapenem resistance   
blaKPC gene   
[Presence] by   
Molecular method  
Not Applicable  
Nereida krusei DNA   
[Presence] by ZOË   
with non-probe   
detection in Positive   
blood culture  
Not detected  
Listeria   
monocytogenes   
(reported as   
listeriosis)  
Not detected  
Staphylococcus   
lugdunensis DNA   
[Presence] by ZOË   
with non-probe   
detection in Positive   
blood culture  
Not detected  
Methicillin   
resistance mecA+mecC   
genes+SCCmec+OrfX   
junction [Presence]   
by Molecular method  
Not Applicable  
Carbapenem resistance   
blaNDM gene   
[Presence] by   
Molecular method  
Not Applicable  
Neisseria   
meningitidis -   
reported as   
meningococcal disease  
Not detected  
Carbapenem resistance   
hcerri OXA-48-like gene   
[Presence] by   
Molecular method  
Not Applicable  
Candida parapsilosis   
DNA [Presence] by ZOË   
with non-probe   
detection in Positive   
blood culture  
Not detected  
Streptococcus   
pneumoniae - reported   
at [facility]  
Not detected  
Proteus sp DNA   
[Presence] by ZOË   
with non-probe   
detection in Positive   
blood culture  
Not detected  
Pseudomonas   
aeruginosa DNA   
[Presence] by ZOË   
with non-probe   
detection in Positive   
blood culture  
Not detected  
Salmonella sp DNA   
[Presence] by ZOË   
with non-probe   
detection in Positive   
blood culture  
Not detected  
Serratia marcescens   
DNA [Presence] by ZOË   
with non-probe   
detection in Positive   
blood culture  
Not detected  
Staphylococcus sp DNA   
[Presence] by ZOË   
with non-probe   
detection in Positive   
blood culture  
Not detected  
Stenotrophomonas   
maltophilia DNA   
[Presence] by ZOË   
with non-probe   
detection in Positive   
blood culture  
Not detected  
Group A   
(Streptococcus   
pyogenes) [id number]  
Not detected  
Group B Strep   
(Streptococcus   
agalactiae)  
Not detected  
Streptococcus sp DNA   
[Presence] by ZOË   
with non-probe   
detection in Positive   
blood culture  
Detected  
Candida tropicalis   
DNA [Presence] by ZOË   
with non-probe   
detection in Positive   
blood culture  
Not detected  
Vancomycin resistance   
Aidan + vanB genes   
[Presence] by   
Molecular method  
Not Applicable  
Carbapenem resistance   
blaVIM gene   
[Presence] by   
Molecular method  
Not Applicable  
Colistin resistance   
mcr-1 gene [Presence]   
by Molecular method  
Not Applicable  
Methicillin   
resistance mecA+mecC   
genes [Presence] in   
Isolate or Specimen   
by Molecular genetics   
method  
Not Applicable  
RESIS. GENE COMMENT 1  
Antimicrobial   
resistance can occur   
via multiple  
RESIS. GENE COMMENT 2  
mechanisms. A Not   
Detected result for   
antimicrobial  
RESIS. GENE COMMENT 3  
resistance gene(s)   
does not indicate   
antimicrobial  
RESIS. GENE COMMENT 4  
susceptibility.  
S = SUSCEPTIBLE I =   
INTERMEDIATE R =   
RESISTANT  
BLANK = DATA NO (more   
content not   
included)...        Normal                                  [facility]   
Physician   
Group  
   
                                        Comment on above:   Performed By: #### L  
ACTIC, CUBLD ####  
[address]   
   
                                                    Body fluid crystal identific  
ation by light microscopyOrdered By: Jeff Onofre  
 on   
2024   
   
                                                    Crystals LM Nom (Body   
fld)            None seen                       None Seen       [facility]  
   
                                                    C reactive protein [Mass/vol  
ume] in Serum or PlasmaOrdered By: Daniela Sanchez   
on   
2024   
   
                      CRP [Mass/Vol] 12.3 mg/dL High       0.0-0.5    [facility]  
   
                                                    C-Reactive Proteinon   
024   
   
                      C-Reactive Protein 12.3 mg/dL High       0.0-0.5    [facility]   
Physician   
Group  
   
                                        Comment on above:   Result Comment: PERF  
ORMED BY:  
[place] [address]  
[phone]  
PATHOLOGIST MEDICAL DIRECTOR  
VIKSA MARTINEZ M.D.   
   
                                                            Performed By: #### U  
RMA, CMP, LIPID, CBCNO, PSAS, TEST ####  
[facility]  
[address]   
   
                                                    Calcium [Mass/volume] in Ser  
um or PlasmaOrdered By: Daniela Sanchez on   
2024   
   
                      Calcium [Mass/Vol] 8.6 mg/dL  Normal     8.6-10.3   [facility]  
   
                                        Comment on above:   Performed By: #### U  
RMA, CMP, LIPID, CBCNO, PSAS, TEST ####  
[facility]  
[address] [place] [address]   
   
                                                    Carbon dioxide, total [Moles  
/volume] in Serum or PlasmaOrdered By: Daniela Sanchez   
on 2024   
   
                      CO2 [Moles/Vol] 23.1 mmol/L Normal     21.0-31.0  [facility]  
   
                                        Comment on above:   Performed By: #### U  
RMA, CMP, LIPID, CBCNO, PSAS, TEST ####  
[facility]  
[address] [place] [address]   
   
                                                    Cell Count Diff,Synovial Flu  
idon 2024   
   
                                                    Appearance, Synovial   
Fluid                     Cloudy                    Critically   
abnormal                  Clear                     [facility]   
Physician   
Group  
   
                                        Comment on above:   Order Comment: Reaso  
n for Exam Type 2 diabetes mellitus   
   
                                                            Performed By: #### U  
RMA, CMP, LIPID, CBCNO, PSAS, TEST ####  
[facility]  
[address] [place] [address]   
   
                                Color, Synovial Fluid Yellow          Critically  
   
abnormal                  [address]               [facility]   
Physician   
Group  
   
                                        Comment on above:   Order Comment: Reaso  
n for Exam Type 2 diabetes mellitus   
   
                                                            Performed By: #### U  
RMA, CMP, LIPID, CBCNO, PSAS, TEST ####  
[facility]  
[address] [place] [address]   
   
                                                    Color, Synovial   
Supernatant     Yellow          Normal                          [facility]   
Physician   
Group  
   
                                        Comment on above:   Order Comment: Reaso  
n for Exam Type 2 diabetes mellitus   
   
                                                            Result Comment: The   
reference interval and other method   
performance  
specifications have not been established for this body  
fluid. The test result must be integrated into the clinical  
context for interpretation.   
   
                                                            Performed By: #### U  
RMA, CMP, LIPID, CBCNO, PSAS, TEST ####  
[facility]  
[address] [place] [address] [place]   
   
                                                    Eosinophils,Synovial   
Fluid           0 %             Normal          0-2             [facility]   
Physician   
Group  
   
                                        Comment on above:   Order Comment: Reaso  
n for Exam Type 2 diabetes mellitus   
   
                                                            Result Comment: PERF  
ORMED BY:  
[address]  
[place] [address]  
[phone]  
PATHOLOGIST MEDICAL DIRECTOR  
VIKAS MARTINEZ M.D.   
   
                                                            Performed By: #### U  
RMA, CMP, LIPID, CBCNO, PSAS, TEST ####  
[facility]  
[address]   
   
                                                    Lymphocytes, Synovial   
Fluid           7 %             Normal          0-74            [facility]   
Physician   
Group  
   
                                        Comment on above:   Order Comment: Reaso  
n for Exam Type 2 diabetes mellitus   
   
                                                            Performed By: #### U  
RMA, CMP, LIPID, CBCNO, PSAS, TEST ####  
[facility]  
[address]   
   
                                                    Monocyte/Histiocyte,Syn  
ov.Fld.         1 %             Normal          0-69            [facility]   
Physician   
Group  
   
                                        Comment on above:   Order Comment: Reaso  
n for Exam Type 2 diabetes mellitus   
   
                                                            Performed By: #### U  
RMA, CMP, LIPID, CBCNO, PSAS, TEST ####  
[address]   
   
                                                    Neutrophils, Synovial   
Fluid           92 %            High            0-24            [facility]   
Physician   
Group  
   
                                        Comment on above:   Order Comment: Reaso  
n for Exam Type 2 diabetes mellitus   
   
                                                            Performed By: #### U  
RMA, CMP, LIPID, CBCNO, PSAS, TEST ####  
[facility]  
[address]   
   
                      RBC, Synovial Fluid 6370 /uL   High       0-0        [facility]   
Physician   
Group  
   
                                        Comment on above:   Order Comment: Reaso  
n for Exam Type 2 diabetes mellitus   
   
                                                            Performed By: #### U  
RMA, CMP, LIPID, CBCNO, PSAS, TEST ####  
[address]   
   
                      TNC, Synovial Fluid 42559 /uL  High       0-150      [facility]   
Physician   
Group  
   
                                        Comment on above:   Order Comment: Reaso  
n for Exam Type 2 diabetes mellitus   
   
                                                            Result Comment: The   
reference interval and other method   
performance  
specifications have not been established for this body  
fluid. The test result must be integrated into the clinical  
context for interpretation.   
   
                                                            Performed By: #### U  
RMA, CMP, LIPID, CBCNO, PSAS, TEST ####  
[address]   
   
                                                    Chloride [Moles/volume] in S  
lorraine or PlasmaOrdered By: Daniela Sanchez on   
2024   
   
                      Chloride [Moles/Vol] 102 mmol/L Normal          [facility]  
   
                                        Comment on above:   Performed By: #### U  
RMA, CMP, LIPID, CBCNO, PSAS, TEST ####  
[address]   
   
                                                    Complete Blood Count Auto Di  
ffon 2024   
   
                                                    Mean Corpuscular HGB   
Conc            34.1 g/dL       Normal          32.5-35.6       [facility]   
Physician   
Group  
   
                                        Comment on above:   Performed By: #### U  
RMA, CMP, LIPID, CBCNO, PSAS, TEST ####  
[address]   
   
                      Monocytes/100 WBC (Bld) 22.07 %    High       0.00-20.00 T  
[facility]   
Physician   
Group  
   
                                        Comment on above:   Result Comment: For   
adults in ED, MDW > 20.0 may be associated  
   
with a higher  
risk of sepsis during the first 12 hrs of hospital admission   
   
                                                            Performed By: #### U  
RMA, CMP, LIPID, CBCNO, PSAS, TEST ####  
[address]   
   
                      NRBC%      0.8 /100{WBC} High       0-0.5      [facility]   
Physician   
Group  
   
                                        Comment on above:   Performed By: #### U  
RMA, CMP, LIPID, CBCNO, PSAS, TEST ####  
[address]   
   
                                                    Comprehensive Metabolic Pane  
guerline 2024   
   
                      Albumin [Mass/Vol] 3.9 g/dL   Normal     3.5-5.7    [facility]   
[facility]   
[facility]  
   
                                        Comment on above:   Performed By: #### U  
RMA, CMP, LIPID, CBCNO, PSAS, TEST ####  
[address]   
   
                                                    Creatinine Clr Calc   
Pharmacy        143.91          Normal                          [facility]   
Physician   
Group  
   
                                        Comment on above:   Performed By: #### U  
RMA, CMP, LIPID, CBCNO, PSAS, TEST ####  
[address]   
   
                                                    GFR/1.73 sq M.predicted   
MDRD (S/P/Bld) [Vol   
rate/Area]      mL/min/{1.73_m2} Normal                          [facility]   
Physician   
Group  
   
                                        Comment on above:   Performed By: #### U  
RMA, CMP, LIPID, CBCNO, PSAS, TEST ####  
[address]   
   
                                                    Creatinine [Mass/volume] in   
Serum or PlasmaOrdered By: Daniela Sanchez on   
2024   
   
                      Creatinine [Mass/Vol] 0.68 mg/dL Low        0.70-1.30  [facility]  
   
                                        Comment on above:   Performed By: #### U  
RMA, CMP, LIPID, CBCNO, PSAS, TEST ####  
[address]   
   
                                                    Crystals,Synovial Fluidon    
   
                      Crystals,Synovial Fluid None Seen  Normal     None Seen  T  
he [facility]   
Physician   
Group  
   
                                        Comment on above:   Order Comment: Reaso  
n for Exam Type 2 diabetes mellitus   
   
                                                            Result Comment: PERF  
ORMED BY:  
[place] [address]  
[phone]  
PATHOLOGIST MEDICAL DIRECTOR  
VIKAS MARTINEZ M.D.   
   
                                                            Performed By: #### U  
RMA, CMP, LIPID, CBCNO, PSAS, TEST ####  
[address]   
   
                                                    Determination of appearance   
of body fluidOrdered By: Jeff Onofre on   
2024   
   
                      Appearance (Body fld) Cloudy     Abnormal   Clear      [facility]  
   
                                                    Erythrocyte Sedimentation Ra  
gina 2024   
   
                      ESR (Bld) [Velocity] 32 mm/h    High       0-19       The   
[facility]   
Physician   
Group  
   
                                        Comment on above:   Result Comment: PERF  
ORMED BY:  
[place] [address]  
[phone]  
PATHOLOGIST MEDICAL DIRECTOR  
VIKAS MARTINEZ M.D.   
   
                                                            Performed By: #### U  
RMA, CMP, LIPID, CBCNO, PSAS, TEST ####  
[address]   
   
                                                    Erythrocyte distribution wid  
th [Ratio] by Automated countOrdered By: Daniela Sanchez   
on 2024   
   
                                                    Erythrocyte   
distribution width   
(RBC) [Ratio]   13.3 %          Normal          12.0-14.8       [facility]  
   
                                        Comment on above:   Performed By: #### U  
RMA, CMP, LIPID, CBCNO, PSAS, TEST ####  
[facility]  
[address]   
   
                                                    Erythrocyte sedimentation ra  
te by Photometric methodOrdered By: Daniela Sanchez  
 on   
2024   
   
                                                    ESR Photometric method   
(Bld) [Velocity] 32 mm/hr        High            0-19            [facility]  
   
                                                    Erythrocytes [#/volume] in B  
lood by Automated countOrdered By: Daniela Sanchez   
on   
2024   
   
                      RBC (Bld) [#/Vol] 5.50 10*6/uL Normal     3.90-5.60  [facility]  
   
                                        Comment on above:   Performed By: #### U  
RMA, CMP, LIPID, CBCNO, PSAS, TEST ####  
[facility]  
[address]   
   
                                                    Evaluation of color of body   
fluidOrdered By: Jeff Onofre on 2024   
   
                      Color (Body fld) Yellow     Abnormal   [facility]  
   
                                                    Glucose [Mass/volume] in Ser  
um or PlasmaOrdered By: Daniela Sanchez on   
2024   
   
                      Glucose [Mass/Vol] 169 mg/dL  High            [facility]  
   
                                        Comment on above:   ADA recommended refe  
rence rangeRandom Glucose Reference Range   
is dependent on time and content of last meal. Glucose of more   
than 200 mg/dL in a nonstressed, ambulatory subject supports   
the diagnosis of Diabetes Mellitus.   
   
                                                            Result Comment: Rand  
om Glucose Reference Range is dependent on   
time and  
content of last meal. Glucose of more than 200 mg/dL in a  
nonstressed, ambulatory subject supports the diagnosis of  
Diabetes Mellitus.  
ADA recommended reference range   
   
                                                            Performed By: #### U  
RMA, CMP, LIPID, CBCNO, PSAS, TEST ####  
[facility]  
[address] [place] [address] [place]   
   
                                                    Glucose [Mass/volume] in Syn  
ovial fluidOrdered By: Jeff Onofre on 2024  
   
   
                                                    Glucose (Syn fld)   
[Mass/Vol]      80 mg/dL                                        [facility]  
   
                                        Comment on above:   No reference range e  
stablished   
   
                                                    Glucose, Synovial Fluidon    
   
                      Glucose, Synovial Fluid 80 mg/dL   Normal                T  
he [facility]   
Physician   
Group  
   
                                        Comment on above:   Order Comment: Reaso  
n for Exam Type 2 diabetes mellitus   
   
                                                            Result Comment: No r  
eference range established  
PERFORMED BY:  
[place] [address]  
[phone]  
PATHOLOGIST MEDICAL DIRECTOR  
VIKAS MARTINEZ M.D.   
   
                                                            Performed By: #### U  
RMA, CMP, LIPID, CBCNO, PSAS, TEST ####  
[facility]  
[address] [place] [address]   
   
                                                    Gram Stainon 2024   
   
                                                    Microscopic observation   
Gram stain Nom (Unsp   
spec)                                   Comment R knee  
Gram Stain Result  
3+ White Blood Cells  
No Bacteria Seen  
PERFORMED BY:  
[place] [address]  
[phone]  
PATHOLOGIST MEDICAL   
DIRECTOR  
VIKAS MARTINEZ M.D.    Normal                                  [facility]   
Physician   
Group  
   
                                        Comment on above:   Performed By: #### U  
RMA, CMP, LIPID, CBCNO, PSAS, TEST ####  
[address]   
   
                                                    Microscopic observation   
Gram stain Nom (Unsp   
spec)                                   Gram Stain Result  
Rare White Blood   
Cells  
4+ Red Blood Cells  
No Bacteria Seen  
PERFORMED BY:  
[place] [address]  
[phone]  
PATHOLOGIST MEDICAL   
DIRECTOR  
VIKAS MARTINEZ M.D.    Normal                                  [facility]   
Physician   
Group  
   
                                        Comment on above:   Performed By: #### U  
RMA, CMP, LIPID, CBCNO, PSAS, TEST ####  
[facility]  
[address] [place] [address]   
   
                                                    Gram stain for investigation  
 of transfusion reactionOrdered By: Jeff Onofre   
on   
2024   
   
                                                    Microscopic observation   
Gram stain Nom (Unsp   
spec)           St. mitis/oralis grp Abnormal                        [facility]  
   
                                                    Gram stain for investigation  
 of transfusion reactionOrdered By: Josie Ann   
on   
2024   
   
                                                    Microscopic observation   
Gram stain Nom (Unsp   
spec)                                                           [facility]  
   
                                                    Microscopic observation   
Gram stain Nom (Unsp   
spec)                                   No Anaerobes Isolated   
3 Days                                                      [facility]  
   
                                                    Hematocrit [Volume Fraction]  
 of Blood by Automated countOrdered By: Daniela Sanchez   
on 2024   
   
                                                    Hematocrit (Bld)   
[Volume fraction] 47.8 %          Normal          38.8-50.0       [facility]  
   
                                        Comment on above:   Performed By: #### U  
RMA, CMP, LIPID, CBCNO, PSAS, TEST ####  
[facility]  
[address]   
   
                                                    Hemoglobin [Mass/volume] in   
BloodOrdered By: Daniela Sanchez on 2024   
   
                                                    Hemoglobin (Bld)   
[Mass/Vol]      16.3 g/dL       Normal          13.0-17.0       [facility]  
   
                                        Comment on above:   Performed By: #### U  
RMA, CMP, LIPID, CBCNO, PSAS, TEST ####  
[facility]  
[address]   
   
                                                    Lab Allan Total Protein, Flon  
 2024   
   
                                                    Lab Allan Total Protein,   
Fl              4.4 g/dL        Normal          .               [facility]   
Physician   
Group  
   
                                        Comment on above:   Order Comment: Reaso  
n for Exam Type 2 diabetes mellitus   
   
                                                            Result Comment: ____  
_____________________________________  
: BODY FLUID TYPE : TOTAL PROTEIN :  
:_________________:_______________________:  
: Amniotic Fluid : <0.4 :  
:_________________:_______________________:  
: : Nonmalignant: <3.0 :  
: Ascitic Fluid : Malignant: >3.0 :  
:_________________:_______________________:  
: Bile, Clear : <0.9 :  
:_________________:_______________________:  
: Bile, Yellow : 0.2 - 0.6 :  
:_________________:_______________________:  
: Lymph : 2.2 - 6.0 :  
:_________________:_______________________:  
: Human Milk : 1.9 - 2.0 :  
:_________________: ______________________:  
: Nasal Secretion : 0.1 - 3.5 :  
:_________________:_______________________:  
: Pancreatic : 0.0 - 0.1 :  
: Juice : (post stimulation) :  
:_________________:_______________________:  
: : Transudate: <0.3 :  
: Pleural Fluid : Exudate: >0.3 :  
:_________________:_______________________:  
: Saliva : 0.1 - 0.2 :  
: (Mixed Glands) : :  
:_________________:_______________________:  
: Synovial Fluid : <2.5 :  
:_________________:_______________________:  
: Tears : 0.8 - 0.9 :  
:_________________:_______________________:  
[place]. Reference Intervals  
for Adults and Children 2008. Ninth  
Edition (V9.1) [employer],  
[facility]; [place]: 2009.  
Performed at: [facility] [id number]  
: Alejandro Salomon PhD, Phone: [phone]  
PERFORMED BY:  
[place] [address]  
[phone]  
PATHOLOGIST MEDICAL DIRECTOR  
VIKAS MARTINEZ M.D.   
   
                                                            Performed By: #### U  
RMA, CMP, LIPID, CBCNO, PSAS, TEST ####  
[facility]  
[address] [place] [address] [place]   
   
                                                    Lactate [Moles/volume] in Se  
rum or PlasmaOrdered By: Daniela Bullimore on   
2024   
   
                      Lactate [Moles/Vol] 1.3 mmol/L            0.5-2.2    [facility]  
   
                                        Comment on above:   Result Comment: PERF  
ORMED BY:  
[place] [address]  
[phone]  
PATHOLOGIST MEDICAL DIRECTOR  
VIKAS MARTINEZ M.D.   
   
                                                            Performed By: #### L  
ACTJULIA SULLIVANLD ####  
[facility]  
[address] [place] [address]   
   
                                                    Leukocytes [#/volume] correc  
bassam for nucleated erythrocytes in Blood by Automated  
   
counOrdered By: Daniela Bullimore on 2024   
   
                                                    WBC corrected for nucl   
RBC Auto (Bld) [#/Vol] 9.4 10*3/uL                     4.1-10.5        [facility]  
   
                                                    Leukocytes [#/volume] in Blo  
od by Automated countOrdered By: Daniela Bullimore on  
   
2024   
   
                      WBC (Bld) [#/Vol] 9.4 10*3/uL Normal     4.1-10.5   [facility]  
   
                                        Comment on above:   Performed By: #### U  
RMA, CMP, LIPID, CBCNO, PSAS, TEST ####  
[facility]  
[address] [place] [address] [place]   
   
                                                    Lymphocytes [#/volume] in Bl  
ood by Automated countOrdered By: Daniela Bullimore   
on   
2024   
   
                                                    Lymphocytes (Bld)   
[#/Vol]         0.6 10*3/uL     Low             1.00-4.8        [facility]  
   
                                        Comment on above:   Performed By: #### U  
RMA, CMP, LIPID, CBCNO, PSAS, TEST ####  
[facility]  
[address]   
   
                                                    Lymphocytes/100 leukocytes i  
n Blood by Automated countOrdered By: Daniela Sanchez on   
2024   
   
                                                    Lymphocytes/100 WBC   
(Bld)           6.0 %           Normal          .               [facility]  
   
                                        Comment on above:   Performed By: #### U  
RMA, CMP, LIPID, CBCNO, PSAS, TEST ####  
[facility]  
[address] [place] [address]   
   
                                                    MCH [Entitic mass] by Automa  
bassam countOrdered By: Daniela Sanchez on 2024   
   
                                                    MCH (RBC) [Entitic   
mass]           29.6 pg         Normal          27.5-35.2       [facility]  
   
                                        Comment on above:   Performed By: #### U  
RMA, CMP, LIPID, CBCNO, PSAS, TEST ####  
[address]   
   
                                                    MCHC Auto (RBC) [Mass/Vol]Or  
dered By: Daniela Sanchez on 2024   
   
                      MCHC (RBC) [Mass/Vol] 34.1 g/dL             32.5-35.6  [facility]  
   
                                                    MCV [Entitic volume] by Auto  
mated countOrdered By: Daniela Sanchez on   
2024   
   
                      MCV (RBC) [Entitic vol] 86.9 fL    Normal     83.5-101   F  
[facility]  
   
                                        Comment on above:   Performed By: #### U  
RMA, CMP, LIPID, CBCNO, PSAS, TEST ####  
[facility]  
[address] [place] [address]   
   
                                                    Manual body fluid eosinophil  
s/100 leukocytesOrdered By: Jeff Onofre on   
2024   
   
                                                    Eosinophils/100 WBC   
Manual cnt (Body fld) 0 %                             0-2             [facility]  
   
                                                    Manual body fluid erythrocyt  
es count (number/volume)Ordered By: Jeff Onofre   
on   
2024   
   
                                                    RBC Manual cnt (Body   
fld) [#/Vol]    6370 /uL        High            0-0             [facility]  
   
                                                    Manual body fluid lymphocyte  
s/100 leukocytesOrdered By: Jeff Onofre on   
2024   
   
                                                    Lymphocytes/100 WBC   
Manual cnt (Body fld) 7 %                             0-74            [facility]  
   
                                                    Monocyte distribution width   
[Entitic volume] in Blood by AutomatedOrdered By:   
Daniela Sanchez on 2024   
   
                                                    Monocyte distribution   
width Auto (Bld)   
[Entitic vol]   22.07 %         High            0.00-20.00      [facility]  
   
                                        Comment on above:   For adults in ED, MD  
W > 20.0 may be associated with a higher   
risk of sepsis during the first 12 hrs of hospital admission   
   
                                                    Neutrophils [#/volume] in Bl  
ood by Automated countOrdered By: Daniela Sanchez   
on   
2024   
   
                                                    Neutrophils (Bld)   
[#/Vol]         8.4 10*3/uL     High            1.8-7.7         [facility]  
   
                                        Comment on above:   Performed By: #### U  
RMA, CMP, LIPID, CBCNO, PSAS, TEST ####  
[facility]  
[address] [place] [address]   
   
                                                    Neutrophils/100 WBC Manual c  
nt (Body fld)Ordered By: Jeff Onofre on   
2024   
   
                                                    Neutrophils/100 WBC   
(Body fld)      92 %            High            0-24            [facility]  
   
                                                    No Panel InformationOrdered   
By: Jeff Onofre on 2024   
   
                                                    Synovial Fluid   
Supernatant Color Yellow                                          [facility]  
   
                                        Comment on above:   The reference interv  
al and other method performance   
specifications have not been established for this body fluid.   
The test result must be integrated into the clinical context   
for interpretation.   
   
                                                    Synovial Fluid Tot   
Nucleated Cells 06716 /uL       High            0-150           [facility]  
   
                                        Comment on above:   The reference interv  
al and other method performance   
specifications have not been established for this body fluid.   
The test result must be integrated into the clinical context   
for interpretation.   
   
                                                    No Panel InformationOrdered   
By: Daniela Sanchez on 2024   
   
                                                    Bacterial ID (NA   
Multiplex Assay) St. mitis/oralis grp Abnormal                        [facility]  
   
                      Estimated GFR (CKD-EPI) > 60.0 mL/Min                       
  [facility]  
   
                                                    Pharmacy Creatinine   
Clearance (Chem 143.91                                          [facility]  
   
                                                    Nucleated erythrocytes [Pres  
ence] in Blood by Automated countOrdered By: Daniela Sanchez on 2024   
   
                                                    Nucleated RBC Auto Ql   
(Bld)           0.8 /100{WBC}   High            0-0.5           [facility]  
   
                                                    Platelet mean volume [Entiti  
c volume] in Blood by Automated countOrdered By:   
Daniela Sanchez on 2024   
   
                                                    Platelet mean volume   
(Bld) [Entitic vol] 8.0 fL          Normal          6.6-10.1        [facility]  
   
                                        Comment on above:   Performed By: #### U  
RMA, CMP, LIPID, CBCNO, PSAS, TEST ####  
[facility]  
[address]   
   
                                                    Platelets [#/volume] in Bloo  
d by Automated countOrdered By: Daniela Sanchez on   
2024   
   
                      Platelets (Bld) [#/Vol] 170 10*3/uL Normal     150-450      
[facility]  
   
                                        Comment on above:   Performed By: #### U  
RMA, CMP, LIPID, CBCNO, PSAS, TEST ####  
[facility]  
[address] [place] [address] [place]   
   
                                                    Potassium [Moles/volume] in   
Serum or PlasmaOrdered By: Daniela Sanchez on   
2024   
   
                      Potassium [Moles/Vol] 4.0 mmol/L Normal     3.5-5.1    [facility]  
   
                                        Comment on above:   Performed By: #### U  
RMA, CMP, LIPID, CBCNO, PSAS, TEST ####  
[facility]  
[address] [place] [address] [place]   
   
                                                    Protein [Mass/volume] in Bod  
y fluidOrdered By: Jeff Onofre on 2024   
   
                                                    Protein (Body fld)   
[Mass/Vol]      See comment                                     [facility]  
   
                                        Comment on above:   Testing sent to [facility].No reference range   
established   
   
                                                    Protein (Body fld)   
[Mass/Vol]      4.4 g/dL                        .               [facility]  
   
                                        Comment on above:   ____________________  
_____________________ : BODY FLUID TYPE :   
TOTAL PROTEIN : :_________________:_______________________: :   
Amniotic Fluid : <0.4 :   
:_________________:_______________________: : : Nonmalignant:   
<3.0 : : Ascitic Fluid : Malignant: >3.0 :   
:_________________:_______________________: : Bile, Clear :   
<0.9 : :_________________:_______________________: : Bile,   
Yellow : 0.2 - 0.6 :   
:_________________:_______________________: : Lymph : 2.2 -   
6.0 : :_________________:_______________________: : Human Milk   
: 1.9 - 2.0 : :_________________: ______________________: :   
Nasal Secretion : 0.1 - 3.5 :   
:_________________:_______________________: : Pancreatic : 0.0   
- 0.1 : : Juice : (post stimulation) :   
:_________________:_______________________: : : Transudate:   
<0.3 : : Pleural Fluid : Exudate: >0.3 :   
:_________________:_______________________: : Saliva : 0.1 -   
0.2 : : (Mixed Glands) : :   
:_________________:_______________________: : Synovial Fluid :   
<2.5 : :_________________:_______________________: : Tears :   
0.8 - 0.9 : :_________________:_______________________: Tianna   
WJustino V. Reference Intervals for Adults and Children   
2008. Ninth Edition (V9.1) [employer], [facility];   
[place]: 2009.Performed at: [address] [id number] Director: Alejandro Salomon PhD, Phone: [phone]   
   
                                                    Protein [Mass/volume] in Ser  
um or PlasmaOrdered By: Daniela Sanchez on   
2024   
   
                      Protein [Mass/Vol] 6.8 g/dL              6.4-8.9    [facility]  
   
                                        Comment on above:   Performed By: #### U  
RMA, CMP, LIPID, CBCNO, PSAS, TEST ####  
[facility]  
[address]   
   
                                                    Serum globulin measurement b  
y calculation (mass/volume)Ordered By: Daniela Sanchez   
on 2024   
   
                      Globulin (S) [Mass/Vol] 2.9 g/dL                         [facility]  
   
                                        Comment on above:   Performed By: #### U  
RMA, CMP, LIPID, CBCNO, PSAS, TEST ####  
[facility]  
[address] [place] [address]   
   
                                                    Serum or plasma albumin/glob  
ulin mass ratioOrdered By: Daniela Bullimore on   
2024   
   
                                                    Albumin/Globulin [Mass   
ratio]          1.3 {ratio}                                     [facility]  
   
                                        Comment on above:   Performed By: #### U  
RMA, CMP, LIPID, CBCNO, PSAS, TEST ####  
[address]   
   
                                                    Serum or plasma anion gap de  
terminationOrdered By: Daniela Bullimore on   
2024   
   
                      Anion gap [Moles/Vol] 13.9 mmol/L Normal     6.0-15.0   [facility]  
   
                                        Comment on above:   Performed By: #### U  
RMA, CMP, LIPID, CBCNO, PSAS, TEST ####  
[address]   
   
                                                    Sodium [Moles/volume] in Ser  
um or PlasmaOrdered By: Daniela Bullimore on   
2024   
   
                      Sodium [Moles/Vol] 135 mmol/L Low        136-145    [facility]  
   
                                        Comment on above:   Performed By: #### U  
RMA, CMP, LIPID, CBCNO, PSAS, TEST ####  
[facility]  
[address] [place] [address]   
   
                                                    Synovial fluid differential   
cell countOrdered By: Jeff Onofre on 2024   
   
                                                    Differential panel (Syn   
fld)            1 %                             0-69            [facility]  
   
                                                    Total Protein, Synovial Flui  
don 2024   
   
                                                    Total Protein, Synovial   
Fluid                           Normal                          [facility]  
   
                                        Comment on above:   Order Comment: Reaso  
n for Exam Type 2 diabetes mellitus   
   
                                                            Result Comment: Test  
ing sent to Reference Laboratory.  
No reference range established  
PERFORMED BY:  
[place] [address]  
[phone]  
PATHOLOGIST MEDICAL DIRECTOR  
VIKAS MARTINEZ M.D.   
   
                                                            Performed By: #### U  
RMA, CMP, LIPID, CBCNO, PSAS, TEST ####  
[address]   
   
                                                    Urea nitrogen [Mass/volume]   
in Serum or PlasmaOrdered By: Daniela Bullimore on   
2024   
   
                                                    Urea nitrogen   
[Mass/Vol]      15 mg/dL        Normal          7-25            [facility]  
   
                                        Comment on above:   Performed By: #### U  
RMA, CMP, LIPID, CBCNO, PSAS, TEST ####  
[address]   
   
                                                    XR knee RT 4V*on 2024   
   
                                        XR knee RT 4V*      [facility] [place] [address]  
  
XRay Report  
Signed  
  
Patient: Kaylynn Mims MR#: [id number]  
16  
: 1955   
Acct:[id number]  
  
Age/Sex: 68 / M ADM   
Date: 24  
  
Loc: ER Room: Type:   
[facility] ER  
Attending Dr:  
Copies to: MAYRA Martinez  
  
  
  
Ordering Provider:   
MAYRA Martinez  
Date of Service:   
24  
Accession #:   
[id number] XR/XR   
knee RT 4V*:   
Extremity Injury,   
Lower  
  
  
  
  
XR knee RT 4V*   
2024 11:19 AM  
  
SIGNS AND SYMPTOMS:   
Right knee pain and   
swelling  
  
PROTOCOL: Frontal,   
lateral, and oblique   
radiographs of the   
right knee  
  
COMPARISON: 2024  
  
FINDINGS:  
  
There is narrowing of   
the weightbearing   
joint spaces and   
temporal joint space   
which I lateral  
spurring. Fracture.   
There is a large   
joint effusion. No   
soft tissue swelling.  
  
  
ORDER #: [id number]   
XR/XR knee RT 4V*  
IMPRESSION:  
  
No fracture.  
  
Tricompartmental   
degenerative changes   
are redemonstrated   
with a large joint   
effusion. This is  
worsened compared to   
the prior exam.  
  
Impression dictated   
by: Jesus Dukes M.D.2024 12:03   
PM  
  
  
Dictation Location:   
[place] [address]  
  
  
  
Transcribed By: [facility]   
24 1203  
Dictated By: Jesus Dukes II, MD   
24 1149  
  
Signed By:  
24 1203       Normal                                  [facility]  
   
                                                    XR knee RT 4V*on 2024   
   
                                        XR knee RT 4V*      [facility] [place] [id number]  
  
XRay Report  
Signed  
  
Patient: Kaylynn Mims MR#: [id number]  
16  
: 1955   
Acct:[id number]  
  
Age/Sex: 68 / M ADM   
Date: 24  
  
Loc: [facility] Room:   
Type: REG CLI  
Attending Dr: To Khan APRN  
Copies to: SANJANA Samuel  
  
  
  
Ordering Provider:   
SANJANA Samuel  
Date of Service:   
24  
Accession #:   
([id number] XR/XR   
knee RT 4V*: M25.561   
- Pain in right knee  
  
  
  
  
RIGHT KNEE - 4 views  
  
CLINICAL HISTORY:   
Jaundice right knee   
pain with increasing   
pain over 2 years.  
  
COMPARISON: None  
  
FINDINGS:  
  
Moderate degenerative   
changes with medial   
weightbearing joint   
space narrowing. No   
acute bony  
process. Moderate   
joint effusion.  
  
  
ORDER #: [id number]   
XR/XR knee RT 4V*  
IMPRESSION:  
  
MODERATE DEGENERATIVE   
CHANGES OF THE RIGHT   
KNEE WITHOUT ACUTE   
BONY PROCESS.  
  
Impression dictated   
by: Claudy Smith Jr., [email or web address]   
2:42 PM  
  
  
Dictation Location:   
[place] [address]  
  
  
  
Transcribed By: [facility]   
24 1442  
Dictated By: Claudy Smith Jr, DO   
24 1441  
  
Signed By:  
24 1442       Normal                                  [facility]  
   
                                                    XR knee LT 4V*on 05-   
   
                                        XR knee LT 4V*      [facility] [place] [address]  
  
XRay Report  
Signed  
  
Patient: Kaylynn Mims MR#: [id number]  
16  
: 1955   
Acct:[id number]  
  
Age/Sex: 68 / M ADM   
Date: 05/15/24  
  
Loc: [facility] Room:   
Type: REG CLI  
Attending Dr: To Khan APRN  
Copies to: SANJANA Samuel  
  
  
  
Ordering Provider:   
SANJANA Samuel  
Date of Service:   
05/15/24  
Accession #:   
([id number] XR/XR   
knee LT 4V*: M25.562   
- Pain in left knee  
  
  
  
  
XR knee LT 4V*   
5/15/2024 10:14 AM  
  
SIGNS AND SYMPTOMS:   
Medial and posterior   
left knee pain  
  
PROTOCOL: Frontal,   
lateral, and oblique   
radiographs of the   
left knee  
  
COMPARISON: None  
  
FINDINGS:  
  
There is   
tricompartmental   
joint space loss,   
greatest in the   
medial weightbearing   
compartment. There  
is spurring of the   
tibial spines, tibial   
plateaus, and femoral   
condyles. No joint   
effusion. No soft  
tissue swelling.   
There is a 15 mm   
suspected ossific   
loose body in the   
anterior aspect of   
the joint  
space.  
  
  
ORDER #: [id number]   
XR/XR knee LT 4V*  
IMPRESSION:  
  
Tricompartmental   
degenerative changes   
are noted in the left   
knee greatest in the   
medial  
weightbearing   
compartment.  
  
No fracture.  
  
There is a 15 mm   
suspected ossific   
loose body in the   
anterior aspect of   
the joint space.  
  
Impression dictated   
by: Jesus Dukes M.D.5/15/2024 3:02 PM  
  
  
Dictation Location:   
[place] [address]  
  
  
  
Transcribed By: [facility]   
05/15/24 1502  
Dictated By: Jesus Dukes II, MD   
05/15/24 1500  
  
Signed By:  
05/15/24 1502       Normal                                  [facility]  
   
                                                    A1C with Estimated Average G  
polodavid 2024   
   
                      Glucose [Mass/Vol] 137 mg/dL  Normal                [facility]  
   
                                        Comment on above:   Result Comment: PERF  
ORMED BY:  
[place] [address]  
[phone]  
PATHOLOGIST MEDICAL DIRECTOR  
VIKAS MARTINEZ M.D.   
   
                                                            Performed By: #### A  
1C WTH eA ####  
[facility]  
[address] [place] [address]   
   
                                                    Alanine aminotransferase [En  
zymatic activity/volume] in Serum or PlasmaOrdered   
By:   
Elvira Harper on 2024   
   
                                                    ALT [Catalytic   
activity/Vol]   31 U/L          Normal          7-52            [facility]  
   
                                        Comment on above:   Order Comment: Reaso  
n for Exam Type 2 diabetes mellitus   
   
                                                            Performed By: #### U  
RMA, CMP, LIPID, CBCNO, PSAS, TEST ####  
[facility]  
[address] [place] [address] [place]   
   
                                                    Albumin [Mass/volume] in Ser  
um or Plasma by Bromocresol green (BCG) dye binding   
methoOrdered By: Elvira Harper on 2024   
   
                                                    Albumin BCG dye   
[Mass/Vol]      4.4 g/dL                        3.5-5.7         [facility]  
   
                                                    Alkaline phosphatase [Enzyma  
tic activity/volume] in Serum or PlasmaOrdered By:   
Elvira Harper on 2024   
   
                                                    ALP [Catalytic   
activity/Vol]   68 U/L          Normal                    [facility]  
   
                                        Comment on above:   Order Comment: Reaso  
n for Exam Type 2 diabetes mellitus   
   
                                                            Performed By: #### U  
RMA, CMP, LIPID, CBCNO, PSAS, TEST ####  
[facility]  
[address]   
   
                                                    Aspartate aminotransferase [  
Enzymatic activity/volume] in Serum or PlasmaOrdered  
By:   
Elvira Harper on 2024   
   
                                                    AST [Catalytic   
activity/Vol]   22 U/L          Normal          13-39           [facility]  
   
                                        Comment on above:   Order Comment: Reaso  
n for Exam Type 2 diabetes mellitus   
   
                                                            Performed By: #### U  
RMA, CMP, LIPID, CBCNO, PSAS, TEST ####  
[facility]  
[address]   
   
                                                    Bilirubin.total [Mass/volume  
] in Serum or PlasmaOrdered By: Elvira Harper on   
2024   
   
                      Bilirubin [Mass/Vol] 1.4 mg/dL  High       0.3-1.0    [facility]  
   
                                        Comment on above:   Samples from patient  
s who have taken Naproxen have shown   
spurious elevation in Total Bilirubin levels. A metabolite of   
Naproxen, O-desmethylnaproxen, has been shown to interfere   
with the Jendrassik-Grof method for measuring Total Bilirubin.   
   
                                                            Order Comment: Reaso  
n for Exam Type 2 diabetes mellitus   
   
                                                            Result Comment: Samp  
les from patients who have taken Naproxen   
have shown  
spurious elevation in Total Bilirubin levels. A metabolite  
of Naproxen, O-desmethylnaproxen, has been shown to  
interfere with the Jendrassik-Grof method for measuring  
Total Bilirubin.   
   
                                                            Performed By: #### U  
RMA, CMP, LIPID, CBCNO, PSAS, TEST ####  
[facility]  
[address]   
   
                                                    Calcium [Mass/volume] in Ser  
um or PlasmaOrdered By: Elvira Harper on 2024   
   
                      Calcium [Mass/Vol] 8.8 mg/dL  Normal     8.6-10.3   [facility]  
   
                                        Comment on above:   Order Comment: Reaso  
n for Exam Type 2 diabetes mellitus   
   
                                                            Performed By: #### U  
RMA, CMP, LIPID, CBCNO, PSAS, TEST ####  
[facility]  
[address] [place] [id number] [place]   
   
                                                    Carbon dioxide, total [Moles  
/volume] in Serum or PlasmaOrdered By: Elvira Harper   
on   
2024   
   
                      CO2 [Moles/Vol] 29.3 mmol/L Normal     21.0-31.0  [facility]  
   
                                        Comment on above:   Order Comment: Reaso  
n for Exam Type 2 diabetes mellitus   
   
                                                            Performed By: #### U  
RMA, CMP, LIPID, CBCNO, PSAS, TEST ####  
[facility]  
[address] [place] [id number] [place]   
   
                                                    Chloride [Moles/volume] in S  
lorraine or PlasmaOrdered By: Elvira Harper on 2024  
   
   
                      Chloride [Moles/Vol] 104 mmol/L Normal          [facility]  
   
                                        Comment on above:   Order Comment: Reaso  
n for Exam Type 2 diabetes mellitus   
   
                                                            Performed By: #### U  
RMA, CMP, LIPID, CBCNO, PSAS, TEST ####  
[facility]  
[address] [place] [id number] [place]   
   
                                                    Cholesterol [Mass/volume] in  
 Serum or PlasmaOrdered By: Elvira Harper on   
2024   
   
                      Cholesterol [Mass/Vol] 166 mg/dL  Normal     140-200    [facility]  
   
                                        Comment on above:   Chol less than 200 m  
g/dl low riskChol 201-239 mg/dl borderline  
   
riskChol 240 mg/dl and greater high risk   
   
                                                            Order Comment: Reaso  
n for Exam Type 2 diabetes mellitus   
   
                                                            Result Comment: Chol  
 less than 200 mg/dl low risk  
Chol 201-239 mg/dl borderline risk  
Chol 240 mg/dl and greater high risk   
   
                                                            Performed By: #### U  
RMA, CMP, LIPID, CBCNO, PSAS, TEST ####  
[facility]  
[address] [place] [id number] [place]   
   
                                                    Cholesterol in LDL Calc [Mas  
s/Vol]Ordered By: Elvira Harper on 2024   
   
                                                    Cholesterol in LDL   
[Mass/Vol]      100 mg/dL                       0-100           [facility]  
   
                                        Comment on above:   LDL ATP III CLASSIFI  
CATIONLDL less than 100 mg/dL OptimalLDL   
100-129 mg/dL Near or above optimalLDL 130-159 mg/dL   
Borderline highLDL 160-189 mg/dL HighLDL greater than 189   
mg/dL Very high   
   
                                                    Cholesterol in VLDL Calc [Ma  
ss/Vol]Ordered By: Elvira Harper on 2024   
   
                                                    Cholesterol in VLDL   
[Mass/Vol]      23 mg/dL                                        [facility]  
   
                                                    Comprehensive Metabolic Pane  
guerline 2024   
   
                      Albumin [Mass/Vol] 4.4 g/dL   Normal     3.5-5.7    [facility]  
   
                                        Comment on above:   Order Comment: Reaso  
n for Exam Type 2 diabetes mellitus   
   
                                                            Performed By: #### U  
RMA, CMP, LIPID, CBCNO, PSAS, TEST ####  
[facility]  
[address]   
   
                                                    GFR/1.73 sq M.predicted   
MDRD (S/P/Bld) [Vol   
rate/Area]      mL/min/{1.73_m2} Normal                          [facility]  
   
                                        Comment on above:   Order Comment: Reaso  
n for Exam Type 2 diabetes mellitus   
   
                                                            Performed By: #### U  
RMA, CMP, LIPID, CBCNO, PSAS, TEST ####  
[facility]  
[address] [place] [address]   
   
                                                    Creatinine [Mass/volume] in   
Serum or PlasmaOrdered By: Elvira Harper on   
2024   
   
                      Creatinine [Mass/Vol] 0.79 mg/dL Normal     0.70-1.30  [facility]  
   
                                        Comment on above:   Order Comment: Reaso  
n for Exam Type 2 diabetes mellitus   
   
                                                            Performed By: #### U  
RMA, CMP, LIPID, CBCNO, PSAS, TEST ####  
[facility]  
[address] [place] [address]   
   
                                                    Erythrocyte distribution wid  
th [Ratio] by Automated countOrdered By: Elvira Harper on   
2024   
   
                                                    Erythrocyte   
distribution width   
(RBC) [Ratio]   13.5 %          Normal          12.0-14.8       [facility]  
   
                                        Comment on above:   Order Comment: Reaso  
n for Exam Type 2 diabetes mellitus   
   
                                                            Performed By: #### U  
RMA, CMP, LIPID, CBCNO, PSAS, TEST ####  
[facility]  
[address] [place] [address] [place]   
   
                                                    Erythrocytes [#/volume] in B  
lood by Automated countOrdered By: Elvira Harper on   
2024   
   
                      RBC (Bld) [#/Vol] 5.82 10*6/uL High       3.90-5.60  [facility]  
   
                                        Comment on above:   Order Comment: Reaso  
n for Exam Type 2 diabetes mellitus   
   
                                                            Performed By: #### U  
RMA, CMP, LIPID, CBCNO, PSAS, TEST ####  
[facility]  
[address] [id number] [place]   
   
                                                    Glucose [Mass/volume] in Ser  
um or PlasmaOrdered By: Elvira Harper on 2024   
   
                      Glucose [Mass/Vol] 128 mg/dL  High            [facility]  
   
                                        Comment on above:   ADA recommended refe  
rence rangeRandom Glucose Reference Range   
is dependent on time and content of last meal. Glucose of more   
than 200 mg/dL in a nonstressed, ambulatory subject supports   
the diagnosis of Diabetes Mellitus.   
   
                                                            Order Comment: Reaso  
n for Exam Type 2 diabetes mellitus   
   
                                                            Result Comment: Rand  
om Glucose Reference Range is dependent on   
time and  
content of last meal. Glucose of more than 200 mg/dL in a  
nonstressed, ambulatory subject supports the diagnosis of  
Diabetes Mellitus.  
ADA recommended reference range   
   
                                                            Performed By: #### U  
RMA, CMP, LIPID, CBCNO, PSAS, TEST ####  
[facility]  
[address] [place] [address] [place]   
   
                                                    Glucose mean value [Mass/vol  
ume] in Blood Estimated from glycated   
hemoglobinOrdered   
By: Elvira Harper on 2024   
   
                                                    Average glucose   
Estimated from glycated   
hemoglobin (Bld)   
[Mass/Vol]      137 mg/dL                                       [facility]  
   
                                                    Hematocrit [Volume Fraction]  
 of Blood by Automated countOrdered By: Elvira Harper  
on   
2024   
   
                                                    Hematocrit (Bld)   
[Volume fraction] 50.0 %          Normal          38.8-50.0       [facility]  
   
                                        Comment on above:   Order Comment: Reaso  
n for Exam Type 2 diabetes mellitus   
   
                                                            Performed By: #### U  
RMA, CMP, LIPID, CBCNO, PSAS, TEST ####  
[facility]  
[address] [id number] [place]   
   
                                                    Hemoglobin A1c percentageOrd  
ered By: Elvira Harper on 2024   
   
                                                    HbA1c (Bld) [Mass   
fraction]       6.4 %           High            4.3-5.6         [facility]  
   
                                        Comment on above:   Increased risk for d  
iabetes: 5.7 - 6.4diabetes: >6.4glycemic   
control for adults with diabetes: <7.0   
   
                                                            Result Comment: Incr  
eased risk for diabetes: 5.7 - 6.4  
diabetes: >6.4  
glycemic control for adults with diabetes: <7.0   
   
                                                            Performed By: #### A  
[facility] ####  
[address]   
   
                                                    Hemoglobin [Mass/volume] in   
BloodOrdered By: Elvira Harper on 2024   
   
                                                    Hemoglobin (Bld)   
[Mass/Vol]      16.6 g/dL       Normal          13.0-17.0       [facility]  
   
                                        Comment on above:   Order Comment: Reaso  
n for Exam Type 2 diabetes mellitus   
   
                                                            Performed By: #### U  
RMA, CMP, LIPID, CBCNO, PSAS, TEST ####  
[place] [id number] [facility]   
   
                                                    Hemogram CBC Without Diffon   
2024   
   
                                                    Mean Corpuscular HGB   
Conc            33.1 g/dL       Normal          32.5-35.6       [facility]   
[facility]   
[facility]  
   
                                        Comment on above:   Order Comment: Reaso  
n for Exam Type 2 diabetes mellitus   
   
                                                            Performed By: #### U  
RMA, CMP, LIPID, CBCNO, PSAS, TEST ####  
[facility]  
[address] [place], [place] [id number] [facility]   
   
                      WBC (Bld) [#/Vol] 5.3 10*3/uL Normal     4.1-10.5   [facility]   
[facility]   
[facility]  
   
                                        Comment on above:   Order Comment: Reaso  
n for Exam Type 2 diabetes mellitus   
   
                                                            Performed By: #### U  
RMA, CMP, LIPID, CBCNO, PSAS, TEST ####  
[place] [id number] [place]   
   
                                                    Leukocytes [#/volume] correc  
bassam for nucleated erythrocytes in Blood by Automated  
   
counOrdered By: Elvira Harper on 2024   
   
                                                    WBC corrected for nucl   
RBC Auto (Bld) [#/Vol] 5.3 10*3/uL                     4.1-10.5        [facility]  
   
                                                    Lipid Panelon 2024   
   
                                                    LDL   
Cholesterol,Calculated 100 mg/dL       Normal          0-100           [facility]   
[facility]   
[facility]  
   
                                        Comment on above:   Order Comment: Reaso  
n for Exam Type 2 diabetes mellitus   
   
                                                            Result Comment: LDL   
ATP III CLASSIFICATION  
LDL less than 100 mg/dL Optimal  
-129 mg/dL Near or above optimal  
-159 mg/dL Borderline high  
-189 mg/dL High  
LDL greater than 189 mg/dL Very high   
   
                                                            Performed By: #### U  
RMA, CMP, LIPID, CBCNO, PSAS, TEST ####  
[facility]  
[address]   
   
                      Triglyceride w/Reflex 117 mg/dL  Normal     0-149      The  
 [facility]   
[facility]   
[facility]  
   
                                        Comment on above:   Order Comment: Reaso  
n for Exam Type 2 diabetes mellitus   
   
                                                            Result Comment: TRIG  
 ATP III CLASSIFICATION  
TRIG less than 150 mg/dL Normal  
TRIG 150-199 mg/dL Borderline high  
TRIG 200-500 mg/dL High  
TRIG greater than 500 mg/dL Very high  
Standard traceable to the Center for Disease Conrtrol and  
Prevention (CDC) test method.   
   
                                                            Performed By: #### U  
RMA, CMP, LIPID, CBCNO, PSAS, TEST ####  
[facility]  
[address]   
   
                      VLDL CHOLESTEROL 23 mg/dL   Normal                [facility]   
Physician   
Group  
   
                                        Comment on above:   Order Comment: Reaso  
n for Exam Type 2 diabetes mellitus   
   
                                                            Performed By: #### U  
RMA, CMP, LIPID, CBCNO, PSAS, TEST ####  
[facility]  
[address]   
   
                                                    MCH [Entitic mass] by Automa  
bassam countOrdered By: Elvira Harper on 2024   
   
                                                    MCH (RBC) [Entitic   
mass]           28.5 pg         Normal          27.5-35.2       [facility]  
   
                                        Comment on above:   Order Comment: Reaso  
n for Exam Type 2 diabetes mellitus   
   
                                                            Performed By: #### U  
RMA, CMP, LIPID, CBCNO, PSAS, TEST ####  
[facility]  
[address]   
   
                                                    MCHC Auto (RBC) [Mass/Vol]Or  
dered By: Elvira Harper on 2024   
   
                      MCHC (RBC) [Mass/Vol] 33.1 g/dL             32.5-35.6  [facility]  
   
                                                    MCV [Entitic volume] by Auto  
mated countOrdered By: Elvira Harper on 2024   
   
                      MCV (RBC) [Entitic vol] 85.9 fL    Normal     83.5-101   F  
[facility]  
   
                                        Comment on above:   Order Comment: Reaso  
n for Exam Type 2 diabetes mellitus   
   
                                                            Performed By: #### U  
RMA, CMP, LIPID, CBCNO, PSAS, TEST ####  
[facility]  
[address]   
   
                                                    Microalbumin [Mass/volume] i  
n UrineOrdered By: Elvira Haprer on 2024   
   
                                                    Albumin DL <= 20 mg/L   
(U) [Mass/Vol]  1.1 mg/dL       Normal          0.0-1.8         [facility]  
   
                                        Comment on above:   Order Comment: Reaso  
n for Exam Type 2 diabetes mellitus   
   
                                                            Result Comment: PERF  
ORMED BY:  
[place] [address]  
[phone]  
PATHOLOGIST MEDICAL DIRECTOR  
VIKAS MARTINEZ M.D.   
   
                                                            Performed By: #### U  
RMA, CMP, LIPID, CBCNO, PSAS, TEST ####  
[facility]  
[address] [place] [address]   
   
                                                    No Panel InformationOrdered   
By: Elvira Harper on 2024   
   
                      Estimated GFR (CKD-EPI) > 60.0 mL/Min                       
  [facility]  
   
                                                    Pharmacy Creatinine   
Clearance (Chem N/A                                             [facility]  
   
                                                    PSA Screen (Yearly Only)on 0  
2024   
   
                                                    PSA Screen (Yearly   
Only)           0.970 ng/mL     Normal          0.000-4.000     [facility]   
Physician   
Group  
   
                                        Comment on above:   Order Comment: Reaso  
n for Exam Prostate cancer screening  
Is patient <50 yrs? Medicare does not pay <50.: N  
Is Medicare the insurance?: Y   
   
                                                            Result Comment: Seri  
al tumor marker results determined by   
assays using  
different manufacturers or methods may not be comparable.  
[facility]  and method:  
[employer] DXI, CHEMILUMINESCENT IMMUNOASSAY.  
PERFORMED BY:  
[place] [address]  
[phone]  
PATHOLOGIST MEDICAL DIRECTOR  
VIKAS MARTINEZ M.D.   
   
                                                            Performed By: #### U  
RMA, CMP, LIPID, CBCNO, PSAS, TEST ####  
[place] [id number] [facility]   
   
                                                    Platelet mean volume [Entiti  
c volume] in Blood by Automated countOrdered By:   
Elvira Harper on 2024   
   
                                                    Platelet mean volume   
(Bld) [Entitic vol] 7.9 fL          Normal          6.6-10.1        [facility]  
   
                                        Comment on above:   Order Comment: Reaso  
n for Exam Type 2 diabetes mellitus   
   
                                                            Result Comment: PERF  
ORMED BY:  
[place] [address]  
[phone]  
PATHOLOGIST MEDICAL DIRECTOR  
VIKAS MARTINEZ M.D.   
   
                                                            Performed By: #### U  
RMA, CMP, LIPID, CBCNO, PSAS, TEST ####  
[facility]  
[address] [place] [address]   
   
                                                    Platelets [#/volume] in Bloo  
d by Automated countOrdered By: Elvira Harper on   
2024   
   
                      Platelets (Bld) [#/Vol] 170 10*3/uL Normal     150-450      
[facility]  
   
                                        Comment on above:   Order Comment: Reaso  
n for Exam Type 2 diabetes mellitus   
   
                                                            Performed By: #### U  
RMA, CMP, LIPID, CBCNO, PSAS, TEST ####  
[facility]  
[address] [place] [address]   
   
                                                    Potassium [Moles/volume] in   
Serum or PlasmaOrdered By: Elvira Harper on   
2024   
   
                      Potassium [Moles/Vol] 4.6 mmol/L Normal     3.5-5.1    [facility]  
   
                                        Comment on above:   Order Comment: Reaso  
n for Exam Type 2 diabetes mellitus   
   
                                                            Performed By: #### U  
RMA, CMP, LIPID, CBCNO, PSAS, TEST ####  
[facility]  
[address] [place] [address]   
   
                                                    Prostate specific Ag [Mass/v  
olume] in Serum or PlasmaOrdered By: Elvira Harper on  
   
2024   
   
                                                    Prostate specific Ag   
[Mass/Vol]      0.970 ng/mL                     0.000-4.000     [facility]  
   
                                        Comment on above:   Serial tumor marker   
results determined by assays using   
different manufacturers or methods may not be   
comparable.[facility]  and   
method:ANNA [employer] DXI, CHEMILUMINESCENT IMMUNOASSAY.   
   
                                                    Protein [Mass/volume] in Ser  
um or PlasmaOrdered By: Elvira Harper on 2024   
   
                      Protein [Mass/Vol] 6.7 g/dL   Normal     6.4-8.9    [facility]  
   
                                        Comment on above:   Order Comment: Reaso  
n for Exam Type 2 diabetes mellitus   
   
                                                            Performed By: #### U  
RMA, CMP, LIPID, CBCNO, PSAS, TEST ####  
[facility]  
[address]   
   
                                                    Serum globulin measurement b  
y calculation (mass/volume)Ordered By: Elvira Harper   
on   
2024   
   
                      Globulin (S) [Mass/Vol] 2.3 g/dL   Normal                [facility]  
   
                                        Comment on above:   Order Comment: Reaso  
n for Exam Type 2 diabetes mellitus   
   
                                                            Performed By: #### U  
RMA, CMP, LIPID, CBCNO, PSAS, TEST ####  
[facility]  
[address]   
   
                                                    Serum or plasma albumin/glob  
ulin mass ratioOrdered By: Elvira Harper on   
2024   
   
                                                    Albumin/Globulin [Mass   
ratio]          1.9 {ratio}     Normal                          [facility]  
   
                                        Comment on above:   Order Comment: Reaso  
n for Exam Type 2 diabetes mellitus   
   
                                                            Performed By: #### U  
RMA, CMP, LIPID, CBCNO, PSAS, TEST ####  
[facility]  
[address] [place] [address]   
   
                                                    Serum or plasma anion gap de  
terminationOrdered By: Elvira Harper on 2024   
   
                      Anion gap [Moles/Vol] 8.3 mmol/L Normal     6.0-15.0   [facility]  
   
                                        Comment on above:   Order Comment: Reaso  
n for Exam Type 2 diabetes mellitus   
   
                                                            Performed By: #### U  
RMA, CMP, LIPID, CBCNO, PSAS, TEST ####  
[facility]  
[address]   
   
                                                    Serum or plasma high density  
 lipoprotein (HDL) cholesterol measurementOrdered   
By:   
Elvira Harper on 2024   
   
                                                    Cholesterol in HDL   
[Mass/Vol]      43 mg/dL        Normal          23-92           [facility]  
   
                                        Comment on above:   HDL CHOL ATP-III CLA  
SSIFICATION Cardiovascular RiskHDL > or   
equal to 60 mg/dL LOWHDL < 40 mg/dL HIGH   
   
                                                            Order Comment: Reaso  
n for Exam Type 2 diabetes mellitus   
   
                                                            Result Comment: HDL   
CHOL ATP-III CLASSIFICATION  
Cardiovascular  
Risk  
HDL > or equal to 60 mg/dL LOW  
HDL < 40 mg/dL HIGH   
   
                                                            Performed By: #### U  
RMA, CMP, LIPID, CBCNO, PSAS, TEST ####  
[facility]  
[address]   
   
                                                    Serum or plasma total choles  
terol/high density lipoprotein (HDL) cholesterol   
mass   
ratOrdered By: Elvira Harper on 2024   
   
                                                    Cholesterol.total/Fernanda  
sterol in HDL [Mass   
ratio]          3.9 {ratio}     Normal          <5.0            [facility]  
   
                                        Comment on above:   Order Comment: Reaso  
n for Exam Type 2 diabetes mellitus   
   
                                                            Result Comment: PERF  
ORMED BY:  
[place] [address]  
[phone]  
PATHOLOGIST MEDICAL DIRECTOR  
VIKAS MARTINEZ M.D.   
   
                                                            Performed By: #### U  
RMA, CMP, LIPID, CBCNO, PSAS, TEST ####  
[facility]  
[address] [place] [address] [place]   
   
                                                    Sodium [Moles/volume] in Ser  
um or PlasmaOrdered By: Elvira Harper on 2024   
   
                      Sodium [Moles/Vol] 137 mmol/L Normal     136-145    [facility]  
   
                                        Comment on above:   Order Comment: Reaso  
n for Exam Type 2 diabetes mellitus   
   
                                                            Performed By: #### U  
RMA, CMP, LIPID, CBCNO, PSAS, TEST ####  
[facility]  
[address] [place] [address]   
   
                                                    Testosteroneon 2024   
   
                      Testosterone 4.30 ng/mL Normal     1.75-7.81  [facility]   
Physician   
Group  
   
                                        Comment on above:   Order Comment: Reaso  
n for Exam Low testosterone   
   
                                                            Result Comment: PERF  
ORMED BY:  
[place] [address]  
[phone]  
PATHOLOGIST MEDICAL DIRECTOR  
VIAKS MARTINEZ M.D.   
   
                                                            Performed By: #### U  
RMA, CMP, LIPID, CBCNO, PSAS, TEST ####  
[facility]  
[address] [place] [address] [place]   
   
                                                    Testosterone [Mass/volume] i  
n Serum or PlasmaOrdered By: Elvira Harper on   
2024   
   
                      Testosterone [Mass/Vol] 4.30 ng/mL            1.75-7.81  F  
[facility]  
   
                                                    Triglyceride [Mass/volume] i  
n Serum or PlasmaOrdered By: Elvira Harper on   
2024   
   
                      Triglyceride [Mass/Vol] 117 mg/dL             0-149      F  
[facility]  
   
                                        Comment on above:   TRIG ATP III CLASSIF  
ICATIONTRIG less than 150 mg/dL NormalTRIG  
   
150-199 mg/dL Borderline highTRIG 200-500 mg/dL High TRIG   
greater than 500 mg/dL Very highStandard traceable to the   
Center for Disease Conrtrol and Prevention (CDC) test method.   
   
                                                    Urea nitrogen [Mass/volume]   
in Serum or PlasmaOrdered By: Elvira Harper on   
2024   
   
                                                    Urea nitrogen   
[Mass/Vol]      14 mg/dL        Normal          7-25            [facility]  
   
                                        Comment on above:   Order Comment: Reaso  
n for Exam Type 2 diabetes mellitus   
   
                                                            Performed By: #### U  
RMA, CMP, LIPID, CBCNO, PSAS, TEST ####  
[facility]  
[address]   
   
                                                    XR shoulder RT min 2V*on    
   
                                        XR shoulder RT min 2V* [facility]  
[address] [place] [address]  
  
XRay Report  
Signed  
  
Patient: Kaylynn Mims MR#: [id number]  
16  
: 1955   
Acct:[id number]  
  
Age/Sex: 68 / M ADM   
Date: 24  
  
Loc: [facility] Room: Type:   
[facility]  
Attending Dr: Gucci Maxwell MD  
Copies to: Gucci Maxwell MD  
  
  
  
Ordering Provider:   
Gucci Maxwell MD  
Date of Service:   
24  
Accession #:   
([id number]) XR/XR   
shoulder RT min 2V*:   
M25.511 - Pain in   
right shoulder  
  
  
  
  
4 views RIGHT   
shoulder plain film  
  
HISTORY: RIGHT   
shoulder pain for 2   
months.  
  
COMPARISON: None  
  
ACUTE FINDINGS: None  
  
DEGENERATIVE CHANGE:   
Extensive   
acromioclavicular   
degeneration with   
inferior marginal   
spurring.  
  
SOFT TISSUE FINDINGS:   
Unremarkable  
  
JOINT EFFUSION: None  
  
POSTOP CHANGES: None  
  
BONY MINERALIZATION:   
Adequate  
  
  
ORDER #: [id number]   
XR/XR shoulder RT min   
2V*  
IMPRESSION: Extensive   
acromioclavicular   
degenerative change.  
  
Impression dictated   
by: Neo Perales M.D.3/7/2024 2:07 PM  
  
  
Dictation Location:   
[place] [address]  
  
  
  
Transcribed By: [facility]   
24 1407  
Dictated By:   
Neo Perales DO   
24 1406  
  
Signed By:  
24 1407       Normal                                  [facility]   
[facility]   
[facility]  
   
                                                    A1C HEMOGLOBINon 2023   
   
                                                    HbA1c (Bld) [Mass   
fraction]       5.8 %                                           [facility]   
[facility]  
Other Phone:   
[phone]  
   
                                                    HbA1c (Bld) [Mass fraction]o  
n 2023   
   
                      A1C HEMOGLOBIN                                  [place]  
[facility]   
[facility]   
[facility]  
Other Phone:   
[phone]  
   
                                                    A1C HEMOGLOBINon 08-   
   
                                                    HbA1c (Bld) [Mass   
fraction]       5.7 %                                           [facility]   
[facility]  
Other Phone:   
[phone]  
   
                                                    HbA1c (Bld) [Mass fraction]o  
n 08-   
   
                      A1C HEMOGLOBIN                                  [place]  
t   
[facility]   
[facility]  
Other Phone:   
[phone]  
   
                                                    URINE DRUG SCREEN (IN-HOUSE)  
on 08-   
   
                                                    URINE DRUG SCREEN   
(IN-HOUSE)                                                      [facility]   
[facility]  
Other Phone:   
[phone]  
   
                                                    CBC AUTO DIFFon 2023   
   
                      BASO #     0.0 103/ul Normal     0.0-0.1    [facility]  
   
                                        Comment on above:   Performed By: #### C  
BC ####  
[facility]  
[address] [place] [address]  
Dr. Dariana Hollingsworth   
   
                      Basophils/100 WBC (Bld) 0.5 %      Normal     0.2-2.0    [facility]  
   
                                        Comment on above:   Performed By: #### C  
BC ####  
[facility]  
[address] [place] [address]  
Dr. Dariana Hollingsworth   
   
                      EO #       0.1 103/ul Normal     0.0-0.7    [facility]  
   
                                        Comment on above:   Performed By: #### C  
BC ####  
[facility]  
[address] [place] [address]  
Dr. Dariana Hollingsworth   
   
                                                    Eosinophils/100 WBC   
(Bld)           1.8 %           Normal          0.9-7.0         [facility]  
   
                                        Comment on above:   Performed By: #### C  
BC ####  
[facility]  
[address] [place] [address]  
Dr. Dariana Hollingsworth   
   
                                                    Erythrocyte   
distribution width   
(RBC) [Ratio]   12.6 %          Normal          11.0-15.0       [facility]  
   
                                        Comment on above:   Performed By: #### C  
BC ####  
[facility]  
[address] [place] [address]  
Dr. Dariana Hollingsworth   
   
                                                    Hematocrit (Bld)   
[Volume fraction] 50.9 %          Normal          42.0-54.0       [facility]  
   
                                        Comment on above:   Performed By: #### C  
BC ####  
[facility]  
[address] [place] [address]  
Dr. Dariana Hollingsworth   
   
                                                    Hemoglobin (Bld)   
[Mass/Vol]      16.6 g/dL       Normal          14.0-18.0       [facility]  
   
                                        Comment on above:   Performed By: #### C  
BC ####  
[facility]  
[address] [place] [address]  
Dr. Dariana Hollingsworth   
   
                      IG #       0.03 10e3/ul Normal     0.00-0.03  [facility]  
   
                                        Comment on above:   Performed By: #### C  
BC ####  
[facility]  
[address] [place] [address]  
Dr. Dariana Hollingsworth   
   
                      IG %       0.5 %      Normal     0.0-0.5    [facility]  
   
                                        Comment on above:   Performed By: #### C  
BC ####  
[facility]  
[address] [place] [address]  
Dr. Dariana Hollingsworth   
   
                      LYMPH #    1.4 103/ul Normal     1.2-3.8    [facility]  
   
                                        Comment on above:   Performed By: #### C  
BC ####  
[facility]  
[address] [place] [address]  
Dr. Dariana Hollingsworth   
   
                                                    Lymphocytes/100 WBC   
(Bld)           22.8 %          Normal          20.5-60.0       [facility]  
   
                                        Comment on above:   Performed By: #### C  
BC ####  
[facility]  
[address] [place] [address]  
Dr. Dariana Hollingsworth   
   
                      MANUAL DIFF REQ NO         Normal                [facility]  
   
                                        Comment on above:   Performed By: #### C  
BC ####  
[facility]  
[address] [place] [address]  
Dr. Dariana Hollingsworth   
   
                                                    MCH (RBC) [Entitic   
mass]           28.2 pg         Normal          25.9-34.0       [facility]  
   
                                        Comment on above:   Performed By: #### C  
BC ####  
[facility]  
[address] [place] [address]  
Dr. Dariana Hollingsworth   
   
                      MCHC (RBC) [Mass/Vol] 32.6 g/dL  Normal     29.9-35.2  [facility]  
   
                                        Comment on above:   Performed By: #### C  
BC ####  
[facility]  
[address] [place] [address]  
Dr. Dariana Hollingsworth   
   
                      MCV (RBC) [Entitic vol] 86.4 fL    Normal     80.0-94.0  [facility]  
   
                                        Comment on above:   Performed By: #### C  
BC ####  
[facility]  
[address] [place] [address]  
Dr. Dariana Hollingsworth   
   
                      MONO #     0.5 103/ul Normal     0.3-0.8    [facility]  
   
                                        Comment on above:   Performed By: #### C  
BC ####  
[facility]  
[address] [place] [address]  
Dr. Dariana Hollingsworth   
   
                      Monocytes/100 WBC (Bld) 8.7 %      Normal     1.7-12.0   [facility]  
   
                                        Comment on above:   Performed By: #### C  
BC ####  
[facility]  
[address] [place] [address]  
Dr. Dariana Hollingsworth   
   
                      NEUT #     4.1 103/ul Normal     1.4-6.5    [facility]  
   
                                        Comment on above:   Performed By: #### C  
BC ####  
[facility]  
[address] [place] [address]  
Dr. Dariana Hollingsworth   
   
                                                    Neutrophils/100 WBC   
(Bld)           65.7 %          Normal          43.0-75.0       [facility]  
   
                                        Comment on above:   Performed By: #### C  
BC ####  
[facility]  
[address] [place] [address]  
Dr. Dariana Hollingsworth   
   
                                                    Platelet mean volume   
(Bld) [Entitic vol] 9.3 fL          Critically low  9.5-13.5        [facility]  
   
                                        Comment on above:   Performed By: #### C  
BC ####  
[facility]  
[address] [place] [address]  
Dr. Dariana Hollingsworth   
   
                      PLT        176 103/ul Normal     150-450    [facility]  
   
                                        Comment on above:   Performed By: #### C  
BC ####  
[facility]  
[address] [place] [address]  
Dr. Dariana Hollingsworth   
   
                      RBC        5.89 106/ul Normal     4.70-6.10  [facility]  
   
                                        Comment on above:   Performed By: #### C  
BC ####  
[facility]  
[address] [place] [address]  
Dr. Dariana Hollingsworth   
   
                      WBC        6.2 103/ul Normal     4.0-11.0   [facility]  
   
                                        Comment on above:   Performed By: #### C  
BC ####  
[facility]  
[address] [place] [address]  
Dr. Dariana Hollingsworth   
   
                                                    GLYCOHEMOGLOBIN A1Con 2023   
   
                      ADA RECOMMENDATION SEE BELOW  Normal                [facility]  
   
                                        Comment on above:   Result Comment: ADA   
RECOMMENDED LIMIT 4.0 - 6.0 ADA   
THERAPEUTIC TARGET < 7.0 ACTION SUGGESTED > 7.0   
   
                                                            Performed By: #### A  
1C ####  
[facility]  
[address] [place] [address]  
Dr. Dariana Hollingsworth   
   
                      Glucose [Mass/Vol] 126 mg/dL  Normal                [facility]  
   
                                        Comment on above:   Performed By: #### A  
1C ####  
[facility]  
[address] [place] [address]  
Dr. Dariana Hollingsworth   
   
                                                    HbA1c (Bld) [Mass   
fraction]       6.0 %           Normal          4.5-6.2         [facility]  
   
                                        Comment on above:   Performed By: #### A  
1C ####  
[facility]  
[address] [place] [address]  
Dr. Dariana Hollingsworth   
   
                                                    MICROALBUMIN, RAND URon 04-1  
3-2023   
   
                      mALB       2.0 mg/L   Normal     <=30.0     [facility]  
   
                                        Comment on above:   Performed By: #### M  
ALBR ####  
[facility]  
[address] [place] [address]  
Dr. Dariana Hollingsworth   
   
                                                    PROF 14(COMP METB)on   
023   
   
                      Albumin [Mass/Vol] 3.6 g/dL   Normal     3.4-5.0    [facility]  
   
                                        Comment on above:   Performed By: #### C  
MP ####  
[facility]  
[address] [place] [address]  
Dr. Dariana Hollingsworth   
   
                                                    Albumin/Globulin [Mass   
ratio]          1.0 {ratio}     Normal                          [facility]  
   
                                        Comment on above:   Performed By: #### C  
MP ####  
[facility]  
[address] [place] [address]  
Dr. Dariana Hollingsworth   
   
                                                    ALP [Catalytic   
activity/Vol]   82 U/L          Normal                    [facility]  
   
                                        Comment on above:   Performed By: #### C  
MP ####  
[facility]  
[address] [place] [address]  
Dr. Dariana Hollingsworth   
   
                                                    ALT [Catalytic   
activity/Vol]   46 U/L          Normal          16-63           [facility]  
   
                                        Comment on above:   Performed By: #### C  
MP ####  
[facility]  
[address] [place] [address]  
Dr. Dariana Hollingsworth   
   
                      Anion gap [Moles/Vol] 11.2 mmol/L Normal                [facility]  
   
                                        Comment on above:   Performed By: #### C  
MP ####  
[facility]  
[address] [place] [address]  
Dr. Dariana Hollingsworth   
   
                                                    AST [Catalytic   
activity/Vol]   24 U/L          Normal          15-37           [facility]  
   
                                        Comment on above:   Performed By: #### C  
MP ####  
[facility]  
[address] [place] [address]  
Dr. Dariana Hollingsworth   
   
                      Bilirubin [Mass/Vol] 1.2 mg/dL  Critically high 0.2-1.0     
 [facility]  
   
                                        Comment on above:   Performed By: #### C  
MP ####  
[facility]  
[address] [place] [address]  
Dr. Dariana Hollingsworth   
   
                      Calcium [Mass/Vol] 8.9 mg/dL  Normal     8.5-10.1   [facility]  
   
                                        Comment on above:   Performed By: #### C  
MP ####  
[facility]  
[address] [place] [address]  
Dr. Dariana Hollingsworth   
   
                      Chloride [Moles/Vol] 104 mmol/L Normal          [facility]  
   
                                        Comment on above:   Performed By: #### C  
MP ####  
[facility]  
[address] [place] [address]  
Dr. Dariana Hollingsworth   
   
                      CO2 [Moles/Vol] 25.2 mmol/L Normal     21.0-32.0  [facility]  
   
                                        Comment on above:   Performed By: #### C  
MP ####  
[facility]  
[address] [address]  
Dr. Dariana Hollingsworth   
   
                      Creatinine [Mass/Vol] 0.83 mg/dL Normal     0.70-1.30  [facility]  
   
                                        Comment on above:   Performed By: #### C  
MP ####  
[facility]  
[address] [address]  
Dr. Dariana Hollingsworth   
   
                      EGFR-AF AMERICAN >60        Normal     >=60       [facility]  
   
                                        Comment on above:   Performed By: #### C  
MP ####  
[facility]  
[address] [address]  
Dr. Dariana Hollingsworth   
   
                      EGFR-NON AF AMERICAN >60        Normal     >=60       [facility]  
   
                                        Comment on above:   Performed By: #### C  
MP ####  
[facility]  
[address] [address]  
Dr. Dariana Hollingsworth   
   
                      Globulin (S) [Mass/Vol] 3.7 g/dL   Normal                [facility]  
   
                                        Comment on above:   Performed By: #### C  
MP ####  
[facility]  
[address] [address]  
Dr. Dariana Hollingsworth   
   
                      Glucose [Mass/Vol] 133 mg/dL  Critically high      [facility]  
   
                                        Comment on above:   Performed By: #### C  
MP ####  
[facility]  
[address] [address]  
Dr. Dariana Hollingsworth   
   
                      Potassium [Moles/Vol] 4.4 mmol/L Normal     3.5-5.1    The  
 [facility]  
   
                                        Comment on above:   Performed By: #### C  
MP ####  
[facility]  
[address] [address]  
Dr. Dariana Hollingsworth   
   
                      Protein [Mass/Vol] 7.3 g/dL   Normal     6.4-8.2    [facility]  
   
                                        Comment on above:   Performed By: #### C  
MP ####  
[facility]  
[address] [address]  
Dr. Dariana Hollingsworth   
   
                      Sodium [Moles/Vol] 136 mmol/L Normal     136-145    [facility]  
   
                                        Comment on above:   Performed By: #### C  
MP ####  
[facility]  
[address] [place], [place] [address]  
Dr. Dariana Hollingsworth   
   
                                                    Urea nitrogen   
[Mass/Vol]      13.0 mg/dL      Normal          7.0-18.0        [facility]  
   
                                        Comment on above:   Performed By: #### C  
MP ####  
[facility]  
[address], [place] [address]  
Dr. Dariana Hollingsworth   
   
                                                    Urea   
nitrogen/Creatinine   
[Mass ratio]    15.7 mg/mg      Normal                          [facility]  
   
                                        Comment on above:   Performed By: #### C  
MP ####  
[facility]  
[address], [place] [address]  
Dr. Dariana Hollingsworth   
   
                                                    XR CSPINE MIN 4 VIEWSon    
   
                                        XR CSPINE MIN 4 VIEWS EXAMINATION: XR   
CSPINE MIN 4 VIEWS  
HISTORY: Cervical   
segmental dysfunction  
COMPARISON: No   
relevant comparison   
available.  
FINDINGS:  
BONES: No significant   
spondylosis,   
scoliosis, fracture,   
or visible bony   
lesion.  
DISC SPACES: Mild   
narrowing C5-C6.   
Suspect mild foramen   
narrowing C5-C6  
bilaterally.  
PARASPINOUS:   
Negative. No   
paraspinous   
abnormality is seen.  
OTHER: Negative.  
IMPRESSION:  
1. C5-C6 moderate   
degenerative disc   
disease and suspected   
mild-moderate foramen  
narrowing   
bilaterally. Consider   
MRI for further   
evaluation if   
symptoms persist.  
Electronically   
authenticated by:   
KAYLYNN GOMEZ Date:   
2023-02-10 07:11    Normal                                  [facility]  
  
  
  
Vital Signs  
  
  
                          Date Time    Vital Sign   Value        Performing   
Clinician                               Facility  
   
                                                    2024   
08:050400          Body height         187.96 cm           DO Elvira Harper  
Work Phone:   
[phone]                          [facility]  
   
                                                    2024   
08:                              Body mass index   
(BMI) [Ratio]             41.5 kg/m2                DO Elvira Harper  
Work Phone:   
[phone]                          [facility]  
   
                                                    2024   
08:05040          Body temperature    98.3 [degF]         DO Elviraandres Harper  
Work Phone:   
[phone]                          [facility]  
   
                                                    2024   
08:          Body weight         146.59 kg           DO Elviraamilcar Harper  
Work Phone:   
[phone]                          [facility]  
   
                                                    2024   
08:                              Diastolic blood   
pressure                  58 mm[Hg]                 DO Levira Harper  
Work Phone:   
[phone]                          [facility]  
   
                                                    2024   
08:          Heart rate          97 /min             DO Elvira Harper  
Work Phone:   
[phone]                          [facility]  
   
                                                    2024   
08:          Respiratory rate    20 /min             DO Elvira Harper  
Work Phone:   
[phone]                          [facility]  
   
                                                    2024   
08:                              SaO2% (BldA) [Mass   
fraction]                 98 %                      DO Elvira Harper  
Work Phone:   
[phone]                          [facility]  
   
                                                    2024   
08:                              Systolic blood   
pressure                  102 mm[Hg]                DO Elvira Harper  
Work Phone:   
[phone]                          [facility]  
   
                                                    2024   
11:          Body temperature    98.8 [degF]         DO Elvira Harper  
Work Phone:   
[phone]                          [facility]  
   
                                                    2024   
11:                              Diastolic blood   
pressure                  82 mm[Hg]                 DO Elvira Harper  
Work Phone:   
[phone]                          [facility]  
   
                                                    2024   
11:          Heart rate          75 /min             DO Elvira Harper  
Work Phone:   
[phone]                          [facility]  
   
                                                    2024   
11:          Respiratory rate    18 /min             DO Elvira Harper  
Work Phone:   
[phone]                          [facility]  
   
                                                    2024   
11:                              SaO2% (BldA) [Mass   
fraction]                 96 %                      DO Elvira Harper  
Work Phone:   
[phone]                          [facility]  
   
                                                    2024   
11:                              Systolic blood   
pressure                  177 mm[Hg]                DO Elvira Harper  
Work Phone:   
[phone]                          [facility]  
   
                                                    2024   
05:          Body weight         158.5 kg            DO Elvira Harper  
Work Phone:   
[phone]                          [facility]  
   
                                                    2024   
13:          Body height         189.23 cm           DO Elvira Harper  
Work Phone:   
[phone]                          [facility]  
   
                                                    2024   
15:                              Inhaled oxygen flow   
rate                      2 L/min                   DO Elvira Harper  
Work Phone:   
[phone]                          [facility]  
   
                                                    2024   
17:          Body temperature    98.3 [degF]         DO Elvira Harper  
Work Phone:   
[phone]                          [facility]  
   
                                                    2024   
17:                              Diastolic blood   
pressure                  67 mm[Hg]                 DO Elvira Harper  
Work Phone:   
[phone]                          [facility]  
   
                                                    2024   
17:          Heart rate          75 /min             DO Elvira Harper  
Work Phone:   
[phone]                          [facility]  
   
                                                    2024   
17:          Respiratory rate    18 /min             DO Elvira Harper  
Work Phone:   
[phone]                          [facility]  
   
                                                    2024   
17:                              SaO2% (BldA) [Mass   
fraction]                 95 %                      DO Elvira Harper  
Work Phone:   
[phone]                          [facility]  
   
                                                    2024   
17:                              Systolic blood   
pressure                  148 mm[Hg]                DO Elvira Harper  
Work Phone:   
[phone]                          [facility]  
   
                                                    2024   
11:          Body height         189.23 cm           DO Elvira Harper  
Work Phone:   
[phone]                          [facility]  
   
                                                    2024   
11:          Body weight         160.57 kg           DO Elvira Harper  
Work Phone:   
[phone]                          [facility]  
   
                                                    2024   
14:          Body height         187.96 cm           DO Elvira Harper  
Work Phone:   
[phone]                          [facility]  
   
                                                    2024   
14:                              Body mass index   
(BMI) [Ratio]             45.4 kg/m2                DO Elvira Harper  
Work Phone:   
[phone]                          [facility]  
   
                                                    2024   
14:          Body weight         160.57 kg           DO Elvira Harper  
Work Phone:   
[phone]                          [facility]  
   
                                                    2024   
14:                              Diastolic blood   
pressure                  82 mm[Hg]                 DO Elvira Harper  
Work Phone:   
[phone]                          [facility]  
   
                                                    2024   
14:          Heart rate          68 /min             DO Elvira Harper  
Work Phone:   
[phone]                          [facility]  
   
                                                    2024   
14:                              SaO2% (BldA) [Mass   
fraction]                 97 %                      DO Elvira Harper  
Work Phone:   
[phone]                          [facility]  
   
                                                    2024   
14:                              Systolic blood   
pressure                  144 mm[Hg]                DO Elvira Harper  
Work Phone:   
[phone]                          [facility]  
   
                                                    2024   
09:          Body height         187.96 cm           DO Elvira Harper  
Work Phone:   
[phone]                          [facility]  
   
                                                    2024   
09:                              Body mass index   
(BMI) [Ratio]             45.7 kg/m2                DO Elvira Harper  
Work Phone:   
[phone]                          [facility]  
   
                                                    2024   
09:          Body weight         161.47 kg           DO Elvira Harper  
Work Phone:   
[phone]                          [facility]  
   
                                                    2024   
09:                              Diastolic blood   
pressure                  82 mm[Hg]                 DO Elvira Harper  
Work Phone:   
[phone]                          [facility]  
   
                                                    2024   
09:          Heart rate          68 /min             DO Elvira Harper  
Work Phone:   
[phone]                          [facility]  
   
                                                    2024   
09:          Respiratory rate    18 /min             DO Elvira Harper  
Work Phone:   
[phone]                          [facility]  
   
                                                    2024   
09:                              SaO2% (BldA) [Mass   
fraction]                 98 %                      DO Elvira Harper  
Work Phone:   
[phone]                          [facility]  
   
                                                    2024   
09:                              Systolic blood   
pressure                  144 mm[Hg]                DO Elvira Harper  
Work Phone:   
[phone]                          [facility]  
   
                                                    2024   
07:          Body height         187.96 cm           DO Elvira Harper  
Work Phone:   
[phone]                          [facility]  
   
                                                    2024   
07:                              Body mass index   
(BMI) [Ratio]             46.3 kg/m2                DO Elvira Harper  
Work Phone:   
[phone]                          [facility]  
   
                                                    2024   
07:          Body weight         163.74 kg           DO Elvira Harper  
Work Phone:   
[phone]                          [facility]  
   
                                                    2024   
07:                              Diastolic blood   
pressure                  78 mm[Hg]                 DO Elvira Harper  
Work Phone:   
[phone]                          [facility]  
   
                                                    2024   
07:          Heart rate          76 /min             DO Elvira Harper  
Work Phone:   
[phone]                          [facility]  
   
                                                    2024   
07:          Respiratory rate    18 /min             DO Elvira Harper  
Work Phone:   
[phone]                          [facility]  
   
                                                    2024   
07:                              SaO2% (BldA) [Mass   
fraction]                 94 %                      DO Elvira Harper  
Work Phone:   
[phone]                          [facility]  
   
                                                    2024   
07:                              Systolic blood   
pressure                  138 mm[Hg]                DO Elvira Harper  
Work Phone:   
[phone]                          [facility]  
   
                                                    05-   
09:          Body height         187.96 cm           DO Elvira Harper  
Work Phone:   
[phone]                          [facility]  
   
                                                    05-   
09:                              Body mass index   
(BMI) [Ratio]             46.4 kg/m2                DO Elvira Harper  
Work Phone:   
[phone]                          [facility]  
   
                                                    05-   
09:          Body weight         163.97 kg           DO Elvira Harper  
Work Phone:   
[phone]                          [facility]  
   
                                                    05-   
09:                              Diastolic blood   
pressure                  78 mm[Hg]                 DO Elvira Harper  
Work Phone:   
[phone]                          [facility]  
   
                                                    05-   
09:          Heart rate          74 /min             DO Elvira Harper  
Work Phone:   
[phone]                          [facility]  
   
                                                    05-   
09:          Respiratory rate    18 /min             DO Elvira Harper  
Work Phone:   
[phone]                          [facility]  
   
                                                    05-   
09:                              SaO2% (BldA) [Mass   
fraction]                 94 %                      DO Elvira Harper  
Work Phone:   
[phone]                          [facility]  
   
                                                    05-   
09:                              Systolic blood   
pressure                  142 mm[Hg]                DO Elvira Harper  
Work Phone:   
[phone]                          [facility]  
   
                                                    2024   
08:          Body height         187.96 cm           DO Elvira Harper  
Work Phone:   
[phone]                          [facility]  
   
                                                    2024   
08:                              Body mass index   
(BMI) [Ratio]             47 kg/m2                  DO Elvira Harper  
Work Phone:   
[phone]                          [facility]  
   
                                                    2024   
08:          Body weight         166.01 kg           DO Elvira Harper  
Work Phone:   
[phone]                          [facility]  
   
                                                    2024   
13:          Body height         187.96 cm           DO Elvira Harper  
Work Phone:   
[phone]                          [facility]  
   
                                                    2024   
13:                              Body mass index   
(BMI) [Ratio]             47 kg/m2                  DO Elvira Harper  
Work Phone:   
[phone]                          [facility]  
   
                                                    2024   
13:          Body weight         166.09 kg           DO Elvira Harper  
Work Phone:   
[phone]                          [facility]  
   
                                                    2024   
13:                              Diastolic blood   
pressure                  84 mm[Hg]                 DO Elvira Harper  
Work Phone:   
[phone]                          [facility]  
   
                                                    2024   
13:          Heart rate          72 /min             DO Elvira Harper  
Work Phone:   
[phone]                          [facility]  
   
                                                    2024   
13:          Respiratory rate    18 /min             DO Elvira Harper  
Work Phone:   
[phone]                          [facility]  
   
                                                    2024   
13:                              SaO2% (BldA) [Mass   
fraction]                 96 %                      DO Elvira Harper  
Work Phone:   
[phone]                          [facility]  
   
                                                    2024   
13:                              Systolic blood   
pressure                  132 mm[Hg]                DO Elvira Harper  
Work Phone:   
[phone]                          [facility]  
   
                                                    2023   
10:          Body height         187.96 cm           Elviraamilcar Harper  
Other Phone:   
[phone]                           [facility] [employer]  
Other Phone:   
[phone]  
   
                                                    2023   
10:                              Body mass index   
(BMI) [Ratio]             46.91 kg/m2               Elviraamilcar Harper  
Other Phone:   
[phone]                           [facility]   
[employer]   
[employer]  
Other Phone:   
[phone]  
   
                                                    2023   
10:          Body weight         165.75 kg           Elviraamilcar Harper  
Other Phone:   
[phone]                           [facility]   
[employer]  
Other Phone:   
[phone]  
   
                                                    2023   
10:                              Diastolic blood   
pressure                  78 mm[Hg]                 Elvira Harper  
Other Phone:   
[phone]                           [facility]   
[employer]   
[employer]  
Other Phone:   
[phone]  
   
                                                    2023   
10:          Respiratory rate    18 /min             Elvira Harper  
Other Phone:   
[phone]                           [employer] [facility]   
[employer]  
Other Phone:   
[phone]  
   
                                                    2023   
10:                              SaO2% (BldA) [Mass   
fraction]                 97 %                      Elvira Meredith  
Other Phone:   
[phone]                           [facility]   
[employer]  
Other Phone:   
[phone]  
   
                                                    2023   
10:                              Systolic blood   
pressure                  128 mm[Hg]                Elvira Meredith  
Other Phone:   
[phone]                           [facility]   
[employer]   
[employer]  
Other Phone:   
[phone]  
   
                                                    08-   
09:          Body height         187.96 cm           Elvira Harper  
Other Phone:   
[phone]                           [facility]   
[employer]  
Other Phone:   
[phone]  
   
                                                    08-   
09:                              Body mass index   
(BMI) [Ratio]             47.06 kg/m2               Elviraandres Harper  
Other Phone:   
[phone]                           [facility]   
[employer]   
[employer]  
Other Phone:   
[phone]  
   
                                                    08-   
09:          Body weight         166.29 kg           Elviraamilcar Harper  
Other Phone:   
[phone]                           [facility]   
[employer]  
Other Phone:   
[phone]  
   
                                                    08-   
09:                              Diastolic blood   
pressure                  78 mm[Hg]                 Elvira Meredith  
Other Phone:   
[phone]                           [facility]   
[employer]   
[employer]  
Other Phone:   
[phone]  
   
                                                    08-   
09:          Respiratory rate    18 /min             Elviraandres Harper  
Other Phone:   
[phone]                           [facility]   
[employer]   
[employer]  
Other Phone:   
[phone]  
   
                                                    08-   
09:                              SaO2% (BldA) [Mass   
fraction]                 97 %                      Elvira Meredith  
Other Phone:   
[phone]                           [facility]   
[employer]   
[employer]  
Other Phone:   
[phone]  
   
                                                    08-   
09:                              Systolic blood   
pressure                  136 mm[Hg]                Elvira Meredith  
Other Phone:   
[phone]                           [facility]   
[employer]   
[employer]  
Other Phone:   
[phone]  
  
  
  
Encounters  
  
  
                          Encounter Date Encounter Type Care Provider Facility  
   
                                                    Start: 2024  
End: 2024           ambulatory                DO Elvira A Harper  
Work Phone:   
[phone]                          [facility]  
Work Phone:   
[phone]  
   
                                                    Start: 2024  
End: 2024                         Patient encounter   
procedure                               DO Elvira Harper  
Work Phone:   
[phone]                          [facility] [place]  
Work Phone:   
[phone]  
   
                                Start: 2024 Non-patient / Non-visit DO Alyssia  
andres Harper  
Work Phone:   
[phone]                          [facility]   
[facility]   
[facility] [place]  
Work Phone:   
[phone]  
   
                                Start: 2024 Non-patient / Non-visit DO Alyssia  
andres Harper  
Work Phone:   
[phone]                          [facility]   
[facility]ious   
Disease  
Work Phone:   
[phone]  
   
                                Start: 2024 Non-patient / Non-visit DO Alyssia  
andres Harper  
Work Phone:   
[phone]                          [facility]   
[facility] [place]   
Orthopedics  
Work Phone:   
[phone]  
   
                                                    Start: 2024  
End: 2024                         Evaluation and   
management of inpatient                 DO Elvira Harper  
Work Phone:   
[phone]                          [facility]   
Surg  
Work Phone:   
[phone]  
   
                                                    Start: 2024  
End: 2024           ambulatory                DO Elvira A Harper  
Work Phone:   
[phone]                          [facility]  
Work Phone:   
[phone]  
   
                                                    Start: 2024  
End: 2024                         Patient encounter   
procedure                               DO Elvira Harper  
Work Phone:   
[phone]                          [facility]   
[facility]   
[facility] [place]  
Work Phone:   
[phone]  
   
                                                    Start: 2024  
End: 2024           ambulatory                DO Elvira A Harper  
Work Phone:   
[phone]                          [facility]  
Work Phone:   
[phone]  
   
                                                    Start: 2024  
End: 2024                         Patient encounter   
procedure                               DO Elvira Harper  
Work Phone:   
[phone]                          [facility]   
[facility] [place]  
Work Phone:   
[phone]  
   
                                Start: 2024 Non-patient / Non-visit DO Alyssia  
andres Harper  
Work Phone:   
[phone]                          [facility] [place]  
Work Phone:   
[phone]  
   
                                                    Start: 2024  
End: 2024           ambulatory                DO Elvira A Harper  
Work Phone:   
[phone]                          [facility]  
Work Phone:   
[phone]  
   
                                                    Start: 2024  
End: 2024                         Patient encounter   
procedure                               DO Elvira Harper  
Work Phone:   
[phone]                          [facility] [place]  
Work Phone:   
[phone]  
   
                                                    Start: 05-  
End: 05-     ambulatory          To Khan Facility:[facility]  
   
                                                    Start: 05-  
End: 05-                         Patient encounter   
procedure                               DO Elvira Harper  
Work Phone:   
[phone]                          [facility]   
[facility]  
   
                                                    Start: 2024  
End: 2024                         Patient encounter   
procedure                               DO Elvira Harper  
Work Phone:   
[phone]                          [facility]  
   
                                                    Start: 2024  
End: 2024           ambulatory                DO Elvira A Harper  
Work Phone:   
[phone]                          [facility]  
Work Phone:   
[phone]  
   
                                                    Start: 2024  
End: 2024     ambulatory          Gucci Maxwell        Facility:[facility]  
   
                                                    Start: 2024  
End: 2024                         Patient encounter   
procedure                               DO Elvira Harper  
Work Phone:   
[phone]                          [facility]   
[facility]   
Orthopedics  
Work Phone:   
[phone]  
   
                                                    Start: 2024  
End: 2024                         Patient encounter   
procedure                               DO Elvira Meredith  
Work Phone:   
[phone]                          [facility] [place]  
Work Phone:   
[phone]  
   
                                                    Start: 2023  
End: 2023           ambulatory                Elvira Harper  
Other Phone:   
[phone]                           [facility] [employer]   
[employer]  
Other Phone:   
[phone]  
   
                                        Start: 2023   Patient encounter   
procedure                 Elvira Harper              [facility]   
[place]  
   
                                                    Start: 08-  
End: 08-           ambulatory                Elvira Harper  
Other Phone:   
[phone]                           [facility] [employer]   
[employer]  
Other Phone:   
[phone]  
   
                                        Start: 08-   Office outpatient ne  
w   
45 minutes                Elvira Harper              [facility]   
[place]  
   
                                                    Start: 2023  
End: 2023     ambulatory          DR BELINDA MAHAJAN    Facility:[facility]  
   
                                                    Start: 2023  
End: 02-     ambulatory          DR DOCTOR ORELLANA      Facility:[facility]  
  
  
  
Procedures  
  
  
                          Date         Procedure    Procedure Detail Performing   
Clinician  
   
                          Start: 2024 Plain chest X-ray              DO Gl  
oria Meredith  
[place] Phone:   
[phone]  
   
                                        Start: 2024   Blood culture for ba  
cteria,   
including anaerobic screen                           DO Elvira Meredith  
Work Phone:   
[phone]  
   
                                        Start: 2024   Investigation of   
transfusion reaction                                DO Elvira Harper  
Work Phone:   
[phone]  
   
                          Start: 2024 Arthroscopy of knee              DO   
Elvira Meredith  
Work Phone:   
[phone]  
   
                                        Start: 2024   Bacterial ID (NA Mul  
tiplex   
Assay)                                              DO Elvira Harper  
Work Phone:   
[phone]  
   
                                        Start: 2024   Blood culture for ba  
cteria,   
including anaerobic screen                           DO Elvira Meredith  
Work Phone:   
[phone]  
   
                                        Start: 2024   Investigation of   
transfusion reaction                                DO Elvira Harper  
Work Phone:   
[phone]  
   
                                        Start: 2024   Duplex scan of lower  
 limb   
veins                                               DO Elvira [employer]  
Work Phone:   
[phone]  
   
                          Start: 2024 X-ray of right knee              DO   
Elvira [employer]  
Work Phone:   
[phone]  
   
                          Start: 2024 X-ray of right knee              DO   
[facility] [employer]  
Work Phone:   
[phone]  
   
                                        Start: 05-   Radiologic examinati  
on of   
knee                                                DO Elvira [employer]  
Work Phone:   
[phone]  
   
                                        Start: 2024   Plain X-ray of right  
   
shoulder                                            DO Elvira [facility] Phone:   
[phone]  
   
                          Start: 2023 PSA screening              DR DOCTOR ORELLANA  
   
                                        Comment on above:   Performed By: #### P  
SAD ####  
[facility]  
[address] [place] [address]  
Dr. Dariana Hollingsworth   
  
  
  
Plan of Treatment  
  
  
                          Date         Care Activity Detail       Author  
   
                                                    Start:   
2024                                                  [facility]  
   
                                                    Start:   
2024                                                  [facility]  
   
                                                    Start:   
2024                                                  [facility]  
   
                                                    Start:   
2024                              Blood culture for   
bacteria, including   
anaerobic screen          Blood Culture             [facility]  
   
                                                    Start:   
2024          Hospital admission                      [facility]  
   
                                                    Start:   
2024                              Referral to infectious   
diseases physician                                  [facility]  
   
                                                    Start:   
2024                                                  [facility]  
   
                                                    Start:   
2024                              Microbial culture, body   
fluid                                               [facility]  
   
                                                    Start:   
2024                              Physical therapy   
procedure                                           [facility]  
   
                                                    Start:   
2024                              Referral to occupational   
therapist                                           [facility]  
   
                                                    Start:   
2024          Consultation                            [facility]  
   
                                                    Start:   
2024          Hospital admission                      [facility]  
   
                                                    Start:   
2024                                                  [facility]  
   
                                                    Start:   
2024                              Bacteria identified in   
Blood by Culture                                    [facility]  
   
                                                    Start:   
2024                              Blood culture for   
bacteria, including   
anaerobic screen          Blood Culture             [facility]  
   
                                                    Start:   
2024                              Insertion of Infusion   
Device into Upper Vein,   
Percutaneous Approach                   Insertion of Infusion   
Device into Upper Vein,   
Percutaneous Approach                   [facility]  
   
                                                    Start:   
2024                              Irrigation of Joints   
using Irrigating   
Substance, Percutaneous   
Endoscopic Approach                     Irrigation of Joints   
using Irrigating   
Substance, Percutaneous   
Endoscopic Approach                     [facility]  
   
                                                    Start:   
2024                              Duplex scan of lower   
limb veins                US venous duplex LE RT    [facility]  
   
                                                    Start:   
2024                              US Lower extremity vein   
- right                                             [facility]  
   
                                                    Start:   
2024                                                  [facility]  
   
                                                            Bacteria identified   
in   
Unspecified specimen by   
Aerobe culture                                      [facility]  
   
                                                            Bacteria identified   
in   
Unspecified specimen by   
Anaerobe culture                                    [facility]  
   
                                                            Glucose measurement   
estimated from glycated   
hemoglobin                                          [facility]  
   
                                       Patient Education Know your Meds [facility]  
Work Phone:   
[phone]  
   
                                       Patient referral              [facility]  
Work Phone:   
[phone]  
   
                                       XR Knee - right 4 Views              [facility]  
  
  
  
Immunizations  
  
  
                      Immunization Date Immunization Notes      Care Provider Fa  
cility  
   
                          2023   Prevnar 20                Elvira Harper  
Other Phone:   
[phone]                           [facility]  
   
                                        2023          zoster vaccine   
recombinant                                         Evlira Harper  
Other Phone:   
[phone]                           [facility]  
   
                                        10-          COVID-19 Pfizer   
(bivalent)                                          Elvira Harper  
Other Phone:   
[phone]                           [facility]  
   
                                        2021          COVID-19 Vaccine Pfi  
zer   
- Documentation Purposes   
Only                                                Elvira Harper  
Other Phone:   
[phone]                           [facility]  
   
                                        2021          COVID-19 Vaccine Pfi  
zer   
- Documentation Purposes   
Only                                                Elvira [facility]  
Other Phone:   
[phone]                           [facility]  
   
                                        2021          COVID-19 Vaccine Pfi  
zer   
- Documentation Purposes   
Only                                                Elvira Harper  
Other Phone:   
[phone]                           [facility]  
  
  
  
Payers  
  
  
                          Date         Payer Category Payer        Policy ID  
   
                          2024   Medicare                  [id number]  
   
                          2024   Self-pay                  [id number]  
[id number]  
   
                          1960   Medicare                  [id number]  
   
                          1955   Unknown                   [id number]  
[id number]  
   
                          1955   Unknown                   [id number] [id number]  
[id number]  
   
                                       Unknown      Moni BC/TITUS [id number]   
[id number]  
   
                                       Unknown                   [id number] [id number]  
[id number]  
   
                                       Unknown                   [id number] 2.16.8  
[id number]  
   
                                       Unknown                   [id number] 2.16.8  
[id number]  
   
                                       Unknown                   [id number] [id number]  
[id number]  
   
                                       Unknown                   [id number] [id number]  
[id number]  
  
  
  
Social History  
  
  
                          Date         Type         Detail       Facility  
   
                                       Sex Assigned At Birth              [facility]  
Other Phone:   
[phone]  
   
                          Start: 1955 Sex Assigned At Birth Male         [facility]  
   
                                                    Start: 2024  
End: 2024                         Tobacco smoking status   
NHIS                                    Never smoked tobacco   
(finding)                               [facility]  
  
  
  
Medical Equipment  
  
  
                                Procedure Code  Equipment Code  Equipment Origin  
al   
Text                      Equipment Identifier      Dates  
   
                                                                OneTouch Delica   
Lancets 33G                                           
   
                                                                OneTouch Delica   
Lancets                                             Start:   
2024  
End: 2024  
   
                                                                OneTouch Delica   
Lancets                                             Start:   
2024  
End: 2024  
   
                                                                OneTouch Delica   
Lancets                                             Start:   
2024  
End: 2024  
   
                                                                OneTouch Delica   
Lancets                                             Start:   
2024  
End: 2024  
   
                                                                OneTouch Delica   
Lancets                                             Start:   
2024  
End: 2024  
   
                                                                OneTouch Delica   
Lancets                                             Start:   
2024  
End: 2024  
   
                                                                OneTouch Delica   
Lancets                                             Start:   
2024  
End: 2024  
   
                                                                OneTouch Delica   
Lancets                                             Start:   
2024  
End: 2024  
   
                                                                OneTouch Delica   
Lancets                                             Start:   
2024  
End: 2024  
  
  
  
Goals  
  
  
                                Date            Patient Goal    Desired Activity  
/State  
   
                                                                  
  
  
  
Functional Status  
  
  
                          Date         Assessment   Result       Facility  
   
                                2024      Functional status Patient is Pro  
gressing Toward   
Baseline                                [facility]  
Work Phone: [phone]  
   
                          2024   Functional status Patient Not at Baseline  
 [facility]  
Work Phone: [phone]  
  
  
  
Mental Status  
  
  
                          Date         Assessment   Result       Facility  
   
                                2024      Cognitive function Cognitive Sta  
tus Patient at   
Baseline                                [facility]  
Work Phone: [phone]  
   
                                2024      Cognitive function Cognitive Sta  
tus Patient at   
Baseline                                [facility]   
Ctr  
Work Phone: [phone]  
  
  
  
Clinical Notes 08- to 2024  
  
  
                                Note Date & Type Note            Facility  
  
  
  
                                        2024 Progress note   
  
  
  
   
                                           
   
                                        Note Date/Time      2024   
12:37pm  
   
                                                    [facility]  
ENTER  
[address] [place] [address]  
  
Hospitalist Progress Note  
Signed with Addenda  
  
Patient: Kaylynn Mims MR#: [id number]  
[id number]  
: 1955 Acct:[id number]  
  
Age/Sex: 68 / M Adm Date:   
4  
Loc:  Room: [address] [place] [address] Type: ADM IN  
Attending Dr: Josie Ann MD  
  
Copies to: ~  
  
  
  
**ADDENDUM**1  
Uncontrolled blood pressure, patient does not have a   
history of hypertension, valsartan started, today   
the dose increased  
  
Addendum Documented By: Josie Ann MD 24   
141  
Addendum Signed By: <Electronically signed by Josie Ann MD> 24 141  
  
  
  
Date of Service:  
2024  
  
  
Subjective  
Subjective  
Narrative:  
Patient has been seen and examined today. He   
complains of worsening pain overnight, sharp, like   
sensation, currently complains 8 out of 10, however   
clinically he does not appear to be in distress  
Patient underwent irrigation of the right knee on   
, cultures pending, so far negative  
Blood cultures strep viridans 1 out of 2  
Synovial fluid culture strep mitis  
  
  
  
Physical exam:  
General -awake, alert, oriented ?3, not in acute   
distress  
Cardiovascular -S1 with S2, no murmurs, no rubs, no   
gallops  
Pulmonary - clear to auscultation bilaterally  
Gastrointestinal - abdomen is soft, nondistended,   
nontender, bowel sounds positive, there is no   
rigidity, no rebound  
Right knee in dressing  
Extremities -no edema  
Neurological -no focal neurological dysfunction   
noted  
  
  
  
Exam  
Physical Exam  
Vital Signs:  
  
  
  
  
Temp Pulse Resp BP Pulse Ox O2 Del Method O2 Flow   
Rate  
  
36.8 C 75 17 172/85 H 95 Room Air 2  
  
24 12:05 24 12:05 24 12:05   
24 12:05 24 12:05 24 12:05   
24 15:21  
  
  
  
  
Objective  
Lab Results  
  
24 05:35  
  
24 05:35  
  
Microbiology Results  
Microbiology  
  
24 10:30 Knee,Right - Other Aerobic Culture -   
Preliminary  
Rare normal skin zack  
2 Days  
24 10:30 Knee,Right - Other Anaerobic Culture   
- Preliminary  
24 10:30 Knee,Right - Other Gram Stain - Final  
24 10:28 Knee,Right - Other Aerobic Culture -   
Final  
No Growth 2 Days  
24 10:28 Knee,Right - Other Anaerobic Culture   
- Preliminary  
24 10:28 Knee,Right - Other Gram Stain - Final  
24 10:29 Knee,Right - Other Aerobic Culture -   
Final  
Rare normal skin zack  
2 Days  
24 10:29 Knee,Right - Other Anaerobic Culture   
- Preliminary  
24 10:29 Knee,Right - Other Gram Stain - Final  
24 19:45 Blood - Left Hand Blood Culture -   
Preliminary  
St. mitis/oralis grp  
24 19:45 Blood - Left Hand Bacterial ID (NA   
Multiplex Assay) - Final  
24 23:00 Joint Fluid - Knee, Right Aerobic   
Culture - Final  
St. mitis/oralis grp  
24 23:00 Joint Fluid - Knee, Right Anaerobic   
Culture - Preliminary  
No Anaerobes Isolated 2 Days  
24 23:00 Joint Fluid - Knee, Right Gram Stain   
- Final  
24 19:15 Joint Fluid - Knee, Right Aerobic   
Culture - Final  
No Growth 2 Days  
24 19:15 Joint Fluid - Knee, Right Anaerobic   
Culture - Final  
No Anaerobes Isolated 3 Days  
24 19:15 Joint Fluid - Knee, Right Gram Stain   
- Final  
24 19:55 Blood - Right Hand Blood Culture -   
Preliminary  
No Growth 2 Days  
  
  
  
Meds  
Allergies and Active Meds  
Allergies  
  
metformin Allergy (Unknown, Verified 24 08:53)  
back spasms  
Penicillins Allergy (Unknown, Verified 24   
08:53)  
rash  
  
  
Active Meds:  
Active Medications  
  
  
  
Generic Name Dose Route Start Last Admin  
  
Trade Name Freq PRN Reason Stop Dose Admin  
  
Acetaminophen 650 mg 24 17:56 24 08:21  
  
Acetaminophen 325 Mg Tablet PO 25 17:55 650 mg  
  
Q4H PRN Administration  
  
Pain Scale 1 - 5  
  
Acetaminophen 1,000 mg 24 12:00 24 12:23  
  
Acetaminophen 500 Mg Tablet PO 25 11:59 1,000   
mg  
  
Q8H NIHARIKA Administration  
  
Ascorbic Acid 500 mg 24 17:00 24 07:58  
  
Ascorbic Acid 500 Mg Tablet PO 25 16:59 500 mg  
  
BID.WITH.MEALS NIHARIKA Administration  
  
Docusate Sodium 200 mg 24 17:56  
  
Docusate 100 Mg Capsule PO 25 17:55  
  
BID PRN  
  
Constipation  
  
Enoxaparin Sodium 40 mg 24 10:00 24   
09:42  
  
Enoxaparin 40 Mg/0.4 Ml Syringe SUBCUT 25   
09:59 40 mg  
  
DAILY@1000 NIHARIKA Administration  
  
Ferrous Sulfate 324 mg 24 17:00 24 07:57  
  
Ferrous Sulfate 324 Mg Tablet.Dr PO 25 16:59   
324 mg  
  
BID.WITH.MEALS NIHARIKA Administration  
  
Hydralazine HCl 10 mg 24 17:56  
  
Hydralazine 20 Mg/Ml Vial IV-PUSH 25 17:55  
  
Q4H PRN  
  
if SBP > 185  
  
Hydromorphone HCl 0.5 mg 24 09:38  
  
Hydromorphone 0.5 Mg/0.5 Ml Syringe IV-PUSH  
  
Q4H PRN  
  
pain  
  
Ceftriaxone Sodium 2 gm in 50 mls @ 100 mls/hr   
24 14:00 24 15:44  
  
Rocephin  mls/hr  
  
Q24H NIHARIKA Administration  
  
Mineral Oil 1 each 24 11:33  
  
Mineral Oil (Orange) 1 Each Enema [place]  
  
ONCE PRN  
  
Constipation  
  
Naloxone HCl 0.4 mg 24 11:33  
  
Naloxone Hcl 0.4 Mg/Ml Vial IV-PUSH 25 11:32  
  
Q2M PRN  
  
Opioid Reversal  
  
Oxycodone HCl 5 mg 24 17:56 24 22:50  
  
Oxycodone Ir 5 Mg Tablet PO 5 mg  
  
Q4HR PRN Administration  
  
Pain  
  
Oxycodone HCl 5 mg 24 11:33 24 12:24  
  
Oxycodone Ir 5 Mg Tablet PO 5 mg  
  
Q4HR PRN Administration  
  
Pain Scale 6 - 10  
  
Pantoprazole Sodium 40 mg 24 09:00 24   
08:37  
  
Pantoprazole 40 Mg Tablet.Dr PO 25 08:59 40 mg  
  
DAILY NIHARIKA Administration  
  
Polyethylene Glycol 17 gm 24 09:00 24   
08:37  
  
Polyethylene Glycol 3350 17 Gm Powd.Pack PO 24   
08:59 17 gm  
  
DAILY NIHARIKA Administration  
  
Prochlorperazine Edisylate 5 mg 24 17:56  
  
Prochlorperazine Edisylate 10 Mg/2 Ml Vial IV-PUSH   
25 17:55  
  
Q4H PRN  
  
Nausea And Vomiting  
  
Senna/Docusate Sodium 2 tab 24 09:00 24   
08:37  
  
Sennosides/Docusate 8.6-50mg 1 Tab Tablet PO   
24 08:59 2 tab  
  
DAILY NIHARIKA Administration  
  
Sodium Chloride 0 ml 24 14:00 24 05:33  
  
Sodium Chloride 0.9 % 10 Ml Syringe IV-PUSH 25   
13:59 Not Given  
  
QSHIFT NIHARIKA  
  
Sodium Chloride 0 ml 24 13:44 24 23:45  
  
Sodium Chloride 0.9 % 10 Ml Syringe IV-PUSH 25   
13:43 10 ml  
  
PRN PRN Administration  
  
Flush  
  
Tramadol HCl 50 mg 24 11:33 24 01:16  
  
Tramadol 50 Mg Tablet PO 25 11:32 50 mg  
  
Q6H PRN Administration  
  
Pain Scale 1 - 5  
  
Valsartan 80 mg 24 13:10 24 08:37  
  
Valsartan 80 Mg Tablet PO 25 13:09 80 mg  
  
DAILY NIHARIKA Administration  
  
  
  
  
A&P - Hospitalist  
Assessment/Plan  
(1) Septic arthritis of knee, right:  
  
Plan  
1. Suspected acute right knee septic arthritis,   
status post irrigation on   
Continue with Rocephin therapy, cultures reviewed,   
discussed with ID  
Blood cultures strep viridans 1 out of 2, repeated   
cultures pending  
Appreciate orthopedic input, will reevaluate today  
Continue with pain management  
  
2. DVT prophylaxis SCDs Lovenox  
  
  
Documented By: Josie Ann MD 24 1235  
Signed By: <Electronically signed by Josie Ann MD>  
24 1237  
   
  
[facility]  
Work Phone: [phone]- Progress note  
  
  
  
  
                                        Author              Josie Ann  
[facility]  
2024 2:16pm  
   
                                        Note Date/Time      2024 2:  
16pm  
   
                                                    [facility]  
ENTER  
[address] [place] [address]  
  
Hospitalist Progress Note  
Signed  
  
Patient: Kaylynn Mims MR#: [id number]  
[id number]  
: 1955 Acct:[id number]  
  
Age/Sex: 68 / M Adm Date:   
4  
Loc:  Room: [address] [place] [address] Type: ADM IN  
Attending Dr: Josie Ann MD  
  
Copies to: ~  
  
  
Date of Service:  
2024  
  
  
Subjective  
Subjective  
Narrative:  
Patient has been seen and examined today. He was ambulated in the hallway with a
   
walker, however still complains of significant pain requiring opioids.  
Otherwise denies any abdominal pain, passing flatus, no nausea no vomiting, 
denies   
any shortness of breath any chest pain  
  
  
Physical exam:  
General -awake, alert, oriented ?3, not in acute distress  
Cardiovascular -S1 with S2, no murmurs, no rubs, no gallops  
Pulmonary - clear to auscultation bilaterally  
Gastrointestinal - abdomen is soft, nondistended, nontender, bowel sounds 
positive,   
there is no rigidity, no rebound  
Right knee in dressing, I did not undress, it take a look at the knee, still 
some   
swelling noted, incision site looks quite well, without any drainage without any
   
erythema, no obvious skin signs of infection  
Extremities -no edema  
Neurological -no focal neurological dysfunction noted  
  
  
  
Exam  
Physical Exam  
Vital Signs:  
  
  
  
  
Temp Pulse Resp BP Pulse Ox O2 Del Method O2 Flow Rate  
  
36.5 C 79 20 175/80 H 95 Room Air 2  
  
24 12:08 24 12:08 24 12:08 24 12:08 24 12:08 
24   
12:08 24 15:21  
  
  
  
  
Objective  
Lab Results  
  
24 05:35  
  
24 05:25  
  
Microbiology Results  
Microbiology  
  
24 13:40 Blood - Right Antecubital Blood Culture - Preliminary  
No Growth 2 Days  
24 13:41 Blood - Right Hand Blood Culture - Preliminary  
No Growth 2 Days  
24 10:30 Knee,Right - Other Aerobic Culture - Final  
St. mitis/oralis [facility]  
24 10:30 Knee,Right - Other Anaerobic Culture - Final  
24 10:30 Knee,Right - Other Gram Stain - Final  
24 10:29 Knee,Right - Other Aerobic Culture - Final  
St. mitis/oralis [facility]  
24 10:29 Knee,Right - Other Anaerobic Culture - Final  
24 10:29 Knee,Right - Other Gram Stain - Final  
24 10:28 Knee,Right - Other Aerobic Culture - Final  
St. mitis/oralis [facility]  
24 10:28 Knee,Right - Other Anaerobic Culture - Final  
24 10:28 Knee,Right - Other Gram Stain - Final  
24 23:00 Joint Fluid - Knee, Right Aerobic Culture - Final  
St. mitis/oralis [facility]  
24 23:00 Joint Fluid - Knee, Right Anaerobic Culture - Final  
No Anaerobes Isolated 3 Days  
24 23:00 Joint Fluid - Knee, Right Gram Stain - Final  
24 19:45 Blood - Left Hand Blood Culture - Preliminary  
St. mitis/oralis [facility]  
24 19:45 Blood - Left Hand Bacterial ID (NA Multiplex Assay) - Final  
24 19:55 Blood - Right Hand Blood Culture - Preliminary  
No Growth 3 Days  
  
  
  
Meds  
Allergies and Active Meds  
Allergies  
  
metformin Allergy (Unknown, Verified 24 08:53)  
back spasms  
Penicillins Allergy (Unknown, Verified 24 08:53)  
rash  
  
  
Active Meds:  
Active Medications  
  
  
  
Generic Name Dose Route Start Last Admin  
  
Trade Name Freq PRN Reason Stop Dose Admin  
  
Acetaminophen 650 mg 24 17:56 24 08:21  
  
Acetaminophen 325 Mg Tablet PO 25 17:55 650 mg  
  
Q4H PRN Administration  
  
Pain Scale 1 - 5  
  
Acetaminophen 1,000 mg 24 12:00 24 12:14  
  
Acetaminophen 500 Mg Tablet PO 25 11:59 1,000 mg  
  
Q8H NIHARIKA Administration  
  
Ascorbic Acid 500 mg 24 17:00 24 09:21  
  
Ascorbic Acid 500 Mg Tablet PO 25 16:59 500 mg  
  
BID.WITH.MEALS NIHARIKA Administration  
  
Docusate Sodium 200 mg 24 17:56  
  
Docusate 100 Mg Capsule PO 25 17:55  
  
BID PRN  
  
Constipation  
  
Enoxaparin Sodium 40 mg 24 10:00 24 09:21  
  
Enoxaparin 40 Mg/0.4 Ml Syringe SUBCUT 25 09:59 40 mg  
  
DAILY@1000 NIHARIKA Administration  
  
Ferrous Sulfate 324 mg 24 17:00 24 09:21  
  
Ferrous Sulfate 324 Mg Tablet.Dr PO 25 16:59 324 mg  
  
BID.WITH.MEALS NIHARIKA Administration  
  
Heparin Sodium (Porcine) 0 unit 24 14:00 24 05:21  
  
Heparin-Lock 500 Unit/5 Ml Syringe IV-PUSH 25 13:59 500 unit  
  
QSHIFT NIHARIKA Administration  
  
Hydralazine HCl 10 mg 24 17:56  
  
Hydralazine 20 Mg/Ml Vial IV-PUSH 25 17:55  
  
Q4H PRN  
  
if SBP > 185  
  
Hydromorphone HCl 0.5 mg 24 09:38  
  
Hydromorphone 0.5 Mg/0.5 Ml Syringe IV-PUSH  
  
Q4H PRN  
  
pain  
  
Ceftriaxone Sodium 2 gm in 50 mls @ 100 mls/hr 24 14:00 24 16:50  
  
Rocephin IV Infused  
  
Q24H NIHARIKA Infusion  
  
Mineral Oil 1 each 24 11:33  
  
Mineral Oil (Orange) 1 Each Enema [place]  
  
ONCE PRN  
  
Constipation  
  
Naloxone HCl 0.4 mg 24 11:33  
  
Naloxone Hcl 0.4 Mg/Ml Vial IV-PUSH 25 11:32  
  
Q2M PRN  
  
Opioid Reversal  
  
Oxycodone HCl 5 mg 24 11:33 24 12:24  
  
Oxycodone Ir 5 Mg Tablet PO 5 mg  
  
Q4HR PRN Administration  
  
Pain Scale 6 - 10  
  
Oxycodone HCl 10 mg 24 12:38 24 13:30  
  
Oxycodone Ir 5 Mg Tablet PO 10 mg  
  
Q4HR PRN Administration  
  
Pain  
  
Pantoprazole Sodium 40 mg 24 09:00 24 09:20  
  
Pantoprazole 40 Mg Tablet.Dr PO 25 08:59 40 mg  
  
DAILY NIHARIKA Administration  
  
Polyethylene Glycol 17 gm 24 09:00 24 09:22  
  
Polyethylene Glycol 3350 17 Gm Powd.Pack PO 24 08:59 17 gm  
  
DAILY NIHARIKA Administration  
  
Prochlorperazine Edisylate 5 mg 24 17:56  
  
Prochlorperazine Edisylate 10 Mg/2 Ml Vial IV-PUSH 25 17:55  
  
Q4H PRN  
  
Nausea And Vomiting  
  
Senna/Docusate Sodium 2 tab 24 21:00 24 09:22  
  
Sennosides/Docusate 8.6-50mg 1 Tab Tablet PO 24 20:59 2 tab  
  
BID NIHARIKA Administration  
  
Sodium Chloride 0 ml 24 14:00 24 05:29  
  
Sodium Chloride 0.9 % 10 Ml Syringe IV-PUSH 25 13:59 10 ml  
  
QSHIFT NIHARIKA Administration  
  
Sodium Chloride 0 ml 24 13:44 24 23:45  
  
Sodium Chloride 0.9 % 10 Ml Syringe IV-PUSH 25 13:43 10 ml  
  
PRN PRN Administration  
  
Flush  
  
Tramadol HCl 50 mg 24 11:33 24 12:15  
  
Tramadol 50 Mg Tablet PO 25 11:32 50 mg  
  
Q6H PRN Administration  
  
Pain Scale 1 - 5  
  
Valsartan 160 mg 24 09:00  
  
Valsartan 160 Mg Tablet PO 25 08:59  
  
DAILY NIHARIKA  
  
  
  
  
A&P - Hospitalist  
Assessment/Plan  
(1) Septic arthritis of knee, right:  
  
Plan  
1. Suspected acute right knee septic arthritis, status post irrigation on   
Continue with Rocephin therapy, cultures reviewed, discussed with ID  
Appreciate orthopedic input,  
Continue with pain management, quite difficult to control,  
  
2. DVT prophylaxis SCDs Lovenox  
  
Plan to discharge in a.m.  
  
  
Documented By: Josie Ann MD 24  
Signed By: <Electronically signed by Josie Ann MD>  
24  
   
  
[facility]  
Work Phone: [phone]- Progress note  
  
  
  
  
                                        Author              Paul Crum  
[facility]  
2024 10:56am  
   
                                        Note Date/Time      2024 10  
:56am  
   
                                                    [facility]  
ENTER  
[address] [place] [address]  
  
Infect. Disease Progress Note  
Signed  
  
Patient: Kaylynn Mims MR#: [id number]  
[id number]  
: 1955 Acct:[id number]  
  
Age/Sex: 68 / M Adm Date:   
4  
Loc:  Room: [address] [place] [address] Type: ADM IN  
Attending Dr: Josie Ann MD  
  
Copies to: ~  
  
  
Date of Service:  
2024  
  
  
Subjective  
Interval history:  
Patient still endorses he is having a significant mount of pain at the right 
knee   
itself. States he had a cold sweat last night but remains afebrile. Patient just
   
feels he should be able to put more weight and do more with his right knee as he
   
tells me it feels just as bad as it did when he came to the hospital  
  
Exam  
Physical Exam  
Vital Signs:  
  
Vital Signs  
  
  
  
Temp Pulse Resp BP Pulse Ox O2 Del Method  
  
24 08:00 Room Air  
  
24 07:24 98.8 F 72 12 188/107 H 97 Room Air  
  
24 04:00 86 18 168/83 H 97  
  
24 00:00 76 18 145/69 H 95  
  
24 20:00 98.9 F 82 18 171/89 H 97 Room Air  
  
24 20:00 Room Air  
  
24 16:33 97.6 F 83 18 171/85 H 95 Room Air  
  
24 12:05 98.3 F 75 17 172/85 H 95 Room Air  
  
  
  
Const  
General: cooperative, uncomfortable and no acute distress  
Orientation: oriented x3  
HEENT  
Head: normal to inspection  
Ears: hearing grossly normal bilaterally  
Mouth: oral mucosae normal  
Eyes  
General: appearance normal, both eyes and all related structures  
Neck  
Neck: normal visual inspection  
Chest  
Chest palpation & inspection: normal inspection of the chest  
Resp  
Effort & Inspection: normal respiratory effort  
Cardio  
Rate: regular rate  
Rhythm: regular rhythm  
GI  
Inspection: normal to inspection  
Palpation: soft and nontender  
Auscultation: normal bowel sounds  
Skin  
General: no rashes or lesions noted  
Neuro  
General: patient oriented x3  
Extrem  
General: abnormal to inspection (R knee wrapped)  
Other:  
Wrap not taken down. Patient endorses that he having a lot of knee pain.  
  
Objective  
Labs  
CBC/BMP:  
CBC, BMP  
  
  
  
24  
  
05:25  
  
Sodium 133 L  
  
Potassium 4.3  
  
Chloride 98  
  
Carbon Dioxide 27.1  
  
Anion Gap 12.2  
  
BUN 14  
  
Creatinine 0.67 L  
  
Calcium 8.6  
  
  
  
Labs:  
  
  
  
  
24  
  
05:25  
  
BUN 14  
  
Creatinine 0.67 L  
  
  
  
Microbiology  
Microbiology:  
Microbiology - Results from entire visit  
  
24 10:30 Knee,Right - Other Aerobic Culture - Final  
St. mitis/oralis [facility]  
24 10:30 Knee,Right - Other Anaerobic Culture - Final  
24 10:30 Knee,Right - Other Gram Stain - Final  
24 10:29 Knee,Right - Other Aerobic Culture - Final  
St. mitis/oralis [facility]  
24 10:29 Knee,Right - Other Anaerobic Culture - Final  
24 10:29 Knee,Right - Other Gram Stain - Final  
24 10:28 Knee,Right - Other Aerobic Culture - Final  
St. mitis/oralis [facility]  
24 10:28 Knee,Right - Other Anaerobic Culture - Final  
24 10:28 Knee,Right - Other Gram Stain - Final  
24 23:00 Joint Fluid - Knee, Right Aerobic Culture - Final  
St. mitis/oralis [facility]  
24 23:00 Joint Fluid - Knee, Right Anaerobic Culture - Final  
No Anaerobes Isolated 3 Days  
24 23:00 Joint Fluid - Knee, Right Gram Stain - Final  
24 19:45 Blood - Left Hand Blood Culture - Preliminary  
St. mitis/oralis grp  
24 19:45 Blood - Left Hand Bacterial ID (NA Multiplex Assay) - Final  
24 19:55 Blood - Right Hand Blood Culture - Preliminary  
No Growth 3 Days  
24 13:40 Blood - Right Antecubital Blood Culture - Preliminary  
No Growth 1 Day  
24 13:41 Blood - Right Hand Blood Culture - Preliminary  
No Growth 1 Day  
24 19:15 Joint Fluid - Knee, Right Aerobic Culture - Final  
No Growth 2 Days  
24 19:15 Joint Fluid - Knee, Right Anaerobic Culture - Final  
No Anaerobes Isolated 3 Days  
24 19:15 Joint Fluid - Knee, Right Gram Stain - Final  
  
  
  
Allergies and Medications  
Allergies and Active Meds  
Allergies  
  
metformin Allergy (Unknown, Verified 24 08:53)  
back spasms  
Penicillins Allergy (Unknown, Verified 24 08:53)  
rash  
  
  
Active Medications  
  
Acetaminophen (Acetaminophen 325 Mg Tablet) 650 mg PO Q4H PRN  
PRN Reason: Pain Scale 1 - 5  
Stop: 25 17:55  
Last Admin: 24 08:21 Dose: 650 mg  
  
Acetaminophen (Acetaminophen 500 Mg Tablet) 1,000 mg PO Q8H [facility]  
Stop: 25 11:59  
Last Admin: 24 04:02 Dose: 1,000 mg  
  
Ascorbic Acid (Ascorbic Acid 500 Mg Tablet) 500 mg PO BID.WITH.MEALS [facility]  
Stop: 25 16:59  
Last Admin: 24 09:21 Dose: 500 mg  
  
Docusate Sodium (Docusate 100 Mg Capsule) 200 mg PO BID PRN  
PRN Reason: Constipation  
Stop: 25 17:55  
Enoxaparin Sodium (Enoxaparin 40 Mg/0.4 Ml Syringe) 40 mg SUBCUT DAILY@1000 [facility]  
Stop: 25 09:59  
Last Admin: 24 09:21 Dose: 40 mg  
  
Ferrous Sulfate (Ferrous Sulfate 324 Mg Tablet.Dr) 324 mg PO BID.WITH.MEALS [facility]  
Stop: 25 16:59  
Last Admin: 24 09:21 Dose: 324 mg  
  
Heparin Sodium (Porcine) (Heparin-Lock 500 Unit/5 Ml Syringe) 0 unit IV-PUSH 
QSHIFT   
[facility]  
Stop: 25 13:59  
Last Admin: 24 05:21 Dose: 500 unit  
  
Hydralazine HCl (Hydralazine 20 Mg/Ml Vial) 10 mg IV-PUSH Q4H PRN  
PRN Reason: if SBP > 185  
Stop: 25 17:55  
Hydromorphone HCl (Hydromorphone 0.5 Mg/0.5 Ml Syringe) 0.5 mg IV-PUSH Q4H PRN  
PRN Reason: pain  
Ceftriaxone Sodium (Rocephin) 2 gm in 50 mls @ 100 mls/hr IV Q24H [facility]  
Last Infusion: 24 16:50 Dose: Infused  
  
Mineral Oil (Mineral Oil (Orange) 1 Each Enema) 1 each [place] ONCE PRN  
PRN Reason: Constipation  
Naloxone HCl (Naloxone Hcl 0.4 Mg/Ml Vial) 0.4 mg IV-PUSH Q2M PRN  
PRN Reason: Opioid Reversal  
Stop: 25 11:32  
Oxycodone HCl (Oxycodone Ir 5 Mg Tablet) 5 mg PO Q4HR PRN  
PRN Reason: Pain Scale 6 - 10  
Last Admin: 24 12:24 Dose: 5 mg  
  
Oxycodone HCl (Oxycodone Ir 5 Mg Tablet) 10 mg PO Q4HR PRN  
PRN Reason: Pain  
Last Admin: 24 09:20 Dose: 10 mg  
  
Pantoprazole Sodium (Pantoprazole 40 Mg Tablet.Dr) 40 mg PO DAILY [facility]  
Stop: 25 08:59  
Last Admin: 24 09:20 Dose: 40 mg  
  
Polyethylene Glycol (Polyethylene Glycol 3350 17 Gm Powd.Pack) 17 gm PO DAILY 
[facility]  
Stop: 24 08:59  
Last Admin: 24 09:22 Dose: 17 gm  
  
Prochlorperazine Edisylate (Prochlorperazine Edisylate 10 Mg/2 Ml Vial) 5 mg IV-
PUSH   
Q4H PRN  
PRN Reason: Nausea And Vomiting  
Stop: 25 17:55  
Senna/Docusate Sodium (Sennosides/Docusate 8.6-50mg 1 Tab Tablet) 2 tab PO BID 
[facility]  
Stop: 24 20:59  
Last Admin: 24 09:22 Dose: 2 tab  
  
Sodium Chloride (Sodium Chloride 0.9 % 10 Ml Syringe) 0 ml IV-PUSH QSHIFT [facility]  
Stop: 25 13:59  
Last Admin: 24 05:29 Dose: 10 ml  
  
Sodium Chloride (Sodium Chloride 0.9 % 10 Ml Syringe) 0 ml IV-PUSH PRN PRN  
PRN Reason: Flush  
Stop: 25 13:43  
Last Admin: 24 23:45 Dose: 10 ml  
  
Tramadol HCl (Tramadol 50 Mg Tablet) 50 mg PO Q6H PRN  
PRN Reason: Pain Scale 1 - 5  
Stop: 25 11:32  
Last Admin: 24 01:16 Dose: 50 mg  
  
Valsartan (Valsartan 80 Mg Tablet) 80 mg PO DAILY [facility]ARIKA  
Stop: 25 13:09  
Last Admin: 24 09:21 Dose: 80 mg  
  
  
  
  
A&P - Infectious Disease  
Assessment/Plan  
(1) Septic arthritis of knee, right:  
(2) Positive blood culture:  
(3) Streptococcus viridans infection:  
  
Plan  
Patient's cultures all have grown strep mitis/oralis. Follow-up blood cultures 
are   
negative. Patient's white count has remained normal. He also remains afebrile.   
Patient concerned over the significant knee pain he continues to have. Charge 
nurse   
reach out to orthopedics for them to come back and perhaps reevaluate the 
patient.   
From an infection standpoint patient on appropriate antibiotics. He has a PICC 
line   
in the right upper extremity. He is planning to get daily IV ceftriaxone at 
[facility].  
  
  
Documented By: Paul Crum MD 24  
Signed By: <Electronically signed by MD Paul rCum>  
24  
   
  
[facility]  
Work Phone: [phone]- [facility]- Progress [facility]  
  
  
  
  
                                        Author              Paul Crum  
[facility]  
2024 9:16am  
   
                                        Note Date/Time      2024 9:  
16am  
   
                                                    [facility]  
ENTER  
[address] [place] [address]  
  
Infect. Disease Progress Note  
Signed  
  
Patient: Kaylynn Mims MR#: [id number]  
[id number]  
: 1955 Acct:[id number]  
  
Age/Sex: 68 / M Adm Date:   
4  
Loc: 4N Room: 4L7255-1 Type: ADM IN  
Attending Dr: Josie Ann MD  
  
Copies to: ~  
  
  
Date of Service:  
2024  
  
  
Subjective  
Interval history:  
Patient feels better overall but expresses concern about where he is going to 
have   
ongoing antibiotics due to cost.  
  
Exam  
Physical Exam  
Vital Signs:  
  
  
  
  
Temp Pulse Resp BP Pulse Ox O2 Del Method O2 Flow Rate  
  
98.5 F 75 17 172/77 H 97 Room Air 2  
  
24 07:43 24 07:43 24 07:43 24 07:43 24 07:43 
24   
07:59 24 15:21  
  
  
  
Const  
General: cooperative and no acute distress  
Orientation: oriented x3  
HEENT  
Head: normal to inspection  
Ears: hearing grossly normal bilaterally  
Mouth: oral mucosae normal  
Eyes  
General: appearance normal, both eyes and all related structures  
Neck  
Neck: normal visual inspection  
Chest  
Chest palpation & inspection: normal inspection of the chest  
Resp  
Effort & Inspection: normal respiratory effort  
Cardio  
Rate: regular rate  
Rhythm: regular rhythm  
GI  
Inspection: normal to inspection  
Palpation: soft and nontender  
Auscultation: normal bowel sounds  
Skin  
General: no rashes or lesions noted  
Neuro  
General: patient oriented x3  
Extrem  
General: abnormal to inspection (R knee wrapped)  
  
Objective  
Labs  
CBC/BMP:  
CBC, BMP  
  
  
  
24  
  
05:35  
  
Corrected WBC 8.5  
  
Uncorrected WBC Count 8.5  
  
RBC 5.03  
  
Hgb 14.9  
  
Hct 44.1  
  
Plt Count 182  
  
Sodium 136  
  
Potassium 4.5  
  
Chloride 99  
  
Carbon Dioxide 29.1  
  
Anion Gap 12.4  
  
BUN 17  
  
Creatinine 0.90  
  
Calcium 8.2 L  
  
  
  
Labs:  
  
  
  
  
24  
  
05:35  
  
BUN 17  
  
Creatinine 0.90  
  
  
  
Microbiology  
Microbiology:  
Microbiology - Results from entire visit  
  
24 19:45 Blood - Left Hand Blood Culture - Preliminary  
Presumptive Viridans Strep  
24 19:45 Blood - Left Hand Bacterial ID (NA Multiplex Assay) - Final  
24 19:55 Blood - Right Hand Blood Culture - Preliminary  
No Growth 2 Days  
24 10:28 Knee,Right - Other Aerobic Culture - Preliminary  
No Growth 1 Day  
24 10:28 Knee,Right - Other Anaerobic Culture - Preliminary  
No Anaerobes Isolated 1 Day  
24 10:28 Knee,Right - Other Gram Stain - Final  
24 10:29 Knee,Right - Other Aerobic Culture - Preliminary  
No Growth 1 Day  
24 10:29 Knee,Right - Other Anaerobic Culture - Preliminary  
No Anaerobes Isolated 1 Day  
24 10:29 Knee,Right - Other Gram Stain - Final  
24 10:30 Knee,Right - Other Aerobic Culture - Preliminary  
Rare normal skin zack  
1 Day  
24 10:30 Knee,Right - Other Anaerobic Culture - Preliminary  
No Anaerobes Isolated 1 Day  
24 10:30 Knee,Right - Other Gram Stain - Final  
24 23:00 Joint Fluid - Knee, Right Aerobic Culture - Preliminary  
St. mitis/oralis grp  
24 23:00 Joint Fluid - Knee, Right Anaerobic Culture - Preliminary  
No Anaerobes Isolated 1 Day  
24 23:00 Joint Fluid - Knee, Right Gram Stain - Final  
24 19:15 Joint Fluid - Knee, Right Aerobic Culture - Final  
No Growth 2 Days  
24 19:15 Joint Fluid - Knee, Right Anaerobic Culture - Preliminary  
No Anaerobes Isolated 2 Days  
24 19:15 Joint Fluid - Knee, Right Gram Stain - Final  
  
  
  
Allergies and Medications  
Allergies and Active Meds  
Allergies  
  
metformin Allergy (Unknown, Verified 24 08:53)  
back spasms  
Penicillins Allergy (Unknown, Verified 24 08:53)  
rash  
  
  
Active Medications  
  
Acetaminophen (Acetaminophen 325 Mg Tablet) 650 mg PO Q4H PRN  
PRN Reason: Pain Scale 1 - 5  
Stop: 25 17:55  
Last Admin: 24 08:21 Dose: 650 mg  
  
Acetaminophen (Acetaminophen 500 Mg Tablet) 1,000 mg PO Q8H [facility]  
Stop: 25 11:59  
Last Admin: 24 04:15 Dose: Not Given  
  
Ascorbic Acid (Ascorbic Acid 500 Mg Tablet) 500 mg PO BID.WITH.MEALS [facility]  
Stop: 25 16:59  
Last Admin: 24 07:58 Dose: 500 mg  
  
Docusate Sodium (Docusate 100 Mg Capsule) 200 mg PO BID PRN  
PRN Reason: Constipation  
Stop: 25 17:55  
Enoxaparin Sodium (Enoxaparin 40 Mg/0.4 Ml Syringe) 40 mg SUBCUT DAILY@1000 [facility]  
Stop: 25 09:59  
Last Admin: 24 11:05 Dose: 40 mg  
  
Ferrous Sulfate (Ferrous Sulfate 324 Mg Tablet.) 324 mg PO BID.WITH.MEALS [facility]  
Stop: 25 16:59  
Last Admin: 24 07:57 Dose: 324 mg  
  
Hydralazine HCl (Hydralazine 20 Mg/Ml Vial) 10 mg IV-PUSH Q4H PRN  
PRN Reason: if SBP > 185  
Stop: 25 17:55  
Hydromorphone HCl (Hydromorphone 1 Mg/Ml Syringe) 0.5 mg IV-PUSH Q4H PRN  
PRN Reason: pain  
Ceftriaxone Sodium (Rocephin) 2 gm in 50 mls @ 100 mls/hr IV Q24H [facility]  
Last Admin: 24 15:44 Dose: 100 mls/hr  
  
Mineral Oil (Mineral Oil (Orange) 1 Each Enema) 1 each [place] ONCE PRN  
PRN Reason: Constipation  
Naloxone HCl (Naloxone Hcl 0.4 Mg/Ml Vial) 0.4 mg IV-PUSH Q2M PRN  
PRN Reason: Opioid Reversal  
Stop: 25 11:32  
Oxycodone HCl (Oxycodone Ir 5 Mg Tablet) 5 mg PO Q4HR PRN  
PRN Reason: Pain  
Last Admin: 24 22:50 Dose: 5 mg  
  
Oxycodone HCl (Oxycodone Ir 5 Mg Tablet) 5 mg PO Q4HR PRN  
PRN Reason: Pain Scale 6 - 10  
Last Admin: 24 08:38 Dose: 5 mg  
  
Pantoprazole Sodium (Pantoprazole 40 Mg Tablet.) 40 mg PO DAILY [facility]  
Stop: 25 08:59  
Last Admin: 24 08:37 Dose: 40 mg  
  
Polyethylene Glycol (Polyethylene Glycol 3350 17 Gm Powd.Pack) 17 gm PO DAILY 
[facility]  
Stop: 24 08:59  
Last Admin: 24 08:37 Dose: 17 gm  
  
Prochlorperazine Edisylate (Prochlorperazine Edisylate 10 Mg/2 Ml Vial) 5 mg IV-
PUSH   
Q4H PRN  
PRN Reason: Nausea And Vomiting  
Stop: 25 17:55  
Senna/Docusate Sodium (Sennosides/Docusate 8.6-50mg 1 Tab Tablet) 2 tab PO DAILY
 [facility]  
Stop: 24 08:59  
Last Admin: 24 08:37 Dose: 2 tab  
  
Sodium Chloride (Sodium Chloride 0.9 % 10 Ml Syringe) 0 ml IV-PUSH QSHIFT [facility]  
Stop: 25 13:59  
Last Admin: 24 05:33 Dose: Not Given  
  
Sodium Chloride (Sodium Chloride 0.9 % 10 Ml Syringe) 0 ml IV-PUSH PRN PRN  
PRN Reason: Flush  
Stop: 25 13:43  
Last Admin: 24 23:45 Dose: 10 ml  
  
Tramadol HCl (Tramadol 50 Mg Tablet) 50 mg PO Q6H PRN  
PRN Reason: Pain Scale 1 - 5  
Stop: 25 11:32  
Last Admin: 24 01:16 Dose: 50 mg  
  
Valsartan (Valsartan 80 Mg Tablet) 80 mg PO DAILY [facility]  
Stop: 25 13:09  
Last Admin: 24 08:37 Dose: 80 mg  
  
  
  
  
A&P - Infectious Disease  
Assessment/Plan  
(1) Septic arthritis of knee, right:  
(2) Positive blood culture:  
(3) Streptococcus viridans infection:  
  
Plan  
Grace strep from blood culture which would likely be the strep mitis oralis 
that was   
cultured from the joint fluid. Ceftriaxone should suffice. Once dailyneeded. 
Patient   
lives closest to [facility] about 12 miles and expressesconcern about 
getting   
there once daily. His sister is currently in the room anddid offer for him to 
stay at   
her house which is closer to [facility]. Patient states 
he   
only has Medicare and cannot afford IV antibiotics at home. Ultimately will need
 to   
get IV ceftriaxone once daily. 24more days required  
  
  
Documented By: Paul Crum MD [id number]  
Signed By: <Electronically signed by MD Paul Crum>  
24  
   
  
[facility]  
Work Phone: [phone]- Progress note  
  
  
  
  
                                        Author              Josie Ann  
[facility]  
2024 1:08pm  
   
                                        Note Date/Time      2024 1:  
08pm  
   
                                                    [facility]  
ENTER  
[address] [place] [address]  
  
Hospitalist Progress Note  
Signed  
  
Patient: Kaylynn Mims MR#: [id number]  
[id number]  
: 1955 Acct:[id number]  
  
Age/Sex: 68 / M Adm Date:   
4  
Loc: 4N Room: 0O6684-4 Type: ADM IN  
Attending Dr: Josie Ann MD  
  
Copies to: ~  
  
  
Date of Service:  
2024  
  
  
Subjective  
Subjective  
Narrative:  
  
Patient underwent irrigation of the right knee on , cultures pending, 
so far   
negative  
Blood cultures strep viridans 1 out of 2  
  
Patient is doing better, the pain improved after the surgery  
  
  
Physical exam:  
General -awake, alert, oriented ?3, not in acute distress  
Cardiovascular -S1 with S2, no murmurs, no rubs, no gallops  
Pulmonary - clear to auscultation bilaterally  
Gastrointestinal - abdomen is soft, nondistended, nontender, bowel sounds 
positive,   
there is no rigidity, no rebound  
Extremities -no edema  
Neurological -no focal neurological dysfunction noted  
  
  
  
Exam  
Physical Exam  
Vital Signs:  
  
  
  
  
Temp Pulse Resp BP Pulse Ox O2 Del Method O2 Flow Rate  
  
36.6 C 75 18 173/78 H 97 Room Air 2  
  
24 12:05 24 12:05 24 12:05 24 12:05 24 12:05 
24   
12:05 24 12:00  
  
  
  
  
Objective  
Lab Results  
  
24 05:51  
  
24 05:51  
  
Microbiology Results  
Microbiology  
  
24 10:30 Knee,Right - Other Aerobic Culture - Preliminary  
Rare normal skin zack  
1 Day  
24 10:30 Knee,Right - Other Anaerobic Culture - Preliminary  
No Anaerobes Isolated 1 Day  
24 10:28 Knee,Right - Other Aerobic Culture - Preliminary  
No Growth 1 Day  
24 10:28 Knee,Right - Other Anaerobic Culture - Preliminary  
No Anaerobes Isolated 1 Day  
24 10:29 Knee,Right - Other Aerobic Culture - Preliminary  
No Growth 1 Day  
24 10:29 Knee,Right - Other Anaerobic Culture - Preliminary  
No Anaerobes Isolated 1 Day  
24 23:00 Joint Fluid - Knee, Right Aerobic Culture - Preliminary  
24 23:00 Joint Fluid - Knee, Right Anaerobic Culture - Preliminary  
No Anaerobes Isolated 1 Day  
24 23:00 Joint Fluid - Knee, Right Gram Stain - Final  
24 19:15 Joint Fluid - Knee, Right Aerobic Culture - Final  
No Growth 2 Days  
24 19:15 Joint Fluid - Knee, Right Anaerobic Culture - Preliminary  
No Anaerobes Isolated 2 Days  
24 19:15 Joint Fluid - Knee, Right Gram Stain - Final  
24 19:45 Blood - Left Hand Blood Culture - Preliminary  
Presumptive Viridans Strep  
24 19:45 Blood - Left Hand Bacterial ID (NA Multiplex Assay) - Final  
24 19:55 Blood - Right Hand Blood Culture - Preliminary  
No Growth 1 Day  
  
  
  
Meds  
Allergies and Active Meds  
Allergies  
  
metformin Allergy (Unknown, Verified 24 08:53)  
back spasms  
Penicillins Allergy (Unknown, Verified 24 08:53)  
rash  
  
  
Active Meds:  
Active Medications  
  
  
  
Generic Name Dose Route Start Last Admin  
  
Trade Name Freq PRN Reason Stop Dose Admin  
  
Acetaminophen 650 mg 24 17:56 24 08:21  
  
Acetaminophen 325 Mg Tablet PO 25 17:55 650 mg  
  
Q4H PRN Administration  
  
Pain Scale 1 - 5  
  
Acetaminophen 1,000 mg 24 12:00 24 12:15  
  
Acetaminophen 500 Mg Tablet PO 25 11:59 1,000 mg  
  
Q8H NIHARIKA Administration  
  
Ascorbic Acid 500 mg 24 17:00 24 08:20  
  
Ascorbic Acid 500 Mg Tablet PO 25 16:59 500 mg  
  
BID.WITH.MEALS NIHARIKA Administration  
  
Docusate Sodium 200 mg 24 17:56  
  
Docusate 100 Mg Capsule PO 25 17:55  
  
BID PRN  
  
Constipation  
  
Enoxaparin Sodium 40 mg 24 10:00 24 11:05  
  
Enoxaparin 40 Mg/0.4 Ml Syringe SUBCUT 25 09:59 40 mg  
  
DAILY@1000 NIHARIKA Administration  
  
Ferrous Sulfate 324 mg 24 17:00 24 08:20  
  
Ferrous Sulfate 324 Mg Tablet.Dr PO 25 16:59 324 mg  
  
BID.WITH.MEALS NIHARIKA Administration  
  
Hydralazine HCl 10 mg 24 17:56  
  
Hydralazine 20 Mg/Ml Vial IV-PUSH 25 17:55  
  
Q4H PRN  
  
if SBP > 185  
  
Hydromorphone HCl 0.25 mg 24 17:56  
  
Hydromorphone 1 Mg/Ml Syringe IV-PUSH  
  
Q4H PRN  
  
pain  
  
Vancomycin HCl 1.5 gm/ 530 mls @ 353.333 mls/hr 24 08:00 24 08:39  
  
Dextrose IV 25 07:59 353.33 mls/hr  
  
Q12H NIHARIKA Administration  
  
Cefepime HCl 2 gm in 50 mls @ 100 mls/hr 24 10:45 24 11:30  
  
Maxipime  mls/hr  
  
Q8H NIHARIKA Administration  
  
Mineral Oil 1 each 24 11:33  
  
Mineral Oil ([place]) 1 Each Enema [place]  
  
ONCE PRN  
  
Constipation  
  
Naloxone HCl 0.4 mg 24 11:33  
  
Naloxone Hcl 0.4 Mg/Ml Vial IV-PUSH 25 11:32  
  
Q2M PRN  
  
Opioid Reversal  
  
Oxycodone HCl 5 mg 24 17:56  
  
Oxycodone Ir 5 Mg Tablet PO  
  
Q4HR PRN  
  
Pain  
  
Oxycodone HCl 5 mg 24 11:33  
  
Oxycodone Ir 5 Mg Tablet PO  
  
Q4HR PRN  
  
Pain Scale 6 - 10  
  
Pantoprazole Sodium 40 mg 24 09:00 24 08:20  
  
Pantoprazole 40 Mg Tablet. PO 25 08:59 40 mg  
  
DAILY NIHARIKA Administration  
  
Polyethylene Glycol 17 gm 24 09:00 24 08:21  
  
Polyethylene Glycol 3350 17 Gm Powd.Pack PO 24 08:59 17 gm  
  
DAILY NIHARIKA Administration  
  
Prochlorperazine Edisylate 5 mg 24 17:56  
  
Prochlorperazine Edisylate 10 Mg/2 Ml Vial IV-PUSH 25 17:55  
  
Q4H PRN  
  
Nausea And Vomiting  
  
Senna/Docusate Sodium 2 tab 24 09:00 24 08:20  
  
Sennosides/Docusate 8.6-50mg 1 Tab Tablet PO 24 08:59 2 tab  
  
DAILY NIHARIKA Administration  
  
Sodium Chloride 0 ml 24 14:00 24 05:28  
  
Sodium Chloride 0.9 % 10 Ml Syringe IV-PUSH 25 13:59 Not Given  
  
QSHIFT [facility]  
  
Tramadol HCl 50 mg 24 11:33 24 11:06  
  
Tramadol 50 Mg Tablet PO 25 11:32 50 mg  
  
Q6H PRN Administration  
  
Pain Scale 1 - 5  
  
Vancomycin HCl 1 each 24 18:36  
  
Vancomycin - Pharmacy Dosing 1 Each Miscell IV  
  
ONCE PRN  
  
ZZ.Pharmacy Consult  
  
Protocol  
  
  
  
  
A&P - Hospitalist  
Assessment/Plan  
(1) Septic arthritis of knee, right:  
  
Plan  
1. Suspected right knee septic arthritis, status post irrigation on   
Continue with empiric ntibiotic therapy, ID consulted  
Synovial fluid cultures so far negative  
Blood cultures strep viridans 1 out of 2, repeated cultures pending  
Appreciate orthopedic input  
Continue with pain management  
  
2. DVT prophylaxis SCDs for now  
  
  
Documented By: Josie Ann MD 24 [id number]  
Signed By: <Electronically signed by Josie Ann MD>  
24 [id number]  
   
  
[facility]  
Work Phone: [phone]- Consult note  
  
  
  
  
                                        Author              Paul Crum  
[facility]  
2024 9:03am  
   
                                        Note Date/Time      2024 9:  
09am  
   
                                                    [facility]  
ENTER  
[address] [place] [address]  
  
Infect. Disease Consult Note  
Signed  
  
Patient: Kaylynn Mims MR#: [id number]  
[id number]  
: 1955 Acct:[id number]  
  
Age/Sex: 68 / M Adm Date:   
4  
Loc: 4N Room: 8P2796-2 Type: ADM IN  
Attending Dr: Josie Ann MD  
  
Copies to: DO Paul Baig MD Ruta Semaskiene, MD~  
  
  
HPI  
Data of Consult  
Consult date:  
24  
  
Requesting Physician:  
Josie Ann MD  
  
Primary Care Provider:  
Elvira Harper DO  
  
Consult Narrative  
History of present illness:  
Mr. Mims is a 68 year old male who is status post right knee irrigation and 
lavage   
after concern for septic arthritis became apparent. He has bilateral 
osteoarthritis.   
He recently had an injection of steroids in his right knee and shortly after 
that the   
knee became exquisitely worse with pain and swelling. Blood cultures were drawn 
and   
are positive for Streptococcus viridans. Aspiration of the joint fluid confirmed
   
infection. Intraoperative cultures pending.  
  
CC: Josie Ann MD  
  
  
[facility]  
Surgical History (Reviewed 24 @ 14:38 by GARRET Chong)  
  
History of facial surgery  
jaw  
  
  
Family History (Reviewed 24 @ 14:38 by GARRET Chong)  
Brother Heart disease  
Legacy FamHx Relation: Brother(s)  
Family history of mental disorder  
Legacy FamHx Relation: Brother(s); Legacy FamHx Problem: Diagnosed with Mental   
Illness  
Father   
Family/Other   
Legacy FamHx Problem: father, --cirrhosis of the liver:mother,   
--multiple health issues:1 sister, --pancreatic cancer  
Mother   
Heart disease  
Sister Cancer  
Legacy FamHx Problem: Diagnosed with Cancer  
Heart disease  
  
  
Social History  
Smoking Status: Never smoker  
Substance Use Type: None  
  
Allergies and Medications  
Allergies and Active Meds  
Allergies  
  
metformin Allergy (Unknown, Verified 24 08:53)  
back spasms  
Penicillins Allergy (Unknown, Verified 24 08:53)  
rash  
  
  
Active Medications  
  
Acetaminophen (Acetaminophen 325 Mg Tablet) 650 mg PO Q4H PRN  
PRN Reason: Pain Scale 1 - 5  
Stop: 25 17:55  
Last Admin: 24 08:21 Dose: 650 mg  
  
Acetaminophen (Acetaminophen 500 Mg Tablet) 1,000 mg PO Q8H [facility]  
Stop: 25 11:59  
Last Admin: 24 03:10 Dose: Not Given  
  
Ascorbic Acid (Ascorbic Acid 500 Mg Tablet) 500 mg PO BID.WITH.MEALS [facility]  
Stop: 25 16:59  
Last Admin: 24 08:20 Dose: 500 mg  
  
Docusate Sodium (Docusate 100 Mg Capsule) 200 mg PO BID PRN  
PRN Reason: Constipation  
Stop: 25 17:55  
Enoxaparin Sodium (Enoxaparin 40 Mg/0.4 Ml Syringe) 40 mg SUBCUT DAILY@1000 [facility]  
Stop: 25 09:59  
Ferrous Sulfate (Ferrous Sulfate 324 Mg Tablet.Dr) 324 mg PO BID.WITH.MEALS [facility]  
Stop: 25 16:59  
Last Admin: 24 08:20 Dose: 324 mg  
  
Hydralazine HCl (Hydralazine 20 Mg/Ml Vial) 10 mg IV-PUSH Q4H PRN  
PRN Reason: if SBP > 185  
Stop: 25 17:55  
Hydromorphone HCl (Hydromorphone 1 Mg/Ml Syringe) 0.25 mg IV-PUSH Q4H PRN  
PRN Reason: pain  
Vancomycin HCl 1.5 gm/ (Dextrose) 530 mls @ 353.333 mls/hr IV Q12H [facility]  
Stop: 25 07:59  
Last Admin: 24 08:39 Dose: 353.33 mls/hr  
  
Cefepime HCl (Maxipime) 1 gm in 50 mls @ 12.5 mls/hr IV Q8H [facility]  
Last Infusion: 24 03:45 Dose: Infused  
  
Mineral Oil (Mineral Oil (Orange) 1 Each Enema) 1 each KY ONCE PRN  
PRN Reason: Constipation  
Naloxone HCl (Naloxone Hcl 0.4 Mg/Ml Vial) 0.4 mg IV-PUSH Q2M PRN  
PRN Reason: Opioid Reversal  
Stop: 25 11:32  
Oxycodone HCl (Oxycodone Ir 5 Mg Tablet) 5 mg PO Q4HR PRN  
PRN Reason: Pain  
Oxycodone HCl (Oxycodone Ir 5 Mg Tablet) 5 mg PO Q4HR PRN  
PRN Reason: Pain Scale 6 - 10  
Pantoprazole Sodium (Pantoprazole 40 Mg Tablet.Dr) 40 mg PO DAILY [facility]  
Stop: 25 08:59  
Last Admin: 24 08:20 Dose: 40 mg  
  
Polyethylene Glycol (Polyethylene Glycol 3350 17 Gm Powd.Pack) 17 gm PO DAILY 
[facility]  
Stop: 24 08:59  
Last Admin: 24 08:21 Dose: 17 gm  
  
Prochlorperazine Edisylate (Prochlorperazine Edisylate 10 Mg/2 Ml Vial) 5 mg IV-
PUSH   
Q4H PRN  
PRN Reason: Nausea And Vomiting  
Stop: 25 17:55  
Senna/Docusate Sodium (Sennosides/Docusate 8.6-50mg 1 Tab Tablet) 2 tab PO DAILY
 [facility]  
Stop: 24 08:59  
Last Admin: 24 08:20 Dose: 2 tab  
  
Sodium Chloride (Sodium Chloride 0.9 % 10 Ml Syringe) 0 ml IV-PUSH QSHIFT [facility]  
Stop: 25 13:59  
Last Admin: 24 05:28 Dose: Not Given  
  
Tramadol HCl (Tramadol 50 Mg Tablet) 50 mg PO Q6H PRN  
PRN Reason: Pain Scale 1 - 5  
Stop: 25 11:32  
Last Admin: 24 01:29 Dose: 50 mg  
  
Vancomycin HCl (Vancomycin - Pharmacy Dosing 1 Each Miscell) 1 each IV ONCE PRN;
   
Protocol  
PRN Reason: ZZ.Pharmacy Consult  
  
  
  
Exam  
Physical Exam  
Vital Signs:  
  
  
Vital Signs  
  
  
  
Temp Pulse Pulse Resp BP BP Pulse Ox  
  
24 07:46 97.9 F 72 16 169/84 H 97  
  
24 04:00 97.9 F 70 16 133/68 95  
  
24 23:59 97.9 F 74 18 177/83 H 98  
  
24 20:40  
  
24 19:06 98.6 F 72 17 182/61 H 95  
  
24 17:44  
  
24 17:42 66 17 153/69 H 97  
  
24 16:21 66 17 143/73 H 96  
  
24 16:00  
  
24 15:51 72 17 142/71 H 96  
  
24 15:21 68 17 157/70 H 96  
  
24 14:51 70 17 163/73 H 94 L  
  
24 14:21 73 17 162/80 H 95  
  
24 14:06 68 17 159/85 H 96  
  
24 13:51 69 17 143/72 H 96  
  
24 13:36 68 17 141/73 H 97  
  
24 13:21 67 17 152/78 H 97  
  
24 12:30  
  
24 12:06 70 16 142/72 H 95  
  
24 11:45 97.4 F L 69 16 135/70 95  
  
24 11:35 73 14 147/72 H 98  
  
24 11:30 69 16 145/73 H 98  
  
24 11:25 69 16 142/77 H 98  
  
24 11:20 98.4 F 69 14 141/75 H 95  
  
24 09:07 98.1 F 73 18 144/85 H 96  
  
  
  
  
O2 Del Method O2 Flow Rate  
  
24 07:46 Room Air  
  
24 04:00 Room Air  
  
24 23:59 Room Air  
  
24 20:40 Room Air  
  
24 19:06  
  
24 17:44 Room Air  
  
24 17:42 Room Air  
  
24 16:21 Room Air  
  
24 16:00 Room Air  
  
24 15:51 Room Air  
  
24 15:21 Nasal Cannula 2  
  
24 14:51 Nasal Cannula 2  
  
24 14:21 Nasal Cannula 2  
  
24 14:06 Nasal Cannula 2  
  
24 13:51 Nasal Cannula 3  
  
24 13:36 Nasal Cannula 3  
  
24 13:21 Nasal Cannula 3  
  
24 12:30 Nasal Cannula 3  
  
24 12:06  
  
24 11:45  
  
24 11:35  
  
24 11:30  
  
24 11:25  
  
24 11:20 Simple Mask 8  
  
24 09:07 Room Air  
  
  
Intake and Output  
  
  
  
24  
  
23:59 07:59 15:59  
  
Intake Total 1130 / 1810 750 / 750  
  
Output Total 1800 / 1800  
  
Balance 1130 / 1650 -1050 / -1050  
  
Intake:  
  
 / 1260 50 / 50  
  
Cefepime 1Gm-*Ns* 1 gm In 50 ml 50 / 100 50 / 50  
  
@ 12.5 mls/hr IV Q8H NIHARIKA Rx#:  
  
[id number]  
  
Vancomycin 1.5 gm In Dextrose 5 530 / 1060  
  
% in Water 500 ml @ 353.333  
  
mls/hr IV Q12H NIHARIKA Rx#:[id number]  
  
Oral 550 / 550 700 / 700  
  
Output:  
  
Urine 1800 / 1800  
  
Other:  
  
# Unmeasured Voids 2  
  
Weight 346 lb 12.594 oz  
  
Date of Last Bowel Movement 24  
  
  
Patient Weight  
  
  
  
24  
  
23:59  
  
Weight 346 lb 12.594 oz  
  
  
  
Const  
General: cooperative and no acute distress  
Orientation: oriented x3  
HEENT  
Head: normal to inspection  
Ears: hearing grossly normal bilaterally  
Mouth: oral mucosae normal  
Eyes  
General: appearance normal, both eyes and all related structures  
Neck  
Neck: normal visual inspection  
Chest  
Chest palpation & inspection: normal inspection of the chest  
Resp  
Effort & Inspection: normal respiratory effort  
Cardio  
Rate: regular rate  
Rhythm: regular rhythm  
GI  
Inspection: normal to inspection  
Palpation: soft and nontender  
Auscultation: normal bowel sounds  
Skin  
General: no rashes or lesions noted  
Neuro  
General: patient oriented x3  
Extrem  
General: abnormal to inspection (R knee wrapped)  
  
Results - Infectious Disease  
Labs  
  
24 05:51  
  
24 05:51  
  
Labs:  
  
Laboratory Results - last 24 hr  
  
  
  
24  
  
05:51  
  
Corrected WBC 9.1  
  
Uncorrected WBC Count 9.1  
  
RBC 4.95  
  
Hgb 14.7  
  
Hct 43.3  
  
MCV 87.6  
  
MCH 29.7  
  
MCHC 33.9  
  
RDW 13.3  
  
Plt Count 180  
  
MPV 7.6  
  
Neut % (Auto) 80.3  
  
Lymph % (Auto) 9.3  
  
Mono % (Auto) 9.7  
  
Eos % (Auto) 0.1  
  
Baso % (Auto) 0.6  
  
Nucleat RBC Rel Count 0.2  
  
Neut # (Auto) 7.3  
  
Lymph # (Auto) 0.8 L  
  
Mono # (Auto) 0.9 H  
  
Eos # (Auto) 0.0  
  
Baso # (Auto) 0.1  
  
PHA Creatinine Clear 133.88  
  
Sodium 136  
  
Potassium 4.3  
  
Chloride 103  
  
Carbon Dioxide 25.9  
  
Anion Gap 11.4  
  
BUN 20  
  
Creatinine 0.85  
  
Est GFR (CKD-EPI) > 60.0  
  
Glucose 146 H  
  
Calcium 8.2 L  
  
  
  
  
Laboratory Tests  
  
  
  
24  
  
23:00  
  
Synovial RBC 6370 H  
  
Synovial Tot Nuc Cell 42108 H  
  
Synovial Neutrophils 92 H  
  
Synovial Crystals None seen  
  
  
  
Microbiology Results  
Microbiology Narrative:  
  
  
  
  
  
24 19:45 Blood Culture - Preliminary  
  
Blood - Left Hand Presumptive Viridans Strep  
  
Bacterial ID (NA Multiplex Assay) - Final  
  
24 19:55 Blood Culture - Preliminary  
  
Blood - Right Hand No Growth 1 Day  
  
24 10:30 Aerobic Culture - Pending  
  
Knee,Right - Other Anaerobic Culture - Pending  
  
Gram Stain - Pending  
  
24 10:29 Aerobic Culture - Pending  
  
Knee,Right - Other Anaerobic Culture - Pending  
  
Gram Stain - Pending  
  
24 10:28 Aerobic Culture - Pending  
  
Knee,Right - Other Anaerobic Culture - Pending  
  
Gram Stain - Pending  
  
24 19:15 Aerobic Culture - Preliminary  
  
Joint Fluid - Knee, Right No Growth 1 Day  
  
Anaerobic Culture - Preliminary  
  
No Anaerobes Isolated 1 Day  
  
Gram Stain -  
  
Gram Stain Final   
3+ White Blood Cells  
No Bacteria Seen Final  
  
24 23:00 Aerobic Culture - Pending  
  
Joint Fluid - Knee, Right Anaerobic Culture - Pending  
  
Gram Stain -  
  
Gram Stain Final 24-  
Rare White Blood Cells  
4+ Red Blood Cells  
No Bacteria Seen Final  
  
  
  
  
A&P - Infectious Disease  
(1) Septic arthritis of knee, right:  
(2) Positive blood culture:  
(3) Streptococcus viridans infection:  
  
Plan  
Patient's blood culture 1 of 2 sets is positive for presumptive viridans strep. 
I   
presume this organism is likely what is infecting the knee however given the   
injection so I will cover for potential healthcare associated pathogens. 
Therefore   
vancomycin and cefepime to be maintained at this time. Patient likelywill 
require   
PICC line. Gram stain did not show any bacteria but did have whitecells. Cell 
count   
consistent with septic arthritis. Follow-up on intraoperative cultures.  
  
  
Documented By: Paul Crum MD 24 0855  
Signed By: <Electronically signed by MD Paul Crum>  
24 0903  
   
  
[facility]  
Work Phone: [phone]- Progress note  
  
  
  
  
                                        Author              Jeff Milliganisle  
[facility]  
2024 8:06am  
   
                                        Note Date/Time      2024 8:  
06am  
   
                                                    [facility]  
ENTER  
[address] [place] [address]  
  
Orthopedic Progress Note  
Signed  
  
Patient: Kaylynn Mims MR#: [id number]  
[id number]  
: 1955 Acct:[id number]  
  
Age/Sex: 68 / M Adm Date:   
4  
Loc: 4N Room: [address] [place] [address] Type: ADM IN  
Attending Dr: Josie Ann MD  
  
Copies to: ~  
  
  
Date of Service:  
2024  
  
  
Subjective  
Subjective  
Interval History:  
Patient resting comfortably in bed this morning. He reports that he is sore in 
the   
knee but it feels better than what it did prior to surgery.  
Nursing reports no acute events overnight  
  
Exam  
Physical Exam  
Vital Signs:  
  
  
  
  
Temp Pulse Resp BP Pulse Ox O2 Del Method O2 Flow Rate  
  
97.9 F 72 16 169/84 H 97 Room Air 2  
  
24 07:46 24 07:46 24 07:46 24 07:46 24 07:46 
24   
07:46 24 15:21  
  
  
  
Narrative:  
Right knee Ace wrap and dressing is clean dry and intact. Passively I can move 
his   
knee in a short arc of motion without any discomfort. Neurovascular intact 
distally  
  
Objective  
Labs  
Labs:  
Laboratory Results - last 24 hr  
  
  
  
24  
  
05:51  
  
Corrected WBC 9.1  
  
Uncorrected WBC Count 9.1  
  
RBC 4.95  
  
Hgb 14.7  
  
Hct 43.3  
  
MCV 87.6  
  
MCH 29.7  
  
MCHC 33.9  
  
RDW 13.3  
  
Plt Count 180  
  
MPV 7.6  
  
Neut % (Auto) 80.3  
  
Lymph % (Auto) 9.3  
  
Mono % (Auto) 9.7  
  
Eos % (Auto) 0.1  
  
Baso % (Auto) 0.6  
  
Nucleat RBC Rel Count 0.2  
  
Neut # (Auto) 7.3  
  
Lymph # (Auto) 0.8 L  
  
Mono # (Auto) 0.9 H  
  
Eos # (Auto) 0.0  
  
Baso # (Auto) 0.1  
  
PHA Creatinine Clear 133.88  
  
Sodium 136  
  
Potassium 4.3  
  
Chloride 103  
  
Carbon Dioxide 25.9  
  
Anion Gap 11.4  
  
BUN 20  
  
Creatinine 0.85  
  
Est GFR (CKD-EPI) > 60.0  
  
Glucose 146 H  
  
Calcium 8.2 L  
  
  
  
Micro  
Microbiology Results:  
Microbiology  
  
24 19:45 Blood - Left Hand Blood Culture - Preliminary  
Presumptive Viridans Strep  
24 19:45 Blood - Left Hand Bacterial ID (NA Multiplex Assay) - Final  
24 19:55 Blood - Right Hand Blood Culture - Preliminary  
No Growth 1 Day  
24 19:15 Joint Fluid - Knee, Right Aerobic Culture - Preliminary  
No Growth 1 Day  
24 19:15 Joint Fluid - Knee, Right Anaerobic Culture - Preliminary  
No Anaerobes Isolated 1 Day  
24 19:15 Joint Fluid - Knee, Right Gram Stain - Final  
  
  
  
Assessment / Plan  
Assessment and plan  
(1) Septic arthritis of knee, right:  
Code(s):  
M00.9 - Pyogenic arthritis, unspecified  
  
Plan  
POD 1 sp R knee washout for septic arthritis  
1. Pain control  
2. DVT prophylaxis: SCDs bilaterally and I will defer to the hospitalist team 
onany   
chemical prophylaxis  
3. Infectious disease consulted. Antibiotics per ID. Intraoperative cultures 
pending.   
Blood culture from  is growing strep viridans  
4. PT/OT: WBAT and range of motion as tolerated right lower extremity  
5. Keep dressing in place until 2-week postop follow-up in the office. Upon   
discharge, needs set up with outpatient physical therapy to continue working on 
range   
of motion.  
6. Orthopedic surgery will sign off. Please call with any questions or concerns  
  
  
Documented By: Jeff Onofre MD 24 08  
03  
Signed By: <Electronically signed by Jeff Onofre MD>  
24 0806  
   
  
[facility]  
Work Phone: [phone]- Progress note  
  
  
  
  
                                        Author              Josie Ann  
[facility]  
2024 7:23pm  
   
                                        Note Date/Time      2024 11  
:42am  
   
                                                    [facility]  
ENTER  
[address] [place] [address]  
  
Hospitalist Progress Note  
Signed with Addenda  
  
Patient: Kaylynn Mims MR#: [id number]  
[id number]  
: 1955 Acct:[id number]  
  
Age/Sex: 68 / M Adm Date:   
4  
Loc:  Room: 9W1976-3 Type: ADM IN  
Attending Dr: Josie Ann MD  
  
Copies to: ~  
  
  
  
**ADDENDUM**1  
Patient has been seen and examined today. Patient is doing better. After the 
surgery   
his pain is improved. He was able to ambulate with quite reasonably good pain   
control.  
Otherwise he denies any complaints. No chest pain no shortness of breath no   
palpitations no dizziness  
  
Addendum Documented By: Josie Ann MD 24  
Addendum Signed By: <Electronically signed by Josie Ann MD> 24  
  
  
  
Date of Service:  
2024  
  
  
Subjective  
Subjective  
Narrative:  
  
Patient underwent irrigation of the right knee, cultures pending, so far 
negative  
  
  
Physical exam:  
General -awake, alert, oriented ?3, not in acute distress  
Cardiovascular -S1 with S2, no murmurs, no rubs, no gallops  
Pulmonary - clear to auscultation bilaterally  
Gastrointestinal - abdomen is soft, nondistended, nontender, bowel sounds 
positive,   
there is no rigidity, no rebound  
Extremities -no edema  
Neurological -no focal neurological dysfunction noted  
  
  
  
Exam  
Physical Exam  
Vital Signs:  
  
  
  
  
Temp Pulse Resp BP Pulse Ox O2 Del Method  
  
36.7 C 73 18 144/85 H 96 Room Air  
  
24 09:07 24 09:07 24 09:07 24 09:07 24 09:07 
24   
09:07  
  
  
  
  
Objective  
Lab Results  
  
24 06:26  
  
24 06:26  
  
Microbiology Results  
Microbiology  
  
24 19:15 Joint Fluid - Knee, Right Aerobic Culture - Preliminary  
No Growth 1 Day  
24 19:15 Joint Fluid - Knee, Right Anaerobic Culture - Preliminary  
No Anaerobes Isolated 1 Day  
24 19:15 Joint Fluid - Knee, Right Gram Stain - Final  
24 23:00 Joint Fluid - Knee, Right Gram Stain - Final  
  
  
  
Meds  
Allergies and Active Meds  
Allergies  
  
metformin Allergy (Unknown, Verified 24 08:53)  
back spasms  
Penicillins Allergy (Unknown, Verified 24 08:53)  
rash  
  
  
Active Meds:  
Active Medications  
  
  
  
Generic Name Dose Route Start Last Admin  
  
Trade Name Freq PRN Reason Stop Dose Admin  
  
Acetaminophen 650 mg 24 17:56  
  
Acetaminophen 325 Mg Tablet PO 25 17:55  
  
Q4H PRN  
  
Pain Scale 1 - 5  
  
Acetaminophen 1,000 mg 24 11:45  
  
Acetaminophen 500 Mg Tablet PO 25 11:44  
  
Q8H [facility]A  
  
Ascorbic Acid 500 mg 24 17:00  
  
Ascorbic Acid 500 Mg Tablet PO 25 16:59  
  
BID.WITH.MEALS [facility]  
  
Docusate Sodium 200 mg 24 17:56  
  
Docusate 100 Mg Capsule PO 25 17:55  
  
BID PRN  
  
Constipation  
  
Droperidol 1.25 mg 24 09:41  
  
Droperidol 5 Mg/2 Ml Vial IV-PUSH 24 12:42  
  
ONCE PRN  
  
Nausea And Vomiting  
  
Ferrous Sulfate 324 mg 24 17:00  
  
Ferrous Sulfate 324 Mg Tablet.Dr PO 25 16:59  
  
BID.WITH.MEALS [facility]  
  
Hydralazine HCl 10 mg 24 17:56  
  
Hydralazine 20 Mg/Ml Vial IV-PUSH 25 17:55  
  
Q4H PRN  
  
if SBP > 185  
  
Hydromorphone HCl 0.25 mg 24 17:56  
  
Hydromorphone 1 Mg/Ml Syringe IV-PUSH  
  
Q4H PRN  
  
pain  
  
Hydromorphone HCl 0.5 mg 24 09:41  
  
Hydromorphone 0.5 Mg/0.5 Ml Syringe IV-PUSH 24 12:42  
  
Q5M PRN  
  
Pain  
  
Vancomycin HCl 1.5 gm/ 530 mls @ 353.333 mls/hr 24 08:00 24 08:09  
  
Dextrose IV 25 07:59 353.33 mls/hr  
  
Q12H [facility]A Administration  
  
Cefepime HCl 1 gm in 50 mls @ 12.5 mls/hr 24 06:30 24 08:09  
  
Maxipime IV 12.5 mls/hr  
  
Q8H [facility]ARIKA Administration  
  
Lactated Ringer's 1,000 mls @ 20 mls/hr 24 08:03 24 09:17  
  
Lactated Ringers IV 24 08:02 20 mls/hr  
  
.Q24H ONE Administration  
  
Ketorolac Tromethamine 15 mg 24 22:00 24 05:30  
  
Ketorolac Tromethamine 15 Mg/Ml Vial IV-PUSH 24 14:01 15 mg  
  
Q8HR NIHARIKA Administration  
  
Ketorolac Tromethamine 15 mg 24 11:33  
  
Ketorolac Tromethamine 15 Mg/Ml Vial IV-PUSH 24 11:34  
  
ONCE ONE  
  
Labetalol HCl 10 mg 24 09:41  
  
Labetalol 100 Mg/20 Ml Vial IV-PUSH 24 12:42  
  
Q10M PRN  
  
Hypertension  
  
Mineral Oil 1 each 24 11:33  
  
Mineral Oil (Orange) 1 Each Enema KY  
  
ONCE PRN  
  
Constipation  
  
Naloxone HCl 0.4 mg 24 11:33  
  
Naloxone Hcl 0.4 Mg/Ml Vial IV-PUSH 25 11:32  
  
Q2M PRN  
  
Opioid Reversal  
  
Oxycodone HCl 5 mg 24 17:56  
  
Oxycodone Ir 5 Mg Tablet PO  
  
Q4HR PRN  
  
Pain  
  
Oxycodone HCl 5 mg 24 11:33  
  
Oxycodone Ir 5 Mg Tablet PO  
  
Q4HR PRN  
  
Pain Scale 6 - 10  
  
Pantoprazole Sodium 40 mg 24 09:00  
  
Pantoprazole 40 Mg Tablet. PO 25 08:59  
  
DAILY [facility]A  
  
Polyethylene Glycol 17 gm 24 09:00  
  
Polyethylene Glycol 3350 17 Gm Powd.Pack PO 24 08:59  
  
DAILY [facility]  
  
Prochlorperazine Edisylate 5 mg 24 17:56  
  
Prochlorperazine Edisylate 10 Mg/2 Ml Vial IV-PUSH 25 17:55  
  
Q4H PRN  
  
Nausea And Vomiting  
  
Senna/Docusate Sodium 2 tab 24 09:00  
  
Sennosides/Docusate 8.6-50mg 1 Tab Tablet PO 24 08:59  
  
DAILY [facility]A  
  
Sodium Chloride 0 ml 24 14:00  
  
Sodium Chloride 0.9 % 10 Ml Syringe IV-PUSH 25 13:59  
  
QSHIFT NIHARIKA  
  
Tramadol HCl 50 mg 24 11:33  
  
Tramadol 50 Mg Tablet PO 25 11:32  
  
Q6H PRN  
  
Pain Scale 1 - 5  
  
Vancomycin HCl 1 each 24 18:36  
  
Vancomycin - Pharmacy Dosing 1 Each Miscell IV  
  
ONCE PRN  
  
ZZ.Pharmacy Consult  
  
Protocol  
  
  
  
  
A&P - Hospitalist  
Assessment/Plan  
(1) Septic arthritis of knee, right:  
  
Plan  
1. Suspected right knee septic arthritis, status post irrigation on   
Continue with empiric ntibiotic therapy, ID consulted  
Appreciate orthopedic input  
Continue with pain management  
  
2. DVT prophylaxis SCDs for now  
  
  
Documented By: Josie Ann MD 24 1141  
Signed By: <Electronically signed by Josie Ann MD>  
24 1142  
   
  
[facility]  
Work Phone: [phone]- History and physical note  
  
  
  
  
                                        Author              Josie Ann  
[facility]  
2024 11:41am  
   
                                        Note Date/Time      2024 6:  
33pm  
   
                                                    [facility]  
ENTER  
[address] [place] [address]  
  
Hospitalist H&P  
Signed  
  
Patient: Kaylynn Mims MR#: [id number]  
[id number]  
: 1955 Acct:[id number]  
  
Age/Sex: 68 / M Adm Date:   
4  
Loc:  Room: 8T8054-1 Type: ADM IN  
Attending Dr: Josie Ann MD  
  
Copies to: Elvira Harper, DO Josie Ann MD~  
  
  
HPI  
DATE OF EXAMINATION: 24  
CHIEF COMPLAINT: Right knee pain  
HISTORY OF PRESENT ILLNESS:  
68 years old male presented with complaints of right knee pain. Patient does 
have   
history of osteoarthritis and got injection into his left knee previously, on   
Wednesday, which is 5 days ago patient got injection into his left and right 
knee   
with he thinks with steroids by Dr. Mora. That day patient was doing quite 
well. He   
was walking and done some shopping. However the next day the pain started to 
come, on   
Friday he was not able to ambulate normally. The pain was quite severe. He went 
to   
[facility] emergency room and was prescribed some pain medications. And was sent 
home.   
The pain continued so he came to emergency room. He denied any fevers any 
chills. He   
denied any injury or any trauma. He denied any recent infections. X-ray in 
emergency   
room was done which showed large effusions with degenerative changes, effusions 
are   
worse compared to 2024.  
  
10 systems are reviewed and are negative apart as mentioned H&P  
  
General -patient is awake alert oriented ?3, does not appear to be in distress  
HEENT -normal oropharyngeal mucosa without any ulcers or exudates  
Cardiovascular -S1 plus S2, with regular rate, without any murmurs, gallops, 
rubs  
Pulmonary -clear to auscultation bilaterally  
Gastrointestinal -abdomen is soft, nondistended, nontender, bowel sounds 
positive, no   
rigidity, no rebound  
Genitourinary -no flank tenderness  
Musculoskeletal -no back tenderness,  
Right knee is quite swollen and warmer than the left, but there is no redness, 
no   
skin lesions, no signs of trauma  
Neurological -no facial asymmetry noted, pupils are equal and symmetrical,   
extraocular muscle intact, no nystagmus, motor function 5 out of 5 in upper and 
lower   
extremities, sensation 5 out of 5 in upper and lower extremities, finger to nose
 test   
performed well without any dysmetria, reflexes symmetrical bilaterally in lower   
extremities, speech is clear, no tremors noted, gait deferred  
Skin -no significant ulcers, no rash noted  
Extremities - no edema in bilateral lower extremities noted  
Psychiatry - appropriate affect  
  
  
  
[facility]  
Surgical History (Reviewed 24 @ 14:38 by GARRET Chong)  
  
History of facial surgery  
jaw  
  
  
Family History (Reviewed 24 @ 14:38 by GARRET Chong)  
Brother Heart disease  
Legacy FamHx Relation: Brother(s)  
Family history of mental disorder  
Legacy FamHx Relation: Brother(s); Legacy FamHx Problem: Diagnosed with Mental   
Illness  
Father   
Family/Other   
Legacy FamHx Problem: father, --cirrhosis of the liver:mother,   
--multiple health issues:1 sister, --pancreatic cancer  
Mother   
Heart disease  
Sister Cancer  
Legacy FamHx Problem: Diagnosed with Cancer  
Heart disease  
  
  
Social History  
Smoking Status: Never smoker  
Substance Use Type: None  
  
Meds  
Medications and Allergies  
Allergies  
  
metformin Allergy (Unknown, Verified 24 08:53)  
back spasms  
Penicillins Allergy (Unknown, Verified 24 08:53)  
rash  
  
  
Home Medications  
  
blood sugar diagnostic #50 ea 05/15/24 [Rx Confirmed 24]  
testosterone cypionate 200 mg/mL intramuscular oil 200 mg IM Q2W 24 days #2 mL   
24 [Rx Confirmed 24]  
diclofenac sodium 75 mg tablet,delayed release 75 mg PO BID 30 days #60 tabs 
24   
[Rx Confirmed 24]  
nabumetone 750 mg tablet 750 mg PO BID PRN pain 24 [History Confirmed 
24]  
  
  
  
Exam  
Physical Exam  
Vital Signs:  
  
  
  
  
Temp Pulse Resp BP Pulse Ox O2 Del Method  
  
36.8 C 75 18 148/67 H 95 Room Air  
  
24 17:08 24 17:08 24 17:08 24 17:08 24 17:08 
24   
17:08  
  
  
  
  
Results - Hospitalist H&P  
Lab Results  
Labs:  
Laboratory Last Values  
  
  
  
Corrected WBC 9.4 X10E3/uL (4.1-10.5) 24 16:43  
  
Uncorrected WBC Count 9.4 x10E3/uL (4.1-10.5) 24 16:43  
  
RBC 5.50 X10E6/uL (3.90-5.60) 24 16:43  
  
Hgb 16.3 g/dL (13.0-17.0) 24 16:43  
  
Hct 47.8 % (38.8-50.0) 24 16:43  
  
MCV 86.9 fl (83.5-101) 24 16:43  
  
MCH 29.6 pg (27.5-35.2) 24 16:43  
  
MCHC 34.1 g/dL (32.5-35.6) 24 16:43  
  
RDW 13.3 % (12.0-14.8) 24 16:43  
  
Plt Count 170 x10E3/uL (150-450) 24 16:43  
  
MPV 8.0 fl (6.6-10.1) 24 16:43  
  
Neut % (Auto) 88.3 % (.) 24 16:43  
  
Lymph % (Auto) 6.0 % (.) 24 16:43  
  
Mono % (Auto) 5.1 % (.) 24 16:43  
  
Eos % (Auto) 0.1 % (.) 24 16:43  
  
Baso % (Auto) 0.5 % (.) 24 16:43  
  
Nucleat RBC Rel Count 0.8 /100 WBC (0-0.5) H 24 16:43  
  
Neut # (Auto) 8.4 x10E3/uL (1.8-7.7) H 24 16:43  
  
Lymph # (Auto) 0.6 x10E3/uL (1.00-4.8) L 24 16:43  
  
Mono # (Auto) 0.5 x10E3/uL (0.0-0.8) 24 16:43  
  
Eos # (Auto) 0.0 x10E3/uL (0.0-0.45) 24 16:43  
  
Baso # (Auto) 0.0 x10E3/uL (0.0-0.2) 24 16:43  
  
Monocyte Dist Width 22.07 % (0.00-20.00) H 24 16:43  
  
ESR 32 mm/hr (0-19) H 24 16:43  
  
PHA Creatinine Clear 143.91 24 16:43  
  
Sodium 135 mmol/L (136-145) L 24 16:43  
  
Potassium 4.0 mmol/L (3.5-5.1) 24 16:43  
  
Chloride 102 mmol/L () 24 16:43  
  
Carbon Dioxide 23.1 mmol/L (21.0-31.0) 24 16:43  
  
Anion Gap 13.9 mEq/L (6.0-15.0) 24 16:43  
  
BUN 15 mg/dL (7-25) 24 16:43  
  
Creatinine 0.68 mg/dL (0.70-1.30) L 24 16:43  
  
Est GFR (CKD-EPI) > 60.0 mL/Min 24 16:43  
  
Glucose 169 mg/dL () H 24 16:43  
  
Calcium 8.6 mg/dL (8.6-10.3) 24 16:43  
  
Total Bilirubin 1.5 mg/dl (0.3-1.0) H 24 16:43  
  
AST 19 U/L (13-39) 24 16:43  
  
ALT 38 U/L (7-52) 24 16:43  
  
Alkaline Phosphatase 60 U/L () 24 16:43  
  
C-Reactive Prot, Quant 12.3 mg/dL (0.0-0.5) H 24 16:43  
  
Total Protein 6.8 gm/dL (6.4-8.9) 24 16:43  
  
Albumin 3.9 gm/dL (3.5-5.7) 24 16:43  
  
Globulin 2.9 gm/dL 24 16:43  
  
Albumin/Globulin Ratio 1.3 24 16:43  
  
  
  
  
Assessment & Plan  
Assessment/Plan  
(1) Septic arthritis of knee, right:  
  
Plan  
1. Right knee intractable pain to extended he is not able to ambulate associated
with   
large knee effusions by clinical exam and on x-ray, status post intra-articular   
injection on , per patient with steroids  
Will start empiric antibiotic therapy,  
We will get in touch with orthopedic doctor regarding the patient  
N.p.o. after midnight if he needs any surgical intervention  
Pain management with IV for intractable pain  
  
2. DVT prophylaxis SCDs for now  
IP vs OBS Justification  
Based on differential dx, clinical care plan, and risk of adverse events, if   
untreated, in my clinical judgement this patient requires an acute care setting 
as:   
INPATIENT because of an expectation of an over 2 midnight stay.  
Estimated length of stay (# of days): 5  
  
  
Documented By: Josie Ann MD 24 182  
Signed By: <Electronically signed by Josie Ann MD>  
24 1141  
   
  
[facility]  
Work Phone: [phone]- Progress note  
  
  
  
  
                                        Author              Jeff Onofre  
[facility]  
2024 10:04am  
   
                                        Note Date/Time      2024 10  
:04am  
   
                                                    [facility]  
ENTER  
[address] [place] [address]  
  
Orthopedic Progress Note  
Signed  
  
Patient: Kaylynn Mims MR#: [id number]  
[id number]  
: 1955 Acct:[id number]  
  
Age/Sex: 68 / M Adm Date:   
4  
Loc:  Room: [address] [place] [address] Type: ADM IN  
Attending Dr: Josie Ann MD  
  
Copies to: ~  
  
  
Date of Service:  
2024  
  
  
Subjective  
Subjective  
Interval History:  
Patient is resting comfortably in bed this morning. He reports that while the 
pain   
improved slightly after the aspiration yesterday and has returned and he still 
has   
significant pain with any sort of range of motion of the knee.  
Nursing reports no acute events overnight  
  
Exam  
Physical Exam  
Vital Signs:  
  
  
  
  
Temp Pulse Resp BP Pulse Ox O2 Del Method  
  
98.1 F 73 18 144/85 H 96 Room Air  
  
24 09:07 24 09:07 24 09:07 24 09:07 24 09:07 
24   
09:07  
  
  
  
Narrative:  
Right knee has a very large effusion. With any passive range of motion of the 
right   
knee causes significant pain. Tenderness diffusely about the knee. Minimal to no
  
erythema noted. There is some skin discoloration within the pretibial region   
bilaterally but no pitting edema noted.  
  
Objective  
Labs  
Labs:  
Laboratory Results - last 24 hr  
  
  
  
24  
  
16:43 19:15 19:45  
  
Corrected WBC 9.4  
  
Uncorrected WBC Count 9.4  
  
RBC 5.50  
  
Hgb 16.3  
  
Hct 47.8  
  
MCV 86.9  
  
MCH 29.6  
  
MCHC 34.1  
  
RDW 13.3  
  
Plt Count 170  
  
MPV 8.0  
  
Neut % (Auto) 88.3  
  
Lymph % (Auto) 6.0  
  
Mono % (Auto) 5.1  
  
Eos % (Auto) 0.1  
  
Baso % (Auto) 0.5  
  
Nucleat RBC Rel Count 0.8 H  
  
Neut # (Auto) 8.4 H  
  
Lymph # (Auto) 0.6 L  
  
Mono # (Auto) 0.5  
  
Eos # (Auto) 0.0  
  
Baso # (Auto) 0.0  
  
Monocyte Dist Width 22.07 H  
  
ESR 32 H  
  
PHA Creatinine Clear 143.91  
  
Sodium 135 L  
  
Potassium 4.0  
  
Chloride 102  
  
Carbon Dioxide 23.1  
  
Anion Gap 13.9  
  
BUN 15  
  
Creatinine 0.68 L  
  
Est GFR (CKD-EPI) > 60.0  
  
Glucose 169 H  
  
Lactic Acid 1.3  
  
Calcium 8.6  
  
Total Bilirubin 1.5 H  
  
AST 19  
  
ALT 38  
  
Alkaline Phosphatase 60  
  
C-Reactive Prot, Quant 12.3 H  
  
Total Protein 6.8  
  
Albumin 3.9  
  
Globulin 2.9  
  
Albumin/Globulin Ratio 1.3  
  
Synovial Color Cancelled  
  
Synovial Appearance Cancelled  
  
Synov Supernatant Color Cancelled  
  
Synovial RBC Cancelled  
  
Synovial Tot Nuc Cell Cancelled  
  
Synovial Mononuclear Cancelled  
  
Synovial Neutrophils Cancelled  
  
Synovial Eosinophils Cancelled  
  
Synov Monos/Histiocyte Cancelled  
  
Synovial LE Cells Cancelled  
  
Synovial Lymphocytes % Cancelled  
  
Synovial Other Cells % Cancelled  
  
Synovial Crystals Cancelled  
  
Synovial Glucose  
  
Synovial Total Protein  
  
Synovial Fluid Comment Cancelled  
  
  
  
  
24  
  
23:00 06:26  
  
Corrected WBC 8.3  
  
Uncorrected WBC Count 8.3  
  
RBC 4.91  
  
Hgb 14.7  
  
Hct 43.0  
  
MCV 87.5  
  
MCH 29.9  
  
MCHC 34.2  
  
RDW 13.5  
  
Plt Count 175  
  
MPV 7.8  
  
Neut % (Auto) 71.5  
  
Lymph % (Auto) 17.6  
  
Mono % (Auto) 10.0  
  
Eos % (Auto) 0.4  
  
Baso % (Auto) 0.5  
  
Nucleat RBC Rel Count 0.1  
  
Neut # (Auto) 5.9  
  
Lymph # (Auto) 1.5  
  
Mono # (Auto) 0.8  
  
Eos # (Auto) 0.0  
  
Baso # (Auto) 0.0  
  
Monocyte Dist Width  
  
ESR  
  
PHA Creatinine Clear 142.25  
  
Sodium 135 L  
  
Potassium 3.8  
  
Chloride 102  
  
Carbon Dioxide 25.2  
  
Anion Gap 11.6  
  
BUN 18  
  
Creatinine 0.72  
  
Est GFR (CKD-EPI) > 60.0  
  
Glucose 142 H  
  
Lactic Acid  
  
Calcium 8.4 L  
  
Total Bilirubin  
  
AST  
  
ALT  
  
Alkaline Phosphatase  
  
C-Reactive Prot, Quant  
  
Total Protein  
  
Albumin  
  
Globulin  
  
Albumin/Globulin Ratio  
  
Synovial Color Yellow A  
  
Synovial Appearance Cloudy A  
  
Synov Supernatant Color Yellow  
  
Synovial RBC 6370 H  
  
Synovial Tot Nuc Cell 19536 H  
  
Synovial Mononuclear  
  
Synovial Neutrophils 92 H  
  
Synovial Eosinophils 0  
  
Synov Monos/Histiocyte 1  
  
Synovial LE Cells  
  
Synovial Lymphocytes % 7  
  
Synovial Other Cells %  
  
Synovial Crystals None seen  
  
Synovial Glucose 80  
  
Synovial Total Protein  
  
Synovial Fluid Comment  
  
  
  
Micro  
Microbiology Results:  
Microbiology  
  
24 23:00 Joint Fluid - Knee, Right Gram Stain - Final  
24 19:15 Joint Fluid - Knee, Right Gram Stain - Final  
  
  
  
Assessment / Plan  
Assessment and plan  
(1) Septic arthritis of knee, right:  
Code(s):  
M00.9 - Pyogenic arthritis, unspecified  
  
Plan  
Right knee septic arthritis  
  
We discussed the patient's aspiration results this morning. I explained to him 
that   
with a cell count of greater than 79,000 with 92% PMNs is certainly concerning 
for   
infection especially given his history as well as his elevated inflammatory 
labs. As   
a result, I recommended a washout of the knee today. We discussed this procedure
in   
detail including its risk and benefits. Ultimately patient agreed to proceed 
after   
all of his questions and concerns were answered and addressed. Informed consent 
was   
obtained.  
  
Remain n.p.o. Plan for right knee washout/lavage later this morning.  
  
  
Documented By: Jeff Onofre MD 08/20/24 10  
02  
Signed By: <Electronically signed by Jeff Onofre MD>  
24 [id number]  
   
  
[facility]  
Work Phone: [phone]- Consult note  
  
  
  
  
                                        Author              Jeff Onofre  
[facility]  
2024 11:12pm  
   
                                        Note Date/Time      2024 11  
:12pm  
   
                                                    [facility]  
ENTER  
[address] [place] [address]  
  
Orthopedic Consult Note  
Signed  
  
Patient: Kaylynn Mims MR#: [id number]  
[id number]  
: 1955 Acct:[id number]  
  
Age/Sex: 68 / M Adm Date:   
4  
Loc:  Room: [address] [place] [address] Type: ADM IN  
Attending Dr: Josie Ann MD  
  
Copies to: DO Jeff Baig MD Ruta Semaskiene, MD~  
  
  
History of Present Illness  
HPI  
Consult date:  
2024  
  
Requesting provider:  
Josie Ann MD  
  
History of present illness:  
Patient is a 68-year-old male with known bilateral knee osteoarthritis being 
treated   
by Dr. Woods. He had bilateral knee steroid injections a few days ago. He did 
really   
well initially for the first day. However, after that he began having 
significant   
pain and swelling to the point where he could not even put weight on the right 
knee.   
He says with any movement of the knee he has significant pain diffusely. He 
denies   
any trauma or injury to the knee. He denies any recent infections.  
I spoke with the emergency room physician and they were unable to obtain any 
fluid. I   
also spoke to lab who reported the small mount of fluid the ER sent was not 
enough   
for analysis.  
Currently the patient is resting comfortably in his bed. Has not had pain 
medications   
for many hours. He reports the knee is bothering him quite a bit ifhe moves it. 
He   
denies any history of gout or pseudogout.  
  
[facility]  
Surgical History (Reviewed 24 @ 14:38 by GARRET Chong)  
  
History of facial surgery  
jaw  
  
  
Family History (Reviewed 24 @ 14:38 by GARRET Chong)  
Brother Heart disease  
Legacy FamHx Relation: Brother(s)  
Family history of mental disorder  
Legacy FamHx Relation: Brother(s); Legacy FamHx Problem: Diagnosed with Mental   
Illness  
Father   
Family/Other   
Legacy FamHx Problem: father, --cirrhosis of the liver:mother,   
--multiple health issues:1 sister, --pancreatic cancer  
Mother   
Heart disease  
Sister Cancer  
Legacy FamHx Problem: Diagnosed with Cancer  
Heart disease  
  
  
Social History  
Smoking Status: Never smoker  
Substance Use Type: None  
  
Allergies & Medications  
Medications and Allergies  
Allergies  
  
metformin Allergy (Unknown, Verified 24 13:30)  
back spasms  
Penicillins Allergy (Unknown, Verified 24 13:30)  
rash  
  
  
Home Medications  
  
blood sugar diagnostic #50 ea 05/15/24 [Rx Confirmed 24]  
testosterone cypionate 200 mg/mL intramuscular oil 200 mg IM Q2W 24 days #2 mL   
24 [Rx Confirmed 24]  
diclofenac sodium 75 mg tablet,delayed release 75 mg PO BID 30 days #60 tabs 
24   
[Rx Confirmed 24]  
nabumetone 750 mg tablet 750 mg PO BID PRN pain 24 [History Confirmed 
24]  
  
  
  
Exam  
Physical Exam  
Vital Signs:  
  
  
  
  
Temp Pulse Resp BP Pulse Ox O2 Del Method  
  
97.8 F 72 18 177/79 H 97 Room Air  
  
24 23:01 24 22:50 24 23:01 24 23:01 24 23:01 
24   
23:01  
  
  
  
Narrative:  
Right knee has a very large effusion. With any passive range of motion of the 
right   
knee causes significant pain. Tenderness diffusely about the knee. Minimal to no
  
erythema noted. There is some skin discoloration within the pretibial region   
bilaterally but no pitting edema noted.  
  
Results - Orthopedics  
Lab Results  
  
24 16:43  
  
24 16:43  
  
Labs:  
Laboratory Results - Last 48 hrs.  
  
24 19:45: Lactic Acid 1.3  
24 19:15: Synovial Color Cancelled, Synovial Appearance Cancelled, Synov   
Supernatant Color Cancelled, Synovial RBC Cancelled, Synovial Tot Nuc Cell 
Cancelled,   
Synovial Mononuclear Cancelled, Synovial Neutrophils Cancelled, Synovial 
Eosinophils   
Cancelled, Synov Monos/Histiocyte Cancelled, Synovial LE Cells Cancelled, 
Synovial   
Lymphocytes % Cancelled, Synovial Other Cells % Cancelled, Synovial Crystals   
Cancelled, Synovial Fluid Comment Cancelled  
24 16:43: Corrected WBC 9.4, Uncorrected WBC Count 9.4, RBC 5.50, Hgb 
16.3, Hct   
47.8, MCV 86.9, MCH 29.6, MCHC 34.1, RDW 13.3, Plt Count 170, MPV 8.0,Neut % 
(Auto)   
88.3, Lymph % (Auto) 6.0, Mono % (Auto) 5.1, Eos % (Auto) 0.1, Baso % (Auto) 
0.5,   
Nucleat RBC Rel Count 0.8 H, Neut # (Auto) 8.4 H, Lymph # (Auto) 0.6 L, Mono # 
(Auto)   
0.5, Eos # (Auto) 0.0, Baso # (Auto) 0.0, Monocyte Dist Width 22.07 H, ESR 32 H,
PHA   
Creatinine Clear 143.91, Sodium 135 L, Potassium 4.0, Chloride 102, Carbon 
Dioxide   
23.1, Anion Gap 13.9, BUN 15, Creatinine 0.68 L, Est GFR (CKD-EPI) > 60.0, 
Glucose   
169 H, Calcium 8.6, Total Bilirubin 1.5 H, AST 19, ALT 38, Alkaline Phosphatase 
60,   
C-Reactive Prot, Quant12.3 H, Total Protein 6.8, Albumin 3.9, Globulin 2.9,   
Albumin/Globulin Ratio 1.3  
  
H & H  
  
  
  
24 Range/Units  
  
16:43  
  
Hgb 16.3 (13.0-17.0) g/dL  
  
Hct 47.8 (38.8-50.0) %  
  
  
All other labs are normal.  
  
Microbiology Results  
Microbiology:  
Microbiology - Results from entire visit  
  
24 19:15 Joint Fluid - Knee, Right Gram Stain - Final  
  
  
Imaging & Diagnostic Results  
Imaging/Diagnostics:  
Nonweightbearing x-rays of the right knee were independently reviewed today. 
These do   
demonstrate signs of osteoarthritis. There is certainly a large effusion noted. 
No   
acute osseous abnormalities are appreciated.  
  
Assessment/Plan  
(1) Effusion, right knee:  
Code(s):  
M25.461 - Effusion, right knee  
  
Plan  
Right knee effusion, concern for gout/pseudogout versus septic arthritis  
  
I had a long conversation with the patient regarding his presentation and 
history   
along with his exam of the right knee. At this point in time the amountof 
swelling   
that he has in his right knee is concerning and given the timing of the 
injection is   
also concerning from an infection standpoint. His ESR is 32 and his CRP is 12.3   
today. Given this information coupled with his history as well as his exam, I am
  
concerned for septic arthritis of the right knee. I recommended a right knee   
aspiration and analysis of the fluid. We discussed therisks and benefits of the   
procedure at length. All the patient's questions and concerns were answered and   
addressed. Informed consent was obtained.  
After consent was obtained, the right knee had approximately 160 cc of 
yellowishegg   
drop soup appearing fluid aspirated using sterile technique. Patient tolerated 
the   
aspiration well. This fluid will be sent for the following:  
Cell count with differential  
Crystals  
Gram stain  
Culture  
Protein  
Glucose  
Patient will remain n.p.o. after midnight. I will check on the results in the 
morning   
and if there is concern for infection then we will consider a washout ofthe 
knee. I   
did discuss this with the patient this evening.  
  
  
Documented By: Jeff Onofre MD 24  
Signed By: <Electronically signed by Jeff Onofre MD>  
24 2312  
   
  
[facility]  
Work Phone: [phone]- Evaluation note*   
  
                      Encounter Date Diagnosis  Assessment Notes Treatment Notes  
 Treatment   
Clinical Notes  
   
                                                Medicare annual   
wellness visit,   
initial (ICD-10 -   
Z00.00)                                             Personalized health   
advice was given to   
the beneficiary   
including a written   
plan for screenings   
discussed and   
provided. Advanced   
care planning   
reviewed and/or   
information given   
as requested.   
Additional   
counseling was   
provided here today   
in regards to, [ ].   
The above visit was   
performed by Melany PÉREZ   
under direct   
supervision of Dr. Elvira Harper DO.   
Document reviewed   
and amended by   
provider signed   
below.  
UTD on colonoscopy  
Due for screening   
lab work in April  
To get second   
shingrix vaccine   
and TdaP at   
pharmacy  
Declines flu   
vaccine  
                                          
   
                                                Type 2 diabetes   
mellitus (ICD-10 -   
E11.9)                                              Well controlled on   
current dose of   
glipizide ER, will   
continue  
                                          
   
                                                Prostate cancer   
screening (ICD-10 -   
Z12.5)                                                        
   
                                                Low testosterone   
(ICD-10 - E29.1)                                    I have personally   
reviewed the [place]   
report for this   
patient. I have   
considered the   
risks of abuse,   
dependence,   
addiction and   
diversion. I   
believe that it is   
clinically   
appropriate for   
this patient to be   
prescribed this   
medication based on   
documented   
diagnosis.  
                                          
  
  
[facility]  
Other Phone: [phone]- Evaluation note*   
  
                      Encounter Date Diagnosis  Assessment Notes Treatment Notes  
 Treatment   
Clinical Notes  
   
                                        10 Aug, 2023        Type 2 diabetes   
mellitus (ICD-10 -   
E11.9)                                              Will discontinue   
Januvia due to   
cost, will trial   
switching to   
glipizide ER 5mg   
daily. Plan to f/u   
in 3 months for AWV   
and recheck a1c. To   
call with any   
issues with med in   
the mean time.  
                                          
   
                                        10 Aug, 2023        Low testosterone   
(ICD-10 - E29.1)                                    Recent PSA and CBC.   
Plan to check CBC   
and testosterone   
level next visit  
                                          
   
                                        10 Aug, 2023        High risk   
medication use   
(ICD-10 - Z79.899)                                            
  
  
[facility]  
Other Phone: [phone] summary  
  
  
  
  
                                        Author              Josie Ann  
[facility]  
2024 2:20pm  
   
                                        Note Date/Time      2024 2:  
21pm  
   
                                                    [facility]  
ENTER  
[address] [place] [address]  
  
Discharge Summary  
Signed  
  
Patient: Kaylynn Mims MR#: [id number]  
[id number]  
: 1955 Acct:[id number]  
  
Age/Sex: 68 / M Adm Date:   
4  
Loc:  Room: [address] [place] [address]  
  
Attending Dr: Josie Ann MD  
  
Copies to: DO Josie Baig MD~  
  
  
Providers  
Date of Discharge: 24  
Discharging Provider: Josie Ann  
Primary Care Provider: Elvira Harper  
Consults:  
  
  
24 17:56  
Consult to Orthopedic Surgery Routine  
Comment:  
Consulting Provider: [facility]  
Reason For Exam: knee effusion  
Has Provider Been Notified: Yes  
Date of Notification: 24  
Time of Notification: 07:32  
  
24 17:57  
Consult to Occupational Therapy Routine  
Comment:  
Physician Instructions:  
Consult to OT for:: Evaluation and Treat  
Consult to Physical Therapy Routine  
Comment:  
Physician Instructions:  
Consult to PT for:: Evaluation and Treat  
  
24 11:33  
Consult to Infectious Diseases Routine  
Comment:  
Consulting Provider: [facility] - Infectious Disease  
Reason For Exam: native R knee septic arthritis  
Has Provider Been Notified: Yes  
Date of Notification: 24  
Time of Notification: 11:42  
Consult to Occupational Therapy Routine  
Comment:  
Physician Instructions: WBAT and ROMAT RLE  
Consult to OT for:: Evaluation and Treat  
Comment: WBAT  
Consult to Physical Therapy Routine  
Comment:  
Physician Instructions:  
Consult to PT for:: Evaluation and Treat  
Comment: WBAT  
Extended Comment: WBAT and ROMAT RLE  
  
  
  
  
Discharge Diagnosis  
(1) Septic arthritis of knee, right:  
Final Diagnosis  
Final Discharge Diagnosis:  
Acute strep mitis/oralis septic arthritis of the right knee, status post 
incision and   
irrigation by Ortho team, discharged on Rocephin therapy through the right upper
  
extremity PICC line  
  
Newly diagnosed hypertension, started new medications  
  
  
  
Summary  
Hospital Course  
Hospital course:  
68 years old male presented with complaints of right knee pain. Few days ago 
hehad   
bilateral knee injection with steroids, 24 hours after he developed severe pain 
and   
swelling of the right knee. Patient was afebrile without leukocytosis,however he
did   
have significantly elevated CRP. Right knee drainage was done byan orthopedic 
doctor   
while in the emergency room and was sent for cultures. Patient was started on   
broad-spectrum antibiotics with vancomycin and cefepime, cultures revealed strep
  
mitis, with blood culture showing strep mitis as well. Repeated blood cultures 
remain   
negative. Antibiotics were adjusted Rocephin therapy and PICC line was placed in
the   
right upper extremity. Patient underwent incision and irrigation of the septic 
knee   
by orthopedic surgeon. ID was consulted. Patient was arranged outpatient 
infusion   
center to get IV antibiotics and physical therapy. With recommendations to 
follow-up   
with orthopedic doctor, ID as outpatient.  
Patient's blood pressure was noted to be elevated and he was started on 
valsartan and   
chlorthalidone therapy with recommendations to follow-up with primarycare doctor
for   
further evaluation and treatment. He was asymptomatic. Denies any nausea any   
headache, any vision problems any chest pain shortness of breath.  
On discharge she still had right knee pain, but it was better since admission. 
Denies   
any fevers. Denies any abdominal pain.  
  
Review of system:  
General - denies any fevers, dizziness, headache  
Pulmonary - denies any SOB, cough  
Gastrointestinal - denies any abdominal pain, any nausea, vomiting  
Cardiovascular - denies any chest pain, palpitations  
  
Physical exam:  
General -awake, alert, oriented ?3, not in acute distress  
Cardiovascular -S1 with S2, no murmurs, no rubs, no gallops  
Pulmonary - clear to auscultation bilaterally  
Gastrointestinal - abdomen is soft, slightly distended with positive bowel 
sounds, no   
tenderness  
Extremities -no edema  
Right knee in dressing  
Neurological -no focal neurological dysfunction noted  
  
The patient CARE and further plan was discussed with the patient and the 
patient's   
wife at the bedside. All questions answered. Patient expressed understanding and
was   
discharged home in a stable condition. The patient was given written and verbal   
instructions. The recommendations were made to follow-up as outpatient within 
one   
week.The patient was informed if his symptoms get worse to go back to emergency 
room   
or call his primary care physician office.  
  
Time spent on the discharge day 35 min.  
Time Spent with Patient  
Time spent providing/coordinating discharge services (# min): 35  
Surgeries and Procedures  
  
  
Operation Date: 24 12:30  
Actual Procedures  
p OR Right Knee Washout, Arthroscopy(Right) - Jeff Onofre II, MD  
  
  
  
Discharge Plan  
Discharge Plan  
Patient Disposition: Home  
  
Activity: No Activity Restriction  
  
Diet: Low-Sodium  
  
Additional Instructions:  
Weekly CRP to be done  
  
You were found to have elevated blood pressure, so blood pressure medications 
were   
initiated. Please follow-up with your family doctor for further evaluationand   
treatment and adjustment of your medications.  
  
  
Your next antibiotic will be at [facility] on 24 
at   
11:00am.  
  
Instructions: Know your Meds  
  
Prescriptions:  
New  
ceftriaxone 2 gram Recon Soln  
2 g IV Q24H 24 Days Qty: 24 0RF  
acetaminophen [Tylenol] 325 mg Tablet  
650 mg PO Q4H PRN (Reason: Pain Scale 1 - 5) Qty: 20 0RF  
ascorbic acid (vitamin C) [Vitamin C] 500 mg Tablet  
500 mg PO BID.WITH.MEALS Qty: 60 0RF  
oxycodone 5 mg Tablet  
5 mg PO Q4HR PRN (Reason: Pain Scale 6 - 10) 7 Days Qty: 20 0RF  
ferrous sulfate 324 mg (65 mg iron) Tablet,Delayed Release (Dr/Ec)  
324 mg PO BID.WITH.MEALS Qty: 0 0RF  
polyethylene glycol 3350 [HealthyLax] 17 gram Powder In Packet  
17 g PO DAILY Qty: 30 0RF  
sennosides-docusate sodium 8.6-50 mg Tablet  
2 tab PO BID PRN (Reason: constipation) Qty: 60 0RF  
valsartan 160 mg Tablet  
160 mg PO DAILY Qty: 30 3RF  
chlorthalidone 25 mg tablet  
25 mg PO DAILY Qty: 30 0RF  
  
Continued  
testosterone cypionate 200 mg/mL oil  
200 mg IM Q2W 24 Days Qty: 2 2RF  
nabumetone 750 mg tablet  
750 mg PO BID PRN (Reason: pain)  
(DME) blood sugar diagnostic Strip  
See Rx Instructions .Route Qty: 50 2RF  
Rx Instructions:  
As directed  
diclofenac sodium 75 mg tablet,delayed release (DR/EC)  
75 mg PO BID 30 Days Qty: 60 2RF  
  
Other Ambulatory Orders:  
DME Home Medical Equipment (Routine) Timeframe: 2024  
Location: Determined by Patient  
Ordered By: Jeff Onofre II  
Basic Metabolic Panel (Routine) Timeframe: 1 Week  
Location: Determined by Patient  
Ordered By: Josie Semaskiene  
C-Reactive Protein (QWEEK) Timeframe: 2024  
Location: Determined by Patient  
Ordered By: Josie Semaskiene  
C-Reactive Protein (QWEEK) Timeframe: 2024  
Location: Determined by Patient  
Ordered By: Josie Semaskiene  
C-Reactive Protein (QWEEK) Timeframe: 2024  
Location: Determined by Patient  
Ordered By: Josie Semaskiene  
C-Reactive Protein (Routine) Timeframe: 2024  
Location: Determined by Patient  
Ordered By: Josie Semaskiene  
C-Reactive Protein (Routine) Timeframe: 2024  
Location: Determined by Patient  
Ordered By: Josie Semaskiene  
C-Reactive Protein (Routine) Timeframe: 2024  
Location: Determined by Patient  
Ordered By: Josie Semaskiene  
  
Follow Up:  
Paul Crum MD [Active Staff] - (Call office on Monday to schedule follow-
upwith   
Dr. Crum in 2 weeks)  
Jeff Onofre II, MD [Active Staff] - 24 10:00 am (Follow-up with   
Orthopedic Surgery)  
Elvira Harper DO [Primary Care Provider] - 24 10:45 am (Follow-up with 
your   
Primary Care Provider, call office to reschedule if needed. )  
  
  
Exam  
Physical Exam  
Vital Signs:  
  
  
  
  
Temp Pulse Resp BP Pulse Ox O2 Del Method O2 Flow Rate  
  
37.1 C 75 18 177/82 H 96 Room Air 2  
  
24 11:41 24 11:41 24 11:41 24 11:41 24 11:41 
24   
11:41 24 15:21  
  
  
  
  
Diagnostic Studies  
Completed and Pending Studies  
Pending studies at discharge:  
  
  
24 19:45  
Blood Culture Stat  
  
24 13:40  
Blood Culture Routine  
  
  
Preliminary micro results at discharge  
  
  
  
24 13:40 Blood Culture - Preliminary  
  
Blood - Right Antecubital No Growth 3 Days  
  
24 13:41 Blood Culture - Preliminary  
  
Blood - Right Hand No Growth 3 Days  
  
24 19:55 Blood Culture - Preliminary  
  
Blood - Right Hand No Growth 4 Days  
  
  
  
  
Labs on day of discharge:  
  
  
24 14:52: C-Reactive Prot, Quant 22.7 H  
  
  
  
  
  
Documented By: Josie Ann MD 24 1416  
Signed By: <Electronically signed by Josie Ann MD>  
24 1420  
   
  
[facility]  
Work Phone: [phone] note*   
  
                          Diagnosis    Onset Date   Resolution   Status  
   
                          Right shoulder pain                           acute  
   
                          Type 2 diabetes mellitus                           acu  
te  
   
                          Bursitis of right shoulder                           a  
cute  
   
                          Right shoulder pain                           acute  
  
  
[facility]  
Work Phone: [phone] note*   
  
                          Diagnosis    Onset Date   Resolution   Status  
   
                          Bursitis of right shoulder                           a  
cute  
   
                          Right shoulder pain                           acute  
   
                          Effusion, left knee                           noneacti  
ve  
   
                          Left knee pain                           noneactive  
   
                          Arthritis of left knee                           chron  
ic  
   
                          Effusion, left knee                           noneacti  
ve  
  
  
[facility]  
Work Phone: [phone] note*   
  
                          Diagnosis    Onset Date   Resolution   Status  
   
                          Effusion, left knee                           noneacti  
ve  
   
                          Left knee pain                           noneactive  
   
                          Arthritis of left knee                           chron  
ic  
   
                          Effusion, left knee                           noneacti  
ve  
   
                          Iliotibial band syndrome                           non  
eactive  
   
                          Right knee pain                           noneactive  
  
  
[facility]  
Work Phone: [phone] note*   
  
                          Diagnosis    Onset Date   Resolution   Status  
   
                          Arthritis of left knee                           chron  
ic  
   
                          Effusion, left knee                           noneacti  
ve  
   
                          Iliotibial band syndrome                           non  
eactive  
   
                          Right knee pain                           noneactive  
   
                          Arthritis of knee, right                           chr  
onic  
   
                          Arthritis of left knee                           chron  
ic  
  
  
[facility]  
Work Phone: [phone] note*   
  
                          Diagnosis    Onset Date   Resolution   Status  
   
                          Iliotibial band syndrome                           non  
eactive  
   
                          Right knee pain                           noneactive  
   
                          Arthritis of knee, right                           chr  
onic  
   
                          Arthritis of left knee                           chron  
ic  
   
                          Effusion, right knee                           acute  
   
                          Intractable pain                           acute  
   
                          Pain and swelling of right knee                         
    acute  
   
                          Positive blood culture                           acute  
   
                          Septic arthritis of knee, right                         
    acute  
   
                          Streptococcus viridans infection                        
     acute  
  
  
[facility]  
Work Phone: [phone] note*   
  
                          Diagnosis    Onset Date   Resolution   Status  
   
                          Iliotibial band syndrome                           non  
eactive  
   
                          Right knee pain                           noneactive  
   
                          Arthritis of knee, right                           chr  
onic  
   
                          Arthritis of left knee                           chron  
ic  
   
                          Effusion, right knee                           resolve  
d  
   
                          Intractable pain                           resolved  
   
                          Pain and swelling of right knee                         
    resolved  
   
                          Positive blood culture                           resol  
maine  
   
                          Septic arthritis of knee, right                         
    resolved  
   
                          Streptococcus viridans infection                        
     resolved  
  
  
[facility]  
Work Phone: [phone] general Narrative - Reported*   
  
                                Type            Description     Date  
   
                                Medical History type 2 diabetes   
   
                                Surgical History jaw sx            
   
                                Hospitalization History see above         
  
  
[facility]  
Other Phone: [phone]  
  
Summary Purpose  
  
  
                                                      
  
  
  
Family History  
  
  
                          Relationship Condition    Age at Onset Recorded Date/T  
denise  
   
                          brother      Heart disease Unknown        
   
                                       Family history of mental disorder Unknown  
        
   
                          father            Unknown        
   
                          family member      Unknown        
   
                          Not Specified      Unknown        
   
                                       Heart disease Unknown        
   
                          sister       Malignant neoplasm Unknown        
  
  
  
                          Relationship Condition    Age at Onset Recorded Date/T  
denise  
   
                          brother      Heart disease Unknown        
   
                                       Family history of mental disorder Unknown  
        
   
                          father            Unknown        
   
                          family member      Unknown        
   
                          mother            Unknown        
   
                                       Heart disease Unknown        
   
                          sister       Malignant neoplasm Unknown        
  
  
  
Advance Directives  
  
  
                                Advance Directive Response        Recorded Date/  
Time  
   
                                Advance Directives No               11:42am  
  
  
  
                                Advance Directive Response        Recorded Date/  
Time  
   
                                Advance Directives No               9:39am  
  
  
  
Chief Complaint and Reason for Visit  
  
  
                                        Chief Complaint     BACK SPASMS  
CONSULT DR ELVIRA HARPER RT SHOULDER PAIN  
M25.511 - Pain in right shoulder  
z12.5 e29.1 e11.9  
   
                                        Reason for Visit    Right shoulder pain  
Type 2 diabetes mellitus  
Bursitis of right shoulder  
Right shoulder pain  
  
  
  
                                        Chief Complaint     CONSULT DR ELVIRA GUZMAN RT SHOULDER PAIN  
M25.511 - Pain in right shoulder  
z12.5 e29.1 e11.9  
l knee pain  
M25.562  
Lt knee pain and effusion  
   
                                        Reason for Visit    Bursitis of right sh  
oulder  
Right shoulder pain  
Effusion, left knee  
Left knee pain  
Arthritis of left knee  
Effusion, left knee  
  
  
  
                                        Chief Complaint     l knee pain  
M25.562  
Lt knee pain and effusion  
Amb Documentation  
knee leg hip pain  
   
                                        Reason for Visit    Effusion, left knee  
Left knee pain  
Arthritis of left knee  
Effusion, left knee  
Iliotibial band syndrome  
Right knee pain  
  
  
  
                                        Chief Complaint     l knee pain  
M25.562  
Lt knee pain and effusion  
Amb Documentation  
knee leg hip pain  
M25.561  
   
                                        Reason for Visit    Effusion, left knee  
Left knee pain  
Arthritis of left knee  
Effusion, left knee  
Iliotibial band syndrome  
Right knee pain  
  
  
  
                                        Chief Complaint     Lt knee pain and eff  
usion  
Amb Documentation  
knee leg hip pain  
M25.561  
B/L knee steroid injection  
   
                                        Reason for Visit    Arthritis of left kn  
ee  
Effusion, left knee  
Iliotibial band syndrome  
Right knee pain  
Arthritis of knee, right  
Arthritis of left knee  
  
  
  
                                        Chief Complaint     Lt knee pain and eff  
usion  
Amb Documentation  
knee leg hip pain  
M25.561  
B/L knee steroid injection  
rt knee swelling  
   
                                        Reason for Visit    Arthritis of left kn  
ee  
Effusion, left knee  
Iliotibial band syndrome  
Right knee pain  
Arthritis of knee, right  
Arthritis of left knee  
  
  
  
                                        Chief Complaint     Amb Documentation  
knee leg hip pain  
M25.561  
B/L knee steroid injection  
rt knee swelling  
   
                                        Reason for Visit    Iliotibial band synd  
bon  
Right knee pain  
Arthritis of knee, right  
Arthritis of left knee  
Effusion, right knee  
Intractable pain  
Pain and swelling of right knee  
Positive blood culture  
Septic arthritis of knee, right  
Streptococcus viridans infection  
  
  
  
                                        Chief Complaint     Amb Documentation  
knee leg hip pain  
M25.561  
B/L knee steroid injection  
rt knee swelling  
Amb Documentation  
septic arthritis  
   
                                        Reason for Visit    Iliotibial band synd  
bon  
Right knee pain  
Arthritis of knee, right  
Arthritis of left knee  
Effusion, right knee  
Intractable pain  
Pain and swelling of right knee  
Positive blood culture  
Septic arthritis of knee, right  
Streptococcus viridans infection  
  
  
  
Additional Source Comments  
  
  
  
                                                    PREGNANCY (unrecognized sect  
ion and content)  
   
                                                    No Pregnancy Status Records   
FoundNo Pregnancy Status Records Found  
  
  
  
  
                                                    INFORMATION SOURCE (unrecogn  
ized section and content)  
   
                                          
  
  
  
                                        DATE CREATED        AUTHOR  
   
                                2023                      [facility]  
[facility]  
  
  
  
                                DATE CREATED    AUTHOR          AUTHOR'S ORGANIZ  
ATION  
   
                                2024                      [facility]  
[facility]  
  
  
  
  
  
                                                    REASON FOR VISIT (unrecogniz  
ed section and content)  
   
                                                    [facility]  
  
  
  
  
                                                    Care Teams (unrecognized sec  
tion and content)  
   
                                          
  
  
  
                                                    Team Status: Active   
   
                          Member       Role         Status       Tyesha Harper DO Primary Care Provider Active        
   
  
  
  
                                                    Team Status: Active   
   
                          Member       Role         Status       Tyesha Harper DO Primary Care Provider Active        
 Start: 2024  
  
   
                          SANJANA Samuel Attending Provider Active    
     Start: 2024  
  
  
  
  
                                                    Team Status: Inactive   
   
                          Member       Role         Status       Tyesha Harper DO Primary Care Provider Active        
 Start: 2024  
End: 2024  
   
                          SANJANA Samuel Attending Provider Active    
     Start: 2024  
End: 2024  
  
  
  
                                                    Team Status: Inactive   
   
                          Member       Role         Status       Tyesha Harper DO Primary Care Provider Active        
 Start: 2024  
End: 2024  
   
                          Matheus Woods DO Attending Provider Active         
Start: 2024  
End: 2024  
  
  
  
                                                    Team Status: Inactive   
   
                          Member       Role         Status       Tyesha Harper DO Primary Care Provider Active        
 Start: 2024  
End: 2024  
   
                          Daniela Sanchez , FNP-BC Emergency Provider Active   
      Start: 2024  
End: 2024  
   
                                        Josie Ann MD Admit Provider, Att  
ending   
Provider                  Active                    Start: 2024  
End: 2024  
   
                          Gucci Maxwell MD Other Provider Active       Start: A  
ugust 2024  
End: 2024  
   
                          Ashley Rai MD Other Provider Active       Star  
t: 2024  
End: 2024  
   
                          Eduarda L Karissa , NP-C Other Provider Active         
Start: 2024  
End: 2024  
   
                          Ted Penny DO Other Provider Active       Start  
: 2024  
End: 2024  
   
                          Jeff Onofre II, MD Other Provider Active       S  
tart: 2024  
End: 2024  
   
                          Sai Butts DO Other Provider Active       Start:  
 2024  
End: 2024  
   
                          Paul Crum MD Other Provider Active       Start:   
2024  
End: 2024  
  
  
  
                                                    Team Status: Active   
   
                          Member       Role         Status       Dates  
   
                          Elvira Harper DO Primary Care Provider Active        
 Start: 2024  
  
   
                          Daniela Sanchez , P-BC Emergency Provider Active   
      Start: 2024  
  
   
                                        Josie Ann MD Admit Provider, Oth  
er   
Provider                  Active                    Start: 2024  
  
   
                          Jeff Onofre II, MD Attending Provider Active      
   Start: 2024  
  
  
  
  
                                                    Team Status: Active   
   
                          Member       Role         Status       Dates  
   
                          Elvira Harper DO Primary Care Provider Active        
 Start: 2024  
  
   
                          Daniela Sanchez , FNP-BC Emergency Provider Active   
      Start: 2024  
  
   
                                        Josie Ann MD Admit Provider, Oth  
er   
Provider                  Active                    Start: 2024  
  
   
                          Gucci Maxwell MD Other Provider Active       Start: A  
ugust 2024  
  
   
                          Ashley Rai MD Other Provider Active       Star  
t: 2024  
  
   
                          Eduarda L Karissa , NP-C Other Provider Active         
Start: 2024  
  
   
                          Ted A Hemalatha , DO Other Provider Active       Start  
: 2024  
  
   
                          Jeff Onofre II, MD Other Provider Active       S  
tart: 2024  
  
   
                          Sai Butts , DO Other Provider Active       Start:  
 2024  
  
   
                                        Paul Crum MD  Attending Provider,   
Other   
Provider                  Active                    Start: 2024  
  
  
  
  
                                                    Team Status: Inactive   
   
                          Member       Role         Status       Dates  
   
                                        Elvira Harper , DO Primary Care Provide  
r,   
Attending Provider        Active                    Start: 2024  
End: 2024  
  
  
  
                                                    Team Status: Inactive   
   
                          Member       Role         Status       Dates  
   
                          Elvira Harper , DO Primary Care Provider Active        
 Start: 2024  
End: 2024  
   
                          Gucci Maxwell MD Attending Provider Active       Star  
t: 2024  
End: 2024  
  
  
  
                                                    Team Status: Inactive   
   
                          Member       Role         Status       Dates  
   
                          Gucci Maxwell MD Attending Provider Active       Star  
t: 2024  
End: 2024  
   
                          Elvira Harper , DO Primary Care Provider Active        
 Start: 2024  
End: 2024  
  
  
  
                                                    Team Status: Inactive   
   
                          Member       Role         Status       Dates  
   
                                        Elvira Harper , DO Primary Care Provide  
r, Attending   
Provider                  Active                    Start: 2024  
End: 2024  
  
  
  
                                                    Team Status: Inactive   
   
                          Member       Role         Status       Dates  
   
                          Elvira Harper , DO Primary Care Provider Active        
 Start: May 15th, 2024  
End: May 15th, 2024  
   
                          SANJANA Samuel Attending Provider Active    
     Start: May 15th, 2024  
End: May 15th, 2024  
  
  
  
                                                    Team Status: Inactive   
   
                          Member       Role         Status       Dates  
   
                          Elvira Harper , DO Primary Care Provider Active        
 Start: May 23rd, 2024  
End: May 23rd, 2024  
   
                          Matheus Woods DO Attending Provider Active         
Start: May 23rd, 2024  
End: May 23rd, 2024  
  
  
  
                                                    Team Status: Active   
   
                          Member       Role         Status       Dates  
   
                          Elvira Harper , DO Primary Care Provider Active        
 Start: 2024  
  
   
                          Daniela Sanchez , [facility] Provider Active   
      Start: 2024  
  
   
                                        Josie Ann MD Admit Provider, Att  
ending   
Provider                  Active                    Start: 2024  
  
  
  
  
                                                    Team Status: Active   
   
                          Member       Role         Status       Dates  
   
                          Elvira Harper , DO Primary Care Provider Active        
 Start: 2024  
  
   
                          Maya Joaquin Attending Provider Active       Start:  
 2024  
  
  
  
  
                                                    Team Status: Inactive   
   
                          Member       Role         Status       Dates  
   
                                        Elvira A Harper , DO Primary Care Provide  
r,   
Attending Provider        Active                    Start: 2024  
End: 2024  
  
  
  
  
  
                                                    Goals (unrecognized section   
and content)  
   
                                                    Goals may be documented in a  
n alternate section  
  
FOR RECORDS PERTAINING TO PATIENTS WHO ARE OR HAVE BEEN ENROLLED IN A CHEMICAL 
DEPENDENCY/SUBSTANCEABUSE PROGRAM, SOME INFORMATION MAY BE OMITTED. This 
clinical summary was aggregated from multiple sources. Caution should be 
exercised in using it in the provision of clinical care. This summary normalizes
information from multiple sources, and as a consequence, information in this 
document may materially change the coding, format and clinical context of 
patient data. In addition, data may be omitted in some cases. CLINICAL DECISIONS
SHOULD BE BASED ON THE PRIMARY CLINICAL RECORDS. [facility] provides 
no warranty or guarantee of the accuracy or completeness of information in this 
document.

## 2024-09-11 VITALS
TEMPERATURE: 97.1 F | OXYGEN SATURATION: 100 % | DIASTOLIC BLOOD PRESSURE: 67 MMHG | SYSTOLIC BLOOD PRESSURE: 134 MMHG | HEART RATE: 89 BPM

## 2024-09-12 VITALS
OXYGEN SATURATION: 97 % | SYSTOLIC BLOOD PRESSURE: 150 MMHG | HEART RATE: 89 BPM | TEMPERATURE: 98.3 F | DIASTOLIC BLOOD PRESSURE: 74 MMHG

## 2024-09-12 NOTE — PC.NURSE
1030: Pt. to [facility] amb. accompanied by wife. Seated in recliner. VSS. PICC line in place to right upper arm and without evidence of infection. Flushes easily with good blood return on aspiration. Pt. without c/o dyspnea. Relays  stiff  knee only.  
1040: IV Rocephin initiated at this time. Declines snack or beverage.

## 2024-09-12 NOTE — PC.NURSE
1110: IV antibiotic completed at this time without adverse reaction. PICC line flushed with saline, new cap applied. Pt. d/c'd amb. to home with wife.

## 2024-09-13 ENCOUNTER — HOSPITAL ENCOUNTER
Dept: HOSPITAL 101 - LAB | Age: 69
Discharge: HOME | End: 2024-09-13
Payer: MEDICARE

## 2024-09-13 VITALS
SYSTOLIC BLOOD PRESSURE: 118 MMHG | HEART RATE: 78 BPM | TEMPERATURE: 97.34 F | DIASTOLIC BLOOD PRESSURE: 79 MMHG | OXYGEN SATURATION: 96 %

## 2024-09-13 DIAGNOSIS — R78.81: ICD-10-CM

## 2024-09-13 DIAGNOSIS — Z79.899: ICD-10-CM

## 2024-09-13 DIAGNOSIS — M00.9: Primary | ICD-10-CM

## 2024-09-13 DIAGNOSIS — A49.1: ICD-10-CM

## 2024-09-13 LAB
ANION GAP: 8.5
BLOOD UREA NITROGEN: 12 MG/DL (ref 7–18)
CALCIUM: 9 MG/DL (ref 8.5–10.1)
CARBON DIOXIDE: 30.4 MMOL/L (ref 21–32)
CHLORIDE: 100 MMOL/L (ref 98–107)
CO2 BLD-SCNC: 30.4 MMOL/L (ref 21–32)
ESTIMATED GFR (AFRICAN AMERICA: >60 (ref 60–?)
ESTIMATED GFR (NON-AFRICAN AME: >60 (ref 60–?)
GLUCOSE BLD-MCNC: 111 MG/DL (ref 74–106)
POTASSIUM SERPLBLD-SCNC: 3.9 MMOL/L (ref 3.5–5.1)
POTASSIUM: 3.9 MMOL/L (ref 3.5–5.1)
SODIUM BLD-SCNC: 135 MMOL/L (ref 136–145)
SODIUM: 135 MMOL/L (ref 136–145)

## 2024-09-13 PROCEDURE — 86140 C-REACTIVE PROTEIN: CPT

## 2024-09-13 PROCEDURE — 36592 COLLECT BLOOD FROM PICC: CPT

## 2024-09-13 PROCEDURE — 80048 BASIC METABOLIC PNL TOTAL CA: CPT

## 2024-09-13 NOTE — PC.NURSE
arrival ambulatory for IV rocephin, see other visit number for this. labs drawn via picc and sent to lab, patient tolerated well.

## 2024-09-13 NOTE — XMS_ITS
Comprehensive CCD (C-CDA v2.1)  
  
                         Created on: 2024  
  
  
Kaylynn Mims  
External Reference #: CDR,PersonID:[id number]  
: 1955  
Sex: Male  
  
Demographics  
  
  
                                        Address             [address] [place] [address]  [id number]  
   
                                        Home Phone          [phone]  
   
                                        Preferred Language  en  
   
                                        Marital Status        
   
                                        Yazdanism Affiliation Unknown  
   
                                        Race                White  
   
                                        Ethnic Group        Not  or Lati  
no  
  
  
Author  
  
  
                                        Organization        [facility]  
  
  
Care Team Providers  
  
  
                                Care Team Member Name Role            Phone  
   
                                MISC, DR ACEVEDO Admitting       Unavailable  
   
                                MISC, DR ACEVEDO Consulting      Unavailable  
   
                                MISC, DR ACEVEDO Attending       Unavailable  
   
                                ZIEBER, DR KAYLYNN HOPKINS Consulting      Unavailable  
   
                                HOUSE, DR TREVINO Consulting      Unavailable  
   
                                HOUSE, DR TREVINO Attending       Unavailable  
   
                                HOUSE, DR TREVINO Admitting       Unavailable  
   
                                Harper, Elvira   Unavailable     [phone]  
   
                                MD Gucci Maxwell Attending Provider [phone]  
   
                                Harper, DO Elvira A Primary Care Provider [phone]   
   
                                Harper, DO Elvira A Attending Provider [phone]  
   
                                SANJANA Khan Attending Provider [phone]  
[phone]  
   
                                Harper, DO Elvira A Primary Care Provider [phone]   
   
                                Harper, DO Elvira A Primary Care Provider [phone]   
   
                                SANJANA Khan Attending Provider [phone]  
   
                                [facility] Daniela BENITES Emergency Provider [phone]  
[phone]  
   
                                MD Josie Ann Admit Provider  [phone]  
   
                                MD Josie Ann Attending Provider [phone]  
   
                                MD Gucci Maxwell Other Provider  [phone]  
   
                                MD Ashley Rai Other Provider  [phone]  
   
                                GEOVANNY Hancock Other Provider  [phone]   
   
                                DO Ted Penny Other Provider  [phone]  
   
                                MD Jeff Onofre II Other Provider  [phone]   
   
                                DO Sai Butts A Other Provider  [phone]  
[phone]  
   
                                MD Paul Crum Other Provider  [phone]  
   
                                MD Katerina Oliveira Admit Provider  [phone]  
   
                                MD Miladys Osman Attending Provider [phone]  
   
                                Gucci Maxwell   Admitting       Unavailable  
   
                                Gucci Maxwell   Attending       Unavailable  
   
                                Harper, Elvira A Primary Care    Unavailable  
   
                                Harper, Elvira A Primary Care    Unavailable  
   
                                To Khan Admitting       Unavailable  
   
                                Bill, To Montes De Oca Attending       Unavailable  
   
                                Bill, To Montes De Oca Attending       Unavailable  
   
                                Meredith, Elvira A Primary Care    Unavailable  
   
                                To Khan Admitting       Unavailable  
   
                                Gucci Maxwell   Consulting      Unavailable  
   
                                Semaskiene, Josie Admitting       Unavailable  
   
                                Semaskiene, Josie Attending       Unavailable  
   
                                Harper, Elvira A Primary Care    Unavailable  
   
                                Ashley Rai Consulting      Unavailable  
   
                                Eduarda Hancock Consulting      Unavailable  
   
                                Ted Penny Consulting      Unavailable  
   
                                Jeff Onofre II Consulting      UnavailSai Toney Consulting      Unavailable  
   
                                Paul Crum      Unavailable  
   
                                Katerina Oliveira Admitting       Unavailable  
   
                                Harper, Elvira A Primary Care    Unavailable  
   
                                Miladys Osman  Attending       Unavailable  
   
                                LEVON Churchill Consulting      Unavailable  
   
                                Harper, Elvira A Primary Care    Unavailable  
   
                                Harper, Elvira A Attending       Unavailable  
   
                                Harper, Elvira A Admitting       Unavailable  
  
  
  
Allergies  
  
  
                                                    Allergy   
Classification                          Reported   
Allergen(s)               Allergy Type              Date of   
Onset                     Reaction(s)               Facility  
   
                                                    metFORMIN  
(2 sources)         metFORMIN           Drug Allergy          
4                         [facility]  
   
                                                    Penicillins   
(antibiotic)  
(2 sources)         Penicillins         Drug Allergy          
4                         [facility]  
   
                                                      
(10 sources)        metFORMIN           Drug Allergy          
4                         [facility]  
   
                                                      
(2 sources)  Penicillin   Drug Allergy              rash         [facility]  
Other Phone:   
[phone]  
   
                                                      
(9 sources)                             Penicillins;   
Translations:   
[Penicillins]                           Allergy to   
substance                                 
4                         [facility]  
   
                                                      
(1 source)          metFORMIN           Drug Allergy          
[address] [place] [address]   
Repository  
  
  
  
Medications  
Current Medications  
  
  
  
                      Medication Drug Class(es) Dates      Sig (Normalized) Sig   
(Original)  
   
                                                    acetaminophen 325   
mg oral tablet  
(3 sources)                                         Start:   
2024                              take 2 tablets by   
mouth every four   
hours                                   Acetaminophen   
(Tylenol) 325 mg   
Tablet Active 650   
MG PO Q4H    
12:00am  
   
                                                    ascorbic acid 500   
mg oral tablet  
(3 sources)               Vitamin C                 Start:   
2024                              take 1 tablet by   
mouth twice daily   
at mealtime                             Ascorbic Acid   
(Vitamin C)   
(Vitamin C) 500 mg   
Tablet Active 500   
MG PO Twice daily   
with meals 60   
2024   
12:00am  
   
                                                    BD Syringe/Needle   
23G X 1  3 ML  
(1 source)                                          Start:   
2023                                          BD Syringe/Needle   
23G X 1  3 ML as   
directed SQ every   
2 weeks for 90   
days    
Active  
   
                                                    Blood Sugar   
Diagnostic  
(8 sources)                                         Start:   
05-                                          Blood Sugar   
Diagnostic Active   
0 .Route 50 May   
15th, 2024 12:00am   
As directed  
   
                                                    cefTRIAXone 2000 mg   
injection  
(3 sources)                             Cephalosporin   
Antibacterial                           Start:   
2024                              take 2 g   
intravenously   
every twenty-four   
hours                                   Ceftriaxone Active   
2 GM IV Q24H    
12:00am  
   
                                                    ferrous sulfate 324   
mg delayed release   
oral tablet  
(4 sources)                                         Start:   
2024  
End:   
2024                                          Ferrous Sulfate   
Active 324 MG PO   
.qdaily 2024 12:00am  
   
                                                    metoprolol tartrate   
25 mg oral tablet  
(1 source)                              beta-Adrenergic   
Blocker                                 Start:   
09-                              take 12.5 mg by   
mouth twice daily                       Metoprolol   
Tartrate Active   
12.5 MG PO Twice   
daily  12:00am  
   
                                                    nabumetone 750 mg   
oral tablet  
(8 sources)                             Nonsteroidal   
Anti-inflammatory   
Drug                                    Start:   
2024                              take 750 mg by   
mouth twice daily                       Nabumetone Active   
750 MG PO Twice   
daily 2024 12:00am  
  
  
  
                                                    Start: 2024  
End: 2024                                     Nabumetone Discontinued MG T  
ABLET 2024 12:00am   
2024 6:31pm  
  
  
  
                                                    rivaroxaban 20 mg   
oral tablet  
(2 sources)                             Factor Xa   
Inhibitor                 Start: 09-         take 1 tablet   
by mouth twice   
daily at   
mealtime                                Rivaroxaban   
(Xarelto Dvt-Pe   
Treat 30d Start) 15   
mg (42)- 20 mg (9)   
tablets,dose pack   
Active 0 PO   
.COMPLEX 51   
2024 12:00am take   
one-15 mg tablet   
twice daily for 21   
days, then one-20   
mg tablet once   
daily; must take   
with meal/food  
  
  
  
                                        Start: 09-   take 1 tablet by chrissy  
th once   
daily in the evening                    Rivaroxaban (Xarelto) 20 mg tablet   
Active 20 MG PO Every evening  12:00am start   
once 30 day dose pack is completed  
  
  
  
                                                    valsartan 80 mg   
oral tablet  
(4 sources)                             Angiotensin 2   
Receptor Blocker          Start: 2024         take 80 mg by   
mouth once   
daily                                   Valsartan Active   
80 MG PO Daily 90   
2024 12:00am  
  
  
  
                                                    Start: 2024  
End: 2024                         take 160 mg by mouth once   
daily                                   Valsartan Discontinued 160 MG PO   
Daily  12:00am   
2024 8:48am  
  
  
  
Completed/Discontinued Medications  
  
  
  
                      Medication Drug Class(es) Dates      Sig (Normalized) Sig   
(Original)  
   
                                                    chlorthalidone 25 mg   
oral tablet  
(3 sources)                             Thiazide-like   
Diuretic                                Start:   
2024  
End:   
2024                              take 25 mg by   
mouth once daily                        Chlorthalidone   
Discontinued 25 MG   
PO Daily  12:00am   
2024   
8:48am  
   
                                                    diclofenac sodium 75   
mg delayed release   
oral tablet  
(15 sources)                            Nonsteroidal   
Anti-inflammatory   
Drug                                    Start:   
05-  
End:   
09-                              take 75 mg by   
mouth twice daily                       Diclofenac Sodium   
Discontinued 75 MG   
PO Twice daily 60   
   
9:30am 2024 3:44pm  
   
                                                    docusate sodium 50   
mg / sennosides, usp   
8.6 mg oral tablet  
(3 sources)                                         Start:   
2024  
End:   
2024                              take 2 tablets by   
mouth twice daily                       Sennosides-Docusate   
Sodium Discontinued   
2 TAB PO Twice   
daily 60 2024 12:00am   
2024   
3:11pm  
   
                                                    glipiZIDE er 5 mg 24   
hr extended release   
oral tablet  
(20 sources)              Sulfonylurea              Start:   
2024  
End:   
2024                                          Glipizide   
Discontinued MG PO   
2024   
12:00am 2024 6:30pm  
  
  
  
                                                    Start: 2024  
End: 2024                         take 1 tablet by mouth once   
daily at mealtime                       Glipizide Discontinued 0 .ROUTE   
.COMPLEX  8:21am   
2024 9:15am TAKE 1   
TABLET BY MOUTH EVERY DAY WITH FOOD  
   
                                                    Start: 2024  
End: 2024                                     Glipizide Discontinued MG PO  
 2024 12:00am 2024   
8:22am  
   
                                                    Start: 2024  
End: 2024                         take 1 tablet by mouth once   
daily at mealtime                       Glipizide Discontinued 1 TAB PO   
Daily 2024 1:00am   
2024 2:16pm   
FreeTextSi tablet with food   
Orally Once a day; Note: Source   
Status: Taking; Refills: 1; Qty: 90   
Tablet; Provider: Meredith ROGERS  
   
                                        Start: 08-   take 1 tablet by chrissy  
th every   
twenty-four hours                       glipiZIDE ER 5 MG 1 tablet with   
food Orally Once a day for 90 days   
10 Aug, 2023 Active  
  
  
  
                                                    meloxicam 15 mg   
oral tablet  
(17 sources)                            Nonsteroidal   
Anti-inflammatory   
Drug                                    Start:   
2024  
End:   
2024                              take 15 mg by   
mouth once daily                        Meloxicam   
Discontinued 15   
MG PO Daily May   
15th, 2024   
9:36am 2024   
9:16am On Hold:   
While on   
Diclofenac  
   
                                                    oxyCODONE   
hydrochloride 5   
mg oral tablet  
(3 sources)               Opioid Agonist            Start:   
2024  
End:   
2024                              take 5 mg by   
mouth every four   
hours                                   Oxycodone   
Discontinued 5   
MG PO Every 4   
hours  3:11pm  
   
                                                    polyethylene   
glycol 3350 19128   
mg powder for   
oral solution  
(3 sources)               Osmotic Laxative          Start:   
2024  
End:   
2024                                          Polyethylene   
Glycol 3350   
(Healthylax) 17   
gram Powder In   
Packet   
Discontinued 17   
GM PO Daily  12:00am   
2024 3:11pm  
   
                                                    1 ml testosterone   
cypionate 200   
mg/ml injection  
(20 sources)              Androgen                  Start:   
2024  
End:   
2024                              inject 200 mg by   
intramuscular   
injection every   
other week                              Testosterone   
Cypionate   
Discontinued 200   
MG IM EVERY 2   
WEEKS 2 30 May   
3rd, 2024 8:40am   
2024   
7:48am  
  
  
  
                                                    Start: 2024  
End: 2024                         inject 1 mL by intramuscular   
injection every other week              Testosterone Cypionate   
Discontinued 200 MG SUBCUT EVERY 2   
WEEKS 2024 1:00am   
2024 9:44am   
FreeTextSig: INJECT 1 ML   
INTRAMUSCULARLY EVERY 2 WEEKS;   
Note: Source Status: Start;   
Refills: 2; Qty: 2 Milliliter;   
Provider: Meredith Felix (NPI:   
[id number])  
   
                                        Start: 08-   inject 1 mL by intra  
muscular   
injection every other week              Testosterone Cypionate 200 MG/ML 1   
mL Intramuscular every two weeks   
for 30 days 10 Aug, 2023 Active  
   
                                        Start: 08-   inject 1 mL by intra  
muscular   
injection every other week              Testosterone Cypionate 200 MG/ML 1   
mL Intramuscular every two weeks   
for 30 days 10 Aug, 2023 Active  
  
  
  
                                                    triamcinolone acetonide 1   
mg/ml topical cream  
(20 sources)              Corticosteroid            Start: 2024  
End: 2024                                     Triamcinolone Acetonide   
Discontinued 1 APPLIC   
TOPICAL Twice daily 80   
2024   
8:41am 2024   
9:44am  
  
  
  
                                                                Triamcinolone Ac  
etonide 0.1 % 1 application Externally twice a day Active  
  
  
  
Problems  
Active Problems  
  
  
                      Problem Classification Problem    Date       Documented Da  
te Episodic/Chronic  
   
                                                    Bacterial infection;   
unspecified site  
(14 sources)                            Microbiologic   
culture positive;   
Translations:   
[Bacteremia]                            Onset:   
2024                Episodic  
   
                                                    Diabetes mellitus   
without complication  
(15 sources)                            Type 2 diabetes   
mellitus;   
Translations: [Type   
2 diabetes mellitus   
without   
complications]                          Onset:   
2024                                          Chronic  
   
                                                    Essential hypertension  
(4 sources)                             Essential   
hypertension;   
Translations:   
[Essential (primary)   
hypertension]                           Onset:   
2024                Chronic  
   
                                                    Infective arthritis   
and osteomyelitis   
(except that caused by   
tuberculosis or   
sexually transmitted   
disease)  
(11 sources)                            Knee pyogenic   
arthritis;   
Translations:   
[Pyogenic arthritis,   
unspecified]                            Onset:   
2024                Episodic  
   
                                                    Osteoarthritis  
(20 sources)                            Arthritis of knee;   
Translations:   
[Unilateral primary   
osteoarthritis, left   
knee]                                   2024          Chronic  
   
                                                    Other aftercare  
(2 sources)                             Other long term   
(current) drug   
therapy;   
Translations: [Other   
long term (current)   
drug therapy]                           Onset:   
2024                                          Episodic  
   
                                                    Other bone disease and   
musculoskeletal   
deformities  
(4 sources)                             Segmental and   
somatic dysfunction   
of cervical region;   
Translations: [SEG   
SOMATIC DYSF   
CERVICAL REGION]                        Onset:   
2023                                          Episodic  
   
                                                    Other connective   
tissue disease  
(1 source)                              Pain in right arm;   
Translations: [PAIN   
IN RIGHT ARM]                           Onset:   
2023                                          Episodic  
   
                                                    Other connective   
tissue disease  
(9 sources)                             Bursitis of right   
shoulder;   
Translations:   
[Bursitis of right   
shoulder]                               2024          Episodic  
   
                                                    Other connective   
tissue disease  
(2 sources)                             Bursitis of right   
shoulder;   
Translations:   
[Disorders of bursae   
and tendons in   
shoulder region,   
unspecified]                            2024          Episodic  
   
                                                    Other connective   
tissue disease  
(7 sources)                             Iliotibial band   
syndrome,   
unspecified leg;   
Translations: [Other   
disorders of muscle,   
ligament, and   
fascia]                                 2024          Episodic  
   
                                                    Other endocrine   
disorders  
(2 sources)                             Decreased   
testosterone level ;   
Translations:   
[Testicular   
hypofunction]                                               Chronic  
   
                                                    Other endocrine   
disorders  
(3 sources)                             Testicular   
hypofunction;   
Translations:   
[Testicular   
hypofunction]                           Onset:   
2024                                          Chronic  
   
                                                    Other nervous system   
disorders  
(1 source)                              Difficulty in   
walking, not   
elsewhere   
classified;   
Translations:   
[Difficulty in   
walking, not   
elsewhere   
classified]                             Onset:   
2024                                          Chronic  
   
                                                    Other nervous system   
disorders  
(1 source)                              Anesthesia of skin;   
Translations:   
[ANESTHESIA OF SKIN]                    Onset:   
2023                                          Episodic  
   
                                                    Other non-traumatic   
joint disorders  
(12 sources)                            Pain in right   
shoulder;   
Translations: [Right   
shoulder pain]                          2024          Episodic  
   
                                                    Other non-traumatic   
joint disorders  
(8 sources)                             Effusion, left knee;   
Translations:   
[Effusion of joint,   
lower leg]                              05-          Episodic  
   
                                                    Other non-traumatic   
joint disorders  
(14 sources)                            Pain in right knee;   
Translations: [Pain   
in joint, lower leg]                    Onset:   
2024                Episodic  
   
                                                    Other non-traumatic   
joint disorders  
(3 sources)                             Effusion of right   
knee joint;   
Translations:   
[Effusion, right   
knee]                                   2024          Episodic  
   
                                                    Other non-traumatic   
joint disorders  
(3 sources)                             Effusion, right   
knee; Translations:   
[Effusion of joint,   
lower leg]                              2024          Episodic  
   
                                                    Pulmonary heart   
disease  
(5 sources)                             Acute pulmonary   
embolism;   
Translations: [Other   
pulmonary embolism   
without acute cor   
pulmonale]                              Onset:   
2024                Episodic  
   
                                                    Residual codes;   
unclassified  
(3 sources)                             Pain; Translations:   
[Pain, unspecified]                     2024          Episodic  
   
                                                    Residual codes;   
unclassified  
(3 sources)                             Pain, unspecified;   
Translations:   
[Generalized pain]                      2024          Episodic  
   
                                                    Unclassified  
(1 source)                              Pain in right   
shoulder;   
Translations: [Pain   
in right shoulder]                      Onset:   
2024                                            
  
  
Past or Other Problems  
  
  
                      Problem Classification Problem    Date       Documented Da  
te Episodic/Chronic  
   
                                                    Other non-traumatic   
joint disorders  
(4 sources)                             Pain in left knee;   
Translations: [Pain   
in joint, lower   
leg]                                    Onset:   
05-                05-                Episodic  
   
                                                    Other screening for   
suspected conditions   
(not mental disorders   
or infectious disease)  
(11 sources)                            Encounter for   
screening for   
malignant neoplasm   
of prostate;   
Translations:   
[Decreased   
testosterone level   
]                                       Onset:   
2024                                          Episodic  
  
  
  
Results  
  
  
                          Test Name    Value        Interpretation Reference   
Range                                   Facility  
   
                                                    Activated partial thrombopla  
stin time (aPTT) in platelet poor plasma by   
coagulation   
aOrdered By: LEVON Churchill on 09-   
   
                      aPTT Coag (PPP) [Time] 34.9 s                25.1-36.5  [facility]  
   
                                        Comment on above:   A hematocrit value g  
reater than 55% may lead to inaccurate   
results in coagulation testing. Patients having hematocrit   
values >55% require a special collection tube for coagulation   
studies. Please contact the laboratory at [phone] for   
redraw instructions.   
   
                                                    Automated basophil %Ordered   
By: LEVON Churchill on 09-   
   
                                                    Basophils/100 WBC   
(Bld)           1.2 %           Normal          .               [facility]  
   
                                        Comment on above:   Performed By: #### C  
UBLD ####  
[facility]  
[address] [place] [address]   
   
                                                    Automated basophil countOrde  
red By: LEVON Churchill on 09-   
   
                                                    Basophils (Bld)   
[#/Vol]         0.1 10*3/uL     Normal          0.0-0.2         [facility]  
   
                                        Comment on above:   Result Comment: PERF  
ORMED BY:  
[place] [address]  
[phone]  
PATHOLOGIST MEDICAL DIRECTOR  
VIKAS MARTINEZ M.D.   
   
                                                            Performed By: #### C  
UBLD ####  
[facility]  
[address] [place] [address]   
   
                                                    Automated blood monocyte cou  
ntOrdered By: LEVON Churchill on 09-   
   
                                                    Monocytes (Bld)   
[#/Vol]         0.5 10*3/uL     Normal          0.0-0.8         [facility]  
   
                                        Comment on above:   Performed By: #### C  
UBLD ####  
[address]   
   
                                                    Automated eosinophil %Ordere  
d By: LEVON Churchill on 09-   
   
                                                    Eosinophils/100 WBC   
(Bld)           3.5 %           Normal          .               [facility]  
   
                                        Comment on above:   Performed By: #### C  
UBLD ####  
[address]   
   
                                                    Automated eosinophil countOr  
dered By: LEVON Churchill on 09-   
   
                                                    Eosinophils (Bld)   
[#/Vol]         0.2 10*3/uL     Normal          0.0-0.45        [facility]  
   
                                        Comment on above:   Performed By: #### C  
UBLD ####  
[address]   
   
                                                    Automated monocyte %Ordered   
By: LEVON Churchill on 09-   
   
                                                    Monocytes/100 WBC   
(Bld)           11.1 %          Normal          .               [facility]  
   
                                        Comment on above:   Performed By: #### C  
UBLD ####  
[address]   
   
                                                    Automated neutrophil %Ordere  
d By: LEVON Churchill on 09-   
   
                                                    Neutrophils/100 WBC   
(Bld)           62.9 %          Normal          .               [facility]  
   
                                        Comment on above:   Performed By: #### C  
UBLD ####  
[address]   
   
                                                    Basic Metabolic Panelon    
   
                                                    Creatinine Clr Calc   
Pharmacy        133.15          Normal                          [facility]   
[facility]   
[facility]  
   
                                        Comment on above:   Result Comment: PERF  
ORMED BY:  
[place] [address]  
[phone]  
PATHOLOGIST MEDICAL DIRECTOR  
VIKAS MARTINEZ M.D.   
   
                                                            Performed By: #### L  
C FL TP ####  
[facility]ct#[id number]  
,  
#### SYN TP ####  
[address]   
   
                                                    GFR/1.73 sq   
M.predicted MDRD   
(S/P/Bld) [Vol   
rate/Area]      mL/min/{1.73_m2} Normal                          [facility]   
[facility]   
[facility]  
   
                                        Comment on above:   Performed By: #### L  
C FL TP ####  
[facility]ct#[id number]  
,  
#### SYN TP ####  
[address]   
   
                                                    Calcium [Mass/volume] in Ser  
um or PlasmaOrdered By: Obaydah Daromar on   
09-   
   
                      Calcium [Mass/Vol] 8.6 mg/dL  Normal     8.6-10.3   [facility]  
   
                                        Comment on above:   Performed By: #### L  
C FL TP ####  
[facility]ct#[id number]  
,  
#### SYN TP ####  
[address]   
   
                                                    Carbon dioxide, total [Moles  
/volume] in Serum or PlasmaOrdered By: Obaydah   
Daromar on   
09-   
   
                      CO2 [Moles/Vol] 27.4 mmol/L Normal     21.0-31.0  [facility]  
   
                                        Comment on above:   Performed By: #### L  
C FL TP ####  
[facility]ct#[id number]  
,  
#### SYN TP ####  
[facility]  
[address] [place] [address]   
   
                                                    Chloride [Moles/volume] in S  
lorraine or PlasmaOrdered By: Obaydah Daromar on   
09-   
   
                      Chloride [Moles/Vol] 102 mmol/L Normal          [facility]  
   
                                        Comment on above:   Performed By: #### L  
C FL TP ####  
[facility]ct#[id number]  
,  
#### SYN TP ####  
[facility]  
[address] [place] [address]   
   
                                                    Complete Blood Count Auto Di  
ffon 09-   
   
                                                    Mean Corpuscular HGB   
Conc            33.7 g/dL       Normal          32.5-35.6       [facility]   
[facility]   
[facility]  
   
                                        Comment on above:   Performed By: #### C  
UBLD ####  
[address]   
   
                      NRBC%      0.1 /100{WBC} Normal     0-0.5      [facility]   
Physician   
Group  
   
                                        Comment on above:   Performed By: #### C  
UBLD ####  
[address]   
   
                                                    Creatinine [Mass/volume] in   
Serum or PlasmaOrdered By: Katerina Oliveira on   
09-   
   
                      Creatinine [Mass/Vol] 0.67 mg/dL Low        0.70-1.30  [facility]  
   
                                        Comment on above:   Performed By: #### L  
C FL TP ####  
[facility]ct#[id number]  
,  
#### SYN TP ####  
[address]   
   
                                                    Erythrocyte distribution wid  
th [Ratio] by Automated countOrdered By: LEVON Churchill   
on   
09-   
   
                                                    Erythrocyte   
distribution width   
(RBC) [Ratio]   12.5 %          Normal          12.0-14.8       [facility]  
   
                                        Comment on above:   Performed By: #### C  
UBLD ####  
[place] [address] [place]   
   
                                                    Erythrocytes [#/volume] in B  
lood by Automated countOrdered By: LEVON Churchill on   
09-   
   
                      RBC (Bld) [#/Vol] 4.21 10*6/uL Normal     3.90-5.60  [facility]  
   
                                        Comment on above:   Performed By: #### C  
UBLD ####  
[address]   
   
                                                    Glucose [Mass/volume] in Ser  
um or PlasmaOrdered By: Katerina Oliveira on   
09-   
   
                      Glucose [Mass/Vol] 129 mg/dL  High            [facility]  
   
                                        Comment on above:   ADA recommended refe  
rence rangeRandom Glucose Reference Range   
is dependent on time and content of last meal. Glucose of more   
than 200 mg/dL in a nonstressed, ambulatory subject supports   
the diagnosis of Diabetes Mellitus.   
   
                                                            Result Comment: Rand  
om Glucose Reference Range is dependent on   
time and  
content of last meal. Glucose of more than 200 mg/dL in a  
nonstressed, ambulatory subject supports the diagnosis of  
Diabetes Mellitus.  
ADA recommended reference range   
   
                                                            Performed By: #### L  
C FL TP ####  
[facility]ct#[id number]  
,  
#### SYN TP ####  
[address]   
   
                                                    Hematocrit [Volume Fraction]  
 of Blood by Automated countOrdered By: LEVON Churchill on  
  
09-   
   
                                                    Hematocrit (Bld)   
[Volume fraction] 36.6 %          Low             38.8-50.0       [facility]  
   
                                        Comment on above:   Performed By: #### C  
UBLD ####  
[address]   
   
                                                    Hemoglobin [Mass/volume] in   
BloodOrdered By: LEVON Churchill on 09-   
   
                                                    Hemoglobin (Bld)   
[Mass/Vol]      12.3 g/dL       Low             13.0-17.0       [facility]  
   
                                        Comment on above:   Performed By: #### C  
UBLD ####  
[address]   
   
                                                    INR in Platelet poor plasma   
by Coagulation assayOrdered By: LEVON Churchill on   
09-   
   
                                                    INR Coag (PPP)   
[Relative time] 1.7 {INR}       Normal                          [facility]  
   
                                        Comment on above:   INR Therapeutic Rang  
e A) Pre- and Peroperative OAT started two  
  
weeks before surgery. NOT HIP SURGERY: 1.5 - 2.5 HIP SURGERY: 2   
- 3B) Primary and secondary prevention of venous THROMBOSIS: 2   
- 3C) Active venous thrombosis, pulmonary embolismand   
prevention of recurrent venous thrombosis: 2 - 3D) Prevention   
of arterial thromboembolismincluding patients with mechanical   
heart valves: 3 - 4.5   
   
                                                            Result Comment: INR   
Therapeutic Range  
A) Pre- and Peroperative OAT started two weeks before  
surgery. NOT HIP SURGERY: 1.5 - 2.5  
HIP SURGERY: 2 - 3  
B) Primary and secondary prevention of venous  
THROMBOSIS: 2 - 3  
C) Active venous thrombosis, pulmonary embolism  
and prevention of recurrent venous thrombosis: 2 - 3  
D) Prevention of arterial thromboembolism  
including patients with mechanical heart valves: 3 - 4.5   
   
                                                            Performed By: #### C  
UBLD ####  
[address]   
   
                                                    Leukocytes [#/volume] correc  
bassam for nucleated erythrocytes in Blood by Automated  
   
counOrdered By: LEVON Churchill on 09-   
   
                                                    WBC corrected for nucl   
RBC Auto (Bld) [#/Vol] 4.4 10*3/uL                     4.1-10.5        [facility]  
   
                                                    Leukocytes [#/volume] in Blo  
od by Automated countOrdered By: LEVON Churchill on   
09-   
   
                      WBC (Bld) [#/Vol] 4.4 10*3/uL Normal     4.1-10.5   [facility]  
   
                                        Comment on above:   Performed By: #### C  
UBLD ####  
[address]   
   
                                                    Lymphocytes [#/volume] in Bl  
ood by Automated countOrdered By: LEVON Churchill on   
09-   
   
                                                    Lymphocytes (Bld)   
[#/Vol]         0.9 10*3/uL     Low             1.00-4.8        [facility]  
   
                                        Comment on above:   Performed By: #### C  
UBLD ####  
[address]   
   
                                                    Lymphocytes/100 leukocytes i  
n Blood by Automated countOrdered By: LEVON Churchill on   
09-   
   
                                                    Lymphocytes/100 WBC   
(Bld)           21.3 %          Normal          .               [facility]  
   
                                        Comment on above:   Performed By: #### C  
UBLD ####  
[address]   
   
                                                    MCH [Entitic mass] by Automa  
bassam countOrdered By: LEVON Churchill on 09-   
   
                                                    MCH (RBC) [Entitic   
mass]           29.2 pg         Normal          27.5-35.2       [facility]  
   
                                        Comment on above:   Performed By: #### C  
UBLD ####  
[address]   
   
                                                    MCHC Auto (RBC) [Mass/Vol]Or  
dered By: LEVON Churchill on 09-   
   
                      MCHC (RBC) [Mass/Vol] 33.7 g/dL             32.5-35.6  [facility]  
   
                                                    MCV [Entitic volume] by Auto  
mated countOrdered By: LEVON Churchill on 09-   
   
                                                    MCV (RBC) [Entitic   
vol]            86.8 fL         Normal          83.5-101        [facility]  
   
                                        Comment on above:   Performed By: #### C  
UBLD ####  
[address]   
   
                                                    Neutrophils [#/volume] in Bl  
ood by Automated countOrdered By: LEVON Churchill on   
09-   
   
                                                    Neutrophils (Bld)   
[#/Vol]         2.7 10*3/uL     Normal          1.8-7.7         [facility]  
   
                                        Comment on above:   Performed By: #### C  
UBLD ####  
[facility]  
[address] [place] [address]   
   
                                                    No Panel InformationOrdered   
By: Katerina Oliveira on 09-   
   
                                                    Estimated GFR   
(CKD-EPI)       > 60.0 mL/Min                                   [facility]  
   
                                                    Pharmacy Creatinine   
Clearance (Chem 133.15                                          [facility]  
   
                                                    Nucleated erythrocytes [Pres  
ence] in Blood by Automated countOrdered By: LEVON Churchill on   
09-   
   
                                                    Nucleated RBC Auto Ql   
(Bld)           0.1 /100{WBC}                   0-0.5           [facility]  
   
                                                    Partial Thromboplastin Timeo  
n 09-   
   
                      aPTT Coag (Bld) [Time] 34.9 s     Normal     25.1-36.5  [facility]  
[facility]   
[facility]   
[facility]  
   
                                        Comment on above:   Result Comment: A he  
matocrit value greater than 55% may lead   
to   
inaccurate  
results in coagulation testing. Patients having hematocrit  
values >55% require a special collection tube for  
coagulation studies.  
Please contact the laboratory at [phone] for redraw  
instructions.  
PERFORMED BY:  
[place] [address]  
[phone]  
PATHOLOGIST MEDICAL DIRECTOR  
VIKAS MARTINEZ M.D.   
   
                                                            Performed By: #### C  
UBLD ####  
[address]   
   
                                                    Platelet mean volume [Entiti  
c volume] in Blood by Automated countOrdered By: LEVON Churchill on 09-   
   
                                                    Platelet mean volume   
(Bld) [Entitic vol] 7.8 fL          Normal          6.6-10.1        [facility]  
   
                                        Comment on above:   Performed By: #### C  
UBLD ####  
[address]   
   
                                                    Platelets [#/volume] in Bloo  
d by Automated countOrdered By: LEVON Churchill on   
09-   
   
                                                    Platelets (Bld)   
[#/Vol]         195 10*3/uL     Normal          150-450         [facility]  
   
                                        Comment on above:   Performed By: #### C  
UBLD ####  
[address]   
   
                                                    Potassium [Moles/volume] in   
Serum or PlasmaOrdered By: Obaydah Daromar on   
09-   
   
                      Potassium [Moles/Vol] 4.0 mmol/L Normal     3.5-5.1    [facility]  
   
                                        Comment on above:   Performed By: #### L  
C FL TP ####  
[facility]ct#[id number]  
,  
#### SYN TP ####  
[facility]  
[address]   
   
                                                    Prothrombin time (PT)Ordered  
 By: LEVON Churchill on 09-   
   
                      PT Coag (PPP) [Time] 19.0 s     High       9.0-12.9   [facility]  
   
                                        Comment on above:   A hematocrit value g  
reater than 55% may lead to inaccurate   
results in coagulation testing. Patients having hematocrit   
values >55% require a special collection tube for coagulation   
studies. Please contact the laboratory at [phone] for   
redraw instructions.   
   
                                                            Result Comment: A he  
matocrit value greater than 55% may lead to   
inaccurate  
results in coagulation testing. Patients having hematocrit  
values >55% require a special collection tube for  
coagulation studies.  
Please contact the laboratory at [phone] for redraw  
instructions.   
   
                                                            Performed By: #### C  
UBLD ####  
[address]   
   
                                                    Serum or plasma anion gap de  
terminationOrdered By: Obaydah Daromar on 09-  
   
   
                      Anion gap [Moles/Vol] 10.6 mmol/L Normal     6.0-15.0   [facility]  
   
                                        Comment on above:   Performed By: #### L  
C FL TP ####  
[facility]ct#[id number]  
,  
#### SYN TP ####  
[facility]  
[address]   
   
                                                    Sodium [Moles/volume] in Ser  
um or PlasmaOrdered By: Obaydah Daromar on   
09-   
   
                      Sodium [Moles/Vol] 136 mmol/L Normal     136-145    [facility]  
   
                                        Comment on above:   Performed By: #### L  
C FL TP ####  
[facility]ct#[id number]  
,  
#### SYN TP ####  
[facility]  
[address] [place] [address] [place]   
   
                                                    Urea nitrogen [Mass/volume]   
in Serum or PlasmaOrdered By: Obaydah Daromar on   
09-   
   
                                                    Urea nitrogen   
[Mass/Vol]      13 mg/dL        Normal          7-25            [facility]  
   
                                        Comment on above:   Performed By: #### L  
C FL TP ####  
[facility]ct#[id number]  
,  
#### SYN TP ####  
[facility]  
[address] [place] [address] [place]   
   
                                                    Alanine aminotransferase [En  
zymatic activity/volume] in Serum or PlasmaOrdered   
By:   
Obaydah Daromar on 2024   
   
                                                    ALT [Catalytic   
activity/Vol]   35 U/L          Normal          7-52            [facility]  
   
                                        Comment on above:   Performed By: #### L  
C FL TP ####  
[facility]ct#[id number]  
,  
#### SYN TP ####  
[facility]  
[address] [place] [address] [place]   
   
                                                    Albumin [Mass/volume] in Ser  
um or Plasma by Bromocresol green (BCG) dye binding   
methoOrdered By: Obaydah Daromar on 2024   
   
                                                    Albumin BCG dye   
[Mass/Vol]      3.3 g/dL        Low             3.5-5.7         [facility]  
   
                                                    Alkaline phosphatase [Enzyma  
tic activity/volume] in Serum or PlasmaOrdered By:   
Obaydah Daromar on 2024   
   
                                                    ALP [Catalytic   
activity/Vol]   63 U/L          Normal                    [facility]  
   
                                        Comment on above:   Performed By: #### L  
C FL TP ####  
[facility]#[id number]  
,  
#### SYN TP ####  
[facility]  
[address] [place] [address] [place]   
   
                                                    Aspartate aminotransferase [  
Enzymatic activity/volume] in Serum or PlasmaOrdered  
By:   
Obaydah Daromar on 2024   
   
                                                    AST [Catalytic   
activity/Vol]   19 U/L          Normal          13-39           [facility]  
   
                                        Comment on above:   Performed By: #### L  
C FL TP ####  
[facility]ct#[id number]  
,  
#### SYN TP ####  
[address]   
   
                                                    Bilirubin.total [Mass/volume  
] in Serum or PlasmaOrdered By: Katerina Oliveira on   
2024   
   
                      Bilirubin [Mass/Vol] 0.7 mg/dL  Normal     0.3-1.0    [facility]  
   
                                        Comment on above:   Performed By: #### L  
C FL TP ####  
[facility]ct#[id number]  
,  
#### SYN TP ####  
[address]   
   
                                                    Complete Blood Count Auto Di  
ffon 2024   
   
                                                    Basophils (Bld)   
[#/Vol]         0.0 10*3/uL     Normal          0.0-0.2         [facility] [facility]   
Physician   
Group  
   
                                        Comment on above:   Result Comment: PERF  
ORMED BY:  
[place] [address]  
[phone]  
PATHOLOGIST MEDICAL DIRECTOR  
VIKAS MARTINEZ M.D.   
   
                                                            Performed By: #### C  
UBLD ####  
[address]   
   
                                                    Basophils/100 WBC   
(Bld)           0.8 %           Normal          .               [facility]   
Physician   
Group  
   
                                        Comment on above:   Performed By: #### C  
UBLD ####  
[address]   
   
                                                    Eosinophils (Bld)   
[#/Vol]         0.1 10*3/uL     Normal          0.0-0.45        [facility]   
Physician   
Group  
   
                                        Comment on above:   Performed By: #### C  
UBLD ####  
[address]   
   
                                                    Eosinophils/100 WBC   
(Bld)           2.4 %           Normal          .               [facility]   
Physician   
Group  
   
                                        Comment on above:   Performed By: #### C  
UBLD ####  
[address]   
   
                                                    Erythrocyte   
distribution width   
(RBC) [Ratio]   12.8 %          Normal          12.0-14.8       The [facility]   
Physician   
Group  
   
                                        Comment on above:   Performed By: #### C  
UBLD ####  
[address]   
   
                                                    Hematocrit (Bld)   
[Volume fraction] 36.8 %          Low             38.8-50.0       [facility]   
Physician   
Group  
   
                                        Comment on above:   Performed By: #### C  
UBLD ####  
[address]   
   
                                                    Hemoglobin (Bld)   
[Mass/Vol]      12.5 g/dL       Low             13.0-17.0       [facility]   
Physician   
Group  
   
                                        Comment on above:   Performed By: #### C  
UBLD ####  
[address]   
   
                                                    Lymphocytes (Bld)   
[#/Vol]         1.0 10*3/uL     Normal          1.00-4.8        [facility]   
Physician   
Group  
   
                                        Comment on above:   Performed By: #### C  
UBLD ####  
[address]   
   
                                                    Lymphocytes/100 WBC   
(Bld)           18.2 %          Normal          .               [facility]   
Physician   
Group  
   
                                        Comment on above:   Performed By: #### C  
UBLD ####  
[address]   
   
                                                    MCH (RBC) [Entitic   
mass]           29.4 pg         Normal          27.5-35.2       [facility]   
Physician   
Group  
   
                                        Comment on above:   Performed By: #### C  
UBLD ####  
[address]   
   
                                                    MCV (RBC) [Entitic   
vol]            86.4 fL         Normal          83.5-101        [facility]   
Physician   
Group  
   
                                        Comment on above:   Performed By: #### C  
UBLD ####  
[address]   
   
                                                    Mean Corpuscular HGB   
Conc            34.0 g/dL       Normal          32.5-35.6       [facility]   
Physician   
Group  
   
                                        Comment on above:   Performed By: #### C  
UBLD ####  
[address]   
   
                                                    Monocytes (Bld)   
[#/Vol]         0.6 10*3/uL     Normal          0.0-0.8         [facility]   
Physician   
Group  
   
                                        Comment on above:   Performed By: #### C  
UBLD ####  
[place] [address] [place]   
   
                                                    Monocytes/100 WBC   
(Bld)           11.2 %          Normal          .               [facility]   
Physician   
Group  
   
                                        Comment on above:   Performed By: #### C  
UBLD ####  
[place] [address] [place]   
   
                                                    Neutrophils (Bld)   
[#/Vol]         3.6 10*3/uL     Normal          1.8-7.7         The [facility]   
Physician   
Group  
   
                                        Comment on above:   Performed By: #### C  
UBLD ####  
[address]   
   
                                                    Neutrophils/100 WBC   
(Bld)           67.4 %          Normal          .               [facility]   
Physician   
Group  
   
                                        Comment on above:   Performed By: #### C  
UBLD ####  
[address]   
   
                      NRBC%      0.1 /100{WBC} Normal     0-0.5      [facility]   
Physician   
Group  
   
                                        Comment on above:   Performed By: #### C  
UBLD ####  
[address]   
   
                                                    Platelet mean volume   
(Bld) [Entitic vol] 7.7 fL          Normal          6.6-10.1        [facility]   
Physician   
Group  
   
                                        Comment on above:   Performed By: #### C  
UBLD ####  
[place] [address] [place]   
   
                                                    Platelets (Bld)   
[#/Vol]         203 10*3/uL     Normal          150-450         [facility]   
Physician   
Group  
   
                                        Comment on above:   Performed By: #### C  
UBLD ####  
[place] [address] [place]   
   
                      RBC (Bld) [#/Vol] 4.26 10*6/uL Normal     3.90-5.60  [facility]   
Physician   
Group  
   
                                        Comment on above:   Performed By: #### C  
UBLD ####  
[place] [address] [place]   
   
                      WBC (Bld) [#/Vol] 5.4 10*3/uL Normal     4.1-10.5   [facility]   
Physician   
Group  
   
                                        Comment on above:   Performed By: #### C  
UBLD ####  
[address]   
   
                                                    Basophils (Bld)   
[#/Vol]         0.1 10*3/uL     Normal          0.0-0.2         [facility]   
Physician   
Group  
   
                                        Comment on above:   Result Comment: PERF  
ORMED BY:  
[place] [address]  
[phone]  
PATHOLOGIST MEDICAL DIRECTOR  
VIKAS MARTINEZ M.D.   
   
                                                            Performed By: #### L  
C FL TP ####  
[facility]#[id number]  
,  
#### SYN TP ####  
[address]   
   
                                                    Basophils/100 WBC   
(Bld)           1.2 %           Normal          .               [facility]   
Physician   
Group  
   
                                        Comment on above:   Performed By: #### L  
C FL TP ####  
[facility]#[id number]  
,  
#### SYN TP ####  
[address]   
   
                                                    Eosinophils (Bld)   
[#/Vol]         0.2 10*3/uL     Normal          0.0-0.45        [facility]   
Physician   
Group  
   
                                        Comment on above:   Performed By: #### L  
C FL TP ####  
[facility]#[id number]  
,  
#### SYN TP ####  
[address]   
   
                                                    Eosinophils/100 WBC   
(Bld)           3.1 %           Normal          .               [facility]   
Physician   
Group  
   
                                        Comment on above:   Performed By: #### L  
C FL TP ####  
[facility]#[id number]  
,  
#### SYN TP ####  
[address]   
   
                                                    Erythrocyte   
distribution width   
(RBC) [Ratio]   12.6 %          Normal          12.0-14.8       [facility]   
Physician   
Group  
   
                                        Comment on above:   Performed By: #### L  
C FL TP ####  
[facility]#[id number]  
,  
#### SYN TP ####  
[address]   
   
                                                    Hematocrit (Bld)   
[Volume fraction] 38.7 %          Low             38.8-50.0       [facility]   
Physician   
Group  
   
                                        Comment on above:   Performed By: #### L  
C FL TP ####  
[facility]#[id number]  
,  
#### SYN TP ####  
[address]   
   
                                                    Hemoglobin (Bld)   
[Mass/Vol]      13.0 g/dL       Normal          13.0-17.0       [facility]   
Physician   
Group  
   
                                        Comment on above:   Performed By: #### L  
C FL TP ####  
[facility]#[id number]  
,  
#### SYN TP ####  
[address]   
   
                                                    Lymphocytes (Bld)   
[#/Vol]         1.3 10*3/uL     Normal          1.00-4.8        [facility]   
Physician   
Group  
   
                                        Comment on above:   Performed By: #### L  
C FL TP ####  
[facility]#[id number]  
,  
#### SYN TP ####  
[address]   
   
                                                    Lymphocytes/100 WBC   
(Bld)           22.5 %          Normal          .               [facility]   
Physician   
Group  
   
                                        Comment on above:   Performed By: #### L  
C FL TP ####  
[facility]#[id number]  
,  
#### SYN TP ####  
[address]   
   
                                                    MCH (RBC) [Entitic   
mass]           29.2 pg         Normal          27.5-35.2       [facility]   
Physician   
Group  
   
                                        Comment on above:   Performed By: #### L  
C FL TP ####  
[facility]#[id number]  
,  
#### SYN TP ####  
[address]   
   
                                                    MCV (RBC) [Entitic   
vol]            87.0 fL         Normal          83.5-101        [facility]   
Physician   
Group  
   
                                        Comment on above:   Performed By: #### L  
C FL TP ####  
[facility]#[id number]  
,  
#### SYN TP ####  
[address]   
   
                                                    Mean Corpuscular HGB   
Conc            33.6 g/dL       Normal          32.5-35.6       [facility]   
Physician   
Group  
   
                                        Comment on above:   Performed By: #### L  
C FL TP ####  
[facility]#[id number]  
,  
#### SYN TP ####  
[address]   
   
                                                    Monocytes (Bld)   
[#/Vol]         0.6 10*3/uL     Normal          0.0-0.8         [facility]   
Physician   
Group  
   
                                        Comment on above:   Performed By: #### L  
C FL TP ####  
[facility]#[id number]  
,  
#### SYN TP ####  
[address]   
   
                                                    Monocytes/100 WBC   
(Bld)           9.9 %           Normal          .               [facility]   
Physician   
Group  
   
                                        Comment on above:   Performed By: #### L  
C FL TP ####  
[facility]#[id number]  
,  
#### SYN TP ####  
[address]   
   
                                                    Neutrophils (Bld)   
[#/Vol]         3.6 10*3/uL     Normal          1.8-7.7         [facility]   
Physician   
Group  
   
                                        Comment on above:   Performed By: #### L  
C FL TP ####  
[facility]#[id number]  
,  
#### SYN TP ####  
[address]   
   
                                                    Neutrophils/100 WBC   
(Bld)           63.3 %          Normal          .               [facility]   
Physician   
Group  
   
                                        Comment on above:   Performed By: #### L  
C FL TP ####  
[facility]#[id number]  
,  
#### SYN TP ####  
[facility]  
[address] [place] [address]   
   
                      NRBC%      0.2 /100{WBC} Normal     0-0.5      [facility]   
Physician   
Group  
   
                                        Comment on above:   Performed By: #### L  
C FL TP ####  
[facility]#[id number]  
,  
#### SYN TP ####  
[facility]  
[address] [place] [address]   
   
                                                    Platelet mean volume   
(Bld) [Entitic vol] 7.7 fL          Normal          6.6-10.1        The [facility]   
Physician   
Group  
   
                                        Comment on above:   Performed By: #### L  
C FL TP ####  
[facility]#[id number]  
,  
#### SYN TP ####  
[address]   
   
                                                    Platelets (Bld)   
[#/Vol]         204 10*3/uL     Normal          150-450         The [facility]   
Physician   
Group  
   
                                        Comment on above:   Performed By: #### L  
C FL TP ####  
[facility]#[id number]  
,  
#### SYN TP ####  
[address]   
   
                      RBC (Bld) [#/Vol] 4.44 10*6/uL Normal     3.90-5.60  [facility]   
Physician   
Group  
   
                                        Comment on above:   Performed By: #### L  
C FL TP ####  
[facility]#[id number]  
,  
#### SYN TP ####  
[address]   
   
                      WBC (Bld) [#/Vol] 5.7 10*3/uL Normal     4.1-10.5   The [facility]   
Physician   
Group  
   
                                        Comment on above:   Performed By: #### L  
C FL TP ####  
[facility]#[id number]  
,  
#### SYN TP ####  
[address]   
   
                                                    Comprehensive Metabolic Pane  
guerline 2024   
   
                      Albumin [Mass/Vol] 3.3 g/dL   Low        3.5-5.7    The [facility]   
Physician   
Group  
   
                                        Comment on above:   Performed By: #### L  
C FL TP ####  
[facility]#[id number]  
,  
#### SYN TP ####  
[address]   
   
                      Anion gap [Moles/Vol] 10.4 mmol/L Normal     6.0-15.0   Th  
e [facility]   
Physician   
Group  
   
                                        Comment on above:   Performed By: #### L  
C FL TP ####  
[facility]#[id number]  
,  
#### SYN TP ####  
[address]   
   
                      Calcium [Mass/Vol] 8.7 mg/dL  Normal     8.6-10.3   [facility]   
Physician   
Group  
   
                                        Comment on above:   Performed By: #### L  
C FL TP ####  
[facility]#[id number]  
,  
#### SYN TP ####  
[address]   
   
                      Chloride [Moles/Vol] 102 mmol/L Normal          The   
[facility]   
Physician   
Group  
   
                                        Comment on above:   Performed By: #### L  
C FL TP ####  
[facility]#[id number]  
,  
#### SYN TP ####  
[address]   
   
                      CO2 [Moles/Vol] 27.6 mmol/L Normal     21.0-31.0  The [facility]   
Physician   
Group  
   
                                        Comment on above:   Performed By: #### L  
C FL TP ####  
[facility]#[id number]  
,  
#### SYN TP ####  
[address]   
   
                      Creatinine [Mass/Vol] 0.68 mg/dL Low        0.70-1.30  The  
 [facility]   
Physician   
Group  
   
                                        Comment on above:   Performed By: #### L  
C FL TP ####  
[facility]#[id number]  
,  
#### SYN TP ####  
[place] [address] [place]   
   
                                                    Creatinine Clr Calc   
Pharmacy        134.90          Normal                          The [facility]   
Physician   
Group  
   
                                        Comment on above:   Result Comment: PERF  
ORMED BY:  
[place] [address]  
[phone]  
PATHOLOGIST MEDICAL DIRECTOR  
VIKAS MARTINEZ M.D.   
   
                                                            Performed By: #### L  
C FL TP ####  
[facility]#[id number]  
,  
#### SYN TP ####  
[place] [address] [place]   
   
                                                    GFR/1.73 sq   
M.predicted MDRD   
(S/P/Bld) [Vol   
rate/Area]      mL/min/{1.73_m2} Normal                          The [facility]   
Physician   
Group  
   
                                        Comment on above:   Performed By: #### L  
C FL TP ####  
[facility]#[id number]  
,  
#### SYN TP ####  
[place] [address] [place]   
   
                      Glucose [Mass/Vol] 131 mg/dL  High            The [facility]   
Physician   
Group  
   
                                        Comment on above:   Result Comment: Rand  
 Glucose Reference Range is dependent on  
   
time and  
content of last meal. Glucose of more than 200 mg/dL in a  
nonstressed, ambulatory subject supports the diagnosis of  
Diabetes Mellitus.  
ADA recommended reference range   
   
                                                            Performed By: #### L  
C FL TP ####  
[facility]#[id number]  
,  
#### SYN TP ####  
[facility]  
[address]   
   
                      Potassium [Moles/Vol] 4.0 mmol/L Normal     3.5-5.1    The  
 [facility]   
Physician   
Group  
   
                                        Comment on above:   Performed By: #### L  
C FL TP ####  
[facility]#[id number]  
,  
#### SYN TP ####  
[facility]  
[address] [place] [address]   
   
                      Sodium [Moles/Vol] 136 mmol/L Normal     136-145    The [facility]   
Physician   
Group  
   
                                        Comment on above:   Performed By: #### L  
C FL TP ####  
[facility]#[id number]  
,  
#### SYN TP ####  
[facility]  
[address] [place] [address]   
   
                                                    Urea nitrogen   
[Mass/Vol]      17 mg/dL        Normal          7-25            The [facility]   
Physician   
Group  
   
                                        Comment on above:   Performed By: #### L  
C FL TP ####  
[facility]#[id number]  
,  
#### SYN TP ####  
[address]   
   
                                                    ECH echo transthoracicon    
   
                                        ECH echo transthoracic [facility] [place] [address]  
  
Echocardiogram  
Signed  
  
Patient: Kaylynn Mims   
MR#: [id number]  
16  
: 1955   
Acct:[id number]  
  
Age/Sex: 69 / M ADM Date:   
24  
  
Loc:  Room: [address] [place] [address]   
Type: ADM IN  
Attending Dr: Katerina Oliveira MD  
  
Ordering Provider:   
Katerina Oliveira MD  
Date of Service:   
  
Accession #:   
[id number] ECH/ECH   
echo transthoracic: PE/ro   
heart strain  
  
  
Copies to: Kong Vazquez MD, [facility]  
Katerina Oliveira MD  
  
  
Kaylynn PALMER 11:41 AM  
  
MRN: [id number] Patient   
Location:  
  
  
: 1955 Gender:   
Male  
  
(MM/DD/YYYY)  
  
Age: 69 Years Ordering  
Physician: Katerina Oliveira  
  
Height: 73.62 in  
  
Weight: 325.38 lb   
Performed By: Madison Oconnor RDCS  
  
BSA: 2.7 m2  
  
BP: 119 / 74 mmHg HR: 65   
bpm  
  
Reason For Study: PE/ro   
heart  
strain  
  
History: DM. HTN.  
+------------------------  
-------------------------  
-------------------------  
-+  
Interpretation Summary  
  
The left ventricular   
size, thickness and   
function  
  
are normal  
Ejection Fraction =   
50-55%.  
  
A variety of Doppler   
measurements indicate  
  
impaired left ventricular   
relaxation, which is  
associated with grade   
I/IV or mild diastolic  
  
dysfunction.  
The left ventricular wall   
motion is normal.  
  
The left atrium appears   
mildly dilated.  
  
The right ventricular   
wall motion is normal.  
No tricuspid   
regurgitation.  
  
No thrombus, vegetation   
or mass is seen.  
  
Procedure/Quality: A   
two-dimensional  
transthoracic   
echocardiogram with color   
flow,  
  
Doppler and injection of   
contrast agent Definity  
was performed.  
  
Left Ventricle: The left   
ventricular size,   
thickness  
  
and function are normal.   
Ejection Fraction = 50-  
55%. A variety of Doppler   
measurements indicate  
  
impaired left ventricular   
relaxation, which is  
associated with grade   
I/IV or mild diastolic  
  
dysfunction. The left   
ventricular wall motion   
is  
  
normal. No left   
ventricular thrombus or   
mass is  
seen.  
  
Left Atrium: The left   
atrium appears mildly   
dilated.  
  
The atrial septum appears   
normal.  
  
Right Atrium: The right   
atrium appears normal in  
  
size.  
  
Right Ventricle: The   
right ventricular size,  
  
thickness and function   
are normal. The right  
  
ventricular wall motion   
is normal.  
  
Aortic Valve: The aortic   
valve is normal in   
structure  
  
and function.  
  
Mitral Valve: The mitral   
valve is normal in  
  
structure and function.  
  
Tricuspid Valve: The   
tricuspid valve is   
normal. No  
tricuspid regurgitation.  
  
Pulmonic Valve: The   
pulmonic valve is not   
well  
  
visualized.  
  
Arteries: The aortic root   
is normal size.  
  
Pericardium/Pleura: No   
pericardial effusion   
seen.  
  
There is no pleural   
effusion.  
  
IVC/Hepatic Veins: The   
inferior vena cava was   
not  
visualized during the   
exam.  
  
Miscellaneous: No   
thrombus, vegetation or   
mass  
  
is seen.  
  
MMode/2D Measurements   
Calculations  
  
IVSd (0.7-1.1 cm): 1.10   
cm LVIDd (3.7-5.4 cm):   
5.6 cm  
  
LVPWd (0.7-1.1 cm): 1.20   
cm LVIDs (2.3-3.6 cm):   
4.1 cm  
  
LA dimension (2.3-4.0   
cm): 4.2 Ao root diam   
(2.0-3.2 cm): 3.2  
cm cm  
FS: 26.8 % Ao root area:   
8.0 cm2  
  
EDV(Teich): 153.7 ml LVOT   
diam: 2.20 cm  
  
ESV(Teich): 74.2 ml LVOT   
area: 3.8 cm2  
  
EF(Teich): 51.7 %   
LAV(MOD-sp2): 46.1 ml  
  
LAV(MOD-sp4): 62.5 ml  
  
LA A2 area: 17.6 cm2  
  
LA A4 area: 22.3 cm2  
  
LA length (vol): 6.4 cm  
  
LA vol: 52.4 ml  
  
LA vol index: 19.7 ml/m2  
  
Doppler Measurements   
Calculations  
  
MV E max [place]: 66.0 cm/sec   
Ao V2 max: 107.0 cm/sec  
  
MV A max [place]: 79.3 cm/sec   
Ao max P.6 mmHg  
  
MV dec time: 0.18 sec Ao   
mean P.00 mmHg  
  
MV dec slope: 360.0   
cm/sec?? Ao V2 mean: 69.5   
cm/sec  
  
E/E' lat: 8.2 Ao V2 VTI:   
19.0 cm  
  
E/E' med: 8.3 SURESH(I,D):   
3.7 cm2  
  
SURESH(V,D): 3.6 cm2  
  
LV V1 max: 100.0 cm/sec  
  
LV V1 max P.0 mmHg  
  
LV V1 mean: 71.3 cm/sec  
  
LV V1 mean P.00 mmHg  
  
LV V1 VTI: 18.4 cm  
  
+------------------------  
-------------------------  
-----------------------+  
+------------------------  
-------------------------  
-----------------------+  
+------------------------  
--+----------------------  
-----------------------+  
: Electronically :  
: :  
: signed :  
: :  
: by: KONG PALMER. :  
: MD KANA, :  
: :  
: [facility] :  
: :  
: on: 2024, :  
: :  
: 9:50 PM :  
+------------------------  
--+----------------------  
-----------------------+  
  
  
  
  
  
Transcribed By: SCV  
Performed At: 24   
1141  
Signed By: Kong Vazquez MD, [facility] 24   
476                Normal                                  The [facility]   
Physician   
Group  
   
                                                    Partial Thromboplastin Timeo  
n 2024   
   
                      aPTT Coag (Bld) [Time] 37.1 s     High       25.1-36.5  Th  
e [facility]   
Physician   
Group  
   
                                        Comment on above:   Result Comment: A he  
matocrit value greater than 55% may lead   
to   
inaccurate  
results in coagulation testing. Patients having hematocrit  
values >55% require a special collection tube for  
coagulation studies.  
Please contact the laboratory at [phone] for redraw  
instructions.  
PERFORMED BY:  
[facility]  
[place] [address]  
[phone]  
PATHOLOGIST MEDICAL DIRECTOR  
VIKAS MARTINEZ M.D.   
   
                                                            Performed By: #### C  
UBLD ####  
[place] [address] [place]   
   
                      aPTT Coag (Bld) [Time] 95.7 s     Off scale high 25.1-36.5  
  [facility]   
Physician   
Group  
   
                                        Comment on above:   Result Comment: Crit  
ical value  
result called  
at 0700 on 24  
A hematocrit value greater than 55% may lead to inaccurate  
results in coagulation testing. Patients having hematocrit  
values >55% require a special collection tube for  
coagulation studies.  
Please contact the laboratory at [phone] for redraw  
instructions.  
PERFORMED BY:  
[place] [address]  
[phone]  
PATHOLOGIST MEDICAL DIRECTOR  
VIKAS MARITNEZ M.D.   
   
                                                            Performed By: #### L  
C FL TP ####  
[facility] Acct#[id number]  
,  
#### SYN TP ####  
[place] [address] [place]   
   
                      aPTT Coag (Bld) [Time] 50.9 s     High       25.1-36.5  [facility]   
Physician   
Group  
   
                                        Comment on above:   Result Comment: A he  
matocrit value greater than 55% may lead   
to   
inaccurate  
results in coagulation testing. Patients having hematocrit  
values >55% require a special collection tube for  
coagulation studies.  
Please contact the laboratory at [phone] for redraw  
instructions.  
PERFORMED BY:  
[place] [address]  
[phone]  
PATHOLOGIST MEDICAL DIRECTOR  
VIKAS MARTINEZ M.D.   
   
                                                            Performed By: #### C  
UBLD ####  
[place] [address] [place]   
   
                                                    Protein [Mass/volume] in Ser  
um or PlasmaOrdered By: Katerina Oliveira on   
2024   
   
                      Protein [Mass/Vol] 6.2 g/dL   Low        6.4-8.9    [facility]  
   
                                        Comment on above:   Performed By: #### L  
C FL TP ####  
[facility] Acct#[id number]  
,  
#### SYN TP ####  
[place] [address] [place]   
   
                                                    Prothrombin Time INRon    
   
                                                    INR Coag (PPP)   
[Relative time] 1.3 {INR}       Normal                          [facility]   
Physician   
Group  
   
                                        Comment on above:   Result Comment: INR   
Therapeutic Range  
A) Pre- and Peroperative OAT started two weeks before  
surgery. NOT HIP SURGERY: 1.5 - 2.5  
HIP SURGERY: 2 - 3  
B) Primary and secondary prevention of venous  
THROMBOSIS: 2 - 3  
C) Active venous thrombosis, pulmonary embolism  
and prevention of recurrent venous thrombosis: 2 - 3  
D) Prevention of arterial thromboembolism  
including patients with mechanical heart valves: 3 - 4.5   
   
                                                            Performed By: #### C  
UBLD ####  
[facility]  
[address] [place] [address] [facility]   
   
                      PT Coag (PPP) [Time] 15.2 s     High       9.0-12.9   The   
[facility]   
Physician   
Group  
   
                                        Comment on above:   Result Comment: A he  
matocrit value greater than 55% may lead   
to   
inaccurate  
results in coagulation testing. Patients having hematocrit  
values >55% require a special collection tube for  
coagulation studies.  
Please contact the laboratory at [phone] for redraw  
instructions.   
   
                                                            Performed By: #### C  
UBLD ####  
[address] [address] [facility]   
   
                                                    INR Coag (PPP)   
[Relative time] 1.4 {INR}       Normal                          [facility]   
Physician   
Group  
   
                                        Comment on above:   Result Comment: INR   
Therapeutic Range  
A) Pre- and Peroperative OAT started two weeks before  
surgery. NOT HIP SURGERY: 1.5 - 2.5  
HIP SURGERY: 2 - 3  
B) Primary and secondary prevention of venous  
THROMBOSIS: 2 - 3  
C) Active venous thrombosis, pulmonary embolism  
and prevention of recurrent venous thrombosis: 2 - 3  
D) Prevention of arterial thromboembolism  
including patients with mechanical heart valves: 3 - 4.5   
   
                                                            Performed By: #### L  
C FL TP ####  
[facility] Acct#[id number]  
,  
#### SYN TP ####  
[address] [address] [facility]   
   
                      PT Coag (PPP) [Time] 15.9 s     High       9.0-12.9   The   
[facility]   
Physician   
Group  
   
                                        Comment on above:   Result Comment: A he  
matocrit value greater than 55% may lead   
to   
inaccurate  
results in coagulation testing. Patients having hematocrit  
values >55% require a special collection tube for  
coagulation studies.  
Please contact the laboratory at [phone] for redraw  
instructions.   
   
                                                            Performed By: #### L  
C FL TP ####  
[facility] Acct#[id number]  
,  
#### SYN TP ####  
[facility]  
[address] [place] [address]   
   
                                                    Serum globulin measurement b  
y calculation (mass/volume)Ordered By: ObaydaAoratoomar on   
2024   
   
                                                    Globulin (S)   
[Mass/Vol]      2.9 g/dL        Normal                          [facility]  
   
                                        Comment on above:   Performed By: #### L  
C FL TP ####  
[facility] Acct#[id number]  
,  
#### SYN TP ####  
[facility]  
[address] [place] [address]   
   
                                                    Serum or plasma albumin/glob  
ulin mass ratioOrdered By: Obaydah Daromar on   
2024   
   
                                                    Albumin/Globulin [Mass   
ratio]          1.1 {ratio}     [facility]  
   
                                        Comment on above:   Performed By: #### L  
C FL TP ####  
[facility] Acct#[id number]  
,  
#### SYN TP ####  
[facility]  
[address] [place] [address]   
   
                                                    Troponin I High Sensitivityo  
n 2024   
   
                                                    Troponin I High   
Sensitivity     116.8 pg/mL     Off scale high  0.0-20.0        [facility]   
Physician   
Group  
   
                                        Comment on above:   Order Comment: DUE T  
O SPECIMEN VISCOSITY SAMPLE HAS BEEN SENT   
OUT.  
SENDOUT STATUS called  
at 0044 on 24  
Synovial Fluid Source: R knee  
Synovial Fluid Site: R knee   
   
                                                            Result Comment: Crit  
ical Result : Called to and read back by:   
RICK IBANEZ  
at: 2024 09:59:11 by:MLG  
PERFORMED BY:  
[place] [address]  
[phone]  
PATHOLOGIST MEDICAL DIRECTOR  
VIKAS MARTINEZ M.D.   
   
                                                            Performed By: #### L  
C FL TP ####  
[facility]ct#[id number]  
,  
#### SYN TP ####  
[facility]  
[address] [place] [address]   
   
                                                    Troponin I.cardiac [Mass/vol  
ume] in Serum or Plasma by Detection limit <= 0.01   
ng/Ordered By: Katerina Oliveira on 2024   
   
                                                    Troponin I.cardiac DL   
<= 0.01 ng/mL   
[Mass/Vol]      116.8 pg/mL     High            0.0-20.0        [facility]  
   
                                        Comment on above:   Critical Result : Ca  
lled to and read back by: RICK IBANEZ   
at: 2024 09:59:11 by:ROJAS   
   
                                                    US venous duplex LE BIon    
   
                                        US venous duplex LE BI [facility] [place] [address]  
  
Ultrasound Report  
Signed  
  
Patient: Kaylynn Mims   
MR#: [id number]  
16  
: 1955   
Acct:[id number]  
  
Age/Sex: 69 / M ADM Date:   
24  
  
Loc:  Room: [address] [place] [address]   
Type: ADM IN  
Attending Dr: Katerina Oliveira MD  
  
Ordering Provider:   
Katerina Oliveira MD  
Date of Service: 24  
Accession #:   
([id number]) US/US   
venous duplex LE BI: ro   
dvt  
  
  
Copies to: Katerina Oliveira MD  
  
  
  
BILATERAL LOWER EXTREMITY   
VENOUS DUPLEX  
  
INDICATION: Painful   
swollen legs  
  
PROCEDURE: Color-flow   
duplex scanning is used   
to interrogate the deep   
venous system of the   
right  
and left lower   
extremities. The common   
femoral vein, femoral   
vein and popliteal vein   
show good  
compressibility with   
normal proximal and   
distal augmentation. The   
posterior tibial and   
peroneal  
veins are compressible.  
  
  
ORDER #: [id number] US/US   
venous duplex LE BI  
IMPRESSION:  
  
NO EVIDENCE FOR DEEP VEIN   
THROMBOSIS OR PROXIMAL   
SUPERFICIAL   
THROMBOPHLEBITIS IN THE   
RIGHT OR LEFT  
LOWER EXTREMITY.  
  
Impression dictated by:   
Neo Franco M.D.2024 9:13 AM  
  
  
Dictation Location:   
[place] [address]  
  
Tech: Eduarda Chua  
  
Transcribed By: MARBELLA   
24  
Dictated By: Neo Franco MD 24  
  
Signed By:  
24       Normal                                  [facility]   
Physician   
Group  
   
                                                    Complete Blood Count Auto Di  
ffon 2024   
   
                                                    Basophils (Bld)   
[#/Vol]         0.1 10*3/uL     Normal          0.0-0.2         [facility]   
Physician   
Group  
   
                                        Comment on above:   Result Comment: PERF  
ORMED BY:  
[place] [address]  
[phone]  
PATHOLOGIST MEDICAL DIRECTOR  
VIKAS MARTINEZ M.D.   
   
                                                            Performed By: #### L  
C FL TP ####  
[facility] Acct#[id number]  
,  
#### SYN TP ####  
[address]   
   
                                                    Basophils/100 WBC   
(Bld)           1.2 %           Normal          .               [facility]   
Physician   
Group  
   
                                        Comment on above:   Performed By: #### L  
C FL TP ####  
[facility] Acct#[id number]  
,  
#### SYN TP ####  
[address]   
   
                                                    Eosinophils (Bld)   
[#/Vol]         0.1 10*3/uL     Normal          0.0-0.45        [facility]   
Physician   
Group  
   
                                        Comment on above:   Performed By: #### L  
C FL TP ####  
[facility] Acct#[id number]  
,  
#### SYN TP ####  
[address]   
   
                                                    Eosinophils/100 WBC   
(Bld)           1.6 %           Normal          .               [facility]   
Physician   
Group  
   
                                        Comment on above:   Performed By: #### L  
C FL TP ####  
[facility] Acct#[id number]  
,  
#### SYN TP ####  
[address]   
   
                                                    Erythrocyte   
distribution width   
(RBC) [Ratio]   12.7 %          Normal          12.0-14.8       [facility]   
Physician   
Group  
   
                                        Comment on above:   Performed By: #### L  
C FL TP ####  
[facility] Acct#[id number]  
,  
#### SYN TP ####  
[address]   
   
                                                    Hematocrit (Bld)   
[Volume fraction] 40.7 %          Normal          38.8-50.0       [facility]   
Physician   
Group  
   
                                        Comment on above:   Performed By: #### L  
C FL TP ####  
[facility]ct#[id number]  
,  
#### SYN TP ####  
[address]   
   
                                                    Hemoglobin (Bld)   
[Mass/Vol]      13.6 g/dL       Normal          13.0-17.0       [facility]   
Physician   
Group  
   
                                        Comment on above:   Performed By: #### L  
C FL TP ####  
[facility]ct#[id number]  
,  
#### SYN TP ####  
[address]   
   
                                                    Lymphocytes (Bld)   
[#/Vol]         1.4 10*3/uL     Normal          1.00-4.8        [facility]   
Physician   
Group  
   
                                        Comment on above:   Performed By: #### L  
C FL TP ####  
[facility] Acct#[id number]  
,  
#### SYN TP ####  
[address]   
   
                                                    Lymphocytes/100 WBC   
(Bld)           22.0 %          Normal          .               [facility]   
Physician   
Group  
   
                                        Comment on above:   Performed By: #### L  
C FL TP ####  
[facility] Acct#[id number]  
,  
#### SYN TP ####  
[address]   
   
                                                    MCH (RBC) [Entitic   
mass]           29.3 pg         Normal          27.5-35.2       [facility]   
Physician   
Group  
   
                                        Comment on above:   Performed By: #### L  
C FL TP ####  
[facility] Acct#[id number]  
,  
#### SYN TP ####  
[address]   
   
                                                    MCV (RBC) [Entitic   
vol]            87.4 fL         Normal          83.5-101        [facility]   
Physician   
Group  
   
                                        Comment on above:   Performed By: #### L  
C FL TP ####  
[facility] Acct#[id number]  
,  
#### SYN TP ####  
[address]   
   
                                                    Mean Corpuscular HGB   
Conc            33.5 g/dL       Normal          32.5-35.6       [facility]   
Physician   
Group  
   
                                        Comment on above:   Performed By: #### L  
C FL TP ####  
[facility]ct#[id number]  
,  
#### SYN TP ####  
[address]   
   
                                                    Monocytes (Bld)   
[#/Vol]         0.7 10*3/uL     Normal          0.0-0.8         [facility]   
Physician   
Group  
   
                                        Comment on above:   Performed By: #### L  
C FL TP ####  
[facility] Acct#[id number]  
,  
#### SYN TP ####  
[place] [address] [place]   
   
                                                    Monocytes/100 WBC   
(Bld)           11.2 %          Normal          .               [facility]   
Physician   
Group  
   
                                        Comment on above:   Performed By: #### L  
C FL TP ####  
[facility]ct#[id number]  
,  
#### SYN TP ####  
[place] [address] [place]   
   
                                                    Neutrophils (Bld)   
[#/Vol]         4.1 10*3/uL     Normal          1.8-7.7         [facility]   
Physician   
Group  
   
                                        Comment on above:   Performed By: #### L  
C FL TP ####  
[facility] Acct#[id number]  
,  
#### SYN TP ####  
[place] [address] [place]   
   
                                                    Neutrophils/100 WBC   
(Bld)           64.0 %          Normal          .               [facility]   
Physician   
Group  
   
                                        Comment on above:   Performed By: #### L  
C FL TP ####  
[facility]ct#[id number]  
,  
#### SYN TP ####  
[address]   
   
                      NRBC%      0.4 /100{WBC} Normal     0-0.5      [facility]   
Physician   
Group  
   
                                        Comment on above:   Performed By: #### L  
C FL TP ####  
[facility]ct#[id number]  
,  
#### SYN TP ####  
[place] [address] [place]   
   
                                                    Platelet mean volume   
(Bld) [Entitic vol] 8.4 fL          Normal          6.6-10.1        [facility]   
Physician   
Group  
   
                                        Comment on above:   Performed By: #### L  
C FL TP ####  
[facility]ct#[id number]  
,  
#### SYN TP ####  
[place] [address] [place]   
   
                                                    Platelets (Bld)   
[#/Vol]         221 10*3/uL     Normal          150-450         [facility]   
Physician   
Group  
   
                                        Comment on above:   Performed By: #### L  
C FL TP ####  
[facility]ct#[id number]  
,  
#### SYN TP ####  
[address]   
   
                      RBC (Bld) [#/Vol] 4.65 10*6/uL Normal     3.90-5.60  [facility]   
Physician   
Group  
   
                                        Comment on above:   Performed By: #### L  
C FL TP ####  
[facility]ct#[id number]  
,  
#### SYN TP ####  
[facility]  
[address] [place] [address]   
   
                      WBC (Bld) [#/Vol] 6.4 10*3/uL Normal     4.1-10.5   [facility]   
Physician   
Group  
   
                                        Comment on above:   Performed By: #### L  
C FL TP ####  
[facility]ct#[id number]  
,  
#### SYN TP ####  
[address]   
   
                                                    Comprehensive Metabolic Pane  
guerline 2024   
   
                      Albumin [Mass/Vol] 3.4 g/dL   Low        3.5-5.7    [facility]   
Physician   
Group  
   
                                        Comment on above:   Performed By: #### L  
C FL TP ####  
[facility]ct#[id number]  
,  
#### SYN TP ####  
[address]   
   
                                                    Albumin/Globulin [Mass   
ratio]          1.1 {ratio}     Normal                          [facility]   
Physician   
Group  
   
                                        Comment on above:   Performed By: #### L  
C FL TP ####  
[facility]ct#[id number]  
,  
#### SYN TP ####  
[facility]  
[address] [place] [address]   
   
                                                    ALP [Catalytic   
activity/Vol]   65 U/L          Normal                    [facility]   
Physician   
Group  
   
                                        Comment on above:   Performed By: #### L  
C FL TP ####  
[facility]ct#[id number]  
,  
#### SYN TP ####  
[facility]  
[address] [place] [address]   
   
                                                    ALT [Catalytic   
activity/Vol]   39 U/L          Normal          7-52            [facility]   
Physician   
Group  
   
                                        Comment on above:   Performed By: #### L  
C FL TP ####  
[facility]ct#[id number]  
,  
#### SYN TP ####  
[address]   
   
                      Anion gap [Moles/Vol] 12.3 mmol/L Normal     6.0-15.0   Th  
e [facility]   
Physician   
Group  
   
                                        Comment on above:   Performed By: #### L  
C FL TP ####  
[facility]ct#[id number]  
,  
#### SYN TP ####  
[facility]  
[address] [place] [address]   
   
                                                    AST [Catalytic   
activity/Vol]   20 U/L          Normal          13-39           [facility]   
Physician   
Group  
   
                                        Comment on above:   Performed By: #### L  
C FL TP ####  
[facility]ct#[id number]  
,  
#### SYN TP ####  
[facility]  
[address] [place] [address] [place]   
   
                      Bilirubin [Mass/Vol] 0.6 mg/dL  Normal     0.3-1.0    The   
[facility]   
Physician   
Group  
   
                                        Comment on above:   Performed By: #### L  
C FL TP ####  
[facility]#[id number]  
,  
#### SYN TP ####  
[facility]  
[address] [place] [address] [place]   
   
                      Calcium [Mass/Vol] 8.7 mg/dL  Normal     8.6-10.3   [facility]   
Physician   
Group  
   
                                        Comment on above:   Performed By: #### L  
C FL TP ####  
[facility]#[id number]  
,  
#### SYN TP ####  
[facility]  
[address] [place] [address] [place]   
   
                      Chloride [Moles/Vol] 102 mmol/L Normal          The   
[facility]   
Physician   
Group  
   
                                        Comment on above:   Performed By: #### L  
C FL TP ####  
[facility]ct#[id number]  
,  
#### SYN TP ####  
[facility]  
[address] [place] [address] [place]   
   
                      CO2 [Moles/Vol] 26.7 mmol/L Normal     21.0-31.0  The [facility]   
Physician   
Group  
   
                                        Comment on above:   Performed By: #### L  
C FL TP ####  
[facility]ct#[id number]  
,  
#### SYN TP ####  
[address]   
   
                      Creatinine [Mass/Vol] 0.96 mg/dL Normal     0.70-1.30  The  
 [facility]   
Physician   
Group  
   
                                        Comment on above:   Performed By: #### L  
C FL TP ####  
[facility]ct#[id number]  
,  
#### SYN TP ####  
[address]   
   
                                                    Creatinine Clr Calc   
Pharmacy        112.42          Normal                          [facility]   
Physician   
Group  
   
                                        Comment on above:   Result Comment: PERF  
ORMED BY:  
[place] [address]  
[phone]  
PATHOLOGIST MEDICAL DIRECTOR  
VIKAS MARTINEZ M.D.   
   
                                                            Performed By: #### L  
C FL TP ####  
[facility]#[id number]  
,  
#### SYN TP ####  
[address]   
   
                                                    GFR/1.73 sq   
M.predicted MDRD   
(S/P/Bld) [Vol   
rate/Area]      mL/min/{1.73_m2} Normal                          [facility]   
Physician   
Group  
   
                                        Comment on above:   Performed By: #### L  
C FL TP ####  
[facility]#[id number]  
,  
#### SYN TP ####  
[address]   
   
                                                    Globulin (S)   
[Mass/Vol]      3.0 g/dL        Normal                          [facility]   
Physician   
Group  
   
                                        Comment on above:   Performed By: #### L  
C FL TP ####  
[facility]ct#[id number]  
,  
#### SYN TP ####  
[address]   
   
                      Glucose [Mass/Vol] 133 mg/dL  High            [facility]   
Physician   
Group  
   
                                        Comment on above:   Result Comment: Rand  
om Glucose Reference Range is dependent on  
   
time and  
content of last meal. Glucose of more than 200 mg/dL in a  
nonstressed, ambulatory subject supports the diagnosis of  
Diabetes Mellitus.  
ADA recommended reference range   
   
                                                            Performed By: #### L  
C FL TP ####  
[facility]#[id number]  
,  
#### SYN TP ####  
[address]   
   
                      Potassium [Moles/Vol] 4.0 mmol/L Normal     3.5-5.1    The  
 [facility]   
Physician   
Group  
   
                                        Comment on above:   Performed By: #### L  
C FL TP ####  
[facility]#[id number]  
,  
#### SYN TP ####  
[address]   
   
                      Protein [Mass/Vol] 6.4 g/dL   Normal     6.4-8.9    [facility]   
Physician   
Group  
   
                                        Comment on above:   Performed By: #### L  
C FL TP ####  
[facility] Acct#[id number]  
,  
#### SYN TP ####  
[address]   
   
                      Sodium [Moles/Vol] 137 mmol/L Normal     136-145    [facility]   
Physician   
Group  
   
                                        Comment on above:   Performed By: #### L  
C FL TP ####  
[facility] Acct#[id number]  
,  
#### SYN TP ####  
[address]   
   
                                                    Urea nitrogen   
[Mass/Vol]      24 mg/dL        Normal          7-25            [facility]   
Physician   
Group  
   
                                        Comment on above:   Performed By: #### L  
C FL TP ####  
[facility] Acct#[id number]  
,  
#### SYN TP ####  
[place] [address] [place]   
   
                                                    Partial Thromboplastin Timeo  
n 2024   
   
                      aPTT Coag (Bld) [Time] 98.7 s     Off scale high 25.1-36.5  
  [facility]   
Physician   
Group  
   
                                        Comment on above:   Result Comment:  
Results called  
at 1858 on 24  
A hematocrit value greater than 55% may lead to inaccurate  
results in coagulation testing. Patients having hematocrit  
values >55% require a special collection tube for  
coagulation studies.  
Please contact the laboratory at [phone] for redraw  
instructions.  
PERFORMED BY:  
[place] [address]  
[phone]  
PATHOLOGIST MEDICAL DIRECTOR  
VIKAS MARTINEZ M.D.   
   
                                                            Performed By: #### C  
UBLD ####  
[place] [id number] [facility]   
   
                      aPTT Coag (Bld) [Time] 43.4 s     High       25.1-36.5  Th  
e [facility]   
Physician   
Group  
   
                                        Comment on above:   Result Comment: A he  
matocrit value greater than 55% may lead   
to   
inaccurate  
results in coagulation testing. Patients having hematocrit  
values >55% require a special collection tube for  
coagulation studies.  
Please contact the laboratory at [phone] for redraw  
instructions.  
PERFORMED BY:  
[address] [place] [address]  
[phone]  
PATHOLOGIST MEDICAL DIRECTOR  
VIKAS MARTINEZ M.D.   
   
                                                            Performed By: #### L  
C FL TP ####  
[facility] Acct#[id number]  
,  
#### SYN TP ####  
[place] [id number] [place]   
   
                                                    Prothrombin Time INRon    
   
                                                    INR Coag (PPP)   
[Relative time] 1.3 {INR}       Normal                          [facility]   
Physician   
Group  
   
                                        Comment on above:   Result Comment: INR   
Therapeutic Range  
A) Pre- and Peroperative OAT started two weeks before  
surgery. NOT HIP SURGERY: 1.5 - 2.5  
HIP SURGERY: 2 - 3  
B) Primary and secondary prevention of venous  
THROMBOSIS: 2 - 3  
C) Active venous thrombosis, pulmonary embolism  
and prevention of recurrent venous thrombosis: 2 - 3  
D) Prevention of arterial thromboembolism  
including patients with mechanical heart valves: 3 - 4.5   
   
                                                            Performed By: #### L  
C FL TP ####  
[facility] Acct#[id number]  
,  
#### SYN TP ####  
[address] [address] [place]   
   
                      PT Coag (PPP) [Time] 15.2 s     High       9.0-12.9   The   
[facility]   
Physician   
Group  
   
                                        Comment on above:   Result Comment: A he  
matocrit value greater than 55% may lead   
to   
inaccurate  
results in coagulation testing. Patients having hematocrit  
values >55% require a special collection tube for  
coagulation studies.  
Please contact the laboratory at [phone] for redraw  
instructions.   
   
                                                            Performed By: #### L  
C FL TP ####  
[facility] Acct#[id number]  
,  
#### SYN TP ####  
[address]   
   
                                                    Troponin I High Sensitivityo  
n 2024   
   
                                                    Troponin I High   
Sensitivity     230.2 pg/mL     Off scale high  0.0-20.0        [facility]   
Physician   
Group  
   
                                        Comment on above:   Result Comment: Crit  
ical Result : Called to and read back by:   
MAGDI SCOTT  
at: 2024 11:55:49 by:SIMONE  
PERFORMED BY:  
[place] [address]  
[phone]  
PATHOLOGIST MEDICAL DIRECTOR  
VIKAS MARTINEZ M.D.   
   
                                                            Performed By: #### A  
ERC ####  
[address]   
   
                                                    Basic Metabolic Panelon 08-2  
3-2024   
   
                                                    Creatinine Clr Calc   
Pharmacy        142.70          Normal                          [facility]   
Physician   
Group  
   
                                        Comment on above:   Order Comment: LINE   
DRAW. TUBE AND LABELS SENT @ 0427. MLG   
   
                                                            Result Comment: PERF  
ORMED BY:  
[place] [address]  
[phone]  
PATHOLOGIST MEDICAL DIRECTOR  
VIKAS MARTINEZ M.D.   
   
                                                            Performed By: #### B  
MP ####[address]   
   
                                                    GFR/1.73 sq   
M.predicted MDRD   
(S/P/Bld) [Vol   
rate/Area]      mL/min/{1.73_m2} Normal                          [facility]   
Physician   
Group  
   
                                        Comment on above:   Order Comment: LINE   
DRAW. TUBE AND LABELS SENT @ 0427. ML[facility]   
   
                                                            Performed By: #### B  
MP ####[address]   
   
                                                    C reactive protein [Mass/vol  
ume] in Serum or PlasmaOrdered By: Paul Crum on   
2024   
   
                      CRP [Mass/Vol] 22.7 mg/dL High       0.0-0.5    [facility]  
   
                                                    C-Reactive Proteinon   
024   
   
                      C-Reactive Protein 22.7 mg/dL High       0.0-0.5    [facility]   
Physician   
Group  
   
                                        Comment on above:   Result Comment: PERF  
ORMED BY:  
[facility]  
[address]  
[place] [id number]  
[phone]  
PATHOLOGIST MEDICAL DIRECTOR  
VIKAS MARTINEZ M.D.   
   
                                                            Performed By: #### A  
ERC ####  
[address]   
   
                                                    Calcium [Mass/volume] in Ser  
um or PlasmaOrdered By: Josie Bethele on   
2024   
   
                      Calcium [Mass/Vol] 8.6 mg/dL  Normal     8.6-10.3   [facility]  
   
                                        Comment on above:   Order Comment: LINE   
DRAW. TUBE AND LABELS SENT @ 0427. MLG   
   
                                                            Performed By: #### B  
MP ####[place] [address] [place]   
   
                                                    Carbon dioxide, total [Moles  
/volume] in Serum or PlasmaOrdered By: Josie Ann on   
2024   
   
                      CO2 [Moles/Vol] 27.1 mmol/L Normal     21.0-31.0  [facility]  
   
                                        Comment on above:   Order Comment: LINE   
DRAW. TUBE AND LABELS SENT @ 0427. MLG   
   
                                                            Performed By: #### B  
MP ####[place] [id number] [place]   
   
                                                    Chloride [Moles/volume] in S  
lorraine or PlasmaOrdered By: Josie Marissa on   
2024   
   
                      Chloride [Moles/Vol] 98 mmol/L  Normal          [facility]  
   
                                        Comment on above:   Order Comment: LINE   
DRAW. TUBE AND LABELS SENT @ 0427. MLG   
   
                                                            Performed By: #### B  
MP ####[place] [address]   
[place] [id number] [place]   
   
                                                    Creatinine [Mass/volume] in   
Serum or PlasmaOrdered By: Josie Marissa on   
2024   
   
                      Creatinine [Mass/Vol] 0.67 mg/dL Low        0.70-1.30  [facility]  
   
                                        Comment on above:   Order Comment: LINE   
DRAW. TUBE AND LABELS SENT @ 0427. ML[facility]   
   
                                                            Performed By: #### B  
MP ####[place] [id number] [place]   
   
                                                    Glucose [Mass/volume] in Ser  
um or PlasmaOrdered By: Josie Semaskiene on   
2024   
   
                      Glucose [Mass/Vol] 151 mg/dL  High            [facility]  
   
                                        Comment on above:   ADA recommended refe  
rence rangeRandom Glucose Reference Range   
is dependent on time and content of last meal. Glucose of more   
than 200 mg/dL in a nonstressed, ambulatory subject supports   
the diagnosis of Diabetes Mellitus.   
   
                                                            Order Comment: LINE   
DRAW. TUBE AND LABELS SENT @ 0427. MLG   
   
                                                            Result Comment: Rand  
om Glucose Reference Range is dependent on   
time and  
content of last meal. Glucose of more than 200 mg/dL in a  
nonstressed, ambulatory subject supports the diagnosis of  
Diabetes Mellitus.  
ADA recommended reference range   
   
                                                            Performed By: #### B  
MP ####[address]   
   
                                                    No Panel InformationOrdered   
By: Josie Ann on 2024   
   
                                                    Estimated GFR   
(CKD-EPI)       > 60.0 mL/Min                                   [facility]  
   
                                                    Pharmacy Creatinine   
Clearance (Chem 142.70                                          [facility]  
   
                                                    Potassium [Moles/volume] in   
Serum or PlasmaOrdered By: Josie Ann on   
2024   
   
                      Potassium [Moles/Vol] 4.3 mmol/L Normal     3.5-5.1    [facility]  
   
                                        Comment on above:   Order Comment: LINE   
DRAW. TUBE AND LABELS SENT @ 0427. MLG   
   
                                                            Performed By: #### B  
MP ####[address]   
   
                                                    Serum or plasma anion gap de  
terminationOrdered By: Josie Ann on 2024  
   
   
                      Anion gap [Moles/Vol] 12.2 mmol/L Normal     6.0-15.0   [facility]  
   
                                        Comment on above:   Order Comment: LINE   
DRAW. TUBE AND LABELS SENT @ 0427. MLG   
   
                                                            Performed By: #### B  
MP ####[address]   
   
                                                    Sodium [Moles/volume] in Ser  
um or PlasmaOrdered By: Josie Ann on   
2024   
   
                      Sodium [Moles/Vol] 133 mmol/L Low        136-145    [facility]  
   
                                        Comment on above:   Order Comment: LINE   
DRAW. TUBE AND LABELS SENT @ 0427. ML[facility]   
   
                                                            Performed By: #### B  
MP ####[address]   
   
                                                    Urea nitrogen [Mass/volume]   
in Serum or PlasmaOrdered By: Josie Ann on   
2024   
   
                                                    Urea nitrogen   
[Mass/Vol]      14 mg/dL        Normal          7-25            [facility]  
   
                                        Comment on above:   Order Comment: LINE   
DRAW. TUBE AND LABELS SENT @ 0427. ML[facility]   
   
                                                            Performed By: #### B  
MP ####[address]   
   
                                                    Automated basophil %Ordered   
By: Jeff Onofre on 2024   
   
                                                    Basophils/100 WBC   
(Bld)           1.5 %           Normal          .               [facility]  
   
                                        Comment on above:   Performed By: #### C  
BC, BMP, CRP ####[address]   
   
                                                    Automated basophil countOrde  
red By: Jeff Onofre on 2024   
   
                                                    Basophils (Bld)   
[#/Vol]         0.1 10*3/uL     Normal          0.0-0.2         [facility]  
   
                                        Comment on above:   Result Comment: PERF  
ORMED BY:  
[facility]  
[address]  
[place] [address]  
[phone]  
PATHOLOGIST MEDICAL DIRECTOR  
VIKAS MARTINEZ M.D.   
   
                                                            Performed By: #### C  
BC, BMP, CRP ####[address]   
   
                                                    Automated blood monocyte cou  
ntOrdered By: Jeff Onofre on 2024   
   
                                                    Monocytes (Bld)   
[#/Vol]         1.2 10*3/uL     High            0.0-0.8         [facility]  
   
                                        Comment on above:   Performed By: #### C  
BC, BMP, CRP ####[address]   
   
                                                    Automated eosinophil %Ordere  
d By: Jeff Onofre on 2024   
   
                                                    Eosinophils/100 WBC   
(Bld)           1.1 %           Normal          .               [facility]  
   
                                        Comment on above:   Performed By: #### C  
BC, BMP, CRP ####[facility] [place] [id number] [facility]   
   
                                                    Automated eosinophil countOr  
dered By: Jeff Onofre on 2024   
   
                                                    Eosinophils (Bld)   
[#/Vol]         0.1 10*3/uL     Normal          0.0-0.45        [facility]  
   
                                        Comment on above:   Performed By: #### C  
BC, BMP, CRP ####[address]   
   
                                                    Automated monocyte %Ordered   
By: Jeff Onofre on 2024   
   
                                                    Monocytes/100 WBC   
(Bld)           14.0 %          Normal          .               [facility]  
   
                                        Comment on above:   Performed By: #### C  
BC, BMP, CRP ####[place] [address] [address]   
   
                                                    Automated neutrophil %Ordere  
d By: Jeff Onofre on 2024   
   
                                                    Neutrophils/100 WBC   
(Bld)           68.5 %          Normal          .               [facility]  
   
                                        Comment on above:   Performed By: #### C  
BC, BMP, CRP ####[place] [id number] [facility]   
   
                                                    Basic Metabolic Panelon    
   
                      Anion gap [Moles/Vol] 12.4 mmol/L Normal     6.0-15.0   [facility]   
Physician   
Group  
   
                                        Comment on above:   Performed By: #### C  
BC, BMP, CRP ####[place] [id number] [facility]   
   
                      Calcium [Mass/Vol] 8.2 mg/dL  Low        8.6-10.3   [facility]   
Physician   
Group  
   
                                        Comment on above:   Performed By: #### C  
BC, BMP, CRP ####[place] [id number] [facility]   
   
                      Chloride [Moles/Vol] 99 mmol/L  Normal          The   
[facility]   
Physician   
Group  
   
                                        Comment on above:   Performed By: #### C  
BC, BMP, CRP ####[place] [id number] [facility]   
   
                      CO2 [Moles/Vol] 29.1 mmol/L Normal     21.0-31.0  [facility]   
Physician   
Group  
   
                                        Comment on above:   Performed By: #### C  
BC, BMP, CRP ####[place] [address] [place] [id number] [facility]   
   
                      Creatinine [Mass/Vol] 0.90 mg/dL Normal     0.70-1.30  The  
 [facility]   
Physician   
Group  
   
                                        Comment on above:   Performed By: #### C  
BC, BMP, CRP ####[place] [id number] [facility]   
   
                                                    Creatinine Clr Calc   
Pharmacy        126.85          Normal                          [facility]   
Physician   
Group  
   
                                        Comment on above:   Result Comment: PERF  
ORMED BY:  
[facility]  
[address]  
[place] [address]  
[phone]  
PATHOLOGIST MEDICAL DIRECTOR  
VIKAS MARTINEZ M.D.   
   
                                                            Performed By: #### C  
NATASHA HARDING CRP ####[address]   
   
                                                    GFR/1.73 sq   
M.predicted MDRD   
(S/P/Bld) [Vol   
rate/Area]      mL/min/{1.73_m2} Normal                          [facility]   
Physician   
Group  
   
                                        Comment on above:   Performed By: #### C  
NATASHA HARDING, CRP ####[address]   
   
                      Glucose [Mass/Vol] 137 mg/dL  High            [facility]   
Physician   
Group  
   
                                        Comment on above:   Result Comment: Rand  
om Glucose Reference Range is dependent on  
   
time and  
content of last meal. Glucose of more than 200 mg/dL in a  
nonstressed, ambulatory subject supports the diagnosis of  
Diabetes Mellitus.  
ADA recommended reference range   
   
                                                            Performed By: #### C  
NATASHA HARDING, CRP ####[place] [id number] [facility]   
   
                      Potassium [Moles/Vol] 4.5 mmol/L Normal     3.5-5.1    The  
 [facility]   
Physician   
Group  
   
                                        Comment on above:   Performed By: #### C  
NATASHA HARDING, CRP ####[address]   
   
                      Sodium [Moles/Vol] 136 mmol/L Normal     136-145    [facility]   
Physician   
Group  
   
                                        Comment on above:   Performed By: #### C  
BC, BMP, CRP ####[place] [id number] [facility]   
   
                                                    Urea nitrogen   
[Mass/Vol]      17 mg/dL        Normal          7-25            [facility]   
Physician   
Group  
   
                                        Comment on above:   Performed By: #### C  
BC, BMP, CRP ####[address]   
   
                                                    C-Reactive Proteinon   
024   
   
                      C-Reactive Protein 12.2 mg/dL High       0.0-0.5    [facility]   
Physician   
Group  
   
                                        Comment on above:   Result Comment: PERF  
ORMED BY:  
[facility]  
[address]  
[place] [address]  
[phone]  
PATHOLOGIST MEDICAL DIRECTOR  
VIKAS MARTINEZ M.D.   
   
                                                            Performed By: #### C  
BC, BMP, CRP ####[address]   
   
                                                    Complete Blood Count Auto Di  
ffon 2024   
   
                                                    Mean Corpuscular HGB   
Conc            33.8 g/dL       Normal          32.5-35.6       [facility]   
Physician   
Group  
   
                                        Comment on above:   Performed By: #### C  
BC, BMP, CRP ####[address]   
   
                      NRBC%      0.2 /100{WBC} Normal     0-0.5      [facility]   
Physician   
Group  
   
                                        Comment on above:   Performed By: #### C  
BC, BMP, CRP ####[address]   
   
                                                    Erythrocyte distribution wid  
th [Ratio] by Automated countOrdered By: Jeff Onofre   
on 2024   
   
                                                    Erythrocyte   
distribution width   
(RBC) [Ratio]   13.3 %          Normal          12.0-14.8       [facility]  
   
                                        Comment on above:   Performed By: #### C  
BC, BMP, CRP ####[address]   
   
                                                    Erythrocytes [#/volume] in B  
lood by Automated countOrdered By: Jeff Onofre   
on   
2024   
   
                      RBC (Bld) [#/Vol] 5.03 10*6/uL Normal     3.90-5.60  [facility]  
   
                                        Comment on above:   Performed By: #### C  
BC, BMP, CRP ####[address] [place] [address] [facility]   
   
                                                    Hematocrit [Volume Fraction]  
 of Blood by Automated countOrdered By: Jeff Onofre   
on 2024   
   
                                                    Hematocrit (Bld)   
[Volume fraction] 44.1 %          Normal          38.8-50.0       [facility]  
   
                                        Comment on above:   Performed By: #### C  
BC, BMP, CRP ####[address]   
   
                                                    Hemoglobin [Mass/volume] in   
BloodOrdered By: Jeff Onofre on 2024   
   
                                                    Hemoglobin (Bld)   
[Mass/Vol]      14.9 g/dL       Normal          13.0-17.0       [facility]  
   
                                        Comment on above:   Performed By: #### C  
BC, BMP, CRP ####[address]   
   
                                                    Leukocytes [#/volume] correc  
bassam for nucleated erythrocytes in Blood by Automated  
   
counOrdered By: Jeff Onofre on 2024   
   
                                                    WBC corrected for nucl   
RBC Auto (Bld) [#/Vol] 8.5 10*3/uL                     4.1-10.5        [facility]  
   
                                                    Leukocytes [#/volume] in Blo  
od by Automated countOrdered By: Jeff Onofre on   
2024   
   
                      WBC (Bld) [#/Vol] 8.5 10*3/uL Normal     4.1-10.5   [facility]  
   
                                        Comment on above:   Performed By: #### C  
BC, BMP, CRP ####[address]   
   
                                                    Lymphocytes [#/volume] in Bl  
ood by Automated countOrdered By: Jeff Onofre on  
   
2024   
   
                                                    Lymphocytes (Bld)   
[#/Vol]         1.3 10*3/uL     Normal          1.00-4.8        [facility]  
   
                                        Comment on above:   Performed By: #### C  
BC, BMP, CRP ####[place] [address] [place]   
   
                                                    Lymphocytes/100 leukocytes i  
n Blood by Automated countOrdered By: Jeff Onofre on   
2024   
   
                                                    Lymphocytes/100 WBC   
(Bld)           14.9 %          Normal          .               [facility]  
   
                                        Comment on above:   Performed By: #### C  
BC, NATASHA, CRP ####[address]   
   
                                                    MCH [Entitic mass] by Automa  
bassam countOrdered By: Jeff Onofre on 2024   
   
                                                    MCH (RBC) [Entitic   
mass]           29.6 pg         Normal          27.5-35.2       [facility]  
   
                                        Comment on above:   Performed By: #### C  
BC, BMP, CRP ####[address]   
   
                                                    MCHC Auto (RBC) [Mass/Vol]Or  
dered By: Jeff Onofre on 2024   
   
                      MCHC (RBC) [Mass/Vol] 33.8 g/dL             32.5-35.6  [facility]  
   
                                                    MCV [Entitic volume] by Auto  
mated countOrdered By: Jeff Onofre on 2024  
  
   
                                                    MCV (RBC) [Entitic   
vol]            87.5 fL         Normal          83.5-101        [facility]  
   
                                        Comment on above:   Performed By: #### C  
NATASHA HARDING, CRP ####[address]   
   
                                                    Neutrophils [#/volume] in Bl  
ood by Automated countOrdered By: Jeff Onofre on  
   
2024   
   
                                                    Neutrophils (Bld)   
[#/Vol]         5.8 10*3/uL     Normal          1.8-7.7         [facility]  
   
                                        Comment on above:   Performed By: #### C  
BC, NATASHA, CRP ####[address]   
   
                                                    Nucleated erythrocytes [Pres  
ence] in Blood by Automated countOrdered By: Jeff Onofre on 2024   
   
                                                    Nucleated RBC Auto Ql   
(Bld)           0.2 /100{WBC}                   0-0.5           [facility]  
   
                                                    Platelet mean volume [Entiti  
c volume] in Blood by Automated countOrdered By:   
Jeff Onofre on 2024   
   
                                                    Platelet mean volume   
(Bld) [Entitic vol] 7.8 fL          Normal          6.6-10.1        [facility]  
   
                                        Comment on above:   Performed By: #### C  
BC, BMP, CRP ####[facility]   
[id number] [place] [id number] [facility]   
   
                                                    Platelets [#/volume] in Bloo  
d by Automated countOrdered By: Jeff Onofre on   
2024   
   
                                                    Platelets (Bld)   
[#/Vol]         182 10*3/uL     Normal          150-450         [facility]  
   
                                        Comment on above:   Performed By: #### C  
BC, BMP, CRP ####[facility]   
[id number] [place] [id number] [facility]   
   
                                                    XR chest 1V portableon    
   
                                        XR chest 1V portable [facility]  
[address] [place] [address]  
  
XRay Report  
Signed  
  
Patient: Kaylynn Mims   
MR#: [id number]  
16  
: 1955   
Acct:[id number]  
  
Age/Sex: 68 / M ADM Date:   
24  
  
Loc:  Room: [address] [place] [address]   
Type: ADM IN  
Attending Dr: Josie Ann MD  
Copies to: MD Josie Sanz MD  
  
  
  
Ordering Provider: Jeff Onofre MD  
Date of Service: 24  
Accession #:   
([id number]) XR/XR chest   
1V portable: PICC line   
verification  
  
  
  
  
SINGLE VIEW CHEST  
  
CLINICAL HISTORY: PICC   
line placement.  
  
COMPARISON: None  
  
FINDINGS:  
  
A right-sided PICC line   
is identified with its   
tip projecting over the   
right subclavian vein   
region.  
Cardiomegaly with   
vascular congestion is   
noted. No lung   
consolidation   
pneumothorax pleural   
effus  
ion or free air.  
  
  
ORDER #: [id number] XR/XR   
chest 1V portable  
IMPRESSION:  
  
A RIGHT-SIDED PICC LINE   
IS SEEN WITH ITS TIP   
PROJECTING OVER THE RIGHT   
SUBCLAVIAN VEIN REGION.  
  
Impression dictated by:   
Claudy Smith Jr.,   
[email or web address] 11:28 AM  
  
  
Dictation Location:   
[place] [address]  
  
  
  
Transcribed By: [facility]   
24 1128  
Dictated By: Claudy Smith Jr, DO 24   
1126  
  
Signed By:  
24 1128       Normal                                  [facility]   
Physician   
Group  
   
                                                    Bacterial blood cultureOrder  
ed By: Josie Ann on 2024   
   
                                                    Bacteria identified Cx   
Nom (Bld)       NO GROWTH 5 DAYS                                 [facility]  
   
                                                    Bacteria identified Cx   
Nom (Bld)       NO GROWTH 5 DAYS                                 [facility]  
   
                                                    Basic Metabolic Panelon    
   
                      Anion gap [Moles/Vol] 11.4 mmol/L Normal     6.0-15.0   Th  
e [facility]   
Physician   
Group  
   
                                        Comment on above:   Performed By: #### C  
BC, BMP ####[address] [address] [place] [address] [facility]   
   
                      Calcium [Mass/Vol] 8.2 mg/dL  Low        8.6-10.3   [facility]   
Physician   
Group  
   
                                        Comment on above:   Performed By: #### C  
BC, BMP ####[place] [place] [address] [place]   
   
                      Chloride [Moles/Vol] 103 mmol/L Normal          The   
[facility]   
Physician   
Group  
   
                                        Comment on above:   Performed By: #### C  
BC, BMP ####[address] [address] [facility]   
   
                      CO2 [Moles/Vol] 25.9 mmol/L Normal     21.0-31.0  [facility]   
Physician   
Group  
   
                                        Comment on above:   Performed By: #### C  
BC, BMP ####[address] [address] [place]   
   
                      Creatinine [Mass/Vol] 0.85 mg/dL Normal     0.70-1.30  The  
 [facility]   
Physician   
Group  
   
                                        Comment on above:   Performed By: #### C  
BC, BMP ####[address] [address] [place]   
   
                                                    Creatinine Clr Calc   
Pharmacy        133.88          Normal                          [facility]   
Physician   
Group  
   
                                        Comment on above:   Result Comment: PERF  
ORMED BY:  
[facility]  
[address]  
[place] [id number]  
[phone]  
PATHOLOGIST MEDICAL DIRECTOR  
VIKAS MARTINEZ M.D.   
   
                                                            Performed By: #### C  
BC, BMP ####[place] [id number] [place]   
   
                                                    GFR/1.73 sq   
M.predicted MDRD   
(S/P/Bld) [Vol   
rate/Area]      mL/min/{1.73_m2} Normal                          [facility]   
Physician   
Group  
   
                                        Comment on above:   Performed By: #### C  
BC, BMP ####[place] [id number] [facility]   
   
                      Glucose [Mass/Vol] 146 mg/dL  High            [facility]   
Physician   
Group  
   
                                        Comment on above:   Result Comment: Rand  
 Glucose Reference Range is dependent on  
   
time and  
content of last meal. Glucose of more than 200 mg/dL in a  
nonstressed, ambulatory subject supports the diagnosis of  
Diabetes Mellitus.  
ADA recommended reference range   
   
                                                            Performed By: #### C  
BC, BMP ####[place] [id number] [facility]   
   
                      Potassium [Moles/Vol] 4.3 mmol/L Normal     3.5-5.1    The  
 [facility]   
Physician   
Group  
   
                                        Comment on above:   Performed By: #### C  
BC, BMP ####[address]   
   
                      Sodium [Moles/Vol] 136 mmol/L Normal     136-145    [facility]   
Physician   
Group  
   
                                        Comment on above:   Performed By: #### C  
BC, BMP ####[address]   
   
                                                    Urea nitrogen   
[Mass/Vol]      20 mg/dL        Normal          7-25            [facility]   
Physician   
Group  
   
                                        Comment on above:   Performed By: #### C  
BC, BMP ####[place] [id number] [facility]   
   
                                                    Blood Cultureon 2024   
   
                                                    Bacteria identified Cx   
Nom (Bld)                               NO GROWTH 5 DAYS  
PERFORMED BY:  
[place] [address]  
[phone]  
PATHOLOGIST MEDICAL   
DIRECTOR  
VIKAS MARTINEZ M.D.    Normal                                  [facility]   
Physician   
Group  
   
                                        Comment on above:   Performed By: #### C  
UBLD ####  
[address]   
   
                                                    Bacteria identified Cx   
Nom (Bld)                               NO GROWTH 5 DAYS  
PERFORMED BY:  
[place] [address]  
[phone]  
PATHOLOGIST MEDICAL   
DIRECTOR  
VIKAS MARTINEZ M.D.    Normal                                  [facility]   
Physician   
Group  
   
                                        Comment on above:   Performed By: #### C  
UBLD ####  
[place] [id number] [facility]   
   
                                                    Complete Blood Count Auto Di  
ffon 2024   
   
                                                    Basophils (Bld)   
[#/Vol]         0.1 10*3/uL     Normal          0.0-0.2         [facility]   
Physician   
Group  
   
                                        Comment on above:   Result Comment: PERF  
ORMED BY:  
[facility]  
[place] [address]  
[phone]  
PATHOLOGIST MEDICAL DIRECTOR  
VIKAS MARTINEZ M.D.   
   
                                                            Performed By: #### C  
BC, BMP ####[address]   
   
                                                    Basophils/100 WBC   
(Bld)           0.6 %           Normal          .               [facility]   
Physician   
Group  
   
                                        Comment on above:   Performed By: #### C  
BC, BMP ####[address]   
   
                                                    Eosinophils (Bld)   
[#/Vol]         0.0 10*3/uL     Normal          0.0-0.45        [facility]   
Physician   
Group  
   
                                        Comment on above:   Performed By: #### C  
BC, BMP ####[address]   
   
                                                    Eosinophils/100 WBC   
(Bld)           0.1 %           Normal          .               [facility]   
Physician   
Group  
   
                                        Comment on above:   Performed By: #### C  
BC, BMP ####[address]   
   
                                                    Erythrocyte   
distribution width   
(RBC) [Ratio]   13.3 %          Normal          12.0-14.8       [facility]   
Physician   
Group  
   
                                        Comment on above:   Performed By: #### C  
BC, BMP ####[address]   
   
                                                    Hematocrit (Bld)   
[Volume fraction] 43.3 %          Normal          38.8-50.0       [facility]   
Physician   
Group  
   
                                        Comment on above:   Performed By: #### C  
BC, BMP ####[address]   
   
                                                    Hemoglobin (Bld)   
[Mass/Vol]      14.7 g/dL       Normal          13.0-17.0       [facility]   
Physician   
Group  
   
                                        Comment on above:   Performed By: #### C  
BC, BMP ####[address]   
   
                                                    Lymphocytes (Bld)   
[#/Vol]         0.8 10*3/uL     Low             1.00-4.8        [facility]   
Physician   
Group  
   
                                        Comment on above:   Performed By: #### C  
BC, BMP ####[address]   
   
                                                    Lymphocytes/100 WBC   
(Bld)           9.3 %           Normal          .               [facility]   
Physician   
Group  
   
                                        Comment on above:   Performed By: #### C  
BC, BMP ####[address]   
   
                                                    MCH (RBC) [Entitic   
mass]           29.7 pg         Normal          27.5-35.2       [facility]   
Physician   
Group  
   
                                        Comment on above:   Performed By: #### C  
BC, BMP ####[address]   
   
                                                    MCV (RBC) [Entitic   
vol]            87.6 fL         Normal          83.5-101        [facility]   
Physician   
Group  
   
                                        Comment on above:   Performed By: #### C  
BC, BMP ####[address]   
   
                                                    Mean Corpuscular HGB   
Conc            33.9 g/dL       Normal          32.5-35.6       [facility]   
Physician   
Group  
   
                                        Comment on above:   Performed By: #### C  
BC, BMP ####[address]   
   
                                                    Monocytes (Bld)   
[#/Vol]         0.9 10*3/uL     High            0.0-0.8         [facility]   
Physician   
Group  
   
                                        Comment on above:   Performed By: #### C  
BC, BMP ####[address]   
   
                                                    Monocytes/100 WBC   
(Bld)           9.7 %           Normal          .               [facility]   
Physician   
Group  
   
                                        Comment on above:   Performed By: #### C  
BC, BMP ####[address]   
   
                                                    Neutrophils (Bld)   
[#/Vol]         7.3 10*3/uL     Normal          1.8-7.7         [facility]   
Physician   
Group  
   
                                        Comment on above:   Performed By: #### C  
BC, BMP ####[address]   
   
                                                    Neutrophils/100 WBC   
(Bld)           80.3 %          Normal          .               [facility]   
Physician   
Group  
   
                                        Comment on above:   Performed By: #### C  
BC, BMP ####[address]   
   
                      NRBC%      0.2 /100{WBC} Normal     0-0.5      [facility]   
Physician   
Group  
   
                                        Comment on above:   Performed By: #### C  
BC, BMP ####[address]   
   
                                                    Platelet mean volume   
(Bld) [Entitic vol] 7.6 fL          Normal          6.6-10.1        [facility]   
Physician   
Group  
   
                                        Comment on above:   Performed By: #### C  
BC, BMP ####[address]   
   
                                                    Platelets (Bld)   
[#/Vol]         180 10*3/uL     Normal          150-450         [facility]   
Physician   
Group  
   
                                        Comment on above:   Performed By: #### C  
BC, BMP ####[address]   
   
                      RBC (Bld) [#/Vol] 4.95 10*6/uL Normal     3.90-5.60  [facility]   
Physician   
Group  
   
                                        Comment on above:   Performed By: #### C  
BC, BMP ####[address]   
   
                      WBC (Bld) [#/Vol] 9.1 10*3/uL Normal     4.1-10.5   [facility]   
Physician   
Group  
   
                                        Comment on above:   Performed By: #### C  
BC, BMP ####[address]   
   
                                                    Aerobic Cultureon 2024  
   
   
                                        Aerobic Culture     Comment right knee   
culture #3  
ORGANISM: Streptococcus   
mitis/oralis grp   
(O:STRMITORGR)  
Comments  
Organism Not Routinely   
Tested for   
Susceptibilities  
Quantity of Growth  
Rare Growth  
*************************  
******  
* This is a corrected   
result. *  
*************************  
******  
A prior result that was   
reported as final has   
been changed.  
Strep mitis/oralis group   
added to report  
Comment right knee   
culture #3  
Anaerobic Culture Results  
No Anaerobes Isolated 3   
Days  
Comment right knee   
culture #3  
Gram Stain Result  
1+ White Blood Cells  
No Bacteria Seen  
PERFORMED BY:  
[place] [address]  
[phone]  
PATHOLOGIST MEDICAL   
DIRECTOR  
VIKAS MARTINEZ M.D.    [facility]   
Physician   
Group  
   
                                        Comment on above:   Performed By: #### A  
ERC ####  
[address]   
   
                                        Aerobic Culture     Comment right knee   
culture #2  
ORGANISM: Streptococcus   
mitis/oralis grp   
(O:STRMITORGR)  
Comments  
Organism Not Routinely   
Tested for   
Susceptibilities  
Quantity of Growth  
Rare Growth  
*************************  
******  
* This is a corrected   
result. *  
*************************  
******  
A prior result that was   
reported as final has   
been changed.  
Strep mitis/oralis group   
added to report  
  
  
Comment right knee   
culture #2  
Anaerobic Culture Results  
No Anaerobes Isolated 3   
Days  
Comment right knee   
culture #2  
Gram Stain Result  
2+ White Blood Cells  
No Bacteria Seen  
PERFORMED BY:  
[place] [address]  
[phone]  
PATHOLOGIST MEDICAL   
DIRECTOR  
VIKAS MARTINEZ M.D.    [facility]   
Physician   
Group  
   
                                        Comment on above:   Performed By: #### A  
ERC ####[facility]  
   
[address]   
   
                                        Aerobic Culture     Comment right knee   
culture #1  
ORGANISM: Streptococcus   
mitis/oralis grp   
(O:STRMITORGR)  
Comments  
Organism Not Routinely   
Tested for   
Susceptibilities  
Quantity of Growth  
Rare Growth  
*************************  
******  
* This is a corrected   
result. *  
*************************  
******  
A prior result that was   
reported as final has   
been changed.  
Strep mitis/oralis group   
added to report  
  
Comment right knee   
culture #1  
Anaerobic Culture Results  
No Anaerobes Isolated 3   
Days  
Comment right knee   
culture #1  
Gram Stain Result  
1+ White Blood Cells  
No Bacteria Seen  
PERFORMED BY:  
[place] [address]  
[phone]  
PATHOLOGIST MEDICAL   
DIRECTOR  
VIKAS MARTINEZ M.D.    Normal                                  [facility]   
Physician   
[facility]  
   
                                        Comment on above:   Performed By: #### A  
ERC ####[facility] [id number]  
   
[address]   
   
                                                    Basic Metabolic Panelon 08-2  
-   
   
                      Anion gap [Moles/Vol] 11.6 mmol/L Normal     6.0-15.0   Th  
e [facility]   
Physician   
Group  
   
                                        Comment on above:   Performed By: #### L  
C FL TP ####  
[facility] Acct#[id number]  
,  
#### SYN TP ####  
[address]   
   
                      Calcium [Mass/Vol] 8.4 mg/dL  Low        8.6-10.3   [facility]   
Physician   
Group  
   
                                        Comment on above:   Performed By: #### L  
C FL TP ####  
[facility] Acct#[id number]  
,  
#### SYN TP ####  
[facility]  
[address] [place] [address] [place]   
   
                      Chloride [Moles/Vol] 102 mmol/L Normal          The   
[facility]   
Physician   
[facility]  
   
                                        Comment on above:   Performed By: #### L  
C FL TP ####  
[facility] Acct#[id number]  
,  
#### SYN TP ####  
[facility]  
[address] [place] [address] [place]   
   
                      CO2 [Moles/Vol] 25.2 mmol/L Normal     21.0-31.0  [facility]   
Physician   
Group  
   
                                        Comment on above:   Performed By: #### L  
C FL TP ####  
[facility] Acct#[id number]  
,  
#### SYN TP ####  
[facility]  
[address] [place] [address] [place]   
   
                      Creatinine [Mass/Vol] 0.72 mg/dL Normal     0.70-1.30  The  
 [facility]   
Physician   
Group  
   
                                        Comment on above:   Performed By: #### L  
C FL TP ####  
[facility] Acct#[id number]  
,  
#### SYN TP ####  
[place] [address] [place]   
   
                                                    Creatinine Clr Calc   
Pharmacy        142.25          Normal                          [facility]   
Physician   
Group  
   
                                        Comment on above:   Result Comment: PERF  
ORMED BY:  
[place] [address]  
[phone]  
PATHOLOGIST MEDICAL DIRECTOR  
VIKAS MARTINEZ M.D.   
   
                                                            Performed By: #### L  
C FL TP ####  
[facility] Acct#[id number]  
,  
#### SYN TP ####  
[place] [address] [place]   
   
                                                    GFR/1.73 sq   
M.predicted MDRD   
(S/P/Bld) [Vol   
rate/Area]      mL/min/{1.73_m2} Normal                          [facility]   
Physician   
Group  
   
                                        Comment on above:   Performed By: #### L  
C FL TP ####  
[facility] Acct#[id number]  
,  
#### SYN TP ####  
[place] [address] [place]   
   
                      Glucose [Mass/Vol] 142 mg/dL  High            [facility]   
Physician   
Group  
   
                                        Comment on above:   Result Comment: Rand  
 Glucose Reference Range is dependent on  
   
time and  
content of last meal. Glucose of more than 200 mg/dL in a  
nonstressed, ambulatory subject supports the diagnosis of  
Diabetes Mellitus.  
ADA recommended reference range   
   
                                                            Performed By: #### L  
C FL TP ####  
[facility] Acct#[id number]  
,  
#### SYN TP ####  
[place] [address] [place]   
   
                      Potassium [Moles/Vol] 3.8 mmol/L Normal     3.5-5.1    The  
 [facility]   
Physician   
Group  
   
                                        Comment on above:   Performed By: #### L  
C FL TP ####  
[facility] Acct#[id number]  
,  
#### SYN TP ####  
[place] [address] [place]   
   
                      Sodium [Moles/Vol] 135 mmol/L Low        136-145    [facility]   
Physician   
Group  
   
                                        Comment on above:   Performed By: #### L  
C FL TP ####  
[facility] Acct#[id number]  
,  
#### SYN TP ####  
[address]   
   
                                                    Urea nitrogen   
[Mass/Vol]      18 mg/dL        Normal          7-25            [facility] [facility]   
[facility]  
   
                                        Comment on above:   Performed By: #### L  
C FL TP ####  
[facility]#[id number]  
,  
#### SYN TP ####  
[address]   
   
                                                    Complete Blood Count Auto Di  
ffon 2024   
   
                                                    Basophils (Bld)   
[#/Vol]         0.0 10*3/uL     Normal          0.0-0.2         [facility]   
[facility]   
[facility]  
   
                                        Comment on above:   Result Comment: PERF  
ORMED BY:  
[place] [address]  
[phone]  
PATHOLOGIST MEDICAL DIRECTOR  
VIKAS MARTINEZ M.D.   
   
                                                            Performed By: #### L  
C FL TP ####  
[facility]#[id number]  
,  
#### SYN TP ####  
[address]   
   
                                                    Basophils/100 WBC   
(Bld)           0.5 %           Normal          .               [facility]   
[facility]   
[facility]  
   
                                        Comment on above:   Performed By: #### L  
C FL TP ####  
[facility]#[id number]  
,  
#### SYN TP ####  
[address]   
   
                                                    Eosinophils (Bld)   
[#/Vol]         0.0 10*3/uL     Normal          0.0-0.45        [facility]   
[facility]   
Group  
   
                                        Comment on above:   Performed By: #### L  
C FL TP ####  
[facility]#[id number]  
,  
#### SYN TP ####  
[address]   
   
                                                    Eosinophils/100 WBC   
(Bld)           0.4 %           Normal          .               [facility]   
[facility]   
Group  
   
                                        Comment on above:   Performed By: #### L  
C FL TP ####  
[facility]#[id number]  
,  
#### SYN TP ####  
[address]   
   
                                                    Erythrocyte   
distribution width   
(RBC) [Ratio]   13.5 %          Normal          12.0-14.8       [facility]   
Physician   
Group  
   
                                        Comment on above:   Performed By: #### L  
C FL TP ####  
[facility]#[id number]  
,  
#### SYN TP ####  
[address]   
   
                                                    Hematocrit (Bld)   
[Volume fraction] 43.0 %          Normal          38.8-50.0       [facility]   
Phys[facility]   
Group  
   
                                        Comment on above:   Performed By: #### L  
C FL TP ####  
[facility]#[id number]  
,  
#### SYN TP ####  
[address]   
   
                                                    Hemoglobin (Bld)   
[Mass/Vol]      14.7 g/dL       Normal          13.0-17.0       [facility]   
[facility]   
Group  
   
                                        Comment on above:   Performed By: #### L  
C FL TP ####  
[facility]#[id number]  
,  
#### SYN TP ####  
[address]   
   
                                                    Lymphocytes (Bld)   
[#/Vol]         1.5 10*3/uL     Normal          1.00-4.8        [facility]   
Physician   
Group  
   
                                        Comment on above:   Performed By: #### L  
C FL TP ####  
[facility]#[id number]  
,  
#### SYN TP ####  
[address]   
   
                                                    Lymphocytes/100 WBC   
(Bld)           17.6 %          Normal          .               [facility]   
Physician   
Group  
   
                                        Comment on above:   Performed By: #### L  
C FL TP ####  
[facility]#[id number]  
,  
#### SYN TP ####  
[address]   
   
                                                    MCH (RBC) [Entitic   
mass]           29.9 pg         Normal          27.5-35.2       [facility]   
Physician   
Group  
   
                                        Comment on above:   Performed By: #### L  
C FL TP ####  
[facility]#[id number]  
,  
#### SYN TP ####  
[address]   
   
                                                    MCV (RBC) [Entitic   
vol]            87.5 fL         Normal          83.5-101        [facility]   
Physician   
Group  
   
                                        Comment on above:   Performed By: #### L  
C FL TP ####  
[facility]#[id number]  
,  
#### SYN TP ####  
[address]   
   
                                                    Mean Corpuscular HGB   
Conc            34.2 g/dL       Normal          32.5-35.6       [facility]   
Phys[facility]   
Group  
   
                                        Comment on above:   Performed By: #### L  
C FL TP ####  
[facility]#[id number]  
,  
#### SYN TP ####  
[place] [address] [place]   
   
                                                    Monocytes (Bld)   
[#/Vol]         0.8 10*3/uL     Normal          0.0-0.8         [facility]   
[facility]   
Group  
   
                                        Comment on above:   Performed By: #### L  
C FL TP ####  
[facility]#[id number]  
,  
#### SYN TP ####  
[place] [address] [place]   
   
                                                    Monocytes/100 WBC   
(Bld)           10.0 %          Normal          .               [facility]   
Physician   
Group  
   
                                        Comment on above:   Performed By: #### L  
C FL TP ####  
[facility]#[id number]  
,  
#### SYN TP ####  
[place] [address] [place]   
   
                                                    Neutrophils (Bld)   
[#/Vol]         5.9 10*3/uL     Normal          1.8-7.7         [facility]   
Physician   
Group  
   
                                        Comment on above:   Performed By: #### L  
C FL TP ####  
[facility]#[id number]  
,  
#### SYN TP ####  
[place] [address] [place]   
   
                                                    Neutrophils/100 WBC   
(Bld)           71.5 %          Normal          .               [facility]   
Physician   
Group  
   
                                        Comment on above:   Performed By: #### L  
C FL TP ####  
[facility]#[id number]  
,  
#### SYN TP ####  
[place] [address] [place]   
   
                      NRBC%      0.1 /100{WBC} Normal     0-0.5      [facility]   
Physician   
Group  
   
                                        Comment on above:   Performed By: #### L  
C FL TP ####  
[facility]#[id number]  
,  
#### SYN TP ####  
[facility]  
[address]   
   
                                                    Platelet mean volume   
(Bld) [Entitic vol] 7.8 fL          Normal          6.6-10.1        [facility]   
Physician   
Group  
   
                                        Comment on above:   Performed By: #### L  
C FL TP ####  
[facility]#[id number]  
,  
#### SYN TP ####  
[facility]  
[address] [place] [address] [place]   
   
                                                    Platelets (Bld)   
[#/Vol]         175 10*3/uL     Normal          150-450         [facility]   
Physician   
Group  
   
                                        Comment on above:   Performed By: #### L  
C FL TP ####  
[facility]#[id number]  
,  
#### SYN TP ####  
[facility]  
[address] [place] [address]   
   
                      RBC (Bld) [#/Vol] 4.91 10*6/uL Normal     3.90-5.60  [facility]   
Physician   
Group  
   
                                        Comment on above:   Performed By: #### L  
C FL TP ####  
[facility]#[id number]  
,  
#### SYN TP ####  
[facility]  
[address] [place] [address]   
   
                      WBC (Bld) [#/Vol] 8.3 10*3/uL Normal     4.1-10.5   [facility]   
Physician   
Group  
   
                                        Comment on above:   Performed By: #### L  
C FL TP ####  
[facility]#[id number]  
,  
#### SYN TP ####  
[facility]  
[address] [place] [address]   
   
                                                    ECG 12 lead ECGon 2024  
   
   
                                        ECG 12 lead ECG     [facility]  
[address] [place] [address]  
  
Electrocardiograph Report  
Signed  
  
Patient: Kaylynn Mims   
MR#: [id number]  
16  
: 1955   
Acct:[id number]  
  
Age/Sex: 68 / M ADM Date:   
24  
  
Loc:  Room: [address] [place] [address]   
Type: ADM IN  
Attending Dr: Josie Ann MD  
  
Ordering Provider: Eez Gordon MD  
Date of Service:   
  
Accession #:   
([id number]) ECG/ECG 12   
lead ECG: pre-op  
  
  
Copies to:  
  
  
Test Reason :  
Blood Pressure : */* mmHG  
Vent. Rate : 72 BPM   
Atrial Rate : 72 BPM  
P-R Int : 168 ms QRS Dur   
: 102 ms  
QT Int : 416 ms P-R-T   
Axes : 30 21 -12 degrees  
QTcB Int : 455 ms  
  
Normal sinus rhythm  
  
Non-specific change in ST   
segment in  
No previous ECGs   
available  
Confirmed by JESSICA VELASCO MD ([id number]) on   
2024 8:59:42 PM  
  
Referred By:   
Electronically Signed By:   
JESSICA VELASCO MD  
  
  
  
Transcribed By: MUS  
Signed By Jessica Velasco MD 0  
24        Normal                                  [facility]   
Physician   
Group  
   
                                                    Gram stain for investigation  
 of transfusion reactionOrdered By: Jeff Onofre   
on   
2024   
   
                                                    Microscopic   
observation Gram stain   
Nom (Unsp spec) St. mitis/oralis grp Abnormal                        [facility]  
   
                                                    Microscopic   
observation Gram stain   
Nom (Unsp spec) St. mitis/oralis grp Abnormal                        [facility]  
   
                                                    Microscopic   
observation Gram stain   
Nom (Unsp spec) St. mitis/oralis grp Abnormal                        [facility]  
   
                                                    US venous duplex LE RTon    
   
                                        US venous duplex LE [facility] [place] [address]  
  
Ultrasound Report  
Signed  
  
Patient: Kaylynn Mims   
MR#: [id number]  
16  
: 1955   
Acct:[id number]  
  
Age/Sex: 68 / M ADM Date:   
24  
  
Loc:  Room: [address] [place] [address]   
Type: ADM IN  
Attending Dr: Josie Ann MD  
  
Ordering Provider: MAYRA Martinez  
Date of Service: 24  
Accession #:   
([id number] US/US   
venous duplex LE RT: leg   
pain and swelling  
  
  
Copies to: MAYRA Martinez MD  
  
  
RIGHT LOWER EXTREMITY   
VENOUS DUPLEX  
  
INDICATION: Painful   
swollen right leg  
  
Unilateral right lower   
extremity venous duplex   
Doppler study was   
obtained utilizing   
B-mode, color-  
flow and spectral   
Doppler.  
  
FINDINGS: The right   
common femoral, femoral,   
and popliteal veins   
showed adequate   
compressibility,  
color-flow and   
augmentation. The right   
posterior tibial and   
peroneal veins were   
compressible, as  
well as proximal greater   
saphenous vein. The   
contralateral left common   
femoral vein was  
compressible with   
color-flow and   
augmentation.  
  
  
ORDER #: [id number] US/US   
venous duplex LE RT  
IMPRESSION:  
  
NO EVIDENCE OF DEEP   
VENOUS THROMBOSIS IN THE   
RIGHT LOWER EXTREMITY. NO   
SUPERFICIAL   
THROMBOPHLEBITIS  
WAS NOTED.  
  
Impression dictated by:   
Neo Franco M.D.2024 9:51 AM  
  
  
Dictation Location:   
[place] [address]  
  
Tech: Bridget Luz  
  
Transcribed By: MARBELLA   
24  
Dictated By: Neo Franco MD 24  
  
Signed By:  
24       Normal                                  [facility]   
Physician   
Group  
   
                                                    Aerobic Cultureon 2024  
   
   
                                        Aerobic Culture     Comment R knee  
ORGANISM: Streptococcus   
mitis/oralis grp   
(O:STRMITORGR)  
Comments  
Organism Not Routinely   
Tested for   
Susceptibilities  
Quantity of Growth  
Light Growth  
  
Comment R knee  
No Anaerobes Isolated 3   
Days  
Comment R knee  
Gram Stain Result  
3+ White Blood Cells  
No Bacteria Seen  
PERFORMED BY:  
[place] [address]  
[phone]  
PATHOLOGIST MEDICAL   
DIRECTOR  
VIKAS MARTINEZ M.D.    Normal                                  [facility]   
Physician   
Group  
   
                                        Comment on above:   Performed By: #### L  
C FL TP ####  
[facility]#[id number]  
,  
#### SYN TP ####  
[facility]  
[address] [place] [address]   
   
                                        Aerobic Culture     No Growth 2 Days  
No Anaerobes Isolated 3   
Days  
Gram Stain Result  
Rare White Blood Cells  
4+ Red Blood Cells  
No Bacteria Seen  
PERFORMED BY:  
[place] [address]  
[phone]  
PATHOLOGIST MEDICAL   
DIRECTOR  
VIKAS MARTINEZ M.D.    Normal                                  [facility]   
Physician   
Group  
   
                                        Comment on above:   Performed By: #### G  
S, AERC ####  
[facility]  
[address] [place], [place] [id number] [facility]   
   
                                                    Alanine aminotransferase [En  
zymatic activity/volume] in Serum or PlasmaOrdered   
By:   
Daniela Sanchez on 2024   
   
                                                    ALT [Catalytic   
activity/Vol]   38 U/L          Normal          7-52            [facility]  
   
                                        Comment on above:   Performed By: #### C  
UBLD ####  
[address]   
   
                                                    Albumin [Mass/volume] in Ser  
um or Plasma by Bromocresol green (BCG) dye binding   
methoOrdered By: Ginger Bullimore on 2024   
   
                                                    Albumin BCG dye   
[Mass/Vol]      3.9 g/dL                        3.5-5.7         [facility]  
   
                                                    Alkaline phosphatase [Enzyma  
tic activity/volume] in Serum or PlasmaOrdered By:   
Ginger   
Bullimore on 2024   
   
                                                    ALP [Catalytic   
activity/Vol]   60 U/L          Normal                    [facility]  
   
                                        Comment on above:   Performed By: #### C  
UBLD ####  
[address]   
   
                                                    Aspartate aminotransferase [  
Enzymatic activity/volume] in Serum or PlasmaOrdered  
By:   
Ginger Bullimore on 2024   
   
                                                    AST [Catalytic   
activity/Vol]   19 U/L          Normal          13-39           [facility]  
   
                                        Comment on above:   Performed By: #### C  
UBLD ####  
[address]   
   
                                                    Automated basophil %Ordered   
By: Ginger Bullimore on 2024   
   
                                                    Basophils/100 WBC   
(Bld)           0.5 %           Normal          .               [facility]  
   
                                        Comment on above:   Performed By: #### C  
BC, ESR, CMP, CRP ####  
[address]   
   
                                                    Automated basophil countOrde  
red By: Ginger Bullimore on 2024   
   
                                                    Basophils (Bld)   
[#/Vol]         0.0 10*3/uL     Normal          0.0-0.2         [facility]  
   
                                        Comment on above:   Performed By: #### C  
BC, ESR, CMP, CRP ####  
[address]   
   
                                                    Automated blood monocyte cou  
ntOrdered By: Ginger Bullimore on 2024   
   
                                                    Monocytes (Bld)   
[#/Vol]         0.5 10*3/uL     Normal          0.0-0.8         [facility]  
   
                                        Comment on above:   Performed By: #### C  
BC, ESR, CMP, CRP ####  
[facility]  
[address] [place] [address]   
   
                                                    Automated eosinophil %Ordere  
d By: Williamer Ashlynore on 2024   
   
                                                    Eosinophils/100 WBC   
(Bld)           0.1 %           Normal          .               [facility]  
   
                                        Comment on above:   Performed By: #### C  
BC, ESR, CMP, CRP ####  
[facility]  
[address] [place] [address]   
   
                                                    Automated eosinophil countOr  
dered By: Williamer Laura on 2024   
   
                                                    Eosinophils (Bld)   
[#/Vol]         0.0 10*3/uL     Normal          0.0-0.45        [facility]  
   
                                        Comment on above:   Performed By: #### C  
BC, ESR, CMP, CRP ####  
[address]   
   
                                                    Automated monocyte %Ordered   
By: Daniela Sanchez on 2024   
   
                                                    Monocytes/100 WBC   
(Bld)           5.1 %           Normal          .               [facility]  
   
                                        Comment on above:   Performed By: #### C  
BC, ESR, CMP, CRP ####  
[address]   
   
                                                    Automated neutrophil %Ordere  
d By: Williamer Laura on 2024   
   
                                                    Neutrophils/100 WBC   
(Bld)           88.3 %          Normal          .               [facility]  
   
                                        Comment on above:   Performed By: #### C  
BC, ESR, CMP, CRP ####  
[facility]  
[address] [place] [address]   
   
                                                    Bacterial blood cultureOrder  
ed By: Daniela Sanchez on 2024   
   
                                                    Bacteria identified Cx   
Nom (Bld)       NO GROWTH 5 DAYS                                 [facility]  
   
                                                    Bilirubin.total [Mass/volume  
] in Serum or PlasmaOrdered By: Daniela Sanchez on   
2024   
   
                      Bilirubin [Mass/Vol] 1.5 mg/dL  High       0.3-1.0    [facility]  
   
                                        Comment on above:   Samples from patient  
s who have taken Naproxen have shown   
spurious elevation in Total Bilirubin levels. A metabolite of   
Naproxen, O-desmethylnaproxen, has been shown to interfere with   
the Jendrassik-Grof method for measuring Total Bilirubin.   
   
                                                            Result Comment: Samp  
les from patients who have taken Naproxen   
have shown  
spurious elevation in Total Bilirubin levels. A metabolite  
of Naproxen, O-desmethylnaproxen, has been shown to  
interfere with the Jendrassik-Grof method for measuring  
Total Bilirubin.   
   
                                                            Performed By: #### C  
UBLD ####  
[facility]  
[address]   
   
                                                    Blood Cultureon 2024   
   
                                                    Bacteria identified Cx   
Nom (Bld)                               NO GROWTH 5 DAYS  
PERFORMED BY:  
[facility]  
[address] [place] [address]  
[phone]  
PATHOLOGIST MEDICAL   
DIRECTOR  
VIKAS MARTINEZ M.D.    Normal                                  [facility]   
Physician   
Group  
   
                                        Comment on above:   Performed By: #### L  
C FL TP ####  
[facility]#[id number]  
,  
#### SYN TP ####  
[facility]  
[address]   
   
                                                    Bacteria identified Cx   
Nom (Bld)                               Results called  
at 0209 on 24  
Gram Stain  
Gram Positive Cocci in   
Chains  
ORGANISM: Streptococcus   
mitis/oralis grp   
(O:STRMITORGR)  
Aerobic Lori Charge   
(Strep)  
-------------------------   
SUSCEPTIBILITY   
------------------------  
ORGANISM: O:STRMITORGR  
ANTIBIOTIC INTERPRETATION   
LORI  
Ampicillin I 0.5  
Cefepime S 0.5  
Ceftriaxone S <0.25  
Erythromycin R >0.5  
Penicillin I 0.25  
Vancomycin S 0.5  
Results called  
at 0209 on 24  
Staphylococcus aureus DNA   
[Presence] by ZOË with   
non-probe detection in   
Positive blood culture  
Not detected  
Bacteroides fragilis DNA   
[Presence] by ZOË with   
non-probe detection in   
Positive blood culture  
Not detected  
Candida auris DNA   
[Presence] by ZOË with   
non-probe detection in   
Positive blood culture  
Not detected  
Candida albicans DNA   
[Presence] by ZOË with   
non-probe detection in   
Positive blood culture  
Not detected  
Acinetobacter   
calcoaceticus-baumannii   
complex DNA [Presence] by   
ZOË with non-probe   
detection in Positive   
blood culture  
Not detected  
Cryptococcus neoformans   
or gattii 9002  
Not detected  
Cephalosporin resistance   
blaCTX-M gene [Presence]   
by Molecular method  
Not Applicable  
Escherichia coli  
Not detected  
Enterobacterales DNA   
[Presence] by ZOË with   
non-probe detection in   
Positive blood culture  
Not detected  
Enterobacter cloacae   
complex DNA [Presence] by   
ZOË with non-probe   
detection in Positive   
blood culture  
Not detected  
Staphylococcus   
epidermidis DNA   
[Presence] by ZOË with   
non-probe detection in   
Positive blood culture  
Not detected  
Enterococcus faecalis DNA   
[Presence] by ZOË with   
non-probe detection in   
Positive blood culture  
Not detected  
Enterococcus faecium DNA   
[Presence] by ZOË with   
non-probe detection in   
Positive blood culture  
Not detected  
Candida glabrata DNA   
[Presence] by ZOË with   
non-probe detection in   
Positive blood culture  
Not detected  
Haemophilus influenzae   
(reported as H flu)  
Not detected  
Carbapenem resistance   
blaIMP gene [Presence] by   
Molecular method  
Not Applicable  
Klebsiella aerogenes DNA   
[Presence] by ZOË with   
non-probe detection in   
Positive blood culture  
Not detected  
Klebsiella   
pneumoniae+Klebsiella   
variicola+Klebsiella   
quasipneumoniae DNA   
[Presence] by ZOË with   
non-probe detection in   
Positive blood culture  
Not detected  
Klebsiella oxytoca DNA   
[Presence] by ZOË with   
non-probe detection in   
Positive blood culture  
Not detected  
Carbapenem resistance   
blaKPC gene [Presence] by   
Molecular method  
Not Applicable  
Nereida krusei DNA   
[Presence] by ZOË with   
non-probe detection in   
Positive blood culture  
Not detected  
Listeria monocytogenes   
(reported as listeriosis)  
Not detected  
Staphylococcus   
lugdunensis DNA   
[Presence] by ZOË with   
non-probe detection in   
Positive blood culture  
Not detected  
Methicillin resistance   
mecA+mecC   
genes+SCCmec+OrfX   
junction [Presence] by   
Molecular method  
Not Applicable  
Carbapenem resistance   
blaNDM gene [Presence] by   
Molecular method  
Not Applicable  
Neisseria meningitidis -   
reported as meningococcal   
disease  
Not detected  
Carbapenem resistance cherri   
OXA-48-like gene   
[Presence] by Molecular   
method  
Not Applicable  
Candida parapsilosis DNA   
[Presence] by ZOË with   
non-probe detection in   
Positive blood culture  
Not detected  
Streptococcus pneumoniae   
- reported at [facility]  
Not detected  
Proteus sp DNA [Presence]   
by ZOË with non-probe   
detection in Positive   
blood culture  
Not detected  
Pseudomonas aeruginosa   
DNA [Presence] by ZOË   
with non-probe detection   
in Positive blood culture  
Not detected  
Salmonella sp DNA   
[Presence] by ZOË with   
non-probe detection in   
Positive blood culture  
Not detected  
Serratia marcescens DNA   
[Presence] by ZOË with   
non-probe detection in   
Positive blood culture  
Not detected  
Staphylococcus sp DNA   
[Presence] by ZOË with   
non-probe detection in   
Positive blood culture  
Not detected  
Stenotrophomonas   
maltophilia DNA   
[Presence] by ZOË with   
non-probe detection in   
Positive blood culture  
Not detected  
Group A (Streptococcus   
pyogenes) [id number]  
Not detected  
Group B Strep   
(Streptococcus   
agalactiae)  
Not detected  
Streptococcus sp DNA   
[Presence] by ZOË with   
non-probe detection in   
Positive blood culture  
Detected  
Candida tropicalis DNA   
[Presence] by ZOË with   
non-probe detection in   
Positive blood culture  
Not detected  
Vancomycin resistance   
Aidan + vanB genes   
[Presence] by Molecular   
method  
Not Applicable  
Carbapenem resistance   
blaVIM gene [Presence] by   
Molecular method  
Not Applicable  
Colistin resistance mcr-1   
gene [Presence] by   
Molecular method  
Not Applicable  
Methicillin resistance   
mecA+mecC genes   
[Presence] in Isolate or   
Specimen by Molecular   
genetics method  
Not Applicable  
RESIS. GENE COMMENT 1  
Antimicrobial resistance   
can occur via multiple  
RESIS. GENE COMMENT 2  
mechanisms. A Not   
Detected result for   
antimicrobial  
RESIS. GENE COMMENT 3  
resistance gene(s) does   
not indicate   
antimicrobial  
RESIS. GENE COMMENT 4  
susceptibility.  
S = SUSCEPTIBLE I =   
INTERMEDIATE R =   
RESISTANT  
BLANK = DATA NO (more   
content not included)... Normal                                  [facility]   
Physician   
Group  
   
                                        Comment on above:   Performed By: #### L  
C FL TP ####  
[facility] Acct#[id number]  
,  
#### SYN TP ####  
[facility]  
[address] [place] [address]   
   
                                                    Body fluid crystal identific  
ation by light microscopyOrdered By: Jeff Onofre  
 on   
2024   
   
                                                    Crystals LM Nom (Body   
fld)            None seen                       None Seen       [facility]  
   
                                                    C reactive protein [Mass/vol  
ume] in Serum or PlasmaOrdered By: Daniela Sanchez   
on   
2024   
   
                      CRP [Mass/Vol] 12.3 mg/dL High       0.0-0.5    [facility]  
   
                                                    C-Reactive Proteinon   
024   
   
                      C-Reactive Protein 12.3 mg/dL High       0.0-0.5    [facility]   
Physician   
Group  
   
                                        Comment on above:   Result Comment: PERF  
ORMED BY:  
[place] [address]  
[phone]  
PATHOLOGIST MEDICAL DIRECTOR  
VIKAS MARTINEZ M.D.   
   
                                                            Performed By: #### C  
UBLD ####  
[facility]  
[address] [place] [address] [place]   
   
                                                    Calcium [Mass/volume] in Ser  
um or PlasmaOrdered By: Daniela Sanchez on   
2024   
   
                      Calcium [Mass/Vol] 8.6 mg/dL  Normal     8.6-10.3   [facility]  
   
                                        Comment on above:   Performed By: #### C  
UBLD ####  
[facility]  
[address]   
   
                                                    Carbon dioxide, total [Moles  
/volume] in Serum or PlasmaOrdered By: Ginger   
[place]   
on 2024   
   
                      CO2 [Moles/Vol] 23.1 mmol/L Normal     21.0-31.0  [facility]  
   
                                        Comment on above:   Performed By: #### C  
UBLD ####  
[address]   
   
                                                    Cell Count Diff,Synovial Flu  
idon 2024   
   
                                                    Appearance, Synovial   
Fluid                     Cloudy                    Critically   
abnormal                  Clear                     [facility]   
Physician   
Group  
   
                                        Comment on above:   Order Comment: DUE T  
O SPECIMEN VISCOSITY SAMPLE HAS BEEN SENT   
OUT.  
SENDOUT STATUS called  
at 44 on 24  
Synovial Fluid Source: R knee  
Synovial Fluid Site: R knee   
   
                                                            Performed By: #### L  
C FL TP ####  
[facility] Acct#[id number]  
,  
#### SYN TP ####  
[facility]  
[address] [place] [address] [place]   
   
                                Color, Synovial Fluid Yellow          Critically  
   
abnormal                                [facility]  
r                                       [facility]   
Physician   
Group  
   
                                        Comment on above:   Order Comment: DUE T  
O SPECIMEN VISCOSITY SAMPLE HAS BEEN SENT   
OUT.  
SENDOUT STATUS called  
at 44 on 24  
Synovial Fluid Source: R knee  
Synovial Fluid Site: R knee   
   
                                                            Performed By: #### L  
C FL TP ####  
[facility] Acct#[id number]  
,  
#### SYN TP ####  
[facility]  
[address] [place] [address] [place]   
   
                                                    Color, Synovial   
Supernatant     Yellow          Normal                          [facility]   
Physician   
Group  
   
                                        Comment on above:   Order Comment: DUE T  
O SPECIMEN VISCOSITY SAMPLE HAS BEEN SENT   
OUT.  
SENDOUT STATUS called  
at 44 on 24  
Synovial Fluid Source: R knee  
Synovial Fluid Site: R knee   
   
                                                            Result Comment: The   
reference interval and other method   
performance  
specifications have not been established for this body  
fluid. The test result must be integrated into the clinical  
context for interpretation.   
   
                                                            Performed By: #### L  
C FL TP ####  
[facility] Acct#[id number]  
,  
#### SYN TP ####  
[address]   
   
                                                    Eosinophils,Synovial   
Fluid           0 %             Normal          0-2             [facility]   
Physician   
Group  
   
                                        Comment on above:   Order Comment: DUE T  
O SPECIMEN VISCOSITY SAMPLE HAS BEEN SENT   
OUT.  
SENDOUT STATUS called  
at 44 on 24  
Synovial Fluid Source: R knee  
Synovial Fluid Site: R knee   
   
                                                            Result Comment: PERF  
ORMED BY:  
[place] [address]  
[phone]  
PATHOLOGIST MEDICAL DIRECTOR  
VIKAS MARTINEZ M.D.   
   
                                                            Performed By: #### L  
C FL TP ####  
[facility] Acct#[id number]  
,  
#### SYN TP ####  
[address]   
   
                                                    Lymphocytes, Synovial   
Fluid           7 %             Normal          0-74            [facility]   
Physician   
Group  
   
                                        Comment on above:   Order Comment: DUE T  
O SPECIMEN VISCOSITY SAMPLE HAS BEEN SENT   
OUT.  
SENDOUT STATUS called  
at 44 on 24  
Synovial Fluid Source: R knee  
Synovial Fluid Site: R knee   
   
                                                            Performed By: #### L  
C FL TP ####  
[facility] Acct#[id number]  
,  
#### SYN TP ####  
[place] [address] [place]   
   
                                                    Monocyte/Histiocyte,Sy  
nov.Fld.        1 %             Normal          0-69            [facility]   
Physician   
Group  
   
                                        Comment on above:   Order Comment: DUE T  
O SPECIMEN VISCOSITY SAMPLE HAS BEEN SENT   
OUT.  
SENDOUT STATUS called  
at 44 on 24  
Synovial Fluid Source: R knee  
Synovial Fluid Site: R knee   
   
                                                            Performed By: #### L  
C FL TP ####  
[facility] Acct#[id number]  
,  
#### SYN TP ####  
[facility]  
[address] [place] [address]   
   
                                                    Neutrophils, Synovial   
Fluid           92 %            High            0-24            [facility]   
Physician   
Group  
   
                                        Comment on above:   Order Comment: DUE T  
O SPECIMEN VISCOSITY SAMPLE HAS BEEN SENT   
OUT.  
SENDOUT STATUS called  
at 44 on 24  
Synovial Fluid Source: R knee  
Synovial Fluid Site: R knee   
   
                                                            Performed By: #### L  
C FL TP ####  
[facility] Acct#[id number]  
,  
#### SYN TP ####  
[address]   
   
                      RBC, Synovial Fluid 6370 /uL   High       0-0        [facility]   
Physician   
Group  
   
                                        Comment on above:   Order Comment: DUE T  
O SPECIMEN VISCOSITY SAMPLE HAS BEEN SENT   
OUT.  
SENDOUT STATUS called  
at 44 on 24  
Synovial Fluid Source: R knee  
Synovial Fluid Site: R knee   
   
                                                            Performed By: #### L  
C FL TP ####  
[facility] Acct#[id number]  
,  
#### SYN TP ####  
[address]   
   
                      TNC, Synovial Fluid 20073 /uL  High       0-150      [facility]   
Physician   
Group  
   
                                        Comment on above:   Order Comment: DUE T  
O SPECIMEN VISCOSITY SAMPLE HAS BEEN SENT   
OUT.  
SENDOUT STATUS called  
at 44 on 24  
Synovial Fluid Source: R knee  
Synovial Fluid Site: R knee   
   
                                                            Result Comment: The   
reference interval and other method   
performance  
specifications have not been established for this body  
fluid. The test result must be integrated into the clinical  
context for interpretation.   
   
                                                            Performed By: #### L  
C FL TP ####  
[facility] Acct#[id number]  
,  
#### SYN TP ####  
[address]   
   
                                                    Chloride [Moles/volume] in S  
lorraine or PlasmaOrdered By: Daniela Sanchez on   
2024   
   
                      Chloride [Moles/Vol] 102 mmol/L Normal          [facility]  
   
                                        Comment on above:   Performed By: #### C  
UBLD ####  
[address]   
   
                                                    Complete Blood Count Auto Di  
ffon 2024   
   
                                                    Mean Corpuscular HGB   
Conc            34.1 g/dL       Normal          32.5-35.6       [facility]   
Physician   
Group  
   
                                        Comment on above:   Performed By: #### C  
BC, ESR, CMP, CRP ####  
[address]   
   
                                                    Monocytes/100 WBC   
(Bld)           22.07 %         High            0.00-20.00      [facility]   
Physician   
Group  
   
                                        Comment on above:   Result Comment: For   
adults in ED, MDW > 20.0 may be associated  
   
with a higher  
risk of sepsis during the first 12 hrs of hospital admission   
   
                                                            Performed By: #### C  
BC, ESR, CMP, CRP ####  
[facility]  
[address]   
   
                      NRBC%      0.8 /100{WBC} High       0-0.5      [facility]   
Physician   
Group  
   
                                        Comment on above:   Performed By: #### C  
BC, ESR, CMP, CRP ####  
[address]   
   
                                                    Comprehensive Metabolic Pane  
guerline 2024   
   
                      Albumin [Mass/Vol] 3.9 g/dL   Normal     3.5-5.7    [facility]   
Physician   
Group  
   
                                        Comment on above:   Performed By: #### C  
UBLD ####  
[address]   
   
                                                    Creatinine Clr Calc   
Pharmacy        143.91          Normal                          [facility]   
Physician   
Group  
   
                                        Comment on above:   Performed By: #### C  
UBLD ####  
[address]   
   
                                                    GFR/1.73 sq   
M.predicted MDRD   
(S/P/Bld) [Vol   
rate/Area]      mL/min/{1.73_m2} Normal                          [facility]   
Physician   
Group  
   
                                        Comment on above:   Performed By: #### C  
UBLD ####  
[address]   
   
                                                    Creatinine [Mass/volume] in   
Serum or PlasmaOrdered By: Daniela Sanchez on   
2024   
   
                      Creatinine [Mass/Vol] 0.68 mg/dL Low        0.70-1.30  [facility]  
   
                                        Comment on above:   Performed By: #### C  
UBLD ####  
[address]   
   
                                                    Crystals,Synovial Fluidon    
   
                                                    Crystals,Synovial   
Fluid           None Seen       Normal          None Seen       [facility]   
Physician   
Group  
   
                                        Comment on above:   Order Comment: Synov  
ial Fluid Source: R knee Synovial Fluid   
Site: R knee   
   
                                                            Result Comment: PERF  
ORMED BY:  
[place] [address]  
[phone]  
PATHOLOGIST MEDICAL DIRECTOR  
VIKAS MARTINEZ M.D.   
   
                                                            Performed By: #### S  
YN JUANI, SYN GLU, SYNCT ####[facility]   
   
                                                    Determination of appearance   
of body fluidOrdered By: Jeff Onofre on   
2024   
   
                      Appearance (Body fld) Cloudy     Abnormal   Clear      [facility]  
   
                                                    Erythrocyte Sedimentation Ra  
gina 2024   
   
                      ESR (Bld) [Velocity] 32 mm/h    High       0-19       The   
[facility]   
Physician   
Group  
   
                                        Comment on above:   Result Comment: PERF  
ORMED BY:  
[place] [address]  
[phone]  
PATHOLOGIST MEDICAL DIRECTOR  
VIKAS MARTINEZ M.D.   
   
                                                            Performed By: #### C  
BC, ESR, CMP, CRP ####  
[facility]  
[address] [place] [address]   
   
                                                    Erythrocyte distribution wid  
th [Ratio] by Automated countOrdered By: Daniela Sanchez   
on 2024   
   
                                                    Erythrocyte   
distribution width   
(RBC) [Ratio]   13.3 %          Normal          12.0-14.8       [facility]  
   
                                        Comment on above:   Performed By: #### C  
BC, ESR, CMP, CRP ####  
[address]   
   
                                                    Erythrocyte sedimentation ra  
te by Photometric methodOrdered By: Daniela Sanchez  
 on   
2024   
   
                                                    ESR Photometric method   
(Bld) [Velocity] 32 mm/hr        High            0-19            [facility]  
   
                                                    Erythrocytes [#/volume] in B  
lood by Automated countOrdered By: Daniela Mayimopal   
on   
2024   
   
                      RBC (Bld) [#/Vol] 5.50 10*6/uL Normal     3.90-5.60  [facility]  
   
                                        Comment on above:   Performed By: #### C  
BC, ESR, CMP, CRP ####  
[facility]  
[address] [place] [address]   
   
                                                    Evaluation of color of body   
fluidOrdered By: Jeff Onofre on 2024   
   
                          Color (Body fld) Yellow       Abnormal     [facility]  
r                                       [facility]  
   
                                                    Glucose [Mass/volume] in Ser  
um or PlasmaOrdered By: Williamer [place] on   
2024   
   
                      Glucose [Mass/Vol] 169 mg/dL  High            [facility]  
   
                                        Comment on above:   ADA recommended refe  
rence rangeRandom Glucose Reference Range   
is dependent on time and content of last meal. Glucose of more   
than 200 mg/dL in a nonstressed, ambulatory subject supports   
the diagnosis of Diabetes Mellitus.   
   
                                                            Result Comment: Rand  
om Glucose Reference Range is dependent on   
time and  
content of last meal. Glucose of more than 200 mg/dL in a  
nonstressed, ambulatory subject supports the diagnosis of  
Diabetes Mellitus.  
ADA recommended reference range   
   
                                                            Performed By: #### C  
UBLD ####  
[facility]  
[address] [place] [address] [place]   
   
                                                    Glucose [Mass/volume] in Syn  
ovial fluidOrdered By: Jeff Onofre on 2024  
   
   
                                                    Glucose (Syn fld)   
[Mass/Vol]      80 mg/dL                                        [facility]  
   
                                        Comment on above:   No reference range e  
stablished   
   
                                                    Glucose, Synovial Fluidon    
   
                                                    Glucose, Synovial   
Fluid           80 mg/dL        Normal                          [facility]   
Physician   
Group  
   
                                        Comment on above:   Order Comment: Synov  
ial Fluid Source: R knee Synovial Fluid   
Site: R knee   
   
                                                            Result Comment: No r  
eference range established  
PERFORMED BY:  
[place] [address]  
[phone]  
PATHOLOGIST MEDICAL DIRECTOR  
VIKAS MARTINEZ M.D.   
   
                                                            Performed By: #### S  
YN JUANI, SYN GLU, SYNCT ####[facility] [address] [place] [address] [place]   
   
                                                    Gram Stainon 2024   
   
                                                    Microscopic   
observation Gram stain   
Nom (Unsp spec)                         Comment R knee  
Gram Stain Result  
3+ White Blood Cells  
No Bacteria Seen  
PERFORMED BY:  
[place] [address]  
[phone]  
PATHOLOGIST MEDICAL   
DIRECTOR  
VIKAS MARTINEZ M.D.    Normal                                  [facility]   
Physician   
Group  
   
                                        Comment on above:   Performed By: #### L  
C FL TP ####  
[facility] Acct#[id number]  
,  
#### SYN TP ####  
[facility]  
[address]   
   
                                                    Microscopic   
observation Gram stain   
Nom (Unsp spec)                         Gram Stain Result  
Rare White Blood Cells  
4+ Red Blood Cells  
No Bacteria Seen  
PERFORMED BY:  
[address] [address]  
[phone]  
PATHOLOGIST MEDICAL   
DIRECTOR  
VIKAS MARTINEZ M.D.    Normal                                  [facility]   
Physician   
Group  
   
                                        Comment on above:   Performed By: #### G  
S, AERC ####  
[address]   
   
                                                    Gram stain for investigation  
 of transfusion reactionOrdered By: Jeff Onofre   
on   
2024   
   
                                                    Microscopic   
observation Gram stain   
Nom (Unsp spec) St. mitis/oralis grp Abnormal                        [facility]  
   
                                                    Gram stain for investigation  
 of transfusion reactionOrdered By: Josie Ann   
on   
2024   
   
                                                    Microscopic   
observation Gram stain   
Nom (Unsp spec)                                                 [facility]  
   
                                                    Microscopic   
observation Gram stain   
Nom (Unsp spec)                         No Anaerobes Isolated 3   
Days                                                        [facility]  
   
                                                    Hematocrit [Volume Fraction]  
 of Blood by Automated countOrdered By: Daniela Sanchez   
on 2024   
   
                                                    Hematocrit (Bld)   
[Volume fraction] 47.8 %          Normal          38.8-50.0       [facility]  
   
                                        Comment on above:   Performed By: #### C  
BC, ESR, CMP, CRP ####  
[address]   
   
                                                    Hemoglobin [Mass/volume] in   
BloodOrdered By: Daniela Sanchez on 2024   
   
                                                    Hemoglobin (Bld)   
[Mass/Vol]      16.3 g/dL       Normal          13.0-17.0       [facility]  
   
                                        Comment on above:   Performed By: #### C  
BC, ESR, CMP, CRP ####  
[address]   
   
                                                    Lab Allan Total Protein, Flon  
 2024   
   
                                                    Lab [facility] Total   
Protein, Fl     4.4 g/dL        Normal          .               [facility]   
Physician   
Group  
   
                                        Comment on above:   Order Comment: SPECI  
MEN SENT TO REFERENCE LAB DUE TO VISCOSITY  
SPECIMEN SOURCE/DESCRIPTION: SYNOVIAL RIGHT KNEE   
   
                                                            Result Comment: ____  
_____________________________________  
: BODY FLUID TYPE : TOTAL PROTEIN :  
:_________________:_______________________:  
: Amniotic Fluid : <0.4 :  
:_________________:_______________________:  
: : Nonmalignant: <3.0 :  
: Ascitic Fluid : Malignant: >3.0 :  
:_________________:_______________________:  
: Bile, Clear : <0.9 :  
:_________________:_______________________:  
: Bile, Yellow : 0.2 - 0.6 :  
:_________________:_______________________:  
: Lymph : 2.2 - 6.0 :  
:_________________:_______________________:  
: Human Milk : 1.9 - 2.0 :  
:_________________: ______________________:  
: Nasal Secretion : 0.1 - 3.5 :  
:_________________:_______________________:  
: Pancreatic : 0.0 - 0.1 :  
: Juice : (post stimulation) :  
:_________________:_______________________:  
: : Transudate: <0.3 :  
: Pleural Fluid : Exudate: >0.3 :  
:_________________:_______________________:  
: Saliva : 0.1 - 0.2 :  
: (Mixed Glands) : :  
:_________________:_______________________:  
: Synovial Fluid : <2.5 :  
:_________________:_______________________:  
: Tears : 0.8 - 0.9 :  
:_________________:_______________________:  
Tianna WJustino. Reference Intervals  
for Adults and Children 2008. Ninth  
Edition (V9.1) [employer],  
[facility]; [place]: 2009.  
Performed at:  - [facility] [id number]  
: Alejandro Salomon PhD, Phone: [phone]  
PERFORMED BY:  
[place] [phone]  
PATHOLOGIST MEDICAL DIRECTOR  
VIKAS MARTINEZ M.D.   
   
                                                            Performed By: #### L  
C FL TP ####  
[facility] Acct#[id number]  
,  
#### SYN TP ####  
[address]   
   
                                                    Lactate [Moles/volume] in Se  
rum or PlasmaOrdered By: Daniela Sanchez on   
2024   
   
                      Lactate [Moles/Vol] 1.3 mmol/L Normal     0.5-2.2    [facility]  
   
                                        Comment on above:   Result Comment: PERF  
ORMED BY:  
[place] [phone]  
PATHOLOGIST MEDICAL DIRECTOR  
VIKAS MARTINEZ M.D.   
   
                                                            Performed By: #### L  
C FL TP ####  
[facility] Acct#[id number]  
,  
#### SYN TP ####  
[address]   
   
                                                    Leukocytes [#/volume] correc  
bassam for nucleated erythrocytes in Blood by Automated  
   
counOrdered By: Williamer Bullimore on 2024   
   
                                                    WBC corrected for nucl   
RBC Auto (Bld) [#/Vol] 9.4 10*3/uL                     4.1-10.5        [facility]  
   
                                                    Leukocytes [#/volume] in Blo  
od by Automated countOrdered By: Ginger Bullimore on  
   
2024   
   
                      WBC (Bld) [#/Vol] 9.4 10*3/uL Normal     4.1-10.5   [facility]  
   
                                        Comment on above:   Performed By: #### C  
BC, ESR, CMP, CRP ####  
[address]   
   
                                                    Lymphocytes [#/volume] in Bl  
ood by Automated countOrdered By: Williamer Bullimore   
on   
2024   
   
                                                    Lymphocytes (Bld)   
[#/Vol]         0.6 10*3/uL     Low             1.00-4.8        [facility]  
   
                                        Comment on above:   Performed By: #### C  
BC, ESR, CMP, CRP ####  
[facility]  
[address] [place] [address]   
   
                                                    Lymphocytes/100 leukocytes i  
n Blood by Automated countOrdered By: Williamer   
Bullimore on   
2024   
   
                                                    Lymphocytes/100 WBC   
(Bld)           6.0 %           Normal          .               [facility]  
   
                                        Comment on above:   Performed By: #### C  
BC, ESR, CMP, CRP ####  
[address]   
   
                                                    MCH [Entitic mass] by Automa  
bassam countOrdered By: Williamer Bullimore on 2024   
   
                                                    MCH (RBC) [Entitic   
mass]           29.6 pg         Normal          27.5-35.2       [facility]  
   
                                        Comment on above:   Performed By: #### C  
BC, ESR, CMP, CRP ####  
[address]   
   
                                                    MCHC Auto (RBC) [Mass/Vol]Or  
dered By: Ginger Bullimore on 2024   
   
                      MCHC (RBC) [Mass/Vol] 34.1 g/dL             32.5-35.6  [facility]  
   
                                                    MCV [Entitic volume] by Auto  
mated countOrdered By: Daniela Sanchez on   
2024   
   
                                                    MCV (RBC) [Entitic   
vol]            86.9 fL         Normal          83.5-101        [facility]  
   
                                        Comment on above:   Performed By: #### C  
BC, ESR, CMP, CRP ####  
[facility]  
[address]   
   
                                                    Manual body fluid eosinophil  
s/100 leukocytesOrdered By: Jeff Onofre on   
2024   
   
                                                    Eosinophils/100 WBC   
Manual cnt (Body fld) 0 %                             0-2             [facility]  
   
                                                    Manual body fluid erythrocyt  
es count (number/volume)Ordered By: Jeff Onofre   
on   
2024   
   
                                                    RBC Manual cnt (Body   
fld) [#/Vol]    6370 /uL        High            0-0             [facility]  
   
                                                    Manual body fluid lymphocyte  
s/100 leukocytesOrdered By: Jeff Onofre on   
2024   
   
                                                    Lymphocytes/100 WBC   
Manual cnt (Body fld) 7 %                             0-74            [facility]  
   
                                                    Monocyte distribution width   
[Entitic volume] in Blood by AutomatedOrdered By:   
Daniela Sanchez on 2024   
   
                                                    Monocyte distribution   
width Auto (Bld)   
[Entitic vol]   22.07 %         High            0.00-20.00      [facility]  
   
                                        Comment on above:   For adults in ED, MD  
W > 20.0 may be associated with a higher   
risk of sepsis during the first 12 hrs of hospital admission   
   
                                                    Neutrophils [#/volume] in Bl  
ood by Automated countOrdered By: Daniela Sanchez   
on   
2024   
   
                                                    Neutrophils (Bld)   
[#/Vol]         8.4 10*3/uL     High            1.8-7.7         [facility]  
   
                                        Comment on above:   Performed By: #### C  
BC, ESR, CMP, CRP ####  
[facility]  
[address]   
   
                                                    Neutrophils/100 WBC Manual c  
nt (Body fld)Ordered By: Jeff Onofre on   
2024   
   
                                                    Neutrophils/100 WBC   
(Body fld)      92 %            High            0-24            [facility]  
   
                                                    No Panel InformationOrdered   
By: Jeff Onofre on 2024   
   
                                                    Synovial Fluid   
Supernatant Color Yellow                                          [facility]  
   
                                        Comment on above:   The reference interv  
al and other method performance   
specifications have not been established for this body fluid.   
The test result must be integrated into the clinical context   
for interpretation.   
   
                                                    Synovial Fluid Tot   
Nucleated Cells 86479 /uL       High            0-150           [facility]  
   
                                        Comment on above:   The reference interv  
al and other method performance   
specifications have not been established for this body fluid.   
The test result must be integrated into the clinical context   
for interpretation.   
   
                                                    No Panel InformationOrdered   
By: Daniela Sanchez on 2024   
   
                                                    Bacterial ID (NA   
Multiplex Assay) St. mitis/oralis grp Abnormal                        [facility]  
   
                                                    Estimated GFR   
(CKD-EPI)       > 60.0 mL/Min                                   [facility]  
   
                                                    Pharmacy Creatinine   
Clearance (Chem 143.91                                          [facility]  
   
                                                    Nucleated erythrocytes [Pres  
ence] in Blood by Automated countOrdered By: Daniela Sanchez on 2024   
   
                                                    Nucleated RBC Auto Ql   
(Bld)           0.8 /100{WBC}   High            0-0.5           [facility]  
   
                                                    Platelet mean volume [Entiti  
c volume] in Blood by Automated countOrdered By:   
Daniela Sanchez on 2024   
   
                                                    Platelet mean volume   
(Bld) [Entitic vol] 8.0 fL          Normal          6.6-10.1        [facility]  
   
                                        Comment on above:   Performed By: #### C  
BC, ESR, CMP, CRP ####  
[facility]  
[address] [place] [id number] [place]   
   
                                                    Platelets [#/volume] in Bloo  
d by Automated countOrdered By: Daniela Sanchez on   
2024   
   
                                                    Platelets (Bld)   
[#/Vol]         170 10*3/uL     Normal          150-450         [facility]  
   
                                        Comment on above:   Performed By: #### C  
BC, ESR, CMP, CRP ####  
[facility]  
[address] [place] [id number] [place]   
   
                                                    Potassium [Moles/volume] in   
Serum or PlasmaOrdered By: Daniela Sanchez on   
2024   
   
                      Potassium [Moles/Vol] 4.0 mmol/L Normal     3.5-5.1    [facility]  
   
                                        Comment on above:   Performed By: #### C  
UBLD ####  
[facility]  
[address] [place] [address] [facility]   
   
                                                    Protein [Mass/volume] in Bod  
y fluidOrdered By: Jeff Onofre on 2024   
   
                                                    Protein (Body fld)   
[Mass/Vol]      See comment                                     [facility]  
   
                                        Comment on above:   Testing sent to [facility].No reference range   
established   
   
                                                    Protein (Body fld)   
[Mass/Vol]      4.4 g/dL                        .               [facility]  
   
                                        Comment on above:   ____________________  
_____________________ : BODY FLUID TYPE :   
TOTAL PROTEIN : :_________________:_______________________: :   
Amniotic Fluid : <0.4 :   
:_________________:_______________________: : : Nonmalignant:   
<3.0 : : Ascitic Fluid : Malignant: >3.0 :   
:_________________:_______________________: : Bile, Clear :   
<0.9 : :_________________:_______________________: : Bile,   
Yellow : 0.2 - 0.6 :   
:_________________:_______________________: : Lymph : 2.2 - 6.0   
: :_________________:_______________________: : Human Milk :   
1.9 - 2.0 : :_________________: ______________________: : Nasal   
Secretion : 0.1 - 3.5 :   
:_________________:_______________________: : Pancreatic : 0.0   
- 0.1 : : Juice : (post stimulation) :   
:_________________:_______________________: : : Transudate:   
<0.3 : : Pleural Fluid : Exudate: >0.3 :   
:_________________:_______________________: : Saliva : 0.1 -   
0.2 : : (Mixed Glands) : :   
:_________________:_______________________: : Synovial Fluid :   
<2.5 : :_________________:_______________________: : Tears :   
0.8 - 0.9 : :_________________:_______________________: Justino Headley. Reference Intervals for Adults and Children .   
Ninth Edition (V9.1) [employer], [facility];   
[place]: 2009.Performed at: [address] [id number] Director: Alejandro Salomon PhD, Phone: [phone]   
   
                                                    Protein [Mass/volume] in Ser  
um or PlasmaOrdered By: Daniela Sanchez on   
2024   
   
                      Protein [Mass/Vol] 6.8 g/dL   Normal     6.4-8.9    [facility]  
   
                                        Comment on above:   Performed By: #### C  
UBLD ####  
[facility]  
[address] [place] [address]   
   
                                                    Serum globulin measurement b  
y calculation (mass/volume)Ordered By: Ginger   
Bullimore   
on 2024   
   
                                                    Globulin (S)   
[Mass/Vol]      2.9 g/dL        Normal                          [facility]  
   
                                        Comment on above:   Performed By: #### C  
UBLD ####  
[address]   
   
                                                    Serum or plasma albumin/glob  
ulin mass ratioOrdered By: Ginger Bullimore on   
2024   
   
                                                    Albumin/Globulin [Mass   
ratio]          1.3 {ratio}     [facility]  
   
                                        Comment on above:   Performed By: #### C  
UBLD ####  
[address]   
   
                                                    Serum or plasma anion gap de  
terminationOrdered By: Ginger Bullimore on   
2024   
   
                      Anion gap [Moles/Vol] 13.9 mmol/L Normal     6.0-15.0   [facility]  
   
                                        Comment on above:   Performed By: #### C  
UBLD ####  
[address]   
   
                                                    Sodium [Moles/volume] in Ser  
um or PlasmaOrdered By: Ginger Bullimore on   
2024   
   
                      Sodium [Moles/Vol] 135 mmol/L Low        136-145    [facility]  
   
                                        Comment on above:   Performed By: #### C  
UBLD ####  
[address]   
   
                                                    Synovial fluid differential   
cell countOrdered By: Jeff Onofre on 2024   
   
                                                    Differential panel   
(Syn fld)       1 %                             0-69            [facility]  
   
                                                    Total Protein, Synovial Flui  
don 2024   
   
                                                    Total Protein,   
Synovial Fluid                  Normal                          [facility]   
[facility]   
[facility]  
   
                                        Comment on above:   Order Comment: DUE T  
O SPECIMEN VISCOSITY SAMPLE HAS BEEN SENT   
OUT.  
SENDOUT STATUS called  
at 0044 on 24  
Synovial Fluid Source: R knee  
Synovial Fluid Site: R knee   
   
                                                            Result Comment: Test  
ing sent to [facility].  
No reference range established  
PERFORMED BY:  
[place] [address]  
[phone]  
PATHOLOGIST MEDICAL DIRECTOR  
VIKAS MARTINEZ M.D.   
   
                                                            Performed By: #### L  
C FL TP ####  
[facility] Acct#[id number]  
,  
#### SYN TP ####  
[address]   
   
                                                    Urea nitrogen [Mass/volume]   
in Serum or PlasmaOrdered By: Daniela Sanchez on   
2024   
   
                                                    Urea nitrogen   
[Mass/Vol]      15 mg/dL        Normal          7-25            [facility]  
   
                                        Comment on above:   Performed By: #### C  
UBLD ####  
[address]   
   
                                                    XR knee RT 4V*on 2024   
   
                                        XR knee RT 4V*      [facility]  
[address] [place] [address]  
  
XRay Report  
Signed  
  
Patient: Kaylynn Mims   
MR#: [id number]  
16  
: 1955   
Acct:[id number]  
  
Age/Sex: 68 / M ADM Date:   
24  
  
Loc: ER Room: Type: [facility]   
ER  
Attending Dr:  
Copies to: MAYRA Martinez  
  
  
  
Ordering Provider: MAYRA Martinez  
Date of Service: 24  
Accession #:   
([id number] XR/XR knee   
RT 4V*: Extremity Injury,   
Lower  
  
  
  
  
XR knee RT 4V* 2024   
11:19 AM  
  
SIGNS AND SYMPTOMS: Right   
knee pain and swelling  
  
PROTOCOL: Frontal,   
lateral, and oblique   
radiographs of the right   
knee  
  
COMPARISON: 2024  
  
FINDINGS:  
  
There is narrowing of the   
weightbearing joint   
spaces and temporal joint   
space which I lateral  
spurring. Fracture. There   
is a large joint   
effusion. No soft tissue   
swelling.  
  
  
ORDER #: [id number] XR/XR   
knee RT 4V*  
IMPRESSION:  
  
No fracture.  
  
Tricompartmental   
degenerative changes are   
redemonstrated with a   
large joint effusion.   
This is  
worsened compared to the   
prior exam.  
  
Impression dictated by:   
Jesus Dukes M.D.2024 12:03 PM  
  
  
Dictation Location:   
RADIO-PC-07  
  
  
  
Transcribed By: [facility]   
24 1203  
Dictated By: Jesus Dukes II, MD 24   
1149  
  
Signed By:  
24 1203       Normal                                  [facility]   
[facility]   
[facility]  
   
                                                    XR knee RT 4V*on 2024   
   
                                        XR knee RT 4V*      [facility] [address] [address]  
  
XRay Report  
Signed  
  
Patient: Kaylynn Mims   
MR#: [id number]  
16  
: 1955   
Acct:[id number]  
  
Age/Sex: 68 / M ADM Date:   
24  
  
Loc: XDS Room: Type:   
REG CLI  
Attending Dr: To Khan APRN  
Copies to: SANJANA Samuel  
  
  
  
Ordering Provider:   
SANJANA Samuel  
Date of Service: 24  
Accession #:   
([id number] XR/XR knee   
RT 4V*: M25.561 - Pain in   
right knee  
  
  
  
  
RIGHT KNEE - 4 views  
  
CLINICAL HISTORY:   
Jaundice right knee pain   
with increasing pain over   
2 years.  
  
COMPARISON: None  
  
FINDINGS:  
  
Moderate degenerative   
changes with medial   
weightbearing joint space   
narrowing. No acute bony  
process. Moderate joint   
effusion.  
  
  
ORDER #: [id number] XR/XR   
knee RT 4V*  
IMPRESSION:  
  
MODERATE DEGENERATIVE   
CHANGES OF THE RIGHT KNEE   
WITHOUT ACUTE BONY   
PROCESS.  
  
Impression dictated by:   
Claudy Smith Jr.,   
TANIA2024 2:42 PM  
  
  
Dictation Location:   
RADIO--08  
  
  
  
Transcribed By: [facility]   
24 1442  
Dictated By: Claudy Smith Jr, DO 24   
1441  
  
Signed By:  
24 1442       Normal                                  [facility]   
[facility]   
[facility]  
   
                                                    XR knee LT 4V*on 05-   
   
                                        XR knee LT 4V*      [facility] [address] [address]  
  
XRay Report  
Signed  
  
Patient: Kaylynn Mims   
MR#: [id number]  
16  
: 1955   
Acct:[id number]  
  
Age/Sex: 68 / M ADM Date:   
05/15/24  
  
[facility] Type:   
REG CLI  
Attending Dr: To Khan APRN  
Copies to: To Tavon   
Binks, APRN  
  
  
  
Ordering Provider:   
SANJANA Samuel  
Date of Service: 05/15/24  
Accession #:   
([id number] XR/XR knee   
LT 4V*: M25.562 - Pain in   
left knee  
  
  
  
  
XR knee LT 4V* 5/15/2024   
10:14 AM  
  
SIGNS AND SYMPTOMS:   
Medial and posterior left   
knee pain  
  
PROTOCOL: Frontal,   
lateral, and oblique   
radiographs of the left   
knee  
  
COMPARISON: None  
  
FINDINGS:  
  
There is tricompartmental   
joint space loss,   
greatest in the medial   
weightbearing   
compartment. There  
is spurring of the tibial   
spines, tibial plateaus,   
and femoral condyles. No   
joint effusion. No soft  
tissue swelling. There is   
a 15 mm suspected ossific   
loose body in the   
anterior aspect of the   
joint  
space.  
  
  
ORDER #: [id number] XR/XR   
knee LT 4V*  
IMPRESSION:  
  
Tricompartmental   
degenerative changes are   
noted in the left knee   
greatest in the medial  
weightbearing   
compartment.  
  
No fracture.  
  
There is a 15 mm   
suspected ossific loose   
body in the anterior   
aspect of the joint   
space.  
  
Impression dictated by:   
Jesus Dukes M.D.5/15/2024 3:02 PM  
  
  
Dictation Location:   
[place] [address]  
  
  
  
Transcribed By: [facility]   
05/15/24 1502  
Dictated By: Jesus Dukes II, MD 05/15/24   
1500  
  
Signed By:  
05/15/24 1502       Normal                                  [facility]   
[facility]   
[facility]  
   
                                                    A1C with Estimated Average MALKA brown 2024   
   
                      Glucose [Mass/Vol] 137 mg/dL  Normal                [facility]   
Physician   
Group  
   
                                        Comment on above:   Result Comment: PERF  
ORMED BY:  
[place] [address]  
[phone]  
PATHOLOGIST MEDICAL DIRECTOR  
VIKAS MARTINEZ M.D.   
   
                                                            Performed By: #### L  
C FL TP ####  
[facility] Acct#[id number]  
,  
#### SYN TP ####  
[address]   
   
                                                    Alanine aminotransferase [En  
zymatic activity/volume] in Serum or PlasmaOrdered   
By:   
Elvira Harper on 2024   
   
                                                    ALT [Catalytic   
activity/Vol]   31 U/L          Normal          7-52            [facility]  
   
                                        Comment on above:   Order Comment: Reaso  
n for Exam Type 2 diabetes mellitus   
   
                                                            Performed By: #### G  
S, AERC ####  
[facility]  
[address] [place] [address] [place]   
   
                                                    Albumin [Mass/volume] in Ser  
um or Plasma by Bromocresol green (BCG) dye binding   
methoOrdered By: Elvira Harper on 2024   
   
                                                    Albumin BCG dye   
[Mass/Vol]      4.4 g/dL                        3.5-5.7         [facility]  
   
                                                    Alkaline phosphatase [Enzyma  
tic activity/volume] in Serum or PlasmaOrdered By:   
Elvira Harper on 2024   
   
                                                    ALP [Catalytic   
activity/Vol]   68 U/L          Normal                    [facility]  
   
                                        Comment on above:   Order Comment: Reaso  
n for Exam Type 2 diabetes mellitus   
   
                                                            Performed By: #### G  
S, AERC ####  
[facility]  
[address] [place] [id number] [place]   
   
                                                    Aspartate aminotransferase [  
Enzymatic activity/volume] in Serum or PlasmaOrdered  
By:   
Elvira Harper on 2024   
   
                                                    AST [Catalytic   
activity/Vol]   22 U/L          Normal          13-39           [facility]  
   
                                        Comment on above:   Order Comment: Reaso  
n for Exam Type 2 diabetes mellitus   
   
                                                            Performed By: #### G  
S, AERC ####  
[facility]  
[address] [place] [address] [place]   
   
                                                    Bilirubin.total [Mass/volume  
] in Serum or PlasmaOrdered By: Elvira Harper on   
2024   
   
                      Bilirubin [Mass/Vol] 1.4 mg/dL  High       0.3-1.0    [facility]  
   
                                        Comment on above:   Samples from patient  
s who have taken Naproxen have shown   
spurious elevation in Total Bilirubin levels. A metabolite of   
Naproxen, O-desmethylnaproxen, has been shown to interfere with   
the Jendrassik-Grof method for measuring Total Bilirubin.   
   
                                                            Order Comment: Reaso  
n for Exam Type 2 diabetes mellitus   
   
                                                            Result Comment: Samp  
les from patients who have taken Naproxen   
have shown  
spurious elevation in Total Bilirubin levels. A metabolite  
of Naproxen, O-desmethylnaproxen, has been shown to  
interfere with the Jendrassik-Grof method for measuring  
Total Bilirubin.   
   
                                                            Performed By: #### G  
S, AERC ####  
[facility]  
[address] [place] [address] [place]   
   
                                                    Calcium [Mass/volume] in Ser  
um or PlasmaOrdered By: Elvira Harper on 2024   
   
                      Calcium [Mass/Vol] 8.8 mg/dL  Normal     8.6-10.3   [facility]  
   
                                        Comment on above:   Order Comment: Reaso  
n for Exam Type 2 diabetes mellitus   
   
                                                            Performed By: #### G  
S, AERC ####  
[facility]  
[address] [id number] [place]   
   
                                                    Carbon dioxide, total [Moles  
/volume] in Serum or PlasmaOrdered By: Elvira Harper   
on   
2024   
   
                      CO2 [Moles/Vol] 29.3 mmol/L Normal     21.0-31.0  [facility]  
   
                                        Comment on above:   Order Comment: Reaso  
n for Exam Type 2 diabetes mellitus   
   
                                                            Performed By: #### G  
S, AERC ####  
[facility]  
[address] [place] [address] [place]   
   
                                                    Chloride [Moles/volume] in S  
lorraine or PlasmaOrdered By: Elvira Harper on 2024  
   
   
                      Chloride [Moles/Vol] 104 mmol/L Normal          [facility]  
   
                                        Comment on above:   Order Comment: Reaso  
n for Exam Type 2 diabetes mellitus   
   
                                                            Performed By: #### G  
S, AERC ####  
[facility]  
[address] [id number] [place]   
   
                                                    Cholesterol [Mass/volume] in  
 Serum or PlasmaOrdered By: Elvira Harper on   
2024   
   
                      Cholesterol [Mass/Vol] 166 mg/dL  Normal     140-200    [facility]  
   
                                        Comment on above:   Chol less than 200 m  
g/dl low riskChol 201-239 mg/dl borderline  
   
riskChol 240 mg/dl and greater high risk   
   
                                                            Order Comment: Reaso  
n for Exam Type 2 diabetes mellitus   
   
                                                            Result Comment: Chol  
 less than 200 mg/dl low risk  
Chol 201-239 mg/dl borderline risk  
Chol 240 mg/dl and greater high risk   
   
                                                            Performed By: #### G  
S, AERC ####  
[facility]  
[address] [id number] [place]   
   
                                                    Cholesterol in LDL Calc [Mas  
s/Vol]Ordered By: Elvira Harper on 2024   
   
                                                    Cholesterol in LDL   
[Mass/Vol]      100 mg/dL                       0-100           [facility]  
   
                                        Comment on above:   LDL ATP III CLASSIFI  
CATIONLDL less than 100 mg/dL OptimalLDL   
100-129 mg/dL Near or above optimalLDL 130-159 mg/dL Borderline   
highLDL 160-189 mg/dL HighLDL greater than 189 mg/dL Very high   
   
                                                    Cholesterol in VLDL Calc [Ma  
ss/Vol]Ordered By: Elvira Harper on 2024   
   
                                                    Cholesterol in VLDL   
[Mass/Vol]      23 mg/dL                                        [facility]  
   
                                                    Comprehensive Metabolic Pane  
guerline 2024   
   
                      Albumin [Mass/Vol] 4.4 g/dL   Normal     3.5-5.7    [facility]   
Physician   
Group  
   
                                        Comment on above:   Order Comment: Reaso  
n for Exam Type 2 diabetes mellitus   
   
                                                            Performed By: #### G  
S, AERC ####  
[facility]  
[address] [place] [address] [place]   
   
                                                    GFR/1.73 sq   
M.predicted MDRD   
(S/P/Bld) [Vol   
rate/Area]      mL/min/{1.73_m2} Normal                          [facility]   
Physician   
Group  
   
                                        Comment on above:   Order Comment: Reaso  
n for Exam Type 2 diabetes mellitus   
   
                                                            Performed By: #### G  
S, AERC ####  
[facility]  
[address] [place] [address]   
   
                                                    Creatinine [Mass/volume] in   
Serum or PlasmaOrdered By: Elvira Harper on   
2024   
   
                      Creatinine [Mass/Vol] 0.79 mg/dL Normal     0.70-1.30  [facility]  
   
                                        Comment on above:   Order Comment: Reaso  
n for Exam Type 2 diabetes mellitus   
   
                                                            Performed By: #### G  
S, AERC ####  
[facility]  
[address] [place] [address] [place]   
   
                                                    Erythrocyte distribution wid  
th [Ratio] by Automated countOrdered By: Elvira Harper on   
2024   
   
                                                    Erythrocyte   
distribution width   
(RBC) [Ratio]   13.5 %          Normal          12.0-14.8       [facility]  
   
                                        Comment on above:   Order Comment: Reaso  
n for Exam Type 2 diabetes mellitus   
   
                                                            Performed By: #### G  
S, AERC ####  
[facility]  
[address] [place], [place] [id number] [place]   
   
                                                    Erythrocytes [#/volume] in B  
lood by Automated countOrdered By: Elvira Harper on   
2024   
   
                      RBC (Bld) [#/Vol] 5.82 10*6/uL High       3.90-5.60  [facility]  
   
                                        Comment on above:   Order Comment: Reaso  
n for Exam Type 2 diabetes mellitus   
   
                                                            Performed By: #### G  
S, AERC ####  
[facility]  
[address] [id number] [place]   
   
                                                    Glucose [Mass/volume] in Ser  
um or PlasmaOrdered By: Elvira Harper on 2024   
   
                      Glucose [Mass/Vol] 128 mg/dL  High            [facility]  
   
                                        Comment on above:   ADA recommended refe  
rence rangeRandom Glucose Reference Range   
is dependent on time and content of last meal. Glucose of more   
than 200 mg/dL in a nonstressed, ambulatory subject supports   
the diagnosis of Diabetes Mellitus.   
   
                                                            Order Comment: Reaso  
n for Exam Type 2 diabetes mellitus   
   
                                                            Result Comment: Rand  
om Glucose Reference Range is dependent on   
time and  
content of last meal. Glucose of more than 200 mg/dL in a  
nonstressed, ambulatory subject supports the diagnosis of  
Diabetes Mellitus.  
ADA recommended reference range   
   
                                                            Performed By: #### G  
S, AERC ####  
[facility]  
[address] [place] [address] [place]   
   
                                                    Glucose mean value [Mass/vol  
ume] in Blood Estimated from glycated   
hemoglobinOrdered   
By: Elvira Harper on 2024   
   
                                                    Average glucose   
Estimated from   
glycated hemoglobin   
(Bld) [Mass/Vol] 137 mg/dL                                       [facility]  
   
                                                    Hematocrit [Volume Fraction]  
 of Blood by Automated countOrdered By: Elvira Harper  
on   
2024   
   
                                                    Hematocrit (Bld)   
[Volume fraction] 50.0 %          Normal          38.8-50.0       [facility]  
   
                                        Comment on above:   Order Comment: Reaso  
n for Exam Type 2 diabetes mellitus   
   
                                                            Performed By: #### G  
S, AERC ####  
[facility]  
[address] [id number] [place]   
   
                                                    Hemoglobin A1c percentageOrd  
ered By: Elvira Harper on 2024   
   
                                                    HbA1c (Bld) [Mass   
fraction]       6.4 %           High            4.3-5.6         [facility]  
   
                                        Comment on above:   Increased risk for d  
iabetes: 5.7 - 6.4diabetes: >6.4glycemic   
control for adults with diabetes: <7.0   
   
                                                            Result Comment: Incr  
eased risk for diabetes: 5.7 - 6.4  
diabetes: >6.4  
glycemic control for adults with diabetes: <7.0   
   
                                                            Performed By: #### L  
C FL TP ####  
[facility] Acct#[id number]  
,  
#### SYN TP ####  
[place] [address] [place]   
   
                                                    Hemoglobin [Mass/volume] in   
BloodOrdered By: Elvira Harper on 2024   
   
                                                    Hemoglobin (Bld)   
[Mass/Vol]      16.6 g/dL       Normal          13.0-17.0       [facility]  
   
                                        Comment on above:   Order Comment: Reaso  
n for Exam Type 2 diabetes mellitus   
   
                                                            Performed By: #### G  
S, AERC ####  
[address]   
   
                                                    Hemogram CBC Without Diffon   
2024   
   
                                                    Mean Corpuscular HGB   
Conc            33.1 g/dL       Normal          32.5-35.6       [facility]   
Physician   
Group  
   
                                        Comment on above:   Order Comment: Reaso  
n for Exam Type 2 diabetes mellitus   
   
                                                            Performed By: #### G  
S, AERC ####  
[place] [address] [place]   
   
                      WBC (Bld) [#/Vol] 5.3 10*3/uL Normal     4.1-10.5   [facility]   
Physician   
Group  
   
                                        Comment on above:   Order Comment: Reaso  
n for Exam Type 2 diabetes mellitus   
   
                                                            Performed By: #### G  
S, AERC ####  
[address] [id number] [place]   
   
                                                    Leukocytes [#/volume] correc  
bassam for nucleated erythrocytes in Blood by Automated  
   
counOrdered By: Elvira Harper on 2024   
   
                                                    WBC corrected for nucl   
RBC Auto (Bld) [#/Vol] 5.3 10*3/uL                     4.1-10.5        [facility]  
   
                                                    Lipid Panelon 2024   
   
                                                    LDL   
Cholesterol,Calculated 100 mg/dL       Normal          0-100           [facility]   
[facility]   
[facility]  
   
                                        Comment on above:   Order Comment: Reaso  
n for Exam Type 2 diabetes mellitus   
   
                                                            Result Comment: LDL   
ATP III CLASSIFICATION  
LDL less than 100 mg/dL Optimal  
-129 mg/dL Near or above optimal  
-159 mg/dL Borderline high  
-189 mg/dL High  
LDL greater than 189 mg/dL Very high   
   
                                                            Performed By: #### G  
S, AERC ####  
[facility]  
[address]   
   
                      Triglyceride w/Reflex 117 mg/dL  Normal     0-149      The  
 [facility]   
Physician   
Group  
   
                                        Comment on above:   Order Comment: Reaso  
n for Exam Type 2 diabetes mellitus   
   
                                                            Result Comment: TRIG  
 ATP III CLASSIFICATION  
TRIG less than 150 mg/dL Normal  
TRIG 150-199 mg/dL Borderline high  
TRIG 200-500 mg/dL High  
TRIG greater than 500 mg/dL Very high  
Standard traceable to the Center for Disease Conrtrol and  
Prevention (CDC) test method.   
   
                                                            Performed By: #### G  
S, AERC ####  
[facility]  
[address]   
   
                      VLDL CHOLESTEROL 23 mg/dL   Normal                [facility]   
Physician   
Group  
   
                                        Comment on above:   Order Comment: Reaso  
n for Exam Type 2 diabetes mellitus   
   
                                                            Performed By: #### G  
S, AERC ####  
[address]   
   
                                                    MCH [Entitic mass] by Automa  
bassam countOrdered By: Elvira Harper on 2024   
   
                                                    MCH (RBC) [Entitic   
mass]           28.5 pg         Normal          27.5-35.2       [facility]  
   
                                        Comment on above:   Order Comment: Reaso  
n for Exam Type 2 diabetes mellitus   
   
                                                            Performed By: #### G  
S, AERC ####  
[facility]  
[address] [place] [address]   
   
                                                    MCHC Auto (RBC) [Mass/Vol]Or  
dered By: Elvira Harper on 2024   
   
                      MCHC (RBC) [Mass/Vol] 33.1 g/dL             32.5-35.6  [facility]  
   
                                                    MCV [Entitic volume] by Auto  
mated countOrdered By: Elvira Harper on 2024   
   
                                                    MCV (RBC) [Entitic   
vol]            85.9 fL         Normal          83.5-101        [facility]  
   
                                        Comment on above:   Order Comment: Reaso  
n for Exam Type 2 diabetes mellitus   
   
                                                            Performed By: #### G  
S, AERC ####  
[facility]  
[address] [place] [address] [place]   
   
                                                    Microalbumin [Mass/volume] i  
n UrineOrdered By: Elvira Harper on 2024   
   
                                                    Albumin DL <= 20 mg/L   
(U) [Mass/Vol]  1.1 mg/dL       Normal          0.0-1.8         [facility]  
   
                                        Comment on above:   Order Comment: SPECI  
MEN SENT TO REFERENCE LAB DUE TO VISCOSITY  
SPECIMEN SOURCE/DESCRIPTION: SYNOVIAL RIGHT KNEE   
   
                                                            Result Comment: PERF  
ORMED BY:  
[place] [address]  
[phone]  
PATHOLOGIST MEDICAL DIRECTOR  
VIKAS MARTINEZ M.D.   
   
                                                            Performed By: #### L  
C FL TP ####  
[facility] Acct#[id number]  
,  
#### SYN TP ####  
[facility]  
[address] [place] [address]   
   
                                                    No Panel InformationOrdered   
By: Elvira Harper on 2024   
   
                                                    Estimated GFR   
(CKD-EPI)       > 60.0 mL/Min                                   [facility]  
   
                                                    Pharmacy Creatinine   
Clearance (Chem N/A                                             [facility]  
   
                                                    PSA Screen (Yearly Only)on 0  
2024   
   
                                                    PSA Screen (Yearly   
Only)               0.970 ng/mL         Normal              0.000-4.00  
0                                       [facility]   
Physician   
Group  
   
                                        Comment on above:   Order Comment: Reaso  
n for Exam Prostate cancer screening Is   
patient <50 yrs? Medicare does not pay <50.: N Is Medicare the   
insurance?: Y   
   
                                                            Result Comment: Seri  
al tumor marker results determined by   
assays using  
different manufacturers or methods may not be comparable.  
[facility]  and method:  
ANNA UNICEL DXI, CHEMILUMINESCENT IMMUNOASSAY.  
PERFORMED BY:  
[place] [address]  
[phone]  
PATHOLOGIST MEDICAL DIRECTOR  
VIKAS MARTINEZ M.D.   
   
                                                            Performed By: #### G  
S, AERC ####  
[facility]  
[address] [place], [place] [id number] [facility]   
   
                                                    Platelet mean volume [Entiti  
c volume] in Blood by Automated countOrdered By:   
Elvira Harper on 2024   
   
                                                    Platelet mean volume   
(Bld) [Entitic vol] 7.9 fL          Normal          6.6-10.1        [facility]  
   
                                        Comment on above:   Order Comment: Reaso  
n for Exam Type 2 diabetes mellitus   
   
                                                            Result Comment: PERF  
ORMED BY:  
[place] [address]  
[phone]  
PATHOLOGIST MEDICAL DIRECTOR  
VIKAS MARTINEZ M.D.   
   
                                                            Performed By: #### G  
S, AERC ####  
[facility]  
[address] [place] [address]   
   
                                                    Platelets [#/volume] in Bloo  
d by Automated countOrdered By: Elvira Harper on   
2024   
   
                                                    Platelets (Bld)   
[#/Vol]         170 10*3/uL     Normal          150-450         [facility]  
   
                                        Comment on above:   Order Comment: Reaso  
n for Exam Type 2 diabetes mellitus   
   
                                                            Performed By: #### G  
S, AERC ####  
[facility]  
[address] [place] [address]   
   
                                                    Potassium [Moles/volume] in   
Serum or PlasmaOrdered By: Elvira Harper on   
2024   
   
                      Potassium [Moles/Vol] 4.6 mmol/L Normal     3.5-5.1    [facility]  
   
                                        Comment on above:   Order Comment: Reaso  
n for Exam Type 2 diabetes mellitus   
   
                                                            Performed By: #### G  
S, AERC ####  
[facility]  
[address] [place] [address] [place]   
   
                                                    Prostate specific Ag [Mass/v  
olume] in Serum or PlasmaOrdered By: Elvira Harper on  
   
2024   
   
                                                    Prostate specific Ag   
[Mass/Vol]          0.970 ng/mL                             0.000-4.00  
0                                       [facility]  
   
                                        Comment on above:   Serial tumor marker   
results determined by assays using   
different manufacturers or methods may not be   
comparable.[facility]  and method:VFA DXI, CHEMILUMINESCENT IMMUNOASSAY.   
   
                                                    Protein [Mass/volume] in Ser  
um or PlasmaOrdered By: Elvira Harper on 2024   
   
                      Protein [Mass/Vol] 6.7 g/dL   Normal     6.4-8.9    [facility]  
   
                                        Comment on above:   Order Comment: Reaso  
n for Exam Type 2 diabetes mellitus   
   
                                                            Performed By: #### G  
S, AERC ####  
[facility]  
[address] [place] [address]   
   
                                                    Serum globulin measurement b  
y calculation (mass/volume)Ordered By: Elvira Harper   
on   
2024   
   
                                                    Globulin (S)   
[Mass/Vol]      2.3 g/dL        [facility]  
   
                                        Comment on above:   Order Comment: Reaso  
n for Exam Type 2 diabetes mellitus   
   
                                                            Performed By: #### G  
S, AERC ####  
[facility]  
[address]   
   
                                                    Serum or plasma albumin/glob  
ulin mass ratioOrdered By: Elvira Harper on   
2024   
   
                                                    Albumin/Globulin [Mass   
ratio]          1.9 {ratio}     [facility]  
   
                                        Comment on above:   Order Comment: Reaso  
n for Exam Type 2 diabetes mellitus   
   
                                                            Performed By: #### G  
S, AERC ####  
[facility]  
[address]   
   
                                                    Serum or plasma anion gap de  
terminationOrdered By: Elvira Harper on 2024   
   
                      Anion gap [Moles/Vol] 8.3 mmol/L Normal     6.0-15.0   [facility]  
   
                                        Comment on above:   Order Comment: Reaso  
n for Exam Type 2 diabetes mellitus   
   
                                                            Performed By: #### G  
S, AERC ####  
[facility]  
[address]   
   
                                                    Serum or plasma high density  
 lipoprotein (HDL) cholesterol measurementOrdered   
By:   
Elvira Harper on 2024   
   
                                                    Cholesterol in HDL   
[Mass/Vol]      43 mg/dL        Normal          23-92           [facility]  
   
                                        Comment on above:   HDL CHOL ATP-III CLA  
SSIFICATION Cardiovascular RiskHDL > or   
equal to 60 mg/dL LOWHDL < 40 mg/dL HIGH   
   
                                                            Order Comment: Reaso  
n for Exam Type 2 diabetes mellitus   
   
                                                            Result Comment: HDL   
CHOL ATP-III CLASSIFICATION  
Cardiovascular  
Risk  
HDL > or equal to 60 mg/dL LOW  
HDL < 40 mg/dL HIGH   
   
                                                            Performed By: #### G  
S, AERC ####  
[facility]  
[address]   
   
                                                    Serum or plasma total choles  
terol/high density lipoprotein (HDL) cholesterol   
mass   
ratOrdered By: Elvira Harper on 2024   
   
                                                    Cholesterol.total/Chol  
esterol in HDL [Mass   
ratio]          3.9 {ratio}     Normal          <5.0            [facility]  
   
                                        Comment on above:   Order Comment: Reaso  
n for Exam Type 2 diabetes mellitus   
   
                                                            Result Comment: PERF  
ORMED BY:  
[place] [address]  
[phone]  
PATHOLOGIST MEDICAL DIRECTOR  
VIKAS MARTINEZ M.D.   
   
                                                            Performed By: #### G  
S, AERC ####  
[facility]  
[address] [id number] [facility]   
   
                                                    Sodium [Moles/volume] in Ser  
um or PlasmaOrdered By: Elvira Harper on 2024   
   
                      Sodium [Moles/Vol] 137 mmol/L Normal     136-145    [facility]  
   
                                        Comment on above:   Order Comment: Reaso  
n for Exam Type 2 diabetes mellitus   
   
                                                            Performed By: #### G  
S, AERC ####  
[facility]  
[address]   
   
                                                    Testosteroneon 2024   
   
                      Testosterone 4.30 ng/mL Normal     1.75-7.81  [facility]   
Physician   
Group  
   
                                        Comment on above:   Order Comment: Reaso  
n for Exam Low testosterone   
   
                                                            Result Comment: PERF  
ORMED BY:  
[place] [address]  
[phone]  
PATHOLOGIST MEDICAL DIRECTOR  
VIKAS MARTINEZ M.D.   
   
                                                            Performed By: #### G  
S, AERC ####  
[facility]  
[address] [place] [id number] [facility]   
   
                                                    Testosterone [Mass/volume] i  
n Serum or PlasmaOrdered By: Elvira Harper on   
2024   
   
                                                    Testosterone   
[Mass/Vol]      4.30 ng/mL                      1.75-7.81       [facility]  
   
                                                    Triglyceride [Mass/volume] i  
n Serum or PlasmaOrdered By: Elvira Harper on   
2024   
   
                                                    Triglyceride   
[Mass/Vol]      117 mg/dL                       0-149           [facility]  
   
                                        Comment on above:   TRIG ATP III CLASSIF  
ICATIONTRIG less than 150 mg/dL NormalTRIG  
   
150-199 mg/dL Borderline highTRIG 200-500 mg/dL High TRIG   
greater than 500 mg/dL Very highStandard traceable to the   
Center for Disease Conrtrol and Prevention (CDC) test method.   
   
                                                    Urea nitrogen [Mass/volume]   
in Serum or PlasmaOrdered By: Elvira Harper on   
2024   
   
                                                    Urea nitrogen   
[Mass/Vol]      14 mg/dL        Normal          7-25            [facility]  
   
                                        Comment on above:   Order Comment: Reaso  
n for Exam Type 2 diabetes mellitus   
   
                                                            Performed By: #### G  
S, AERC ####  
[facility]  
[address]   
   
                                                    XR shoulder RT min 2V*on    
   
                                        XR shoulder RT min 2V* [facility] [place]  
[address] [place] [address]  
  
XRay Report  
Signed  
  
Patient: Kaylynn Mims   
MR#: [id number]  
16  
: 1955   
Acct:[id number]  
  
Age/Sex: 68 / M ADM Date:   
24  
  
Loc: [facility] Room: Type: [facility]  
Attending Dr: Gucci Maxwell MD  
Copies to: Gucci Maxwell MD  
  
  
  
Ordering Provider: Gucci Maxwell MD  
Date of Service: 24  
Accession #:   
([id number]) XR/XR   
shoulder RT min 2V*:   
M25.511 - Pain in right   
shoulder  
  
  
  
  
4 views RIGHT shoulder   
plain film  
  
HISTORY: RIGHT shoulder   
pain for 2 months.  
  
COMPARISON: None  
  
ACUTE FINDINGS: None  
  
DEGENERATIVE CHANGE:   
Extensive   
acromioclavicular   
degeneration with   
inferior marginal   
spurring.  
  
SOFT TISSUE FINDINGS:   
Unremarkable  
  
JOINT EFFUSION: None  
  
POSTOP CHANGES: None  
  
BONY MINERALIZATION:   
Adequate  
  
  
ORDER #: [id number] XR/XR   
shoulder RT min 2V*  
IMPRESSION: Extensive   
acromioclavicular   
degenerative change.  
  
Impression dictated by:   
Neo Perales M.D.3/7/2024 2:07 PM  
  
  
Dictation Location:   
[place] [address]  
  
  
  
Transcribed By: [facility]   
24 1407  
Dictated By: Neo Perales DO 24 1406  
  
Signed By:  
24 1407       Normal                                  [facility]   
Physician   
Group  
   
                                                    A1C HEMOGLOBINon 2023   
   
                                                    HbA1c (Bld) [Mass   
fraction]       5.8 %                                           [facility]   
[facility]   
[employer]  
Other Phone:   
[phone]  
   
                                                    HbA1c (Bld) [Mass fraction]o  
n 2023   
   
                      A1C HEMOGLOBIN                                  [facility]  
t   
Professional   
Corporation  
Other Phone:   
[phone]  
   
                                                    A1C HEMOGLOBINon 08-   
   
                                                    HbA1c (Bld) [Mass   
fraction]       5.7 %                                           [facility]   
[facility]   
[facility]  
Other Phone:   
[phone]  
   
                                                    HbA1c (Bld) [Mass fraction]o  
n 08-   
   
                      A1C HEMOGLOBIN                                  [facility]  
t   
Professional   
[facility]poration  
Other Phone:   
[phone]  
   
                                                    URINE DRUG SCREEN (IN-HOUSE)  
on 08-   
   
                                                    URINE DRUG SCREEN   
(IN-HOUSE)                                                      [facility]   
[facility]   
[facility]  
Other Phone:   
[phone]  
   
                                                    CBC AUTO DIFFon 2023   
   
                      BASO #     0.0 103/ul Normal     0.0-0.1    [facility]  
   
                                        Comment on above:   Performed By: #### C  
BC ####  
[facility]  
[address] [place] [address]  
Dr. Dariana Hollingsworth   
   
                                                    Basophils/100 WBC   
(Bld)           0.5 %           Normal          0.2-2.0         [facility]  
   
                                        Comment on above:   Performed By: #### C  
BC ####  
[facility]  
[address] [place] [address]  
Dr. Dariana Hollingsworth   
   
                      EO #       0.1 103/ul Normal     0.0-0.7    [facility]  
   
                                        Comment on above:   Performed By: #### C  
BC ####  
[facility]  
[address] [place] [address]  
Dr. Dariana Hollingsworth   
   
                                                    Eosinophils/100 WBC   
(Bld)           1.8 %           Normal          0.9-7.0         [facility]  
   
                                        Comment on above:   Performed By: #### C  
BC ####  
[facility]  
[address] [place] [address]  
Dr. Dariana Hollingsworth   
   
                                                    Erythrocyte   
distribution width   
(RBC) [Ratio]   12.6 %          Normal          11.0-15.0       [facility]  
   
                                        Comment on above:   Performed By: #### C  
BC ####  
[facility]  
[address] [place] [address]  
Dr. Dariana Hollingsworth   
   
                                                    Hematocrit (Bld)   
[Volume fraction] 50.9 %          Normal          42.0-54.0       [facility]  
   
                                        Comment on above:   Performed By: #### C  
BC ####  
[facility]  
[address] [place] [address]  
Dr. Dariana Hollingsworth   
   
                                                    Hemoglobin (Bld)   
[Mass/Vol]      16.6 g/dL       Normal          14.0-18.0       [facility]  
   
                                        Comment on above:   Performed By: #### C  
BC ####  
[facility]  
[address] [place] [address]  
Dr. Dariana Hollingsworth   
   
                      IG #       0.03 10e3/ul Normal     0.00-0.03  [facility]  
   
                                        Comment on above:   Performed By: #### C  
BC ####  
[facility]  
[address] [place] [address]  
Dr. Dariana Hollingsworth   
   
                      IG %       0.5 %      Normal     0.0-0.5    [facility]  
   
                                        Comment on above:   Performed By: #### C  
BC ####  
[facility]  
[address] [place] [address]  
Dr. Dariana Hollingsworth   
   
                      LYMPH #    1.4 103/ul Normal     1.2-3.8    [facility]  
   
                                        Comment on above:   Performed By: #### C  
BC ####  
[facility]  
[address] [place] [address]  
Dr. Dariana Hollingsworth   
   
                                                    Lymphocytes/100 WBC   
(Bld)           22.8 %          Normal          20.5-60.0       [facility]  
   
                                        Comment on above:   Performed By: #### C  
BC ####  
[facility]  
[address] [place] [address]  
Dr. Dariana Hollingsworth   
   
                      MANUAL DIFF REQ NO         Normal                [facility]  
   
                                        Comment on above:   Performed By: #### C  
BC ####  
[facility]  
[address] [place] [address]  
Dr. Dariana Hollingsworth   
   
                                                    MCH (RBC) [Entitic   
mass]           28.2 pg         Normal          25.9-34.0       [facility]  
   
                                        Comment on above:   Performed By: #### C  
BC ####  
[facility]  
[address] [place] [address]  
Dr. Dariana Hollingsworth   
   
                      MCHC (RBC) [Mass/Vol] 32.6 g/dL  Normal     29.9-35.2  [facility]  
   
                                        Comment on above:   Performed By: #### C  
BC ####  
[facility]  
[address] [place] [address]  
Dr. Dariana Hollingsworth   
   
                                                    MCV (RBC) [Entitic   
vol]            86.4 fL         Normal          80.0-94.0       [facility]  
   
                                        Comment on above:   Performed By: #### C  
BC ####  
[facility]  
[address] [place] [address]  
Dr. Dariana Hollingsworth   
   
                      MONO #     0.5 103/ul Normal     0.3-0.8    [facility]  
   
                                        Comment on above:   Performed By: #### C  
BC ####  
[facility]  
[address] [place] [address]  
Dr. Dariana Hollingsworth   
   
                                                    Monocytes/100 WBC   
(Bld)           8.7 %           Normal          1.7-12.0        [facility]  
   
                                        Comment on above:   Performed By: #### C  
BC ####  
[facility]  
[address] [place] [address]  
Dr. Dariana Hollingsworth   
   
                      NEUT #     4.1 103/ul Normal     1.4-6.5    [facility]  
   
                                        Comment on above:   Performed By: #### C  
BC ####  
[facility]  
[address] [place] [address]  
Dr. Dariana Hollingsworth   
   
                                                    Neutrophils/100 WBC   
(Bld)           65.7 %          Normal          43.0-75.0       [facility]  
   
                                        Comment on above:   Performed By: #### C  
BC ####  
[facility]  
[address] [place] [address]  
Dr. Dariana Hollingsworth   
   
                                                    Platelet mean volume   
(Bld) [Entitic vol] 9.3 fL          Critically low  9.5-13.5        [facility]  
   
                                        Comment on above:   Performed By: #### C  
BC ####  
[facility]  
[address] [place] [address]  
Dr. Dariana Hollingsworth   
   
                      PLT        176 103/ul Normal     150-450    [facility]  
   
                                        Comment on above:   Performed By: #### C  
BC ####  
[facility]  
[address] [place] [address]  
Dr. Dariana Hollingsworth   
   
                      RBC        5.89 106/ul Normal     4.70-6.10  [facility]  
   
                                        Comment on above:   Performed By: #### C  
BC ####  
[facility]  
[address] [place] [address]  
Dr. Dariana Hollingsworth   
   
                      WBC        6.2 103/ul Normal     4.0-11.0   [facility]  
   
                                        Comment on above:   Performed By: #### C  
BC ####  
[facility]  
[address] [place] [address]  
Dr. Dariana Hollingsworth   
   
                                                    GLYCOHEMOGLOBIN A1Con 2023   
   
                      ADA RECOMMENDATION SEE BELOW  Normal                [facility]  
   
                                        Comment on above:   Result Comment: ADA   
RECOMMENDED LIMIT 4.0 - 6.0 ADA   
THERAPEUTIC   
TARGET < 7.0 ACTION SUGGESTED > 7.0   
   
                                                            Performed By: #### A  
1C ####  
[facility]  
[address] [place] [address]  
Dr. Dariana Hollingsworth   
   
                      Glucose [Mass/Vol] 126 mg/dL  Normal                [facility]  
   
                                        Comment on above:   Performed By: #### A  
1C ####  
[facility]  
[address] [place] [address]  
Dr. Dariana Hollingsworth   
   
                                                    HbA1c (Bld) [Mass   
fraction]       6.0 %           Normal          4.5-6.2         [facility]  
   
                                        Comment on above:   Performed By: #### A  
1C ####  
[facility]  
[address] [place] [address]  
Dr. Dariana Hollingsworth   
   
                                                    MICROALBUMIN, [place] URon -1  
3-2023   
   
                      mALB       2.0 mg/L   Normal     <=30.0     [facility]  
   
                                        Comment on above:   Performed By: #### M  
ALBR ####  
[facility]  
[address] [place] [address]  
Dr. Dariana Hollingsworth   
   
                                                    PROF 14(COMP METB)on   
023   
   
                      Albumin [Mass/Vol] 3.6 g/dL   Normal     3.4-5.0    [facility]  
   
                                        Comment on above:   Performed By: #### C  
MP ####  
[facility]  
[address] [place] [address]  
Dr. Dariana Hollingsworth   
   
                                                    Albumin/Globulin [Mass   
ratio]          1.0 {ratio}     Normal                          [facility]  
   
                                        Comment on above:   Performed By: #### C  
MP ####  
[facility]  
[address] [place] [address]  
Dr. Dariana Hollingsworth   
   
                                                    ALP [Catalytic   
activity/Vol]   82 U/L          Normal                    [facility]  
   
                                        Comment on above:   Performed By: #### C  
MP ####  
[facility]  
[address] [place] [address]  
Dr. Dariana Hollingsworth   
   
                                                    ALT [Catalytic   
activity/Vol]   46 U/L          Normal          16-63           [facility]  
   
                                        Comment on above:   Performed By: #### C  
MP ####  
[facility]  
[address] [place] [address]  
Dr. Dariana Hollingsworth   
   
                      Anion gap [Moles/Vol] 11.2 mmol/L Normal                [facility]  
   
                                        Comment on above:   Performed By: #### C  
MP ####  
[facility]  
[address] [address]  
Dr. Dariana Hollingsworth   
   
                                                    AST [Catalytic   
activity/Vol]   24 U/L          Normal          15-37           [facility]  
   
                                        Comment on above:   Performed By: #### C  
MP ####  
[facility]  
[address] [address]  
Dr. Dariana Hollingsworth   
   
                      Bilirubin [Mass/Vol] 1.2 mg/dL  Critically high 0.2-1.0     
 [facility]  
   
                                        Comment on above:   Performed By: #### C  
MP ####  
[facility]  
[address] [place] [address]  
Dr. Dariana Hollingsworth   
   
                      Calcium [Mass/Vol] 8.9 mg/dL  Normal     8.5-10.1   [facility]  
   
                                        Comment on above:   Performed By: #### C  
MP ####  
[facility]  
[address] [place] [address]  
Dr. Dariana Hollingsworth   
   
                      Chloride [Moles/Vol] 104 mmol/L Normal          [facility]  
   
                                        Comment on above:   Performed By: #### C  
MP ####  
[facility]  
[address] [address]  
Dr. Dariana Hollingsworth   
   
                      CO2 [Moles/Vol] 25.2 mmol/L Normal     21.0-32.0  [facility]  
   
                                        Comment on above:   Performed By: #### C  
MP ####  
[facility]  
[address] [address]  
Dr. Dariana Hollingsworth   
   
                      Creatinine [Mass/Vol] 0.83 mg/dL Normal     0.70-1.30  [facility]  
   
                                        Comment on above:   Performed By: #### C  
MP ####  
[facility]  
[address] [address]  
Dr. Dariana Hollingsworth   
   
                      EGFR-AF AMERICAN >60        Normal     >=60       [facility]  
   
                                        Comment on above:   Performed By: #### C  
MP ####  
[facility]  
[address] [address]  
Dr. Dariana Hollingsworth   
   
                      EGFR-NON AF AMERICAN >60        Normal     >=60       [facility]   
[facility]  
   
                                        Comment on above:   Performed By: #### C  
MP ####  
[facility]  
[address] [address]  
Dr. Dariana Hollingsworth   
   
                                                    Globulin (S)   
[Mass/Vol]      3.7 g/dL        Normal                          [facility]  
   
                                        Comment on above:   Performed By: #### C  
MP ####  
[facility]  
[address] [address]  
Dr. Dariana Hollingsworth   
   
                      Glucose [Mass/Vol] 133 mg/dL  Critically high      [facility]  
   
                                        Comment on above:   Performed By: #### C  
MP ####  
[facility]  
[address] [address]  
Dr. Dariana Hollingsworth   
   
                      Potassium [Moles/Vol] 4.4 mmol/L Normal     3.5-5.1    [facility]  
   
                                        Comment on above:   Performed By: #### C  
MP ####  
[facility]  
[address] [address]  
Dr. Dariana Hollingsworth   
   
                      Protein [Mass/Vol] 7.3 g/dL   Normal     6.4-8.2    [facility]  
   
                                        Comment on above:   Performed By: #### C  
MP ####  
[facility]  
[address] [address]  
Dr. Dariana Hollingsworth   
   
                      Sodium [Moles/Vol] 136 mmol/L Normal     136-145    [facility]  
   
                                        Comment on above:   Performed By: #### C  
MP ####  
[facility]  
[address] [address]  
Dr. Dariana Hollingsworth   
   
                                                    Urea nitrogen   
[Mass/Vol]      13.0 mg/dL      Normal          7.0-18.0        [facility]  
   
                                        Comment on above:   Performed By: #### C  
MP ####  
[facility]  
[address] [address]  
Dr. Dariana Hollingsworth   
   
                                                    Urea   
nitrogen/Creatinine   
[Mass ratio]    15.7 mg/mg      Normal                          [facility]  
   
                                        Comment on above:   Performed By: #### C  
MP ####  
[facility]  
[address] [address]  
Dr. Dariana Hollingsworth   
   
                                                    XR CSPINE MIN 4 VIEWSon    
   
                                        XR CSPINE MIN 4 VIEWS EXAMINATION: XR CS  
PINE   
MIN 4 VIEWS  
HISTORY: Cervical   
segmental dysfunction  
COMPARISON: No relevant   
comparison available.  
FINDINGS:  
BONES: No significant   
spondylosis, scoliosis,   
fracture, or visible bony   
lesion.  
DISC SPACES: Mild   
narrowing C5-C6. Suspect   
mild foramen narrowing   
C5-C6  
bilaterally.  
PARASPINOUS: Negative. No   
paraspinous abnormality   
is seen.  
OTHER: Negative.  
IMPRESSION:  
1. C5-C6 moderate   
degenerative disc disease   
and suspected   
mild-moderate foramen  
narrowing bilaterally.   
Consider MRI for further   
evaluation if symptoms   
persist.  
Electronically   
authenticated by: KAYLYNN GOMEZ Date: 2023-02-10   
07:11               Normal                                  [facility]  
  
  
  
Vital Signs  
  
  
                          Date Time    Vital Sign   Value        Performing   
Clinician                               Facility  
   
                                                    09-   
11:          Body temperature    98.2 [degF]         DO Elvira Harper  
Work Phone:   
[phone]                          [facility]  
   
                                                    09-   
11:                              Diastolic blood   
pressure                  69 mm[Hg]                 DO Elvira Harper  
Work Phone:   
[phone]                          [facility]  
   
                                                    09-   
11:          Heart rate          71 /min             DO Elvira Harper  
Work Phone:   
[phone]                          [facility]  
   
                                                    09-   
11:          Respiratory rate    18 /min             DO Elvira Harper  
Work Phone:   
[phone]                          [facility]  
   
                                                    09-   
11:                              SaO2% (BldA) [Mass   
fraction]                 99 %                      DO Elvira Harper  
Work Phone:   
[phone]                          [facility]  
   
                                                    09-   
11:                              Systolic blood   
pressure                  118 mm[Hg]                DO Elvira Harper  
Work Phone:   
[phone]                          [facility]  
   
                                                    09-   
05:          Body weight         143 kg              DO Elvira Harper  
Work Phone:   
[phone]                          [facility]  
   
                                                    2024   
12:                              Inhaled oxygen flow   
rate                      2 L/min                   DO Elvira Harper  
Work Phone:   
[phone]                          [facility]  
   
                                                    2024   
09:          Body height         187.96 cm           DO Elvira Harper  
Work Phone:   
[phone]                          [facility]  
   
                                                    2024   
08:          Body height         187.96 cm           DO Elvira Harper  
Work Phone:   
[phone]                          [facility]  
   
                                                    2024   
08:                              Body mass index   
(BMI) [Ratio]             41.5 kg/m2                DO Elvira Harper  
Work Phone:   
[phone]                          [facility]  
   
                                                    2024   
08:          Body temperature    98.3 [degF]         DO Elvira Harper  
Work Phone:   
[phone]                          [facility]  
   
                                                    2024   
08:          Body weight         146.59 kg           DO Elvira Harper  
Work Phone:   
[phone]                          [facility]  
   
                                                    2024   
08:                              Diastolic blood   
pressure                  58 mm[Hg]                 DO Elvira Harper  
Work Phone:   
[phone]                          [facility]  
   
                                                    2024   
08:          Heart rate          97 /min             DO Elvira Harper  
Work Phone:   
[phone]                          [facility]  
   
                                                    2024   
08:          Respiratory rate    20 /min             DO Elvira Harper  
Work Phone:   
[phone]                          [facility]  
   
                                                    2024   
08:                              SaO2% (BldA) [Mass   
fraction]                 98 %                      DO Elvira Harper  
Work Phone:   
[phone]                          [facility]  
   
                                                    2024   
08:                              Systolic blood   
pressure                  102 mm[Hg]                DO Elvira Harper  
Work Phone:   
[phone]                          [facility]  
   
                                                    2024   
11:          Body temperature    98.8 [degF]         DO Elvira Harper  
Work Phone:   
[phone]                          [facility]  
   
                                                    2024   
11:                              Diastolic blood   
pressure                  82 mm[Hg]                 DO Elvira Harper  
Work Phone:   
[phone]                          [facility]  
   
                                                    2024   
11:          Heart rate          75 /min             DO Elvira Harper  
Work Phone:   
[phone]                          [facility]  
   
                                                    2024   
11:          Respiratory rate    18 /min             DO Elvira Harper  
Work Phone:   
[phone]                          [facility]  
   
                                                    2024   
11:                              SaO2% (BldA) [Mass   
fraction]                 96 %                      DO Elvira Harper  
Work Phone:   
[phone]                          [facility]  
   
                                                    2024   
11:                              Systolic blood   
pressure                  177 mm[Hg]                DO Elvira Harper  
Work Phone:   
[phone]                          [facility]  
   
                                                    2024   
05:          Body weight         158.5 kg            DO Elvira Harper  
Work Phone:   
[phone]                          [facility]  
   
                                                    2024   
13:          Body height         189.23 cm           DO Elvira Meredith  
Work Phone:   
[phone]                          [facility]  
   
                                                    2024   
15:                              Inhaled oxygen flow   
rate                      2 L/min                   DO Elvira Meredith  
Work Phone:   
[phone]                          [facility]  
   
                                                    2024   
17:          Body temperature    98.3 [degF]         DO Elvira Meredith  
Work Phone:   
[phone]                          [facility]  
   
                                                    2024   
17:                              Diastolic blood   
pressure                  67 mm[Hg]                 DO Elvira Meredith  
Work Phone:   
[phone]                          [facility]  
   
                                                    2024   
17:          Heart rate          75 /min             DO Elvira Meredith  
Work Phone:   
[phone]                          [facility]  
   
                                                    2024   
17:          Respiratory rate    18 /min             DO Elvira Meredith  
Work Phone:   
[phone]                          [facility]  
   
                                                    2024   
17:                              SaO2% (BldA) [Mass   
fraction]                 95 %                      DO Elvira Harepr  
Work Phone:   
[phone]                          [facility]  
   
                                                    2024   
17:                              Systolic blood   
pressure                  148 mm[Hg]                DO Elvira Harper  
Work Phone:   
[phone]                          [facility]  
   
                                                    2024   
11:          Body height         189.23 cm           DO Elvira Harper  
Work Phone:   
[phone]                          [facility]  
   
                                                    2024   
11:          Body weight         160.57 kg           DO Elvira Harper  
Work Phone:   
[phone]                          [facility]  
   
                                                    2024   
14:          Body height         187.96 cm           DO Elvira Harper  
Work Phone:   
[phone]                          [facility]  
   
                                                    2024   
14:                              Body mass index   
(BMI) [Ratio]             45.4 kg/m2                DO Elvira Harper  
Work Phone:   
[phone]                          [facility]  
   
                                                    2024   
14:          Body weight         160.57 kg           DO Elvira Harper  
Work Phone:   
[phone]                          [facility]  
   
                                                    2024   
14:                              Diastolic blood   
pressure                  82 mm[Hg]                 DO Elvira Harper  
Work Phone:   
[phone]                          [facility]  
   
                                                    2024   
14:          Heart rate          68 /min             DO Elvira Meredith  
Work Phone:   
[phone]                          [facility]  
   
                                                    2024   
14:                              SaO2% (BldA) [Mass   
fraction]                 97 %                      DO Elvira Harper  
Work Phone:   
[phone]                          [facility]  
   
                                                    2024   
14:                              Systolic blood   
pressure                  144 mm[Hg]                DO Elvira Harper  
Work Phone:   
[phone]                          [facility]  
   
                                                    2024   
09:          Body height         187.96 cm           DO Elvira Harper  
Work Phone:   
[phone]                          [facility]  
   
                                                    2024   
09:                              Body mass index   
(BMI) [Ratio]             45.7 kg/m2                DO Elvira Harper  
Work Phone:   
[phone]                          [facility]  
   
                                                    2024   
09:          Body weight         161.47 kg           DO Elvira Harper  
Work Phone:   
[phone]                          [facility]  
   
                                                    2024   
09:                              Diastolic blood   
pressure                  82 mm[Hg]                 DO Elvira Harper  
Work Phone:   
[phone]                          [facility]  
   
                                                    2024   
09:          Heart rate          68 /min             DO Elvira Harper  
Work Phone:   
[phone]                          [facility]  
   
                                                    2024   
09:          Respiratory rate    18 /min             DO Elvira Harper  
Work Phone:   
[phone]                          [facility]  
   
                                                    2024   
09:                              SaO2% (BldA) [Mass   
fraction]                 98 %                      DO Elvira Harper  
Work Phone:   
[phone]                          [facility]  
   
                                                    2024   
09:                              Systolic blood   
pressure                  144 mm[Hg]                DO Elvira Harper  
Work Phone:   
[phone]                          [facility]  
   
                                                    2024   
07:          Body height         187.96 cm           DO Elvira Harper  
Work Phone:   
[phone]                          [facility]  
   
                                                    2024   
07:                              Body mass index   
(BMI) [Ratio]             46.3 kg/m2                DO Elvira Harper  
Work Phone:   
[phone]                          [facility]  
   
                                                    2024   
07:          Body weight         163.74 kg           DO Elvira Harper  
Work Phone:   
[phone]                          [facility]  
   
                                                    2024   
07:                              Diastolic blood   
pressure                  78 mm[Hg]                 DO Elvira Harper  
Work Phone:   
[phone]                          [facility]  
   
                                                    2024   
07:          Heart rate          76 /min             DO Elviar Harper  
Work Phone:   
[phone]                          [facility]  
   
                                                    2024   
07:          Respiratory rate    18 /min             DO Elvira Harper  
Work Phone:   
[phone]                          [facility]  
   
                                                    2024   
07:                              SaO2% (BldA) [Mass   
fraction]                 94 %                      DO Elvira Harper  
Work Phone:   
[phone]                          [facility]  
   
                                                    2024   
07:                              Systolic blood   
pressure                  138 mm[Hg]                DO Elvira Harper  
Work Phone:   
[phone]                          [facility]  
   
                                                    05-   
09:          Body height         187.96 cm           DO Elvira Harper  
Work Phone:   
[phone]                          [facility]  
   
                                                    05-   
09:                              Body mass index   
(BMI) [Ratio]             46.4 kg/m2                DO Elvira Harper  
Work Phone:   
[phone]                          [facility]  
   
                                                    05-   
09:          Body weight         163.97 kg           DO Elvira Harper  
Work Phone:   
[phone]                          [facility]  
   
                                                    05-   
09:                              Diastolic blood   
pressure                  78 mm[Hg]                 DO Elvira Harper  
Work Phone:   
[phone]                          [facility]  
   
                                                    05-   
09:          Heart rate          74 /min             DO Elvira Harper  
Work Phone:   
[phone]                          [facility]  
   
                                                    05-   
09:          Respiratory rate    18 /min             DO Elvira Harper  
Work Phone:   
[phone]                          [facility]  
   
                                                    05-   
09:                              SaO2% (BldA) [Mass   
fraction]                 94 %                      DO Elvira Harper  
Work Phone:   
[phone]                          [facility]  
   
                                                    05-   
09:                              Systolic blood   
pressure                  142 mm[Hg]                DO Elvira Harper  
Work Phone:   
[phone]                          [facility]  
   
                                                    2024   
08:          Body height         187.96 cm           DO Elvira Harper  
Work Phone:   
[phone]                          [facility]  
   
                                                    2024   
08:                              Body mass index   
(BMI) [Ratio]             47 kg/m2                  DO Elvira Harper  
Work Phone:   
[phone]                          [facility]  
   
                                                    2024   
08:          Body weight         166.01 kg           DO Elvira Harper  
Work Phone:   
[phone]                          [facility]  
   
                                                    2024   
13:          Body height         187.96 cm           DO Elvira Harper  
Work Phone:   
[phone]                          [facility]  
   
                                                    2024   
13:                              Body mass index   
(BMI) [Ratio]             47 kg/m2                  DO Evlira Harper  
Work Phone:   
[phone]                          [facility]  
   
                                                    2024   
13:          Body weight         166.09 kg           DO Elvira Harper  
Work Phone:   
[phone]                          [facility]  
   
                                                    2024   
13:                              Diastolic blood   
pressure                  84 mm[Hg]                 DO Elvira Harper  
Work Phone:   
[phone]                          [facility]  
   
                                                    2024   
13:          Heart rate          72 /min             DO Elvira Harper  
Work Phone:   
[phone]                          [facility]  
   
                                                    2024   
13:          Respiratory rate    18 /min             DO Elvira Harper  
Work Phone:   
[phone]                          [facility]  
   
                                                    2024   
13:                              SaO2% (BldA) [Mass   
fraction]                 96 %                      DO Elvira Harper  
Work Phone:   
[phone]                          [facility]  
   
                                                    2024   
13:                              Systolic blood   
pressure                  132 mm[Hg]                DO Elvira Harper  
Work Phone:   
[phone]                          [facility]  
   
                                                    2023   
10:          Body height         187.96 cm           Elviar Harper  
Other Phone:   
[phone]                           [facility]   
[employer]  
Other Phone:   
[phone]  
   
                                                    2023   
10:                              Body mass index   
(BMI) [Ratio]             46.91 kg/m2               Elvira Harper  
Other Phone:   
[phone]                           [facility]   
[employer]  
Other Phone:   
[phone]  
   
                                                    2023   
10:          Body weight         165.75 kg           Elvira Harper  
Other Phone:   
[phone]                           [facility]   
[employer]  
Other Phone:   
[phone]  
   
                                                    2023   
10:                              Diastolic blood   
pressure                  78 mm[Hg]                 Elvira Harper  
Other Phone:   
[phone]                           [facility]   
[employer]  
Other Phone:   
[phone]  
   
                                                    2023   
10:          Respiratory rate    18 /min             Elvira Harper  
Other Phone:   
[phone]                           [facility]   
[employer]  
Other Phone:   
[phone]  
   
                                                    2023   
10:                              SaO2% (BldA) [Mass   
fraction]                 97 %                      Elvirasue Harper  
Other Phone:   
[phone]                           [facility]   
[employer]  
Other Phone:   
[phone]  
   
                                                    2023   
10:                              Systolic blood   
pressure                  128 mm[Hg]                Elvira Harper  
Other Phone:   
[phone]                           [facility]   
[employer]  
Other Phone:   
[phone]  
   
                                                    08-   
09:          Body height         187.96 cm           Elvirasue Harper  
Other Phone:   
[phone]                           [facility]   
[employer]  
Other Phone:   
[phone]  
   
                                                    08-   
09:                              Body mass index   
(BMI) [Ratio]             47.06 kg/m2               Elvira Harper  
Other Phone:   
[phone]                           [facility]   
[employer]  
Other Phone:   
[phone]  
   
                                                    08-   
09:          Body weight         166.29 kg           Elvira Harper  
Other Phone:   
[phone]                           [facility]   
[employer]  
Other Phone:   
[phone]  
   
                                                    08-   
09:                              Diastolic blood   
pressure                  78 mm[Hg]                 Elvira Meredith  
Other Phone:   
[phone]                           [facility]   
[employer]   
[employer]  
Other Phone:   
[phone]  
   
                                                    08-   
09:          Respiratory rate    18 /min             Elvira Harper  
Other Phone:   
[phone]                           [facility]   
[employer]  
Other Phone:   
[phone]  
   
                                                    08-   
09:                              SaO2% (BldA) [Mass   
fraction]                 97 %                      Elvira Harper  
Other Phone:   
[phone]                           [facility]   
[employer]   
[employer]  
Other Phone:   
[phone]  
   
                                                    08-   
09:                              Systolic blood   
pressure                  136 mm[Hg]                Elvira Harper  
Other Phone:   
[phone]                           [facility]   
[employer]   
[employer]  
Other Phone:   
[phone]  
  
  
  
Encounters  
  
  
                          Encounter Date Encounter Type Care Provider Facility  
   
                                                    Start: 2024  
End: 09-                         Evaluation and   
management of inpatient                 DO Elvira Harper  
Work Phone:   
[phone]                          [facility] [address]   
[employer]  
Work Phone:   
[phone]  
   
                                                    Start: 2024  
End: 2024                         Patient encounter   
procedure                               DO Elvira Meredith  
Work Phone:   
[phone]                          [facility]   
Orthopedics  
Work Phone:   
[phone]  
   
                                                    Start: 2024  
End: 2024           ambulatory                DO Elvira A Harper  
Work Phone:   
[phone]                          [facility]  
Work Phone:   
[phone]  
   
                                                    Start: 2024  
End: 2024                         Patient encounter   
procedure                               DO Elvira Harper  
Work Phone:   
[phone]                          [facility]   
[facility] [place]  
Work Phone:   
[phone]  
   
                                Start: 2024 Non-patient / Non-visit DO Alyssia  
andres Harper  
Work Phone:   
[phone]                          [facility]   
[facility] [place]  
Work Phone:   
[phone]  
   
                                Start: 2024 Non-patient / Non-visit DO Alyssia  
andres Harper  
Work Phone:   
[phone]                          [facility]   
Disease  
Work Phone:   
[phone]  
   
                                Start: 2024 Non-patient / Non-visit DO Alyssia  
andres Harper  
Work Phone:   
[phone]                          [facility] [place]   
[facility]  
Work Phone:   
[phone]  
   
                                                    Start: 2024  
End: 2024                         Evaluation and   
management of inpatient                 DO Elvira Harper  
Work Phone:   
[phone]                          [facility]   
Surg  
Work Phone:   
[phone]  
   
                                                    Start: 2024  
End: 2024           ambulatory                DO Elvira A Harper  
Work Phone:   
[phone]                          [facility]  
Work Phone:   
[phone]  
   
                                                    Start: 2024  
End: 2024                         Patient encounter   
procedure                               DO Elvira Harper  
Work Phone:   
[phone]                          [facility]   
[facility]   
[facility] [place]  
Work Phone:   
[phone]  
   
                                                    Start: 2024  
End: 2024           ambulatory                DO Elvira A Harper  
Work Phone:   
[phone]                          [facility]  
Work Phone:   
[phone]  
   
                                                    Start: 2024  
End: 2024                         Patient encounter   
procedure                               DO Elvira Harper  
Work Phone:   
[phone]                          [facility]   
[facility]   
[facility] [place]  
Work Phone:   
[phone]  
   
                                Start: 2024 Non-patient / Non-visit DO Alyssia  
andres Harper  
Work Phone:   
[phone]                          [facility]   
[facility]   
[facility] [place]  
Work Phone:   
[phone]  
   
                                                    Start: 2024  
End: 2024           ambulatory                DO Elvira A Harper  
Work Phone:   
[phone]                          [facility]  
Work Phone:   
[phone]  
   
                                                    Start: 2024  
End: 2024                         Patient encounter   
procedure                               DO Elvira Meredith  
Work Phone:   
[phone]                          [facility]   
[facility]   
[facility] [place]  
Work Phone:   
[phone]  
   
                                                    Start: 05-  
End: 05-     ambulatory          To Tavon Khan Facility:[facility]  
   
                                                    Start: 05-  
End: 05-                         Patient encounter   
procedure                               DO Elvira Meredith  
Work Phone:   
[phone]                          [facility]   
[facility]  
   
                                                    Start: 2024  
End: 2024                         Patient encounter   
procedure                               DO Elvira Meredith  
Work Phone:   
[phone]                          [facility]  
   
                                                    Start: 2024  
End: 2024           ambulatory                DO Elvira A Harper  
Work Phone:   
[phone]                          [facility]  
Work Phone:   
[phone]  
   
                                                    Start: 2024  
End: 2024     ambulatory          Gucci Maxwell        Facility:[facility]  
   
                                                    Start: 2024  
End: 2024                         Patient encounter   
procedure                               DO Elvira Meredith  
Work Phone:   
[phone]                          [facility]   
[facility] [place]   
Orthopedics  
Work Phone:   
[phone]  
   
                                                    Start: 2024  
End: 2024                         Patient encounter   
procedure                               DO Elvira Meredith  
Work Phone:   
[phone]                          [facility]   
[facility]   
[facility] [place]  
Work Phone:   
[phone]  
   
                                                    Start: 2023  
End: 2023           ambulatory                Elvira Harper  
Other Phone:   
[phone]                           [facility]   
[facility]  
Other Phone:   
[phone]  
   
                                        Start: 2023   Patient encounter   
procedure                 Elvira Harper              [facility]   
[place]  
   
                                                    Start: 08-  
End: 08-           ambulatory                Elvira Harper  
Other Phone:   
[phone]                           [facility]  
Other Phone:   
[phone]  
   
                                        Start: 08-   Office outpatient ne  
w   
45 minutes                Elvira Harper              [facility]   
[place]  
   
                                                    Start: 2023  
End: 2023     ambulatory          DR BELINDA MAHAJAN    Facility:[facility]  
   
                                                    Start: 2023  
End: 02-     ambulatory          DR DOCTOR ORELLANA      Facility:[facility]  
  
  
  
Procedures  
  
  
                          Date         Procedure    Procedure Detail Performing   
Clinician  
   
                                        Start: 2024   CL Non Cor Transcath  
   
Thrombolysis                                        DO Elvira Harper  
[employer] Phone:   
[phone]  
   
                                        Start: 2024   CL Peripheral Mech   
Thrombectomy Ea Add                                 DO Levira Certus  
Work Phone:   
[phone]  
   
                                        Start: 2024   CL Peripheral Mechan  
ical   
Thrombectomy                                        DO Elvira Twibingo Phone:   
[phone]  
   
                          Start: 2024 CL RHC                    DO Elvira   
[place] Phone:   
[phone]  
   
                                        Start: 2024   CL Vir[place]iana Pulm Angio w/  
Heart   
Cath Bilat                                          DO Elvira Harper  
[employer] Phone:   
[phone]  
   
                                        Start: 2024   Duplex scan of lower  
 limb   
veins                                               DO Elvira Harper  
[employer] Phone:   
[phone]  
   
                          Start: 2024 Plain chest X-ray              DO Gl  
oria Tw[place] Phone:   
[phone]  
   
                                        Start: 2024   Blood culture for ba  
cteria,   
including anaerobic screen                           DO Elvira Harper  
[employer] Phone:   
[phone]  
   
                                        Start: 2024   Investigation of   
transfusion reaction                                DO Elvira Harper  
Work Phone:   
[phone]  
   
                          Start: 2024 Arthroscopy of knee              DO   
Elvira Harper  
[employer] Phone:   
[phone]  
   
                                        Start: 2024   Bacterial ID (NA Mul  
tiplex   
Assay)                                              DO Elvira Harper  
Work Phone:   
[phone]  
   
                                        Start: 2024   Blood culture for ba  
cteria,   
including anaerobic screen                           DO Elvira Meredith  
[employer] Phone:   
[phone]  
   
                                        Start: 2024   Investigation of   
transfusion reaction                                DO Elvira Twibingo Phone:   
[phone]  
   
                                        Start: 2024   Duplex scan of lower  
 limb   
veins                                               DO Elvira Twibingo Phone:   
[phone]  
   
                          Start: 2024 X-ray of right knee              DO   
Elvira Certus  
Work Phone:   
[phone]  
   
                          Start: 2024 X-ray of right knee              DO   
Elvira Certus  
Work Phone:   
[phone]  
   
                                        Start: 05-   Radiologic examinati  
on of   
knee                                                DO Elvira Certus  
Work Phone:   
[phone]  
   
                                        Start: 2024   Plain X-ray of right  
   
shoulder                                            DO Elvira Twibingo Phone:   
[phone]  
   
                          Start: 2023 PSA screening              DR DOCTOR ORELLANA  
   
                                        Comment on above:   Performed By: #### P  
SAD ####  
[facility]  
[address] [place] [address]  
Dr. Dariana Hollingsworth   
  
  
  
Plan of Treatment  
  
  
                          Date         Care Activity Detail       Author  
   
                                                    Start:   
2024                                                  [facility]  
   
                                                    Start:   
2024                                                  [facility]  
   
                                                    Start:   
2024                                                  [facility]  
   
                                                    Start:   
2024                                                  [facility]  
   
                                                    Start:   
2024                                                  [facility]  
   
                                                    Start:   
09-                                                  [facility]  
   
                                                    Start:   
2024                                                  [facility]  
   
                                                    Start:   
2024          Hospital admission                      [facility]  
   
                                                    Start:   
2024                                                  [facility]  
   
                                                    Start:   
2024                                                  [facility]  
   
                                                    Start:   
2024                                                  [facility]  
   
                                                    Start:   
2024                              Blood culture for   
bacteria, including   
anaerobic screen          Blood Culture             [facility]  
   
                                                    Start:   
2024          Hospital admission                      [facility]  
   
                                                    Start:   
2024                              Referral to infectious   
diseases physician                                  [facility]  
   
                                                    Start:   
2024                                                  [facility]  
   
                                                    Start:   
2024                              Microbial culture, body   
fluid                                               [facility]  
   
                                                    Start:   
2024                              Physical therapy   
procedure                                           [facility]  
   
                                                    Start:   
2024                              Referral to occupational   
therapist                                           [facility]  
   
                                                    Start:   
2024          Consultation                            [facility]  
   
                                                    Start:   
2024          Hospital admission                      [facility]  
   
                                                    Start:   
2024                                                  [facility]  
   
                                                    Start:   
2024                              Bacteria identified in   
Blood by Culture                                    [facility]  
   
                                                    Start:   
2024                              Blood culture for   
bacteria, including   
anaerobic screen          Blood Culture             [facility]  
   
                                                    Start:   
2024                              Insertion of Infusion   
Device into Upper Vein,   
Percutaneous Approach                   Insertion of Infusion   
Device into Upper Vein,   
Percutaneous Approach                   [facility]  
   
                                                    Start:   
2024                              Irrigation of Joints   
using Irrigating   
Substance, Percutaneous   
Endoscopic Approach                     Irrigation of Joints   
using Irrigating   
Substance, Percutaneous   
Endoscopic Approach                     [facility]  
   
                                                    Start:   
2024                              Duplex scan of lower   
limb veins                US venous duplex LE RT    [facility]  
   
                                                    Start:   
2024                              US Lower extremity vein   
- right                                             [facility]  
   
                                                    Start:   
2024                                                  [facility]  
   
                                                            Bacteria identified   
in   
Unspecified specimen by   
Aerobe culture                                      [facility]  
   
                                                            Bacteria identified   
in   
Unspecified specimen by   
Anaerobe culture                                    [facility]  
   
                                                            Glucose measurement   
estimated from glycated   
hemoglobin                                          [facility]  
   
                                       Patient Education [facility]   
[facility] Ctr  
Work Phone:   
[phone]  
   
                                       Patient referral              [facility]   
Medical Ctr  
Work Phone:   
[phone]  
   
                                       XR Knee - right 4 Views              [facility]  
  
  
  
Immunizations  
  
  
                      Immunization Date Immunization Notes      Care Provider Fa  
marybeth  
   
                          2023   Prevnar 20                Elvira Harper  
Other Phone:   
[phone]                           [facility]  
   
                                        2023          zoster vaccine   
recombinant                                         Elvira Harper  
Other Phone:   
[phone]                           [facility]  
   
                                        10-          COVID-19 Pfizer   
(bivalent)                                          Elvirasue Harper  
Other Phone:   
[phone]                           [facility]  
   
                                        2021          COVID-19 Vaccine Pfi  
zer   
- Documentation Purposes   
Only                                                Elvira Harper  
Other Phone:   
[phone]                           [facility]  
   
                                        2021          COVID-19 Vaccine Pfi  
zer   
- Documentation Purposes   
Only                                                Elvirasue Harper  
Other Phone:   
[phone]                           [facility]  
   
                                        2021          COVID-19 Vaccine Pfi  
zer   
- Documentation Purposes   
Only                                                Elvira Harper  
Other Phone:   
[phone]                           [facility]  
  
  
  
Payers  
  
  
                          Date         Payer Category Payer        Policy ID  
   
                          2024   Medicare                  [id number]   
[id number]  
   
                          2024   Self-pay                  [id number]  
[id number]  
   
                          1960   Medicare                  [id number]  
   
                          1955   Unknown                   [id number]  
[id number]  
   
                          1956   Unknown                   [id number]  
[id number]  
   
                                       Unknown      Moni HARDING/BS [id number]   
[id number]  
   
                                       Unknown                   [id number] [id number]  
[id number]  
   
                                       Unknown                   [id number] [id number]  
[id number]  
   
                                       Unknown                   [id number] [id number]  
[id number]  
   
                                       Unknown                   [id number] [id number]  
[id number]  
   
                                       Unknown                   [id number] [id number]  
[id number]  
   
                                       Unknown                   [id number] [id number]  
[id number]  
  
  
  
Social History  
  
  
                          Date         Type         Detail       Facility  
   
                                       Sex Assigned At Birth              [facility]  
Other Phone:   
[phone]  
   
                          Start: 1955 Sex Assigned At Birth Male         F  
[facility]  
   
                                                    Start: 2024  
End: 2024                         Tobacco smoking status   
NHIS                                    Never smoked tobacco   
(finding)                               [facility]  
  
  
  
Medical Equipment  
  
  
                                Procedure Code  Equipment Code  Equipment Origin  
al   
Text                      Equipment Identifier      Dates  
   
                                                                OneTouch Delica   
Lancets 33G                                           
   
                                                                OneTouch Delica   
Lancets                                             Start:   
2024  
End: 2024  
   
                                                                OneTouch Delica   
Lancets                                             Start:   
2024  
End: 2024  
   
                                                                OneTouch Delica   
Lancets                                             Start:   
2024  
End: 2024  
   
                                                                OneTouch Delica   
Lancets                                             Start:   
2024  
End: 2024  
   
                                                                OneTouch Delica   
Lancets                                             Start:   
2024  
End: 2024  
   
                                                                OneTouch Delica   
Lancets                                             Start:   
2024  
End: 2024  
   
                                                                OneTouch Delica   
Lancets                                             Start:   
2024  
End: 2024  
   
                                                                OneTouch Delica   
Lancets                                             Start:   
2024  
End: 2024  
   
                                                                OneTouch Delica   
Lancets                                             Start:   
2024  
End: 2024  
   
                                                                OneTouch Delica   
Lancets                                             Start:   
2024  
End: 2024  
  
  
  
Goals  
  
  
                                Date            Patient Goal    Desired Activity  
/State  
   
                                                                  
  
  
  
Functional Status  
  
  
                          Date         Assessment   Result       Facility  
   
                          09-   Functional status Patient at Baseline [facility]   
Ctr  
Work Phone: [phone]  
   
                                2024      Functional status Patient is Pro  
gressing Toward   
Baseline                                [facility]   
Ctr  
Work Phone: [phone]  
   
                          2024   Functional status Patient Not at Baseline  
 [facility]   
Ctr  
Work Phone: [phone]  
  
  
  
Mental Status  
  
  
                          Date         Assessment   Result       Facility  
   
                                09-      Cognitive function Cognitive Sta  
tus Patient at   
Baseline                                [facility]   
Ctr  
Work Phone: [phone]  
   
                                2024      Cognitive function Cognitive Sta  
tus Patient at   
Baseline                                [facility]   
Ctr  
Work Phone: [phone]  
   
                                2024      Cognitive function Cognitive Sta  
tus Patient at   
Baseline                                [facility]   
Ctr  
Work Phone: [phone]  
  
  
  
Clinical Notes 08- to 09-  
  
  
                                Note Date & Type Note            Facility  
  
  
  
                                        09- Discharge summary   
  
  
  
   
                                           
   
                                        Note Date/Time      2024  
   
12:13pm  
   
                                                    [facility]  
ENTER  
[address] [place] [address]  
  
Discharge Summary  
Signed  
  
Patient: Kaylynn Mims MR#: [id number]  
[id number]  
: 1955 Acct:[id number]  
  
Age/Sex: 69 / M Adm Date:   
4  
Loc: 4 Room: 0Z2305-4  
  
Attending Dr: Miladys Osman MD  
  
Copies to: DO Miladys Baig MD~  
  
  
Providers  
Date of Discharge: 09/10/24  
Discharging Provider: Miladys Osman  
Primary Care Provider: Elvira Harper  
Consults:  
  
  
24 09:51  
Consult to Cardiology Routine  
Comment:  
Consulting Provider: LEVON Churchill  
Reason For Exam: B/l PE with R heart strain, criteria   
for EKOS  
Has Provider Been Notified: Yes  
Date of Notification: 24  
Time of Notification: 11:02  
  
  
  
  
Discharge Diagnosis  
(1) Acute pulmonary embolus:  
(2) Bilateral pulmonary embolism:  
(3) Essential hypertension:  
(4) Septic arthritis:  
Final Diagnosis  
Final Discharge Diagnosis:  
As above  
  
Summary  
Hospital Course  
Hospital course:  
Patient is a pleasant 69-year-old male who was recently   
discharged in the hospital on  been diagnosed with   
septic arthritis of right knee with placement of PICC   
line on ceftriaxone. Patient presented to outside   
facility with complaint of shortness of breath and had   
a CTA chest done which showed extensive bilateral acute   
PE with signs of right heart strain. Patient was   
transferred to [facility] and cardiology was   
consulted to assess for catheter-based thrombolytic   
therapy on mechanical thrombectomy. Transthoracic   
echocardiogram showed EF of 50 to 55%. Patient   
underwent catheter-based mechanical thrombectomy with   
adjunct intra-arterial thrombolytic therapy of bothleft   
and right pulmonary arteries. Patient is feeling   
significantly better postprocedure and has been   
ambulating without getting shortness of breath. He   
denies having chest pain. Suspecting provoked   
thromboembolism given his recent right knee septic   
arthritis. Patient is on testosterone injection due to   
his significant symptomatic deficiency. At this time   
will recommend discussing withhis primary care provider   
regarding long-term testosterone use in view of acute   
bilateral pulmonary embolism. He was continued on IV   
ceftriaxone during hospital stay.  
Condition  
Condition at Discharge: Stable  
Status at Discharge  
Functional status at discharge: independent ambulation  
Overall status at discharge: patient is back to   
baseline  
Time Spent with Patient  
Time spent providing/coordinating discharge services (#   
min): 40  
Surgeries and Procedures  
  
  
Operation Date: 24 09:30  
Actual Procedures  
p CL Peripheral Mechanical Thrombectomy - LEVON Churchill DO  
p CL Peripheral Mech Thrombectomy Ea Add - LEVON Churchill DO  
p CL RHC - W Devang Churchill DO  
s CL Non Cor Transcath Thrombolysis - W Devang Churchill DO  
p CL Viridiana Pulm Angio w/Heart Cath Bilat - W Devang Churchill DO  
  
  
  
Discharge Plan  
Discharge Plan  
Patient Disposition: Home  
  
Activity: Ambulate as Tolerated  
  
Diet: Diabetic  
  
Additional Instructions:  
Maintain and perform routine care to right upper arm   
PICC line. Continue IV antibiotics as previously   
ordered.  
  
Instructions: Know your Meds  
  
Stand Alone Forms: Work/School Release Form  
  
Prescriptions:  
New  
metoprolol tartrate 25 mg Tablet  
12.5 mg PO BID 30 Days Qty: 30 12RF  
Xarelto DVT-PE Treat 30d Start 15 mg (42)- 20 mg (9)   
tablets,dose pack  
See Rx Instructions .ROUTE .COMPLEX Qty: 51 0RF  
Rx Instructions:  
take one-15 mg tablet twice daily for 21 days, then   
one-20 mg tablet once daily; must take with meal/food  
Xarelto 20 mg tablet  
20 mg PO QPM 30 Days Qty: 30 12RF  
Rx Instructions:  
start once 30 day dose pack is completed  
  
Continued  
testosterone cypionate 200 mg/mL oil  
200 mg IM Q2W 24 Days Qty: 2 2RF  
nabumetone 750 mg tablet  
750 mg PO BID PRN (Reason: pain)  
ceftriaxone 2 gram Recon Soln  
2 g IV Q24H 24 Days Qty: 24 0RF  
acetaminophen [Tylenol] 325 mg Tablet  
650 mg PO Q4H PRN (Reason: Pain Scale 1 - 5) Qty: 20   
0RF  
ascorbic acid (vitamin C) [Vitamin C] 500 mg Tablet  
500 mg PO BID.WITH.MEALS Qty: 60 0RF  
ferrous sulfate 324 mg (65 mg iron) Tablet,Delayed   
Release (Dr/Ec)  
324 mg PO .qdaily  
(DME) blood sugar diagnostic Strip  
See Rx Instructions .Route Qty: 50 2RF  
Rx Instructions:  
As directed  
valsartan 80 mg tablet  
80 mg PO DAILY Qty: 90 1RF  
  
Discontinued  
diclofenac sodium 75 mg tablet,delayed release (DR/EC)  
75 mg PO BID 30 Days Qty: 60 2RF  
  
Follow Up:  
LEVON Churchill DO [Active Staff - D.O.] - (office   
will call you with follow up)  
Elvira Harper DO [Primary Care Provider] - 24   
10:30 am (You have been scheduled for a follow up   
appointment for the following date and time, please   
call to reschedule if needed.)  
  
  
Exam  
Physical Exam  
Vital Signs:  
  
  
  
  
Temp Pulse Resp BP Pulse Ox O2 Del Method O2 Flow Rate  
  
98.2 F 71 18 118/69 99 Room Air 2  
  
09/10/24 11:36 09/10/24 11:36 09/10/24 11:36 09/10/24   
11:36 09/10/24 11:36 09/10/24 11:36 24 12:45  
  
  
  
Const  
Orientation: alert, awake and oriented x3  
Resp  
Effort & Inspection: normal respiratory effort and able   
to speak in complete sentences  
Auscultation: no rales, no rhonchi and no wheezes  
Cardio  
Rate: regular rate  
Rhythm: regular rhythm  
Heart Sounds: S1 normal and S2 normal  
GI  
Palpation: soft, not firm, no guarding and nontender  
Neuro  
General: patient alert, patient awake, patient oriented   
x3, moves all extremities, no focal motor deficits and   
CN's II-XI intact bilaterally  
  
Diagnostic Studies  
Completed and Pending Studies  
Pending studies at discharge:  
  
  
24 05:00  
Partial Thromboplastin Time IN AM  
Prothrombin Time INR IN AM  
Basic Metabolic Panel [CHEM] IN AM  
Complete Blood Count Auto Diff IN AM  
Complete Blood Count Auto Diff IN AM  
  
24 05:00  
Partial Thromboplastin Time IN AM  
Prothrombin Time INR IN AM  
Basic Metabolic Panel [CHEM] IN AM  
Complete Blood Count Auto Diff IN AM  
Complete Blood Count Auto Diff IN AM  
  
24 05:00  
Partial Thromboplastin Time IN AM  
Prothrombin Time INR IN AM  
Basic Metabolic Panel [CHEM] IN AM  
Complete Blood Count Auto Diff IN AM  
Complete Blood Count Auto Diff IN AM  
  
24 05:00  
Partial Thromboplastin Time IN AM  
Prothrombin Time INR IN AM  
Basic Metabolic Panel [CHEM] IN AM  
Complete Blood Count Auto Diff IN AM  
Complete Blood Count Auto Diff IN AM  
  
09/15/24 05:00  
Partial Thromboplastin Time IN AM  
Prothrombin Time INR IN AM  
Complete Blood Count Auto Diff IN AM  
Complete Blood Count Auto Diff IN AM  
  
24 05:00  
Partial Thromboplastin Time IN AM  
Prothrombin Time INR IN AM  
Complete Blood Count Auto Diff IN AM  
Complete Blood Count Auto Diff IN AM  
  
24 05:00  
Partial Thromboplastin Time IN AM  
Prothrombin Time INR IN AM  
Complete Blood Count Auto Diff IN AM  
Complete Blood Count Auto Diff IN AM  
  
24 05:00  
Partial Thromboplastin Time IN AM  
Prothrombin Time INR IN AM  
Complete Blood Count Auto Diff IN AM  
Complete Blood Count Auto Diff IN AM  
  
24 05:00  
Partial Thromboplastin Time IN AM  
Prothrombin Time INR IN AM  
Complete Blood Count Auto Diff IN AM  
  
  
  
Labs on day of discharge:  
  
  
09/10/24 05:05: Corrected WBC 4.4, Uncorrected WBC   
Count 4.4, RBC 4.21, Hgb 12.3 L, Hct 36.6 L, MCV 86.8,   
MCH 29.2, MCHC 33.7, RDW 12.5, Plt Count 195, MPV 7.8,   
Neut % (Auto) 62.9, Lymph % (Auto) 21.3, Mono % (Auto)   
11.1, Eos % (Auto) 3.5, Baso % (Auto) 1.2, Nucleat RBC   
Rel Count 0.1, Neut # (Auto) 2.7, Lymph # (Auto) 0.9 L,   
Mono # (Auto) 0.5, Eos # (Auto) 0.2, Baso # (Auto) 0.1,   
PT 19.0 H, INR 1.7, APTT 34.9, PHA Creatinine Clear   
133.15, Sodium 136, Potassium 4.0, Chloride 102, Carbon   
Dioxide 27.4, Anion Gap 10.6, BUN 13, Creatinine 0.67   
L, Est GFR (CKD-EPI) > 60.0, Glucose 129 H, Calcium 8.6  
24 14:20: Corrected WBC 5.4, Uncorrected WBC   
Count 5.4, RBC 4.26, Hgb 12.5 L, Hct 36.8 L, MCV 86.4,   
MCH 29.4, MCHC 34.0, RDW 12.8, Plt Count 203, MPV 7.7,   
Neut % (Auto) 67.4, Lymph % (Auto) 18.2, Mono % (Auto)   
11.2, Eos % (Auto) 2.4, Baso % (Auto) 0.8, Nucleat RBC   
Rel Count 0.1, Neut # (Auto) 3.6, Lymph # (Auto) 1.0,   
Mono # (Auto) 0.6, Eos # (Auto) 0.1, Baso # (Auto) 0.0  
24 13:53: PT 15.2 H, INR 1.3, APTT 37.1 H  
  
  
  
  
  
Documented By: Miladys Osman MD 09/10/24 1208  
Signed By: <Electronically signed by Miladys Osman MD>  
09/10/24 1551  
   
  
[facility]  
Work Phone: [phone]- Progress note  
  
  
  
  
                                        Author              W Zhao  
[facility]  
2024 3:35pm  
   
                                        Note Date/Time      2024  
 3:35pm  
   
                                                    [facility]  
ENTER  
[address] [place] [address]  
  
Cardiology Progress Note  
Signed  
  
Patient: Kaylynn Mims MR#: [id number]  
[id number]  
: 1955 Acct:[id number]  
  
Age/Sex: 69 / M Adm Date:   
4  
Loc:  Room: [address] [place] [address] Type: ADM IN  
Attending Dr: Miladys Osman MD  
  
Copies to: ~  
  
  
Date of Service:  
09/10/2024  
  
  
Subjective  
Principal diagnosis: Submassive PE  
Interval history:  
Mr. Mims is a 69 year old male seen in interventional cardiology consultation 
at   
request of hospitalist and patient who is transferred from [place] 
yesterdaywith   
acute shortness of breath, weakness. He underwent outside CTA of the 
chestrevealing   
extensive bilateral PEs with reported RV strain report is reviewed, CDis 
available   
and were trying to upload it in a few images at this time  
  
Patient had recent incision and drainage of the right knee for septic   
arthritis/bursitis of the right knee at this facility recently  
  
While interviewing the patient, bilateral lower extremity venous ultrasound was 
being   
performed by ultrasonographer; there appeared to be no significant findings on 
duplex   
ultrasonography of both lower extremities with no evidence ofDVT at this time  
  
There was no history of prior thromboembolic or bleeding disorder, no history   
ofcardiovascular disease, revascularization, MI or stents; patient is a non-
smoker,   
previously treated for diabetes but on no current therapies at this timedue to 
side   
effects of medication; otherwise healthy; never hospitalized until the right 
knee   
issue developed.  
  
He denies any oncologic history, no history of prostate cancer or prior 
lymphomas or   
radiation or chemotherapy  
  
He denies any family history of precocious blood clot disorders or 
thrombophilias  
  
He is otherwise healthy appearing, overweight with a BMI of 41.5, evidence of 
mild   
right knee edema but no cellulitis or warmth, both lower extremities are 
negative for   
Homans' sign; he is mildly tachycardic with heart rates between 90 and 100 upon   
review of telemetry; he is currently on IV heparin.  
  
He is minimally dyspneic while lying in bed he denies any chest heaviness, 
pressure   
or discomfort  
  
Risks, benefits and alternatives and informed decision-making process performed 
for   
30 minutes this morning with patient in regards to conservative management with   
ongoing IV switching to oral anticoagulation (which he will require oral   
anticoagulation for minimum of 1 year anyway) versus interventional treatment 
with   
catheter-based thrombolytic therapy or mechanical thrombectomy.  
  
Once images are reviewed we can further ascertain direction of therapy.  
  
Will initiate metoprolol 12.5 twice daily, continue with IV heparin, obtain   
echocardiogram, keep him n.p.o. after midnight for potential catheter-based   
intervention tomorrow morning  
  
Interim evaluation 9/10/2024: Patient is status post catheter-based mechanical   
thrombectomy both left and right pulmonary arteries with adjunct thrombolysis. 
He is   
stable, ambulating with normal hemodynamics no evidence tachycardia without any   
hematoma in the right groin access site. Walking up and down the halls without   
shortness of breath.  
  
He can be discharged today on PE dosing of Xarelto details explained, 
prescriptions   
provided, follow-up with cardiology within the next 2 weeks.  
  
Exam  
Physical Exam  
Vital Signs:  
  
  
  
  
Temp Pulse Resp BP Pulse Ox O2 Del Method O2 Flow Rate  
  
98.2 F 71 18 118/69 99 Room Air 2  
  
09/10/24 11:36 09/10/24 11:36 09/10/24 11:36 09/10/24 11:36 09/10/24 11:36 
09/10/24   
11:36 24 12:45  
  
  
  
Const  
General: cooperative, comfortable, no acute distress and well developed  
Nutritional Appearance: overweight  
Orientation: alert, awake and oriented x3  
HEENT  
Head: normal to inspection  
Neck  
Neck: normal visual inspection  
Chest  
Chest palpation & inspection: normal inspection of the chest  
Resp  
Effort & Inspection: normal respiratory effort and able to speak in complete   
sentences  
Auscultation: clear to auscultation bilaterally  
Cardio  
Palpation: normal PMI  
Rate: regular rate and tachycardic  
Rhythm: regular rhythm  
Heart Sounds: S1 normal and S2 normal  
GI  
Inspection: obesity  
Palpation: soft  
Skin  
General: no rashes or lesions noted  
Neuro  
General: patient alert, patient awake and patient oriented x3  
Cognition: normal cognition  
Speech: speech normal  
Extrem  
General: edema (Right knee edema, no cellulitis) Laterality: on the right  
  
Objective  
Labs  
  
09/10/24 05:05  
  
09/10/24 05:05  
  
Labs:  
Laboratory Results - last 24 hr  
  
  
  
09/10/24  
  
05:05  
  
Corrected WBC 4.4  
  
Uncorrected WBC Count 4.4  
  
RBC 4.21  
  
Hgb 12.3 L  
  
Hct 36.6 L  
  
MCV 86.8  
  
MCH 29.2  
  
MCHC 33.7  
  
RDW 12.5  
  
Plt Count 195  
  
MPV 7.8  
  
Neut % (Auto) 62.9  
  
Lymph % (Auto) 21.3  
  
Mono % (Auto) 11.1  
  
Eos % (Auto) 3.5  
  
Baso % (Auto) 1.2  
  
Nucleat RBC Rel Count 0.1  
  
Neut # (Auto) 2.7  
  
Lymph # (Auto) 0.9 L  
  
Mono # (Auto) 0.5  
  
Eos # (Auto) 0.2  
  
Baso # (Auto) 0.1  
  
PT 19.0 H  
  
INR 1.7  
  
APTT 34.9  
  
PHA Creatinine Clear 133.15  
  
Sodium 136  
  
Potassium 4.0  
  
Chloride 102  
  
Carbon Dioxide 27.4  
  
Anion Gap 10.6  
  
BUN 13  
  
Creatinine 0.67 L  
  
Est GFR (CKD-EPI) > 60.0  
  
Glucose 129 H  
  
Calcium 8.6  
  
  
  
  
A&P - Cardiology  
(1) Bilateral pulmonary embolism:  
Code(s):  
I26.99 - Other pulmonary embolism without acute cor pulmonale  
(2) Septic arthritis:  
Code(s):  
M00.9 - Pyogenic arthritis, unspecified  
  
Plan  
See above  
  
  
Documented By: LEVON Churchill DO 09/10/24 1533  
Signed By: <Electronically signed by LEVON Churchill DO>  
09/10/24 1534  
   
  
[facility]  
Work Phone: [phone]- Progress note  
  
  
  
  
                                        Author              Katerina Oliveira  
[facility]  
2024 4:01pm  
   
                                        Note Date/Time      2024   
3:01pm  
   
                                                    [facility]  
ENTER  
[address] [place] [address]  
  
Hospitalist Progress Note  
Signed  
  
Patient: Kaylynn Mims MR#: [id number]  
[id number]  
: 1955 Acct:[id number]  
  
Age/Sex: 69 / M Adm Date:   
4  
Loc:  Room: [address] [place] [address] Type: ADM IN  
Attending Dr: Katerina Oliveira MD  
  
Copies to: ~  
  
  
Date of Service:  
2024  
  
  
Subjective  
Subjective  
Narrative:  
Patient was seen evaluated at bedside, his wife at bedside today. Patient 
underwent   
right heart catheterization earlier this morning status post mechanical 
thrombectomy.   
Found to have acute subsegmental bilateral PE with acute cor pulmonale. Patient   
tolerated procedure well with no immediate complications. Denies any chest pain,
   
shortness of breath, dizziness, lightheadedness. Tolerating diet well, having 
regular   
bowel movements.  
  
Exam  
Physical Exam  
Vital Signs:  
  
  
  
  
Temp Pulse Resp BP Pulse Ox O2 Del Method O2 Flow Rate  
  
97.6 F 68 18 147/72 H 98 Room Air 2  
  
24 14:15 24 14:15 24 14:15 24 14:15 24 14:15 
24   
14:15 24 12:45  
  
  
  
Narrative:  
Const  
General: cooperative  
  
HEENT  
Normal oropharyngeal mucosa without any ulcers or exudates  
  
Eyes: Conjunctiva normal  
  
Pulmonary  
Auscultation: clear to auscultation , no crackles, no wheezes  
  
Cardiovascular  
Rate: normal rate  
Rhythm: regular rhythm  
Heart Sounds: S1 normal, S2 normal and no murmurs  
  
GI  
Inspection: non-distended  
Palpation: soft, not firm and nontender. No rigidity or rebound.  
  
  
Deferred  
  
Neuro  
General: alert, awake and oriented x3. No obvious new focal deficit  
  
Musculoskeletal: Slightly Limited range of motion on R knee, surgical site 
appears   
clean, no discharge or signs of infection.  
  
Extrem  
General: no cyanosis, no pedal edema  
  
Psych  
Appearance: appropriate affect. Grossly normal  
  
  
Objective  
Lab Results  
  
24 14:20  
  
24 05:50  
  
  
Meds  
Allergies and Active Meds  
Allergies  
  
metformin Allergy (Unknown, Verified 24 15:11)  
back spasms  
Penicillins Allergy (Unknown, Verified 24 15:11)  
rash  
  
  
Active Meds:  
Active Medications  
  
  
  
Generic Name Dose Route Start Last Admin  
  
Trade Name Freq PRN Reason Stop Dose Admin  
  
Acetaminophen 650 mg 24 10:47 24 20:27  
  
Acetaminophen 325 Mg Tablet PO 25 10:46 650 mg  
  
Q4H PRN Administration  
  
Pain Scale 1 - 5  
  
Acetaminophen 1,000 mg 24 11:26  
  
Acetaminophen 500 Mg Tablet PO 25 11:25  
  
Q6H PRN  
  
Mild Pain  
  
Al Hydrox/Mg Hydrox/Simethicone 30 ml 24 11:26  
  
Mag Hydrox/Al Hydrox/Simeth 30 Ml Udc PO 25 11:25  
  
Q4H PRN  
  
Epigastric distress (Non-Card)  
  
Docusate Sodium 100 mg 24 11:26  
  
Docusate 100 Mg Capsule PO 25 11:25  
  
QHS PRN  
  
Constipation  
  
Heparin Sodium (Porcine) 4,300 unit 24 08:47 24 02:59  
  
Heparin *Protocol Bolus* 5,000 Unit/Ml Vial IV-PUSH 25 08:46 4,300 unit  
  
PROTOCOL PRN Administration  
  
PTT 45-59  
  
Heparin Sodium (Porcine) 8,600 unit 24 08:47 24 12:05  
  
Heparin *Protocol Bolus* 5,000 Unit/Ml Vial IV-PUSH 25 08:46 8,600 unit  
  
PROTOCOL PRN Administration  
  
PTT < 45  
  
Heparin Sodium/Sodium Chloride 25,000 unit in 250 mls @ 19 mls/hr 24 09:15
   
24 11:04  
  
Heparin IV 25 09:14 1,200 units/hr  
  
.R18X23P NIHARIKA 12 mls/hr  
  
Administration  
  
Protocol  
  
1,900 UNITS/HR  
  
Lactated Ringer's 1,000 mls @ 75 mls/hr 24 11:00 24 09:30  
  
Lactated Ringers IV 25 10:59 0 mls/hr  
  
.I09X86G NIHARIKA Infusion  
  
Ceftriaxone Sodium 2 gm in 50 mls @ 100 mls/hr 24 11:00 24 12:00  
  
Rocephin  mls/hr  
  
Q24H NIHARIKA Administration  
  
Dextrose/Sodium Chloride 1,000 mls @ 80 mls/hr 24 11:30 24 12:30  
  
5 % Dextrose-0.45 % Nacl IV 24 15:29 80 mls/hr  
  
.Q43C49S NIHARIKA Administration  
  
Metoprolol Tartrate 12.5 mg 24 21:00 24 11:25  
  
Metoprolol Tartrate 12.5 Mg Tablet PO 25 20:59 Not Given  
  
BID NIHARIKA  
  
Miscellaneous Information 1 each 24 11:26  
  
Consult To Pharmacy MISCELLANE 24 11:25  
  
.PHACONSULT PRN  
  
ZZ.Pharmacy Consult  
  
Protocol  
  
Morphine Sulfate 4 mg 24 11:26  
  
Morphine Sulfate 4 Mg/Ml Cartridge IV-PUSH  
  
Q20M PRN  
  
Chest Pain  
  
Nitroglycerin 0.4 mg 24 11:26  
  
Nitroglycerin 0.4 Mg Tab.Subl SUBLINGUAL 25 11:25  
  
Q5M PRN  
  
Chest Pain  
  
Ondansetron HCl 4 mg 24 11:26  
  
Ondansetron 4 Mg/2 Ml Vial IV-PUSH 25 11:25  
  
Q6H PRN  
  
Nausea And Vomiting  
  
Prochlorperazine Edisylate 5 mg 24 10:49  
  
Prochlorperazine Edisylate 10 Mg/2 Ml Vial IV-PUSH 25 10:48  
  
Q4H PRN  
  
Nausea And Vomiting  
  
Rivaroxaban 15 mg 24 17:00  
  
Rivaroxaban 15 Mg Tablet PO 25 16:59  
  
BID.WITH.MEALS NIHARIKA  
  
Tramadol HCl 50 mg 24 11:26  
  
Tramadol 50 Mg Tablet PO 25 11:25  
  
Q6H PRN  
  
Moderate Pain  
  
Triamcinolone Acetonide 1 applic 24 11:26  
  
Triamcinolone 0.1% Cream 15 Gm Tube TOPICAL 25 11:25  
  
QID PRN  
  
Irritation  
  
  
  
  
A&P - Hospitalist  
Assessment/Plan  
(1) Acute pulmonary embolus:  
(2) Bilateral pulmonary embolism:  
(3) Essential hypertension:  
(4) Septic arthritis:  
  
Plan  
Bilateral submassive PE with acute cor pulmonale s/p mechanical thrombectomy 
2024  
-Afebrile here, no leukocytosis  
-CT PE protocol reviewed which showed extensive bilateral acute pulmonary 
embolic   
disease involving all lobes of both lungs with mild right heart strain.  
-Continue heparin drip for now. Plan to transition to DOAC per cardiology 
Xarelto 15   
mg BID ordered. Defer anticoagulants to cardiology team.  
-Venous Doppler of the lower extremities with no evidence of DVT  
-Echo was done, pending report.  
-Continue to maintain antibiotics with Rocephin as planned per ID on previous   
admission. Pharmacy consulted.  
-holding blood pressure medications to avoid any hypotension or hemodynamic   
instability. Restart in am.  
-Continue close monitoring for respiratory status and hemodynamic status  
-Repeat labs in am  
-Pain control as needed  
  
Noted that patient on testosterone at home which also can make him 
hypercoagulable.  
  
Discussed with patient at bedside regarding his testosterone. According to him 
he has   
been taking it for about 10 to 15 years. He was told by his PCP that 
histestosterone   
level is low and he was having low energy levels and erectile dysfunction. He 
reports   
much improvement in the symptoms with testosterone injections every 2 weeks.   
Discussed with him hypercoagulability associated withhormonal therapy such as   
testosterone and possibility of weight contributing to his acute pulmonary 
embolism.   
Recommended to hold off testosterone for now. Follow-up with PCP, discuss 
alternative   
therapy. Also discussed with patient regarding hypercoagulable workup as 
outpatient.   
Patient and wife verbalized understanding and in agreement with this plan.  
  
  
Diet: as directed  
DVT ppx: heparin drip, transitioned to Xarelto.  
Code status: Full  
Status: inpatient  
  
  
Discussed with patient and wife at bedside. All questions answered. In 
agreementwith   
the above plan  
  
  
Katerina Serrato MD  
Internal Medicine  
Hospitalist Attending Physician  
  
  
  
Documented By: Katerina Oliveira MD 24 14  
59  
Signed By: <Electronically signed by Katerina Oliveira MD>  
24 1601  
   
  
[facility]  
Work Phone: [phone]- Consult note  
  
  
  
  
                                        Author              LEVON Churchill  
[facility]  
2024 11:36am  
   
                                        Note Date/Time      2024   
11:23am  
   
                                                    [facility]  
ENTER  
[address] [place] [address]  
  
Cardiology Consult Note  
Signed with Jayashree  
  
Patient: Kaylynn Mims MR#: [id number]  
[id number]  
: 1955 Acct:[id number]  
  
Age/Sex: 69 / M Adm Date:   
4  
Loc: 4 Room: [address] [place] [address] Type: ADM IN  
Attending Dr: Katerina Oliveira MD  
  
Copies to: DO Katerina Baig MD W Scott Sheldon, DO~  
  
  
  
**ADDENDUM**1  
Addendum: Reviewed chest CTA, large left mainstem pulmonary embolism with 
extensive   
left lower lobe thrombus and large right mainstem PE with significant RV strain;
 RV   
to LV ratio greater than 1 (and appears to be greater or equal to 2).  
  
Will proceed with mechanical thrombectomy with adjunct thrombolysis tomorrow  
  
Addendum Documented By: LEVON Churchill DO 24 1136  
Addendum Signed By: <Electronically signed by LEVON Churchill DO> 24 1136  
  
  
  
Cardiology HPI  
History of Present Illness  
Consult Date:  
24  
  
Reason for Consult:  
Acute bilateral PE with reported RV strain  
HPI:  
Mr. Mims is a 69 year old male seen in interventional cardiology consultation 
at   
request of hospitalist and patient who is transferred from [place] 
yesterdaywith   
acute shortness of breath, weakness. He underwent outside CTA of the 
chestrevealing   
extensive bilateral PEs with reported RV strain report is reviewed, CDis 
available   
and were trying to upload it in a few images at this time  
  
Patient had recent incision and drainage of the right knee for septic   
arthritis/bursitis of the right knee at this facility recently  
  
While interviewing the patient, bilateral lower extremity venous ultrasound was 
being   
performed by ultrasonographer; there appeared to be no significant findings on 
duplex   
ultrasonography of both lower extremities with no evidence ofDVT at this time  
  
There was no history of prior thromboembolic or bleeding disorder, no history   
ofcardiovascular disease, revascularization, MI or stents; patient is a non-
smoker,   
previously treated for diabetes but on no current therapies at this timedue to 
side   
effects of medication; otherwise healthy; never hospitalized until the right 
knee   
issue developed.  
  
He denies any oncologic history, no history of prostate cancer or prior 
lymphomas or   
radiation or chemotherapy  
  
He denies any family history of precocious blood clot disorders or 
thrombophilias  
  
He is otherwise healthy appearing, overweight with a BMI of 41.5, evidence of 
mild   
right knee edema but no cellulitis or warmth, both lower extremities are 
negative for   
Homans' sign; he is mildly tachycardic with heart rates between 90 and 100 upon   
review of telemetry; he is currently on IV heparin.  
  
He is minimally dyspneic while lying in bed he denies any chest heaviness, 
pressure   
or discomfort  
  
Risks, benefits and alternatives and informed decision-making process performed 
for   
30 minutes this morning with patient in regards to conservative management with   
ongoing IV switching to oral anticoagulation (which he will require oral   
anticoagulation for minimum of 1 year anyway) versus interventional treatment 
with   
catheter-based thrombolytic therapy or mechanical thrombectomy.  
  
Once images are reviewed we can further ascertain direction of therapy.  
  
Will initiate metoprolol 12.5 twice daily, continue with IV heparin, obtain   
echocardiogram, keep him n.p.o. after midnight for potential catheter-based   
intervention tomorrow morning  
  
Count includes the [facility]  
Surgical History (Reviewed 24 @ 10:58 by Katerina Oliveira MD)  
  
History of facial surgery  
jaw  
  
Family History (Reviewed 24 @ 10:58 by Katerina Oliveira MD)  
Brother Heart disease  
Legacy FamHx Relation: Brother(s)  
Family history of mental disorder  
Legacy FamHx Relation: Brother(s); Legacy FamHx Problem: Diagnosed with Mental   
Illness  
Father   
Family/Other   
Legacy FamHx Problem: father, --cirrhosis of the liver:mother,   
--multiple health issues:1 sister, --pancreatic cancer  
Mother   
Heart disease  
Sister Cancer  
Legacy FamHx Problem: Diagnosed with Cancer  
Heart disease  
  
Social History  
Smoking Status: Never smoker  
Substance Use Type: None  
  
Meds  
Medications and Allergies  
Allergies  
  
metformin Allergy (Unknown, Verified 24 15:11)  
back spasms  
Penicillins Allergy (Unknown, Verified 24 15:11)  
rash  
  
  
Home Medications  
  
blood sugar diagnostic #50 ea 05/15/24 [Rx Confirmed 24]  
testosterone cypionate 200 mg/mL intramuscular oil 200 mg IM Q2W 24 days #2 mL   
24 [Rx Confirmed 24]  
diclofenac sodium 75 mg tablet,delayed release 75 mg PO BID 30 days #60 tabs 
24   
[Rx Confirmed 24]  
nabumetone 750 mg tablet 750 mg PO BID PRN pain 24 [History Confirmed 
24]  
ceftriaxone 2 gram solution for injection 2 g IV Q24H 24 days #24 ea 24 
[Rx   
Confirmed 24]  
acetaminophen 325 mg tablet (Tylenol) 650 mg (2 x 325 mg) PO Q4H PRN Pain Scale 
1 - 5   
#20 tabs 24 [Rx Confirmed 24]  
ascorbic acid (vitamin C) 500 mg tablet (Vitamin C) 500 mg PO BID.WITH.MEALS 
#60tabs   
24 [Rx Confirmed 24]  
valsartan 80 mg tablet 80 mg PO DAILY #90 tabs 24 [Rx Confirmed 24]  
ferrous sulfate 324 mg (65 mg iron) tablet,delayed release 324 mg PO .qdaily 
24   
[History Confirmed 24]  
  
  
  
Exam  
Physical Exam  
Vital Signs:  
  
  
  
  
Temp Pulse Resp Pulse Ox O2 Del Method O2 Flow Rate  
  
97.9 F 91 22 97 Nasal Cannula 2  
  
24 09:00 24 09:00 24 09:00 24 09:00 24 09:00 
24   
09:00  
  
  
  
Const  
General: cooperative, comfortable, no acute distress and well developed  
Nutritional Appearance: overweight  
Orientation: alert, awake and oriented x3  
HEENT  
Head: normal to inspection  
Neck  
Neck: normal visual inspection  
Chest  
Chest palpation & inspection: normal inspection of the chest  
Resp  
Effort & Inspection: normal respiratory effort and able to speak in complete   
sentences  
Auscultation: clear to auscultation bilaterally  
Cardio  
Palpation: normal PMI  
Rate: regular rate and tachycardic  
Rhythm: regular rhythm  
Heart Sounds: S1 normal and S2 normal  
GI  
Inspection: obesity  
Palpation: soft  
Skin  
General: no rashes or lesions noted  
Neuro  
General: patient alert, patient awake and patient oriented x3  
Cognition: normal cognition  
Speech: speech normal  
Extrem  
General: edema (Right knee edema, no cellulitis) Laterality: on the right  
  
Results - Cardiology  
Labs  
  
24 10:11  
  
24 10:11  
  
Lab results:  
CBC  
  
  
  
24 Range/Units  
  
10:11  
  
RBC 4.65 (3.90-5.60) X10E6/uL  
  
Hgb 13.6 (13.0-17.0) g/dL  
  
Hct 40.7 (38.8-50.0) %  
  
Plt Count 221 (150-450) x10E3/uL  
  
Neut # (Auto) 4.1 (1.8-7.7) x10E3/uL  
  
Lymph # (Auto) 1.4 (1.00-4.8) x10E3/uL  
  
Mono # (Auto) 0.7 (0.0-0.8) x10E3/uL  
  
Eos # (Auto) 0.1 (0.0-0.45) x10E3/uL  
  
Baso # (Auto) 0.1 (0.0-0.2) x10E3/uL  
  
  
Intake and Output  
  
  
  
24  
  
23:59 07:59 15:59  
  
Other:  
  
Weight 146.5 kg  
  
  
Patient Weight  
  
  
  
24  
  
23:59  
  
Weight 146.5 kg  
  
  
  
  
A&P - Cardiology  
(1) Bilateral pulmonary embolism:  
Code(s):  
I26.99 - Other pulmonary embolism without acute cor pulmonale  
(2) Septic arthritis:  
Code(s):  
M00.9 - Pyogenic arthritis, unspecified  
  
Plan  
See above  
  
  
Documented By: LEVON Churchill DO 24 1114  
Signed By: <Electronically signed by LEVON Churchill DO>  
24 1123  
   
  
[facility]  
Work Phone: [phone]- History and physical note  
  
  
  
  
                                        Author              Katerina Oliveira  
[facility]  
2024 11:13am  
   
                                        Note Date/Time      2024   
10:53am  
   
                                                    [facility]  
ENTER  
[address] [place] [address]  
  
Hospitalist H&P  
Signed  
  
Patient: Kaylynn Mims MR#: [id number]  
[id number]  
: 1955 Acct:[id number]  
  
Age/Sex: 69 / M Adm Date:   
4  
Loc:  Room: [address] [place] [address] Type: ADM IN  
Attending Dr: Katerina Oliveira MD  
  
Copies to: DO Katerina Baig MD~  
  
  
HPI  
DATE OF EXAMINATION: 24  
CHIEF COMPLAINT: SOB  
HISTORY OF PRESENT ILLNESS:  
Patient is a 69-year-old male with medical history of hypertension, recently   
discharged from our facility after was treated for septic arthritis of his 
rightknee   
with positive blood culture for Streptococcus viridans infection status post 
incision   
and irrigation by Ortho team, patient was discharged on Rocephin therapy via 
PICC   
line in the right upper extremity and he has been maintaining regimen. According
to   
him last night patient started having shortness of breathand chest pain in the 
middle   
chest, nonradiating, associated with dizziness and lightheadedness and what he   
described was a presyncopal event, patient sat down and laid flat on the couch 
and   
felt a little better however family members encouraged him to go to the ER for   
further evaluation. Patient went to [facility], as per report given patient 
presented   
complaining of acute onset shortness of breath and mild chest pain and he feels 
weak   
overall, denies nausea, vomiting, abdominal pain, syncope or LOC. Afebrile, no   
leukocytosis, H&H was stable, CT PE protocol showed extensive bilateral acute   
pulmonary embolic disease involving all lobes of both lungs with mild right 
heart   
strain. Patientwas started on heparin drip at [facility] and decision was
made   
to transfer him to our facility for cardiology services to evaluate for criteria
for   
EKOS. On arrival here, patient is alert awake oriented x 3, heart rate in 90s,   
saturating 97% on 2 L, blood pressure 100s over 70s. Resumed on heparin drip per
  
Acute PE protocol. Cardiology consulted here for further evaluation 
andmanagement.  
  
Review of Systems  
Review of Systems  
Review of systems:  
10 systems are reviewed and are negative except as mentioned elsewhere in the   
documentation  
  
Count includes the [facility]  
Surgical History (Reviewed 24 @ 10:58 by Katerina Oliveira MD)  
  
History of facial surgery  
jaw  
  
Family History (Reviewed 24 @ 10:58 by Katerina Oliveira MD)  
Brother Heart disease  
Legacy FamHx Relation: Brother(s)  
Family history of mental disorder  
Legacy FamHx Relation: Brother(s); Legacy FamHx Problem: Diagnosed with Mental   
Illness  
Father   
Family/Other   
Legacy FamHx Problem: father, --cirrhosis of the liver:mother,   
--multiple health issues:1 sister, --pancreatic cancer  
Mother   
Heart disease  
Sister Cancer  
Legacy FamHx Problem: Diagnosed with Cancer  
Heart disease  
  
Social History  
Smoking Status: Never smoker  
Substance Use Type: None  
  
Meds  
Medications and Allergies  
Allergies  
  
metformin Allergy (Unknown, Verified 24 15:11)  
back spasms  
Penicillins Allergy (Unknown, Verified 24 15:11)  
rash  
  
  
Home Medications  
  
blood sugar diagnostic #50 ea 05/15/24 [Rx Confirmed 24]  
testosterone cypionate 200 mg/mL intramuscular oil 200 mg IM Q2W 24 days #2 mL   
24 [Rx Confirmed 24]  
diclofenac sodium 75 mg tablet,delayed release 75 mg PO BID 30 days #60 tabs 
24   
[Rx Confirmed 24]  
nabumetone 750 mg tablet 750 mg PO BID PRN pain 24 [History Confirmed 
24]  
ceftriaxone 2 gram solution for injection 2 g IV Q24H 24 days #24 ea 24 
[Rx   
Confirmed 24]  
acetaminophen 325 mg tablet (Tylenol) 650 mg (2 x 325 mg) PO Q4H PRN Pain Scale 
1 - 5   
#20 tabs 24 [Rx Confirmed 24]  
ascorbic acid (vitamin C) 500 mg tablet (Vitamin C) 500 mg PO BID.WITH.MEALS 
#60tabs   
24 [Rx Confirmed 24]  
valsartan 80 mg tablet 80 mg PO DAILY #90 tabs 24 [Rx Confirmed 24]  
ferrous sulfate 324 mg (65 mg iron) tablet,delayed release 324 mg PO .qdaily 
24   
[History Confirmed 24]  
  
  
  
Exam  
Physical Exam  
Vital Signs:  
  
  
  
  
Temp Pulse Resp Pulse Ox O2 Del Method O2 Flow Rate  
  
97.9 F 91 22 97 Nasal Cannula 2  
  
24 09:00 24 09:00 24 09:00 24 09:00 24 09:00 
24   
09:00  
  
  
  
Narrative:  
Const  
General: cooperative, tired appearing  
  
HEENT  
Normal oropharyngeal mucosa without any ulcers or exudates  
  
Eyes: Conjunctiva normal  
  
Pulmonary  
Auscultation: clear to auscultation , no crackles, no wheezes  
  
Cardiovascular  
Rate: normal rate  
Rhythm: regular rhythm  
Heart Sounds: S1 normal, S2 normal and no murmurs  
  
GI  
Inspection: non-distended  
Palpation: soft, not firm and nontender. No rigidity or rebound.  
  
  
Deferred  
  
Neuro  
General: alert, awake and oriented x3. No obvious new focal deficit  
  
Musculoskeletal: Slightly Limited range of motion on R knee, surgical site 
appears   
clean, no discharge or signs of infection.  
  
Extrem  
General: no cyanosis, no pedal edema  
  
Psych  
Appearance: appropriate affect. Grossly normal  
  
  
Assessment & Plan  
Assessment/Plan  
(1) Acute pulmonary embolus:  
(2) Bilateral pulmonary embolism:  
(3) Essential hypertension:  
(4) Septic arthritis:  
  
Plan  
- Afebrile here, no leukocytosis according to labs at [facility]  
-CT PE protocol reviewed which showed extensive bilateral acute pulmonary 
embolic   
disease involving all lobes of both lungs with mild right heart strain.  
-Continue heparin drip for now. Plan to transition to DOAC upon discharge 
planning  
-Will check venous Doppler of LEs given recent surgery and limited mobility. Pt 
had   
prolonged stay in hospital about a week.  
-Check Echocardiogram to evaluate for R heart strain and assess LV function and 
EF  
-Will resume antibiotics with Rocephin as planned per ID on previous admission.   
Pharmacy consulted.  
-Will hold blood pressure medications to avoid any hypotension or hemodynamic   
instability  
-Continue close monitoring for respiratory status and hemodynamic status  
-Repeat labs here CBC, CMP, troponin, PT/INR/PTT  
-Pain control as needed  
  
Noted that patient on testosterone at home which also can make him 
hypercoagulable.   
Will discuss with patient regarding stopping this and follow with PCP for 
alternative   
therapy.  
  
Diet: NPO for now until sees cardiology, if no procedure today can start diet  
DVT ppx: heparin drip  
Code status: Full  
Status: inpatient  
  
  
Discussed with patient at bedside. All questions answered. In agreement with 
theabove   
plan  
  
  
Katerina Serrato MD  
Internal Medicine  
Hospitalist Attending Physician  
  
IP vs OBS Justification  
Based on differential dx, clinical care plan, and risk of adverse events, if   
untreated, in my clinical judgement this patient requires an acute care setting 
as:   
INPATIENT because of an expectation of an over 2 midnight stay.  
Estimated length of stay (# of days): 3  
  
  
Documented By: Katerina Oliveira MD 24 10  
50  
Signed By: <Electronically signed by Katerina Oliveira MD>  
24 [facility]3  
   
  
[facility]  
Work Phone: [phone]- Progress note  
  
  
  
  
                                        Author              Josie Ann  
[facility]  
2024 2:16pm  
   
                                        Note Date/Time      2024 12  
:37pm  
   
                                                    [facility]  
ENTER  
[address] [place] [address]  
  
Hospitalist Progress Note  
Signed with Addenda  
  
Patient: Kaylynn Mims MR#: [id number]  
[id number]  
: 1955 Acct:[id number]  
  
Age/Sex: 68 / M Adm Date:   
4  
Loc: 4N Room: [address] [place] [address] Type: ADM IN  
Attending Dr: Josie Ann MD  
  
Copies to: ~  
  
  
  
**ADDENDUM**1  
Uncontrolled blood pressure, patient does not have a history of hypertension,   
valsartan started, today the dose increased  
  
Addendum Documented By: Josie Ann MD 24 1416  
Addendum Signed By: <Electronically signed by Josie Ann MD> 24  
  
  
  
Date of Service:  
2024  
  
  
Subjective  
Subjective  
Narrative:  
Patient has been seen and examined today. He complains of worsening pain 
overnight,   
sharp, like sensation, currently complains 8 out of 10, however clinically he 
does   
not appear to be in distress  
Patient underwent irrigation of the right knee on , cultures pending, 
so far   
negative  
Blood cultures strep viridans 1 out of 2  
Synovial fluid culture strep mitis  
  
  
  
Physical exam:  
General -awake, alert, oriented ?3, not in acute distress  
Cardiovascular -S1 with S2, no murmurs, no rubs, no gallops  
Pulmonary - clear to auscultation bilaterally  
Gastrointestinal - abdomen is soft, nondistended, nontender, bowel sounds 
positive,   
there is no rigidity, no rebound  
Right knee in dressing  
Extremities -no edema  
Neurological -no focal neurological dysfunction noted  
  
  
  
Exam  
Physical Exam  
Vital Signs:  
  
  
  
  
Temp Pulse Resp BP Pulse Ox O2 Del Method O2 Flow Rate  
  
36.8 C 75 17 172/85 H 95 Room Air 2  
  
24 12:05 24 12:05 24 12:05 24 12:05 24 12:05 
24   
12:05 24 15:21  
  
  
  
  
Objective  
Lab Results  
  
24 05:35  
  
24 05:35  
  
Microbiology Results  
Microbiology  
  
24 10:30 Knee,Right - Other Aerobic Culture - Preliminary  
Rare normal skin zack  
2 Days  
24 10:30 Knee,Right - Other Anaerobic Culture - Preliminary  
24 10:30 Knee,Right - Other Gram Stain - Final  
24 10:28 Knee,Right - Other Aerobic Culture - Final  
No Growth 2 Days  
24 10:28 Knee,Right - Other Anaerobic Culture - Preliminary  
24 10:28 Knee,Right - Other Gram Stain - Final  
24 10:29 Knee,Right - Other Aerobic Culture - Final  
Rare normal skin zack  
2 Days  
24 10:29 Knee,Right - Other Anaerobic Culture - Preliminary  
24 10:29 Knee,Right - Other Gram Stain - Final  
24 19:45 Blood - Left Hand Blood Culture - Preliminary  
St. mitis/oralis grp  
24 19:45 Blood - Left Hand Bacterial ID (NA Multiplex Assay) - Final  
24 23:00 Joint Fluid - Knee, Right Aerobic Culture - Final  
St. mitis/oralis grp  
24 23:00 Joint Fluid - Knee, Right Anaerobic Culture - Preliminary  
No Anaerobes Isolated 2 Days  
24 23:00 Joint Fluid - Knee, Right Gram Stain - Final  
24 19:15 Joint Fluid - Knee, Right Aerobic Culture - Final  
No Growth 2 Days  
24 19:15 Joint Fluid - Knee, Right Anaerobic Culture - Final  
No Anaerobes Isolated 3 Days  
24 19:15 Joint Fluid - Knee, Right Gram Stain - Final  
24 19:55 Blood - Right Hand Blood Culture - Preliminary  
No Growth 2 Days  
  
  
  
Meds  
Allergies and Active Meds  
Allergies  
  
metformin Allergy (Unknown, Verified 24 08:53)  
back spasms  
Penicillins Allergy (Unknown, Verified 24 08:53)  
rash  
  
  
Active Meds:  
Active Medications  
  
  
  
Generic Name Dose Route Start Last Admin  
  
Trade Name Freq PRN Reason Stop Dose Admin  
  
Acetaminophen 650 mg 24 17:56 24 08:21  
  
Acetaminophen 325 Mg Tablet PO 25 17:55 650 mg  
  
Q4H PRN Administration  
  
Pain Scale 1 - 5  
  
Acetaminophen 1,000 mg 24 12:00 24 12:23  
  
Acetaminophen 500 Mg Tablet PO 25 11:59 1,000 mg  
  
Q8H NIHARIKA Administration  
  
Ascorbic Acid 500 mg 24 17:00 24 07:58  
  
Ascorbic Acid 500 Mg Tablet PO 25 16:59 500 mg  
  
BID.WITH.MEALS NIHARIKA Administration  
  
Docusate Sodium 200 mg 24 17:56  
  
Docusate 100 Mg Capsule PO 25 17:55  
  
BID PRN  
  
Constipation  
  
Enoxaparin Sodium 40 mg 24 10:00 24 09:42  
  
Enoxaparin 40 Mg/0.4 Ml Syringe SUBCUT 25 09:59 40 mg  
  
DAILY@1000 NIHARIKA Administration  
  
Ferrous Sulfate 324 mg 24 17:00 24 07:57  
  
Ferrous Sulfate 324 Mg Tablet. PO 25 16:59 324 mg  
  
BID.WITH.MEALS NIHARIKA Administration  
  
Hydralazine HCl 10 mg 24 17:56  
  
Hydralazine 20 Mg/Ml Vial IV-PUSH 25 17:55  
  
Q4H PRN  
  
if SBP > 185  
  
Hydromorphone HCl 0.5 mg 24 09:38  
  
Hydromorphone 0.5 Mg/0.5 Ml Syringe IV-PUSH  
  
Q4H PRN  
  
pain  
  
Ceftriaxone Sodium 2 gm in 50 mls @ 100 mls/hr 24 14:00 24 15:44  
  
Rocephin  mls/hr  
  
Q24H NIHARIKA Administration  
  
Mineral Oil 1 each 24 11:33  
  
Mineral Oil (Orange) 1 Each Enema OH  
  
ONCE PRN  
  
Constipation  
  
Naloxone HCl 0.4 mg 24 11:33  
  
Naloxone Hcl 0.4 Mg/Ml Vial IV-PUSH 25 11:32  
  
Q2M PRN  
  
Opioid Reversal  
  
Oxycodone HCl 5 mg 24 17:56 24 22:50  
  
Oxycodone Ir 5 Mg Tablet PO 5 mg  
  
Q4HR PRN Administration  
  
Pain  
  
Oxycodone HCl 5 mg 24 11:33 24 12:24  
  
Oxycodone Ir 5 Mg Tablet PO 5 mg  
  
Q4HR PRN Administration  
  
Pain Scale 6 - 10  
  
Pantoprazole Sodium 40 mg 24 09:00 24 08:37  
  
Pantoprazole 40 Mg Tablet. PO 25 08:59 40 mg  
  
DAILY NIHARIKA Administration  
  
Polyethylene Glycol 17 gm 24 09:00 24 08:37  
  
Polyethylene Glycol 3350 17 Gm Powd.Pack PO 24 08:59 17 gm  
  
DAILY NIHARIKA Administration  
  
Prochlorperazine Edisylate 5 mg 24 17:56  
  
Prochlorperazine Edisylate 10 Mg/2 Ml Vial IV-PUSH 25 17:55  
  
Q4H PRN  
  
Nausea And Vomiting  
  
Senna/Docusate Sodium 2 tab 24 09:00 24 08:37  
  
Sennosides/Docusate 8.6-50mg 1 Tab Tablet PO 24 08:59 2 tab  
  
DAILY NIHARIKA Administration  
  
Sodium Chloride 0 ml 24 14:00 24 05:33  
  
Sodium Chloride 0.9 % 10 Ml Syringe IV-PUSH 25 13:59 Not Given  
  
QSHIFT NIHARIKA  
  
Sodium Chloride 0 ml 24 13:44 24 23:45  
  
Sodium Chloride 0.9 % 10 Ml Syringe IV-PUSH 25 13:43 10 ml  
  
PRN PRN Administration  
  
Flush  
  
Tramadol HCl 50 mg 24 11:33 24 01:16  
  
Tramadol 50 Mg Tablet PO 25 11:32 50 mg  
  
Q6H PRN Administration  
  
Pain Scale 1 - 5  
  
Valsartan 80 mg 24 13:10 24 08:37  
  
Valsartan 80 Mg Tablet PO 25 13:09 80 mg  
  
DAILY NIHARIKA Administration  
  
  
  
  
A&P - Hospitalist  
Assessment/Plan  
(1) Septic arthritis of knee, right:  
  
Plan  
1. Suspected acute right knee septic arthritis, status post irrigation on   
Continue with Rocephin therapy, cultures reviewed, discussed with ID  
Blood cultures strep viridans 1 out of 2, repeated cultures pending  
Appreciate orthopedic input, will reevaluate today  
Continue with pain management  
  
2. DVT prophylaxis SCDs Lovenox  
  
  
Documented By: Josie Ann MD 24 1235  
Signed By: <Electronically signed by Josie Ann MD>  
24 1237  
   
  
[facility]  
Work Phone: [phone]- Progress note  
  
  
  
  
                                        Author              Josie Ann  
[facility]  
2024 2:16pm  
   
                                        Note Date/Time      2024 2:  
16pm  
   
                                                    [facility]  
ENTER  
[address] [place] [address]  
  
Hospitalist Progress Note  
Signed  
  
Patient: Kaylynn Mims MR#: [id number]  
[id number]  
: 1955 Acct:[id number]  
  
Age/Sex: 68 / M Adm Date:   
4  
Loc:  Room: [address] [place] [address] Type: ADM IN  
Attending Dr: Josie Ann MD  
  
Copies to: ~  
  
  
Date of Service:  
2024  
  
  
Subjective  
Subjective  
Narrative:  
Patient has been seen and examined today. He was ambulated in the hallway with a
  
walker, however still complains of significant pain requiring opioids.  
Otherwise denies any abdominal pain, passing flatus, no nausea no vomiting, 
denies   
any shortness of breath any chest pain  
  
  
Physical exam:  
General -awake, alert, oriented ?3, not in acute distress  
Cardiovascular -S1 with S2, no murmurs, no rubs, no gallops  
Pulmonary - clear to auscultation bilaterally  
Gastrointestinal - abdomen is soft, nondistended, nontender, bowel sounds 
positive,   
there is no rigidity, no rebound  
Right knee in dressing, I did not undress, it take a look at the knee, still 
some   
swelling noted, incision site looks quite well, without any drainage without any
  
erythema, no obvious skin signs of infection  
Extremities -no edema  
Neurological -no focal neurological dysfunction noted  
  
  
  
Exam  
Physical Exam  
Vital Signs:  
  
  
  
  
Temp Pulse Resp BP Pulse Ox O2 Del Method O2 Flow Rate  
  
36.5 C 79 20 175/80 H 95 Room Air 2  
  
24 12:08 24 12:08 24 12:08 24 12:08 24 12:08 
24   
12:08 24 15:21  
  
  
  
  
Objective  
Lab Results  
  
24 05:35  
  
24 05:25  
  
Microbiology Results  
Microbiology  
  
24 13:40 Blood - Right Antecubital Blood Culture - Preliminary  
No Growth 2 Days  
24 13:41 Blood - Right Hand Blood Culture - Preliminary  
No Growth 2 Days  
24 10:30 Knee,Right - Other Aerobic Culture - Final  
St. mitis/oralis [facility]  
24 10:30 Knee,Right - Other Anaerobic Culture - Final  
24 10:30 Knee,Right - Other Gram Stain - Final  
24 10:29 Knee,Right - Other Aerobic Culture - Final  
St. mitis/oralis [facility]  
24 10:29 Knee,Right - Other Anaerobic Culture - Final  
24 10:29 Knee,Right - Other Gram Stain - Final  
24 10:28 Knee,Right - Other Aerobic Culture - Final  
St. mitis/oralis [facility]  
24 10:28 Knee,Right - Other Anaerobic Culture - Final  
24 10:28 Knee,Right - Other Gram Stain - Final  
24 23:00 Joint Fluid - Knee, Right Aerobic Culture - Final  
St. mitis/oralis grp  
24 23:00 Joint Fluid - Knee, Right Anaerobic Culture - Final  
No Anaerobes Isolated 3 Days  
24 23:00 Joint Fluid - Knee, Right Gram Stain - Final  
24 19:45 Blood - Left Hand Blood Culture - Preliminary  
St. mitis/oralis grp  
24 19:45 Blood - Left Hand Bacterial ID (NA Multiplex Assay) - Final  
24 19:55 Blood - Right Hand Blood Culture - Preliminary  
No Growth 3 Days  
  
  
  
Meds  
Allergies and Active Meds  
Allergies  
  
metformin Allergy (Unknown, Verified 24 08:53)  
back spasms  
Penicillins Allergy (Unknown, Verified 24 08:53)  
rash  
  
  
Active Meds:  
Active Medications  
  
  
  
Generic Name Dose Route Start Last Admin  
  
Trade Name Freq PRN Reason Stop Dose Admin  
  
Acetaminophen 650 mg 24 17:56 24 08:21  
  
Acetaminophen 325 Mg Tablet PO 25 17:55 650 mg  
  
Q4H PRN Administration  
  
Pain Scale 1 - 5  
  
Acetaminophen 1,000 mg 24 12:00 24 12:14  
  
Acetaminophen 500 Mg Tablet PO 25 11:59 1,000 mg  
  
Q8H NIHARIKA Administration  
  
Ascorbic Acid 500 mg 24 17:00 24 09:21  
  
Ascorbic Acid 500 Mg Tablet PO 25 16:59 500 mg  
  
BID.WITH.MEALS NIHARIKA Administration  
  
Docusate Sodium 200 mg 24 17:56  
  
Docusate 100 Mg Capsule PO 25 17:55  
  
BID PRN  
  
Constipation  
  
Enoxaparin Sodium 40 mg 24 10:00 24 09:21  
  
Enoxaparin 40 Mg/0.4 Ml Syringe SUBCUT 25 09:59 40 mg  
  
DAILY@1000 NIHARIKA Administration  
  
Ferrous Sulfate 324 mg 24 17:00 24 09:21  
  
Ferrous Sulfate 324 Mg Tablet.Dr PO 25 16:59 324 mg  
  
BID.WITH.MEALS NIHARIKA Administration  
  
Heparin Sodium (Porcine) 0 unit 24 14:00 24 05:21  
  
Heparin-Lock 500 Unit/5 Ml Syringe IV-PUSH 25 13:59 500 unit  
  
QSHIFT NIHARIKA Administration  
  
Hydralazine HCl 10 mg 24 17:56  
  
Hydralazine 20 Mg/Ml Vial IV-PUSH 25 17:55  
  
Q4H PRN  
  
if SBP > 185  
  
Hydromorphone HCl 0.5 mg 24 09:38  
  
Hydromorphone 0.5 Mg/0.5 Ml Syringe IV-PUSH  
  
Q4H PRN  
  
pain  
  
Ceftriaxone Sodium 2 gm in 50 mls @ 100 mls/hr 24 14:00 24 16:50  
  
Rocephin IV Infused  
  
Q24H [facility]  
  
Mineral Oil 1 each 24 11:33  
  
Mineral Oil ([place]) 1 Each Enema [place]  
  
ONCE PRN  
  
Constipation  
  
Naloxone HCl 0.4 mg 24 11:33  
  
Naloxone Hcl 0.4 Mg/Ml Vial IV-PUSH 25 11:32  
  
Q2M PRN  
  
Opioid Reversal  
  
Oxycodone HCl 5 mg 24 11:33 24 12:24  
  
Oxycodone Ir 5 Mg Tablet PO 5 mg  
  
Q4HR PRN Administration  
  
Pain Scale 6 - 10  
  
Oxycodone HCl 10 mg 24 12:38 24 13:30  
  
Oxycodone Ir 5 Mg Tablet PO 10 mg  
  
Q4HR PRN Administration  
  
Pain  
  
Pantoprazole Sodium 40 mg 24 09:00 24 09:20  
  
Pantoprazole 40 Mg Tablet.Dr PO 25 08:59 40 mg  
  
DAILY [facility] Administration  
  
Polyethylene Glycol 17 gm 24 09:00 24 09:22  
  
Polyethylene Glycol 3350 17 Gm Powd.Pack PO 24 08:59 17 gm  
  
DAILY [facility] Administration  
  
Prochlorperazine Edisylate 5 mg 24 17:56  
  
Prochlorperazine Edisylate 10 Mg/2 Ml Vial IV-PUSH 25 17:55  
  
Q4H PRN  
  
Nausea And Vomiting  
  
Senna/Docusate Sodium 2 tab 24 21:00 24 09:22  
  
Sennosides/Docusate 8.6-50mg 1 Tab Tablet PO 24 20:59 2 tab  
  
BID [facility] Administration  
  
Sodium Chloride 0 ml 24 14:00 24 05:29  
  
Sodium Chloride 0.9 % 10 Ml Syringe IV-PUSH 25 13:59 10 ml  
  
QSHIFT [facility] Administration  
  
Sodium Chloride 0 ml 24 13:44 24 23:45  
  
Sodium Chloride 0.9 % 10 Ml Syringe IV-PUSH 25 13:43 10 ml  
  
PRN PRN Administration  
  
Flush  
  
Tramadol HCl 50 mg 24 11:33 24 12:15  
  
Tramadol 50 Mg Tablet PO 25 11:32 50 mg  
  
Q6H PRN Administration  
  
Pain Scale 1 - 5  
  
Valsartan 160 mg 24 09:00  
  
Valsartan 160 Mg Tablet PO 25 08:59  
  
DAILY [facility]  
  
  
  
  
A&P - Hospitalist  
Assessment/Plan  
(1) Septic arthritis of knee, right:  
  
Plan  
1. Suspected acute right knee septic arthritis, status post irrigation on   
Continue with Rocephin therapy, cultures reviewed, discussed with ID  
Appreciate orthopedic input,  
Continue with pain management, quite difficult to control,  
  
2. DVT prophylaxis SCDs Lovenox  
  
Plan to discharge in a.m.  
  
  
Documented By: Josie Ann MD 24  
Signed By: <Electronically signed by Josie Ann MD>  
[address] [place] [address] Ctr  
Work Phone: [phone]- Progress note  
  
  
  
  
                                        Author              Paul Crum  
[facility]  
2024 10:56am  
   
                                        Note Date/Time      2024 10  
:56am  
   
                                                    [facility]  
ENTER  
[address] [place] [address]  
  
Infect. Disease Progress Note  
Signed  
  
Patient: Kaylynn Mims MR#: [id number]  
[id number]  
: 1955 Acct:[id number]  
  
Age/Sex: 68 / M Adm Date:   
4  
Loc: 4N Room: 6G2489-7 Type: ADM IN  
Attending Dr: Josie Ann MD  
  
Copies to: ~  
  
  
Date of Service:  
2024  
  
  
Subjective  
Interval history:  
Patient still endorses he is having a significant mount of pain at the right 
knee   
itself. States he had a cold sweat last night but remains afebrile. Patient just
  
feels he should be able to put more weight and do more with his right knee as he
  
tells me it feels just as bad as it did when he came to the hospital  
  
Exam  
Physical Exam  
Vital Signs:  
  
Vital Signs  
  
  
  
Temp Pulse Resp BP Pulse Ox O2 Del Method  
  
24 08:00 Room Air  
  
24 07:24 98.8 F 72 12 188/107 H 97 Room Air  
  
24 04:00 86 18 168/83 H 97  
  
24 00:00 76 18 145/69 H 95  
  
24 20:00 98.9 F 82 18 171/89 H 97 Room Air  
  
24 20:00 Room Air  
  
24 16:33 97.6 F 83 18 171/85 H 95 Room Air  
  
24 12:05 98.3 F 75 17 172/85 H 95 Room Air  
  
  
  
Const  
General: cooperative, uncomfortable and no acute distress  
Orientation: oriented x3  
HEENT  
Head: normal to inspection  
Ears: hearing grossly normal bilaterally  
Mouth: oral mucosae normal  
Eyes  
General: appearance normal, both eyes and all related structures  
Neck  
Neck: normal visual inspection  
Chest  
Chest palpation & inspection: normal inspection of the chest  
Resp  
Effort & Inspection: normal respiratory effort  
Cardio  
Rate: regular rate  
Rhythm: regular rhythm  
GI  
Inspection: normal to inspection  
Palpation: soft and nontender  
Auscultation: normal bowel sounds  
Skin  
General: no rashes or lesions noted  
Neuro  
General: patient oriented x3  
Extrem  
General: abnormal to inspection (R knee wrapped)  
Other:  
Wrap not taken down. Patient endorses that he having a lot of knee pain.  
  
Objective  
Labs  
CBC/BMP:  
CBC, BMP  
  
  
  
24  
  
05:25  
  
Sodium 133 L  
  
Potassium 4.3  
  
Chloride 98  
  
Carbon Dioxide 27.1  
  
Anion Gap 12.2  
  
BUN 14  
  
Creatinine 0.67 L  
  
Calcium 8.6  
  
  
  
Labs:  
  
  
  
  
24  
  
05:25  
  
BUN 14  
  
Creatinine 0.67 L  
  
  
  
Microbiology  
Microbiology:  
Microbiology - Results from entire visit  
  
24 10:30 Knee,Right - Other Aerobic Culture - Final  
[facility] mit[facility]/oralis [facility]  
24 10:30 Knee,Right - Other Anaerobic Culture - Final  
24 10:30 Knee,Right - Other Gram Stain - Final  
24 10:29 Knee,Right - Other Aerobic Culture - Final  
[facility] mitis/oralis [facility]  
24 10:29 Knee,Right - Other Anaerobic Culture - Final  
24 10:29 Knee,Right - Other Gram Stain - Final  
24 10:28 Knee,Right - Other Aerobic Culture - Final  
[facility] mitis/oralis [facility]  
24 10:28 Knee,Right - Other Anaerobic Culture - Final  
24 10:28 Knee,Right - Other Gram Stain - Final  
24 23:00 Joint Fluid - Knee, Right Aerobic Culture - Final  
[facility] mitis/oralis [facility]  
24 23:00 Joint Fluid - Knee, Right Anaerobic Culture - Final  
No Anaerobes Isolated 3 Days  
24 23:00 Joint Fluid - Knee, Right Gram Stain - Final  
24 19:45 Blood - Left Hand Blood Culture - Preliminary  
[facility] mitis/oralis [facility]  
24 19:45 Blood - Left Hand Bacterial ID (NA Multiplex Assay) - Final  
24 19:55 Blood - Right Hand Blood Culture - Preliminary  
No Growth 3 Days  
24 13:40 Blood - Right Antecubital Blood Culture - Preliminary  
No Growth 1 Day  
24 13:41 Blood - Right Hand Blood Culture - Preliminary  
No Growth 1 Day  
24 19:15 Joint Fluid - Knee, Right Aerobic Culture - Final  
No Growth 2 Days  
24 19:15 Joint Fluid - Knee, Right Anaerobic Culture - Final  
No Anaerobes Isolated 3 Days  
24 19:15 Joint Fluid - Knee, Right Gram Stain - Final  
  
  
  
Allergies and Medications  
Allergies and Active Meds  
Allergies  
  
metformin Allergy (Unknown, Verified 24 08:53)  
back spasms  
Penicillins Allergy (Unknown, Verified 24 08:53)  
rash  
  
  
Active Medications  
  
Acetaminophen (Acetaminophen 325 Mg Tablet) 650 mg PO Q4H PRN  
PRN Reason: Pain Scale 1 - 5  
Stop: 25 17:55  
Last Admin: 24 08:21 Dose: 650 mg  
  
Acetaminophen (Acetaminophen 500 Mg Tablet) 1,000 mg PO Q8H [facility]  
Stop: 25 11:59  
Last Admin: 24 04:02 Dose: 1,000 mg  
  
Ascorbic Acid (Ascorbic Acid 500 Mg Tablet) 500 mg PO BID.WITH.MEALS [facility]  
Stop: 25 16:59  
Last Admin: 24 09:21 Dose: 500 mg  
  
Docusate Sodium (Docusate 100 Mg Capsule) 200 mg PO BID PRN  
PRN Reason: Constipation  
Stop: 25 17:55  
Enoxaparin Sodium (Enoxaparin 40 Mg/0.4 Ml Syringe) 40 mg SUBCUT DAILY@1000 [facility]  
Stop: 25 09:59  
Last Admin: 24 09:21 Dose: 40 mg  
  
Ferrous Sulfate (Ferrous Sulfate 324 Mg Tablet.) 324 mg PO BID.WITH.MEALS [facility]  
Stop: 25 16:59  
Last Admin: 24 09:21 Dose: 324 mg  
  
Heparin Sodium (Porcine) (Heparin-Lock 500 Unit/5 Ml Syringe) 0 unit IV-PUSH 
QSHIFT   
[facility]  
Stop: 25 13:59  
Last Admin: 24 05:21 Dose: 500 unit  
  
Hydralazine HCl (Hydralazine 20 Mg/Ml Vial) 10 mg IV-PUSH Q4H PRN  
PRN Reason: if SBP > 185  
Stop: 25 17:55  
Hydromorphone HCl (Hydromorphone 0.5 Mg/0.5 Ml Syringe) 0.5 mg IV-PUSH Q4H PRN  
PRN Reason: pain  
Ceftriaxone Sodium (Rocephin) 2 gm in 50 mls @ 100 mls/hr IV Q24H [facility]  
Last Infusion: 24 16:50 Dose: Infused  
  
Mineral Oil (Mineral Oil ([place]) 1 Each Enema) 1 each [place] ONCE PRN  
PRN Reason: Constipation  
Naloxone HCl (Naloxone Hcl 0.4 Mg/Ml Vial) 0.4 mg IV-PUSH Q2M PRN  
PRN Reason: Opioid Reversal  
Stop: 25 11:32  
Oxycodone HCl (Oxycodone Ir 5 Mg Tablet) 5 mg PO Q4HR PRN  
PRN Reason: Pain Scale 6 - 10  
Last Admin: 24 12:24 Dose: 5 mg  
  
Oxycodone HCl (Oxycodone Ir 5 Mg Tablet) 10 mg PO Q4HR PRN  
PRN Reason: Pain  
Last Admin: 24 09:20 Dose: 10 mg  
  
Pantoprazole Sodium (Pantoprazole 40 Mg Tablet.) 40 mg PO DAILY [facility]  
Stop: 25 08:59  
Last Admin: 24 09:20 Dose: 40 mg  
  
Polyethylene Glycol (Polyethylene Glycol 3350 17 Gm Powd.Pack) 17 gm PO DAILY 
[facility]  
Stop: 24 08:59  
Last Admin: 24 09:22 Dose: 17 gm  
  
Prochlorperazine Edisylate (Prochlorperazine Edisylate 10 Mg/2 Ml Vial) 5 mg IV-
PUSH   
Q4H PRN  
PRN Reason: Nausea And Vomiting  
Stop: 25 17:55  
Senna/Docusate Sodium (Sennosides/Docusate 8.6-50mg 1 Tab Tablet) 2 tab PO BID 
[facility]  
Stop: 24 20:59  
Last Admin: 24 09:22 Dose: 2 tab  
  
Sodium Chloride (Sodium Chloride 0.9 % 10 Ml Syringe) 0 ml IV-PUSH QSHIFT [facility]  
Stop: 25 13:59  
Last Admin: 24 05:29 Dose: 10 ml  
  
Sodium Chloride (Sodium Chloride 0.9 % 10 Ml Syringe) 0 ml IV-PUSH PRN PRN  
PRN Reason: Flush  
Stop: 25 13:43  
Last Admin: 24 23:45 Dose: 10 ml  
  
Tramadol HCl (Tramadol 50 Mg Tablet) 50 mg PO Q6H PRN  
PRN Reason: Pain Scale 1 - 5  
Stop: 25 11:32  
Last Admin: 24 01:16 Dose: 50 mg  
  
Valsartan (Valsartan 80 Mg Tablet) 80 mg PO DAILY [facility]  
Stop: 25 13:09  
Last Admin: 24 09:21 Dose: 80 mg  
  
  
  
  
A&P - Infectious Disease  
Assessment/Plan  
(1) Septic arthritis of knee, right:  
(2) Positive blood culture:  
(3) Streptococcus viridans infection:  
  
Plan  
Patient's cultures all have grown strep mitis/oralis. Follow-up blood cultures 
are   
negative. Patient's white count has remained normal. He also remains afebrile.   
Patient concerned over the significant knee pain he continues to have. Charge 
nurse   
reach out to orthopedics for them to come back and perhaps reevaluate the 
patient.   
From an infection standpoint patient on appropriate antibiotics. He has a PICC 
line   
in the right upper extremity. He is planning to get daily IV ceftriaxone at 
[facility].  
  
  
Documented By: Paul Crum MD 24  
Signed By: <Electronically signed by MD Paul Crum>  
24  
   
  
[facility]  
Work Phone: [phone]- [facility]  
  
  
  
  
                                        Author              Paul Crum  
[facility]  
2024 9:16am  
   
                                        Note Date/Time      2024 9:  
16am  
   
                                                    [facility]  
ENTER  
[address] [place] [address]  
  
Infect. Disease Progress Note  
Signed  
  
Patient: Kaylynn Mims MR#: [id number]  
[id number]  
: 1955 Acct:[id number]  
  
Age/Sex: 68 / M Adm Date:   
4  
Loc: 4N Room: 9V7459-9 Type: ADM IN  
Attending Dr: Josie Ann MD  
  
Copies to: ~  
  
  
Date of Service:  
2024  
  
  
Subjective  
Interval history:  
Patient feels better overall but expresses concern about where he is going to 
have   
ongoing antibiotics due to cost.  
  
Exam  
Physical Exam  
Vital Signs:  
  
  
  
  
Temp Pulse Resp BP Pulse Ox O2 Del Method O2 Flow Rate  
  
98.5 F 75 17 172/77 H 97 Room Air 2  
  
24 07:43 24 07:43 24 07:43 24 07:43 24 07:43 
24   
07:59 24 15:21  
  
  
  
Const  
General: cooperative and no acute distress  
Orientation: oriented x3  
HEENT  
Head: normal to inspection  
Ears: hearing grossly normal bilaterally  
Mouth: oral mucosae normal  
Eyes  
General: appearance normal, both eyes and all related structures  
Neck  
Neck: normal visual inspection  
Chest  
Chest palpation & inspection: normal inspection of the chest  
Resp  
Effort & Inspection: normal respiratory effort  
Cardio  
Rate: regular rate  
Rhythm: regular rhythm  
GI  
Inspection: normal to inspection  
Palpation: soft and nontender  
Auscultation: normal bowel sounds  
Skin  
General: no rashes or lesions noted  
Neuro  
General: patient oriented x3  
Extrem  
General: abnormal to inspection (R knee wrapped)  
  
Objective  
Labs  
CBC/BMP:  
CBC, BMP  
  
  
  
24  
  
05:35  
  
Corrected WBC 8.5  
  
Uncorrected WBC Count 8.5  
  
RBC 5.03  
  
Hgb 14.9  
  
Hct 44.1  
  
Plt Count 182  
  
Sodium 136  
  
Potassium 4.5  
  
Chloride 99  
  
Carbon Dioxide 29.1  
  
Anion Gap 12.4  
  
BUN 17  
  
Creatinine 0.90  
  
Calcium 8.2 L  
  
  
  
Labs:  
  
  
  
  
24  
  
05:35  
  
BUN 17  
  
Creatinine 0.90  
  
  
  
Microbiology  
Microbiology:  
Microbiology - Results from entire visit  
  
24 19:45 Blood - Left Hand Blood Culture - Preliminary  
Presumptive Viridans Strep  
24 19:45 Blood - Left Hand Bacterial ID (NA Multiplex Assay) - Final  
24 19:55 Blood - Right Hand Blood Culture - Preliminary  
No Growth 2 Days  
24 10:28 Knee,Right - Other Aerobic Culture - Preliminary  
No Growth 1 Day  
24 10:28 Knee,Right - Other Anaerobic Culture - Preliminary  
No Anaerobes Isolated 1 Day  
24 10:28 Knee,Right - Other Gram Stain - Final  
24 10:29 Knee,Right - Other Aerobic Culture - Preliminary  
No Growth 1 Day  
24 10:29 Knee,Right - Other Anaerobic Culture - Preliminary  
No Anaerobes Isolated 1 Day  
24 10:29 Knee,Right - Other Gram Stain - Final  
24 10:30 Knee,Right - Other Aerobic Culture - Preliminary  
Rare normal skin zack  
1 Day  
24 10:30 Knee,Right - Other Anaerobic Culture - Preliminary  
No Anaerobes Isolated 1 Day  
24 10:30 Knee,Right - Other Gram Stain - Final  
24 23:00 Joint Fluid - Knee, Right Aerobic Culture - Preliminary  
St. mitis/oralis [facility]  
24 23:00 Joint Fluid - Knee, Right Anaerobic Culture - Preliminary  
No Anaerobes Isolated 1 Day  
24 23:00 Joint Fluid - Knee, Right Gram Stain - Final  
24 19:15 Joint Fluid - Knee, Right Aerobic Culture - Final  
No Growth 2 Days  
24 19:15 Joint Fluid - Knee, Right Anaerobic Culture - Preliminary  
No Anaerobes Isolated 2 Days  
24 19:15 Joint Fluid - Knee, Right Gram Stain - Final  
  
  
  
Allergies and Medications  
Allergies and Active Meds  
Allergies  
  
metformin Allergy (Unknown, Verified 24 08:53)  
back spasms  
Penicillins Allergy (Unknown, Verified 24 08:53)  
rash  
  
  
Active Medications  
  
Acetaminophen (Acetaminophen 325 Mg Tablet) 650 mg PO Q4H PRN  
PRN Reason: Pain Scale 1 - 5  
Stop: 25 17:55  
Last Admin: 24 08:21 Dose: 650 mg  
  
Acetaminophen (Acetaminophen 500 Mg Tablet) 1,000 mg PO Q8H [facility]  
Stop: 25 11:59  
Last Admin: 24 04:15 Dose: Not Given  
  
Ascorbic Acid (Ascorbic Acid 500 Mg Tablet) 500 mg PO BID.WITH.MEALS [facility]  
Stop: 25 16:59  
Last Admin: 24 07:58 Dose: 500 mg  
  
Docusate Sodium (Docusate 100 Mg Capsule) 200 mg PO BID PRN  
PRN Reason: Constipation  
Stop: 25 17:55  
Enoxaparin Sodium (Enoxaparin 40 Mg/0.4 Ml Syringe) 40 mg SUBCUT DAILY@1000 [facility]  
Stop: 25 09:59  
Last Admin: 24 11:05 Dose: 40 mg  
  
Ferrous Sulfate (Ferrous Sulfate 324 Mg Tablet.Dr) 324 mg PO BID.WITH.MEALS [facility]  
Stop: 25 16:59  
Last Admin: 24 07:57 Dose: 324 mg  
  
Hydralazine HCl (Hydralazine 20 Mg/Ml Vial) 10 mg IV-PUSH Q4H PRN  
PRN Reason: if SBP > 185  
Stop: 25 17:55  
Hydromorphone HCl (Hydromorphone 1 Mg/Ml Syringe) 0.5 mg IV-PUSH Q4H PRN  
PRN Reason: pain  
Ceftriaxone Sodium (Rocephin) 2 gm in 50 mls @ 100 mls/hr IV Q24H [facility]  
Last Admin: 24 15:44 Dose: 100 mls/hr  
  
Mineral Oil (Mineral Oil (Orange) 1 Each Enema) 1 each [place] ONCE PRN  
PRN Reason: Constipation  
Naloxone HCl (Naloxone Hcl 0.4 Mg/Ml Vial) 0.4 mg IV-PUSH Q2M PRN  
PRN Reason: Opioid Reversal  
Stop: 25 11:32  
Oxycodone HCl (Oxycodone Ir 5 Mg Tablet) 5 mg PO Q4HR PRN  
PRN Reason: Pain  
Last Admin: 24 22:50 Dose: 5 mg  
  
Oxycodone HCl (Oxycodone Ir 5 Mg Tablet) 5 mg PO Q4HR PRN  
PRN Reason: Pain Scale 6 - 10  
Last Admin: 24 08:38 Dose: 5 mg  
  
Pantoprazole Sodium (Pantoprazole 40 Mg Tablet.Dr) 40 mg PO DAILY [facility]  
Stop: 25 08:59  
Last Admin: 24 08:37 Dose: 40 mg  
  
Polyethylene Glycol (Polyethylene Glycol 3350 17 Gm Powd.Pack) 17 gm PO DAILY 
[facility]  
Stop: 24 08:59  
Last Admin: 24 08:37 Dose: 17 gm  
  
Prochlorperazine Edisylate (Prochlorperazine Edisylate 10 Mg/2 Ml Vial) 5 mg IV-
PUSH   
Q4H PRN  
PRN Reason: Nausea And Vomiting  
Stop: 25 17:55  
Senna/Docusate Sodium (Sennosides/Docusate 8.6-50mg 1 Tab Tablet) 2 tab PO DAILY
[facility]  
Stop: 24 08:59  
Last Admin: 24 08:37 Dose: 2 tab  
  
Sodium Chloride (Sodium Chloride 0.9 % 10 Ml Syringe) 0 ml IV-PUSH QSHIFT [facility]  
Stop: 25 13:59  
Last Admin: 24 05:33 Dose: Not Given  
  
Sodium Chloride (Sodium Chloride 0.9 % 10 Ml Syringe) 0 ml IV-PUSH PRN PRN  
PRN Reason: Flush  
Stop: 25 13:43  
Last Admin: 24 23:45 Dose: 10 ml  
  
Tramadol HCl (Tramadol 50 Mg Tablet) 50 mg PO Q6H PRN  
PRN Reason: Pain Scale 1 - 5  
Stop: 25 11:32  
Last Admin: 24 01:16 Dose: 50 mg  
  
Valsartan (Valsartan 80 Mg Tablet) 80 mg PO DAILY [facility]  
Stop: 25 13:09  
Last Admin: 24 08:37 Dose: 80 mg  
  
  
  
  
A&P - Infectious Disease  
Assessment/Plan  
(1) Septic arthritis of knee, right:  
(2) Positive blood culture:  
(3) Streptococcus viridans infection:  
  
Plan  
[place] strep from blood culture which would likely be the strep mitis oralis 
that was   
cultured from the joint fluid. Ceftriaxone should suffice. Once dailyneeded. 
Patient   
lives closest to [facility] about 12 miles and expressesconcern about 
getting   
there once daily. His sister is currently in the room anddid offer for him to 
stay at   
her house which is closer to [facility]. Patient states 
he   
only has Medicare and cannot afford IV antibiotics at home. Ultimately will need
to   
get IV ceftriaxone once daily. 24more days required  
  
  
Documented By: Paul Crum MD [id number]  
Signed By: <Electronically signed by MD Paul Crum>  
2416  
   
  
[facility]  
Work Phone: [phone]- Progress note  
  
  
  
  
                                        Author              Josie Ann  
[facility]  
2024 1:08pm  
   
                                        Note Date/Time      2024 1:  
08pm  
   
                                                    [facility]  
ENTER  
[address] [place] [address]  
  
Hospitalist Progress Note  
Signed  
  
Patient: Kaylynn Mims MR#: [id number]  
[id number]  
: 1955 Acct:[id number]  
  
Age/Sex: 68 / M Adm Date:   
4  
Loc:  Room: 1B5369-4 Type: ADM IN  
Attending Dr: Josie Ann MD  
  
Copies to: ~  
  
  
Date of Service:  
2024  
  
  
Subjective  
Subjective  
Narrative:  
  
Patient underwent irrigation of the right knee on , cultures pending, 
so far   
negative  
Blood cultures strep viridans 1 out of 2  
  
Patient is doing better, the pain improved after the surgery  
  
  
Physical exam:  
General -awake, alert, oriented ?3, not in acute distress  
Cardiovascular -S1 with S2, no murmurs, no rubs, no gallops  
Pulmonary - clear to auscultation bilaterally  
Gastrointestinal - abdomen is soft, nondistended, nontender, bowel sounds 
positive,   
there is no rigidity, no rebound  
Extremities -no edema  
Neurological -no focal neurological dysfunction noted  
  
  
  
Exam  
Physical Exam  
Vital Signs:  
  
  
  
  
Temp Pulse Resp BP Pulse Ox O2 Del Method O2 Flow Rate  
  
36.6 C 75 18 173/78 H 97 Room Air 2  
  
24 12:05 24 12:05 24 12:05 24 12:05 24 12:05 
24   
12:05 24 12:00  
  
  
  
  
Objective  
Lab Results  
  
24 05:51  
  
24 05:51  
  
Microbiology Results  
Microbiology  
  
24 10:30 Knee,Right - Other Aerobic Culture - Preliminary  
Rare normal skin zack  
1 Day  
24 10:30 Knee,Right - Other Anaerobic Culture - Preliminary  
No Anaerobes Isolated 1 Day  
24 10:28 Knee,Right - Other Aerobic Culture - Preliminary  
No Growth 1 Day  
24 10:28 Knee,Right - Other Anaerobic Culture - Preliminary  
No Anaerobes Isolated 1 Day  
24 10:29 Knee,Right - Other Aerobic Culture - Preliminary  
No Growth 1 Day  
24 10:29 Knee,Right - Other Anaerobic Culture - Preliminary  
No Anaerobes Isolated 1 Day  
24 23:00 Joint Fluid - Knee, Right Aerobic Culture - Preliminary  
24 23:00 Joint Fluid - Knee, Right Anaerobic Culture - Preliminary  
No Anaerobes Isolated 1 Day  
24 23:00 Joint Fluid - Knee, Right Gram Stain - Final  
24 19:15 Joint Fluid - Knee, Right Aerobic Culture - Final  
No Growth 2 Days  
24 19:15 Joint Fluid - Knee, Right Anaerobic Culture - Preliminary  
No Anaerobes Isolated 2 Days  
24 19:15 Joint Fluid - Knee, Right Gram Stain - Final  
24 19:45 Blood - Left Hand Blood Culture - Preliminary  
Presumptive Viridans Strep  
24 19:45 Blood - Left Hand Bacterial ID (NA Multiplex Assay) - Final  
24 19:55 Blood - Right Hand Blood Culture - Preliminary  
No Growth 1 Day  
  
  
  
Meds  
Allergies and Active Meds  
Allergies  
  
metformin Allergy (Unknown, Verified 24 08:53)  
back spasms  
Penicillins Allergy (Unknown, Verified 24 08:53)  
rash  
  
  
Active Meds:  
Active Medications  
  
  
  
Generic Name Dose Route Start Last Admin  
  
Trade Name Freq PRN Reason Stop Dose Admin  
  
Acetaminophen 650 mg 24 17:56 24 08:21  
  
Acetaminophen 325 Mg Tablet PO 25 17:55 650 mg  
  
Q4H PRN Administration  
  
Pain Scale 1 - 5  
  
Acetaminophen 1,000 mg 24 12:00 24 12:15  
  
Acetaminophen 500 Mg Tablet PO 25 11:59 1,000 mg  
  
Q8H [facility] Administration  
  
Ascorbic Acid 500 mg 24 17:00 24 08:20  
  
Ascorbic Acid 500 Mg Tablet PO 25 16:59 500 mg  
  
BID.WITH.MEALS [facility] Administration  
  
Docusate Sodium 200 mg 24 17:56  
  
Docusate 100 Mg Capsule PO 25 17:55  
  
BID PRN  
  
Constipation  
  
Enoxaparin Sodium 40 mg 24 10:00 24 11:05  
  
Enoxaparin 40 Mg/0.4 Ml Syringe SUBCUT 25 09:59 40 mg  
  
DAILY@1000 [facility] Administration  
  
Ferrous Sulfate 324 mg 24 17:00 24 08:20  
  
Ferrous Sulfate 324 Mg Tablet.Dr SENA 25 16:59 324 mg  
  
BID.WITH.MEALS [facility] Administration  
  
Hydralazine HCl 10 mg 24 17:56  
  
Hydralazine 20 Mg/Ml Vial IV-PUSH 25 17:55  
  
Q4H PRN  
  
if SBP > 185  
  
Hydromorphone HCl 0.25 mg 24 17:56  
  
Hydromorphone 1 Mg/Ml Syringe IV-PUSH  
  
Q4H PRN  
  
pain  
  
Vancomycin HCl 1.5 gm/ 530 mls @ 353.333 mls/hr 24 08:00 24 08:39  
  
Dextrose IV 25 07:59 353.33 mls/hr  
  
Q12H [facility] Administration  
  
Cefepime HCl 2 gm in 50 mls @ 100 mls/hr 24 10:45 24 11:30  
  
Maxipime  mls/hr  
  
Q8H [facility] Administration  
  
Mineral Oil 1 each 24 11:33  
  
Mineral Oil ([place]ange) 1 Each Enema [place]  
  
ONCE PRN  
  
Constipation  
  
Naloxone HCl 0.4 mg 24 11:33  
  
Naloxone Hcl 0.4 Mg/Ml Vial IV-PUSH 25 11:32  
  
Q2M PRN  
  
Opioid Reversal  
  
Oxycodone HCl 5 mg 24 17:56  
  
Oxycodone Ir 5 Mg Tablet PO  
  
Q4HR PRN  
  
Pain  
  
Oxycodone HCl 5 mg 24 11:33  
  
Oxycodone Ir 5 Mg Tablet PO  
  
Q4HR PRN  
  
Pain Scale 6 - 10  
  
Pantoprazole Sodium 40 mg 24 09:00 24 08:20  
  
Pantoprazole 40 Mg Tablet. PO 25 08:59 40 mg  
  
DAILY NIHARIKA Administration  
  
Polyethylene Glycol 17 gm 24 09:00 24 08:21  
  
Polyethylene Glycol 3350 17 Gm Powd.Pack PO 24 08:59 17 gm  
  
DAILY NIHARIKA Administration  
  
Prochlorperazine Edisylate 5 mg 24 17:56  
  
Prochlorperazine Edisylate 10 Mg/2 Ml Vial IV-PUSH 25 17:55  
  
Q4H PRN  
  
Nausea And Vomiting  
  
Senna/Docusate Sodium 2 tab 24 09:00 24 08:20  
  
Sennosides/Docusate 8.6-50mg 1 Tab Tablet PO 24 08:59 2 tab  
  
DAILY NIHARIKA Administration  
  
Sodium Chloride 0 ml 24 14:00 24 05:28  
  
Sodium Chloride 0.9 % 10 Ml Syringe IV-PUSH 25 13:59 Not Given  
  
QSHIFT NIHARIKA  
  
Tramadol HCl 50 mg 24 11:33 24 11:06  
  
Tramadol 50 Mg Tablet PO 25 11:32 50 mg  
  
Q6H PRN Administration  
  
Pain Scale 1 - 5  
  
Vancomycin HCl 1 each 24 18:36  
  
Vancomycin - Pharmacy Dosing 1 Each Miscell IV  
  
ONCE PRN  
  
ZZ.Pharmacy Consult  
  
Protocol  
  
  
  
  
A&P - Hospitalist  
Assessment/Plan  
(1) Septic arthritis of knee, right:  
  
Plan  
1. Suspected right knee septic arthritis, status post irrigation on   
Continue with empiric ntibiotic therapy, ID consulted  
Synovial fluid cultures so far negative  
Blood cultures strep viridans 1 out of 2, repeated cultures pending  
Appreciate orthopedic input  
Continue with pain management  
  
2. DVT prophylaxis SCDs for now  
  
  
Documented By: Josie Ann MD 24 1307  
Signed By: <Electronically signed by Josie Ann MD>  
24 7711  
   
  
[facility]  
Work Phone: [phone]- Consult note  
  
  
  
  
                                        Author              Paul Crum  
[facility]  
2024 9:03am  
   
                                        Note Date/Time      2024 9:  
09am  
   
                                                    [facility]  
ENTER  
[address] [place] [address]  
  
Infect. Disease Consult Note  
Signed  
  
Patient: Kaylynn Mims MR#: [id number]  
[id number]  
: 1955 Acct:[id number]  
  
Age/Sex: 68 / M Adm Date:   
4  
Loc: 4N Room: 1U7057-5 Type: ADM IN  
Attending Dr: Josie Ann MD  
  
Copies to: DO Paul Baig MD Ruta Semaskiene, MD~  
  
  
HPI  
Data of Consult  
Consult date:  
24  
  
Requesting Physician:  
Josie Ann MD  
  
Primary Care Provider:  
Elvira Harper DO  
  
Consult Narrative  
History of present illness:  
Mr. Mims is a 68 year old male who is status post right knee irrigation and 
lavage   
after concern for septic arthritis became apparent. He has bilateral 
osteoarthritis.   
He recently had an injection of steroids in his right knee and shortly after 
that the   
knee became exquisitely worse with pain and swelling. Blood cultures were drawn 
and   
are positive for Streptococcus viridans. Aspiration of the joint fluid confirmed
  
infection. Intraoperative cultures pending.  
  
CC: Josie Ann MD  
  
  
Count includes the [facility]  
Surgical History (Reviewed 24 @ 14:38 by GARRET Chong)  
  
History of facial surgery  
jaw  
  
  
Family History (Reviewed 24 @ 14:38 by GARRET Chong)  
Brother Heart disease  
Legacy FamHx Relation: Brother(s)  
Family history of mental disorder  
Legacy FamHx Relation: Brother(s); Legacy FamHx Problem: Diagnosed with Mental   
Illness  
Father   
Family/Other   
Legacy FamHx Problem: father, --cirrhosis of the liver:mother,   
--multiple health issues:1 sister, --pancreatic cancer  
Mother   
Heart disease  
Sister Cancer  
Legacy FamHx Problem: Diagnosed with Cancer  
Heart disease  
  
  
Social History  
Smoking Status: Never smoker  
Substance Use Type: None  
  
Allergies and Medications  
Allergies and Active Meds  
Allergies  
  
metformin Allergy (Unknown, Verified 24 08:53)  
back spasms  
Penicillins Allergy (Unknown, Verified 24 08:53)  
rash  
  
  
Active Medications  
  
Acetaminophen (Acetaminophen 325 Mg Tablet) 650 mg PO Q4H PRN  
PRN Reason: Pain Scale 1 - 5  
Stop: 25 17:55  
Last Admin: 24 08:21 Dose: 650 mg  
  
Acetaminophen (Acetaminophen 500 Mg Tablet) 1,000 mg PO Q8H [facility]ARIKA  
Stop: 25 11:59  
Last Admin: 24 03:10 Dose: Not Given  
  
Ascorbic Acid (Ascorbic Acid 500 Mg Tablet) 500 mg PO BID.WITH.MEALS [facility]  
Stop: 25 16:59  
Last Admin: 24 08:20 Dose: 500 mg  
  
Docusate Sodium (Docusate 100 Mg Capsule) 200 mg PO BID PRN  
PRN Reason: Constipation  
Stop: 25 17:55  
Enoxaparin Sodium (Enoxaparin 40 Mg/0.4 Ml Syringe) 40 mg SUBCUT DAILY@1000 [facility]  
Stop: 25 09:59  
Ferrous Sulfate (Ferrous Sulfate 324 Mg Tablet.) 324 mg PO BID.WITH.MEALS [facility]  
Stop: 25 16:59  
Last Admin: 24 08:20 Dose: 324 mg  
  
Hydralazine HCl (Hydralazine 20 Mg/Ml Vial) 10 mg IV-PUSH Q4H PRN  
PRN Reason: if SBP > 185  
Stop: 25 17:55  
Hydromorphone HCl (Hydromorphone 1 Mg/Ml Syringe) 0.25 mg IV-PUSH Q4H PRN  
PRN Reason: pain  
Vancomycin HCl 1.5 gm/ (Dextrose) 530 mls @ 353.333 mls/hr IV Q12H [facility]  
Stop: 25 07:59  
Last Admin: 24 08:39 Dose: 353.33 mls/hr  
  
Cefepime HCl (Maxipime) 1 gm in 50 mls @ 12.5 mls/hr IV Q8H [facility]  
Last Infusion: 24 03:45 Dose: Infused  
  
Mineral Oil (Mineral Oil (Orange) 1 Each Enema) 1 each OH ONCE PRN  
PRN Reason: Constipation  
Naloxone HCl (Naloxone Hcl 0.4 Mg/Ml Vial) 0.4 mg IV-PUSH Q2M PRN  
PRN Reason: Opioid Reversal  
Stop: 25 11:32  
Oxycodone HCl (Oxycodone Ir 5 Mg Tablet) 5 mg PO Q4HR PRN  
PRN Reason: Pain  
Oxycodone HCl (Oxycodone Ir 5 Mg Tablet) 5 mg PO Q4HR PRN  
PRN Reason: Pain Scale 6 - 10  
Pantoprazole Sodium (Pantoprazole 40 Mg Tablet.) 40 mg PO DAILY [facility]  
Stop: 25 08:59  
Last Admin: 24 08:20 Dose: 40 mg  
  
Polyethylene Glycol (Polyethylene Glycol 3350 17 Gm Powd.Pack) 17 gm PO DAILY 
[facility]  
Stop: 24 08:59  
Last Admin: 24 08:21 Dose: 17 gm  
  
Prochlorperazine Edisylate (Prochlorperazine Edisylate 10 Mg/2 Ml Vial) 5 mg IV-
PUSH   
Q4H PRN  
PRN Reason: Nausea And Vomiting  
Stop: 25 17:55  
Senna/Docusate Sodium (Sennosides/Docusate 8.6-50mg 1 Tab Tablet) 2 tab PO DAILY
[facility]  
Stop: 24 08:59  
Last Admin: 24 08:20 Dose: 2 tab  
  
Sodium Chloride (Sodium Chloride 0.9 % 10 Ml Syringe) 0 ml IV-PUSH QSHIFT [facility]  
Stop: 25 13:59  
Last Admin: 24 05:28 Dose: Not Given  
  
Tramadol HCl (Tramadol 50 Mg Tablet) 50 mg PO Q6H PRN  
PRN Reason: Pain Scale 1 - 5  
Stop: 25 11:32  
Last Admin: 24 01:29 Dose: 50 mg  
  
Vancomycin HCl (Vancomycin - Pharmacy Dosing 1 Each Miscell) 1 each IV ONCE PRN;
  
Protocol  
PRN Reason: ZZ.Pharmacy Consult  
  
  
  
Exam  
Physical Exam  
Vital Signs:  
  
  
Vital Signs  
  
  
  
Temp Pulse Pulse Resp BP BP Pulse Ox  
  
24 07:46 97.9 F 72 16 169/84 H 97  
  
24 04:00 97.9 F 70 16 133/68 95  
  
24 23:59 97.9 F 74 18 177/83 H 98  
  
24 20:40  
  
24 19:06 98.6 F 72 17 182/61 H 95  
  
24 17:44  
  
24 17:42 66 17 153/69 H 97  
  
24 16:21 66 17 143/73 H 96  
  
24 16:00  
  
24 15:51 72 17 142/71 H 96  
  
24 15:21 68 17 157/70 H 96  
  
24 14:51 70 17 163/73 H 94 L  
  
24 14:21 73 17 162/80 H 95  
  
24 14:06 68 17 159/85 H 96  
  
24 13:51 68 17 143/72 H 96  
  
24 13:36 68 17 141/73 H 97  
  
24 13:21 67 17 152/78 H 97  
  
24 12:30  
  
24 12:06 70 16 142/72 H 95  
  
24 11:45 97.4 F L 69 16 135/70 95  
  
24 11:35 73 14 147/72 H 98  
  
24 11:30 69 16 145/73 H 98  
  
24 11:25 69 16 142/77 H 98  
  
24 11:20 98.4 F 69 14 141/75 H 95  
  
24 09:07 98.1 F 73 18 144/85 H 96  
  
  
  
  
O2 Del Method O2 Flow Rate  
  
24 07:46 Room Air  
  
24 04:00 Room Air  
  
24 23:59 Room Air  
  
24 20:40 Room Air  
  
24 19:06  
  
24 17:44 Room Air  
  
24 17:42 Room Air  
  
24 16:21 Room Air  
  
24 16:00 Room Air  
  
24 15:51 Room Air  
  
24 15:21 Nasal Cannula 2  
  
24 14:51 Nasal Cannula 2  
  
24 14:21 Nasal Cannula 2  
  
24 14:06 Nasal Cannula 2  
  
24 13:51 Nasal Cannula 3  
  
24 13:36 Nasal Cannula 3  
  
24 13:21 Nasal Cannula 3  
  
24 12:30 Nasal Cannula 3  
  
24 12:06  
  
24 11:45  
  
24 11:35  
  
24 11:30  
  
24 11:25  
  
24 11:20 Simple Mask 8  
  
24 09:07 Room Air  
  
  
Intake and Output  
  
  
  
24  
  
23:59 07:59 15:59  
  
Intake Total 1130 / 1810 750 / 750  
  
Output Total 1800 / 1800  
  
Balance 1130 / 1650 -1050 / -1050  
  
Intake:  
  
 / 1260 50 / 50  
  
Cefepime 1Gm-*Ns* 1 gm In 50 ml 50 / 100 50 / 50  
  
@ 12.5 mls/hr IV Q8H [facility] Rx#:  
  
[id number]  
  
Vancomycin 1.5 gm In Dextrose 5 530 / 1060  
  
% in Water 500 ml @ 353.333  
  
mls/hr IV Q12H [facility]AR[facility]A Rx#:[id number]  
  
Oral 550 / 550 700 / 700  
  
Output:  
  
Urine 1800 / 1800  
  
Other:  
  
# Unmeasured Voids 2  
  
Weight 346 lb 12.594 oz  
  
Date of Last Bowel Movement 24  
  
  
Patient Weight  
  
  
  
24  
  
23:59  
  
Weight 346 lb 12.594 oz  
  
  
  
Const  
General: cooperative and no acute distress  
Orientation: oriented x3  
HEENT  
Head: normal to inspection  
Ears: hearing grossly normal bilaterally  
Mouth: oral mucosae normal  
Eyes  
General: appearance normal, both eyes and all related structures  
Neck  
Neck: normal visual inspection  
Chest  
Chest palpation & inspection: normal inspection of the chest  
Resp  
Effort & Inspection: normal respiratory effort  
Cardio  
Rate: regular rate  
Rhythm: regular rhythm  
GI  
Inspection: normal to inspection  
Palpation: soft and nontender  
Auscultation: normal bowel sounds  
Skin  
General: no rashes or lesions noted  
Neuro  
General: patient oriented x3  
Extrem  
General: abnormal to inspection (R knee wrapped)  
  
Results - Infectious Disease  
Labs  
  
24 05:51  
  
24 05:51  
  
Labs:  
  
Laboratory Results - last 24 hr  
  
  
  
24  
  
05:51  
  
Corrected WBC 9.1  
  
Uncorrected WBC Count 9.1  
  
RBC 4.95  
  
Hgb 14.7  
  
Hct 43.3  
  
MCV 87.6  
  
MCH 29.7  
  
MCHC 33.9  
  
RDW 13.3  
  
Plt Count 180  
  
MPV 7.6  
  
Neut % (Auto) 80.3  
  
Lymph % (Auto) 9.3  
  
Mono % (Auto) 9.7  
  
Eos % (Auto) 0.1  
  
Baso % (Auto) 0.6  
  
Nucleat RBC Rel Count 0.2  
  
Neut # (Auto) 7.3  
  
Lymph # (Auto) 0.8 L  
  
Mono # (Auto) 0.9 H  
  
Eos # (Auto) 0.0  
  
Baso # (Auto) 0.1  
  
PHA Creatinine Clear 133.88  
  
Sodium 136  
  
Potassium 4.3  
  
Chloride 103  
  
Carbon Dioxide 25.9  
  
Anion Gap 11.4  
  
BUN 20  
  
Creatinine 0.85  
  
Est GFR (CKD-EPI) > 60.0  
  
Glucose 146 H  
  
Calcium 8.2 L  
  
  
  
  
Laboratory Tests  
  
  
  
24  
  
23:00  
  
Synovial RBC 6370 H  
  
Synovial Tot Nuc Cell 98884 H  
  
Synovial Neutrophils 92 H  
  
Synovial Crystals None seen  
  
  
  
Microbiology Results  
Microbiology Narrative:  
  
  
  
  
  
24 19:45 Blood Culture - Preliminary  
  
Blood - Left Hand Presumptive Viridans Strep  
  
Bacterial ID (NA Multiplex Assay) - Final  
  
24 19:55 Blood Culture - Preliminary  
  
Blood - Right Hand No Growth 1 Day  
  
24 10:30 Aerobic Culture - Pending  
  
Knee,Right - Other Anaerobic Culture - Pending  
  
Gram Stain - Pending  
  
24 10:29 Aerobic Culture - Pending  
  
Knee,Right - Other Anaerobic Culture - Pending  
  
Gram Stain - Pending  
  
24 10:28 Aerobic Culture - Pending  
  
Knee,Right - Other Anaerobic Culture - Pending  
  
Gram Stain - Pending  
  
24 19:15 Aerobic Culture - Preliminary  
  
Joint Fluid - Knee, Right No Growth 1 Day  
  
Anaerobic Culture - Preliminary  
  
No Anaerobes Isolated 1 Day  
  
Gram Stain -  
  
Gram Stain Final   
3+ White Blood Cells  
No Bacteria Seen Final  
  
24 23:00 Aerobic Culture - Pending  
  
Joint Fluid - Knee, Right Anaerobic Culture - Pending  
  
Gram Stain -  
  
Gram Stain Final 24-  
Rare White Blood Cells  
4+ Red Blood Cells  
No Bacteria Seen Final  
  
  
  
  
A&P - Infectious Disease  
(1) Septic arthritis of knee, right:  
(2) Positive blood culture:  
(3) Streptococcus viridans infection:  
  
Plan  
Patient's blood culture 1 of 2 sets is positive for presumptive viridans strep. 
I   
presume this organism is likely what is infecting the knee however given the   
injection so I will cover for potential healthcare associated pathogens. 
Therefore   
vancomycin and cefepime to be maintained at this time. Patient likelywill 
require   
PICC line. Gram stain did not show any bacteria but did have whitecells. Cell 
count   
consistent with septic arthritis. Follow-up on intraoperative cultures.  
  
  
Documented By: Paul Crum MD 24 0855  
Signed By: <Electronically signed by MD Paul Crum>  
24 0903  
   
  
[facility]  
Work Phone: [phone]- Progress note  
  
  
  
  
                                        Author              Jeff Onofre  
[facility]  
2024 8:06am  
   
                                        Note Date/Time      2024 8:  
06am  
   
                                                    [facility]  
ENTER  
[address] [place], [place] [id number]  
  
Orthopedic Progress Note  
Signed  
  
Patient: Kaylynn Mims MR#: [id number]  
[id number]  
: 1955 Acct:[id number]  
  
Age/Sex: 68 / M Adm Date:   
4  
Loc: 4N Room: [address] [place] [address] Type: ADM IN  
Attending Dr: Josie Ann MD  
  
Copies to: ~  
  
  
Date of Service:  
2024  
  
  
Subjective  
Subjective  
Interval History:  
Patient resting comfortably in bed this morning. He reports that he is sore in 
the   
knee but it feels better than what it did prior to surgery.  
Nursing reports no acute events overnight  
  
Exam  
Physical Exam  
Vital Signs:  
  
  
  
  
Temp Pulse Resp BP Pulse Ox O2 Del Method O2 Flow Rate  
  
97.9 F 72 16 169/84 H 97 Room Air 2  
  
24 07:46 24 07:46 24 07:46 24 07:46 24 07:46 
24   
07:46 24 15:21  
  
  
  
Narrative:  
Right knee Ace wrap and dressing is clean dry and intact. Passively I can move 
his   
knee in a short arc of motion without any discomfort. Neurovascular intact 
distally  
  
Objective  
Labs  
Labs:  
Laboratory Results - last 24 hr  
  
  
  
24  
  
05:51  
  
Corrected WBC 9.1  
  
Uncorrected WBC Count 9.1  
  
RBC 4.95  
  
Hgb 14.7  
  
Hct 43.3  
  
MCV 87.6  
  
MCH 29.7  
  
MCHC 33.9  
  
RDW 13.3  
  
Plt Count 180  
  
MPV 7.6  
  
Neut % (Auto) 80.3  
  
Lymph % (Auto) 9.3  
  
Mono % (Auto) 9.7  
  
Eos % (Auto) 0.1  
  
Baso % (Auto) 0.6  
  
Nucleat RBC Rel Count 0.2  
  
Neut # (Auto) 7.3  
  
Lymph # (Auto) 0.8 L  
  
Mono # (Auto) 0.9 H  
  
Eos # (Auto) 0.0  
  
Baso # (Auto) 0.1  
  
PHA Creatinine Clear 133.88  
  
Sodium 136  
  
Potassium 4.3  
  
Chloride 103  
  
Carbon Dioxide 25.9  
  
Anion Gap 11.4  
  
BUN 20  
  
Creatinine 0.85  
  
Est GFR (CKD-EPI) > 60.0  
  
Glucose 146 H  
  
Calcium 8.2 L  
  
  
  
Micro  
Microbiology Results:  
Microbiology  
  
24 19:45 Blood - Left Hand Blood Culture - Preliminary  
Presumptive Viridans Strep  
24 19:45 Blood - Left Hand Bacterial ID (NA Multiplex Assay) - Final  
24 19:55 Blood - Right Hand Blood Culture - Preliminary  
No Growth 1 Day  
24 19:15 Joint Fluid - Knee, Right Aerobic Culture - Preliminary  
No Growth 1 Day  
24 19:15 Joint Fluid - Knee, Right Anaerobic Culture - Preliminary  
No Anaerobes Isolated 1 Day  
24 19:15 Joint Fluid - Knee, Right Gram Stain - Final  
  
  
  
Assessment / Plan  
Assessment and plan  
(1) Septic arthritis of knee, right:  
Code(s):  
M00.9 - Pyogenic arthritis, unspecified  
  
Plan  
POD 1 sp R knee washout for septic arthritis  
1. Pain control  
2. DVT prophylaxis: SCDs bilaterally and I will defer to the hospitalist team 
onany   
chemical prophylaxis  
3. Infectious disease consulted. Antibiotics per ID. Intraoperative cultures 
pending.   
Blood culture from  is growing strep viridans  
4. PT/OT: WBAT and range of motion as tolerated right lower extremity  
5. Keep dressing in place until 2-week postop follow-up in the office. Upon   
discharge, needs set up with outpatient physical therapy to continue working on 
range   
of motion.  
6. Orthopedic surgery will sign off. Please call with any questions or concerns  
  
  
Documented By: Jeff Onofre MD 24 08  
03  
Signed By: <Electronically signed by Jeff Onofre MD>  
24 0806  
   
  
[facility]  
Work Phone: [phone]- Progress note  
  
  
  
  
                                        Author              Josie nAn  
[facility]  
2024 7:23pm  
   
                                        Note Date/Time      2024 11  
:42am  
   
                                                    [facility]  
ENTER  
[address] [place] [address]  
  
Hospitalist Progress Note  
Signed with Addenda  
  
Patient: Kaylynn Mims MR#: [id number]  
[id number]  
: 1955 Acct:[id number]  
  
Age/Sex: 68 / M Adm Date:   
4  
Loc: 4N Room: [address] [place] [address] Type: ADM IN  
Attending Dr: Josie Ann MD  
  
Copies to: ~  
  
  
  
**ADDENDUM**1  
Patient has been seen and examined today. Patient is doing better. After the 
surgery   
his pain is improved. He was able to ambulate with quite reasonably good pain   
control.  
Otherwise he denies any complaints. No chest pain no shortness of breath no   
palpitations no dizziness  
  
Addendum Documented By: Josie Ann MD 24  
Addendum Signed By: <Electronically signed by Josie Ann MD> 24  
  
  
  
Date of Service:  
2024  
  
  
Subjective  
Subjective  
Narrative:  
  
Patient underwent irrigation of the right knee, cultures pending, so far 
negative  
  
  
Physical exam:  
General -awake, alert, oriented ?3, not in acute distress  
Cardiovascular -S1 with S2, no murmurs, no rubs, no gallops  
Pulmonary - clear to auscultation bilaterally  
Gastrointestinal - abdomen is soft, nondistended, nontender, bowel sounds 
positive,   
there is no rigidity, no rebound  
Extremities -no edema  
Neurological -no focal neurological dysfunction noted  
  
  
  
Exam  
Physical Exam  
Vital Signs:  
  
  
  
  
Temp Pulse Resp BP Pulse Ox O2 Del Method  
  
36.7 C 73 18 144/85 H 96 Room Air  
  
24 09:07 24 09:07 24 09:07 24 09:07 24 09:07 
24   
09:07  
  
  
  
  
Objective  
Lab Results  
  
24 06:26  
  
24 06:26  
  
Microbiology Results  
Microbiology  
  
24 19:15 Joint Fluid - Knee, Right Aerobic Culture - Preliminary  
No Growth 1 Day  
24 19:15 Joint Fluid - Knee, Right Anaerobic Culture - Preliminary  
No Anaerobes Isolated 1 Day  
24 19:15 Joint Fluid - Knee, Right Gram Stain - Final  
24 23:00 Joint Fluid - Knee, Right Gram Stain - Final  
  
  
  
Meds  
Allergies and Active Meds  
Allergies  
  
metformin Allergy (Unknown, Verified 24 08:53)  
back spasms  
Penicillins Allergy (Unknown, Verified 24 08:53)  
rash  
  
  
Active Meds:  
Active Medications  
  
  
  
Generic Name Dose Route Start Last Admin  
  
Trade Name Freq PRN Reason Stop Dose Admin  
  
Acetaminophen 650 mg 24 17:56  
  
Acetaminophen 325 Mg Tablet PO 25 17:55  
  
Q4H PRN  
  
Pain Scale 1 - 5  
  
Acetaminophen 1,000 mg 24 11:45  
  
Acetaminophen 500 Mg Tablet PO 25 11:44  
  
Q8H [facility]A  
  
Ascorbic Acid 500 mg 24 17:00  
  
Ascorbic Acid 500 Mg Tablet PO 25 16:59  
  
BID.WITH.MEALS NIHARIKA  
  
Docusate Sodium 200 mg 24 17:56  
  
Docusate 100 Mg Capsule PO 25 17:55  
  
BID PRN  
  
Constipation  
  
Droperidol 1.25 mg 24 09:41  
  
Droperidol 5 Mg/2 Ml Vial IV-PUSH 24 12:42  
  
ONCE PRN  
  
Nausea And Vomiting  
  
Ferrous Sulfate 324 mg 24 17:00  
  
Ferrous Sulfate 324 Mg Tablet. PO 25 16:59  
  
BID.WITH.MEALS [facility]  
  
Hydralazine HCl 10 mg 24 17:56  
  
Hydralazine 20 Mg/Ml Vial IV-PUSH 25 17:55  
  
Q4H PRN  
  
if SBP > 185  
  
Hydromorphone HCl 0.25 mg 24 17:56  
  
Hydromorphone 1 Mg/Ml Syringe IV-PUSH  
  
Q4H PRN  
  
pain  
  
Hydromorphone HCl 0.5 mg 24 09:41  
  
Hydromorphone 0.5 Mg/0.5 Ml Syringe IV-PUSH 24 12:42  
  
Q5M PRN  
  
Pain  
  
Vancomycin HCl 1.5 gm/ 530 mls @ 353.333 mls/hr 24 08:00 24 08:09  
  
Dextrose IV 25 07:59 353.33 mls/hr  
  
Q12H [facility]A Administration  
  
Cefepime HCl 1 gm in 50 mls @ 12.5 mls/hr 24 06:30 24 08:09  
  
Maxipime IV 12.5 mls/hr  
  
Q8H [facility] Administration  
  
Lactated Ringer's 1,000 mls @ 20 mls/hr 24 08:03 24 09:17  
  
Lactated Ringers IV 24 08:02 20 mls/hr  
  
.Q24H ONE Administration  
  
Ketorolac Tromethamine 15 mg 24 22:00 24 05:30  
  
Ketorolac Tromethamine 15 Mg/Ml Vial IV-PUSH 24 14:01 15 mg  
  
Q8HR NIHARIKA Administration  
  
Ketorolac Tromethamine 15 mg 24 11:33  
  
Ketorolac Tromethamine 15 Mg/Ml Vial IV-PUSH 24 11:34  
  
ONCE ONE  
  
Labetalol HCl 10 mg 24 09:41  
  
Labetalol 100 Mg/20 Ml Vial IV-PUSH 24 12:42  
  
Q10M PRN  
  
Hypertension  
  
Mineral Oil 1 each 24 11:33  
  
Mineral Oil (Orange) 1 Each Enema OH  
  
ONCE PRN  
  
Constipation  
  
Naloxone HCl 0.4 mg 24 11:33  
  
Naloxone Hcl 0.4 Mg/Ml Vial IV-PUSH 25 11:32  
  
Q2M PRN  
  
Opioid Reversal  
  
Oxycodone HCl 5 mg 24 17:56  
  
Oxycodone Ir 5 Mg Tablet PO  
  
Q4HR PRN  
  
Pain  
  
Oxycodone HCl 5 mg 24 11:33  
  
Oxycodone Ir 5 Mg Tablet PO  
  
Q4HR PRN  
  
Pain Scale 6 - 10  
  
Pantoprazole Sodium 40 mg 24 09:00  
  
Pantoprazole 40 Mg Tablet. PO 25 08:59  
  
DAILY NIHARIKA  
  
Polyethylene Glycol 17 gm 24 09:00  
  
Polyethylene Glycol 3350 17 Gm Powd.Pack PO 24 08:59  
  
DAILY [facility]A  
  
Prochlorperazine Edisylate 5 mg 24 17:56  
  
Prochlorperazine Edisylate 10 Mg/2 Ml Vial IV-PUSH 25 17:55  
  
Q4H PRN  
  
Nausea And Vomiting  
  
Senna/Docusate Sodium 2 tab 24 09:00  
  
Sennosides/Docusate 8.6-50mg 1 Tab Tablet PO 24 08:59  
  
DAILY [facility]ARIKA  
  
Sodium Chloride 0 ml 24 14:00  
  
Sodium Chloride 0.9 % 10 Ml Syringe IV-PUSH 25 13:59  
  
QSHIFT [facility]  
  
Tramadol HCl 50 mg 24 11:33  
  
Tramadol 50 Mg Tablet PO 25 11:32  
  
Q6H PRN  
  
Pain Scale 1 - 5  
  
Vancomycin HCl 1 each 24 18:36  
  
Vancomycin - Pharmacy Dosing 1 Each Miscell IV  
  
ONCE PRN  
  
ZZ.Pharmacy Consult  
  
Protocol  
  
  
  
  
A&P - Hospitalist  
Assessment/Plan  
(1) Septic arthritis of knee, right:  
  
Plan  
1. Suspected right knee septic arthritis, status post irrigation on   
Continue with empiric ntibiotic therapy, ID consulted  
Appreciate orthopedic input  
Continue with pain management  
  
2. DVT prophylaxis SCDs for now  
  
  
Documented By: Josie Ann MD 24 1141  
Signed By: <Electronically signed by Josie Ann MD>  
24 1142  
   
  
[facility]  
Work Phone: [phone]- History and physical note  
  
  
  
  
                                        Author              Josie Ann  
[facility]  
2024 11:41am  
   
                                        Note Date/Time      2024 6:  
33pm  
   
                                                    [facility]  
ENTER  
[address] [place] [address]  
  
Hospitalist H&P  
Signed  
  
Patient: Kaylynn Mims MR#: [id number]  
[id number]  
: 1955 Acct:[id number]  
  
Age/Sex: 68 / M Adm Date:   
4  
Loc:  Room: [address] [place] [address] Type: ADM IN  
Attending Dr: Josie Ann MD  
  
Copies to: DO Josie Baig MD~  
  
  
HPI  
DATE OF EXAMINATION: 24  
CHIEF COMPLAINT: Right knee pain  
HISTORY OF PRESENT ILLNESS:  
68 years old male presented with complaints of right knee pain. Patient does 
have   
history of osteoarthritis and got injection into his left knee previously, on   
Wednesday, which is 5 days ago patient got injection into his left and right 
knee   
with he thinks with steroids by Dr. Mora. That day patient was doing quite 
well. He   
was walking and done some shopping. However the next day the pain started to 
come, on   
Friday he was not able to ambulate normally. The pain was quite severe. He went 
to   
[facility] emergency room and was prescribed some pain medications. And was sent 
home.   
The pain continued so he came to emergency room. He denied any fevers any 
chills. He   
denied any injury or any trauma. He denied any recent infections. X-ray in 
emergency   
room was done which showed large effusions with degenerative changes, effusions 
are   
worse compared to 2024.  
  
10 systems are reviewed and are negative apart as mentioned H&P  
  
General -patient is awake alert oriented ?3, does not appear to be in distress  
HEENT -normal oropharyngeal mucosa without any ulcers or exudates  
Cardiovascular -S1 plus S2, with regular rate, without any murmurs, gallops, 
rubs  
Pulmonary -clear to auscultation bilaterally  
Gastrointestinal -abdomen is soft, nondistended, nontender, bowel sounds 
positive, no   
rigidity, no rebound  
Genitourinary -no flank tenderness  
Musculoskeletal -no back tenderness,  
Right knee is quite swollen and warmer than the left, but there is no redness, 
no   
skin lesions, no signs of trauma  
Neurological -no facial asymmetry noted, pupils are equal and symmetrical,   
extraocular muscle intact, no nystagmus, motor function 5 out of 5 in upper and 
lower   
extremities, sensation 5 out of 5 in upper and lower extremities, finger to nose
test   
performed well without any dysmetria, reflexes symmetrical bilaterally in lower   
extremities, speech is clear, no tremors noted, gait deferred  
Skin -no significant ulcers, no rash noted  
Extremities - no edema in bilateral lower extremities noted  
Psychiatry - appropriate affect  
  
  
  
Count includes the [facility]  
Surgical History (Reviewed 24 @ 14:38 by GARRET Chong)  
  
History of facial surgery  
jaw  
  
  
Family History (Reviewed 24 @ 14:38 by GARRET Chong)  
Brother Heart disease  
Legacy FamHx Relation: Brother(s)  
Family history of mental disorder  
Legacy FamHx Relation: Brother(s); Legacy FamHx Problem: Diagnosed with Mental   
Illness  
Father   
Family/Other   
Legacy FamHx Problem: father, --cirrhosis of the liver:mother,   
--multiple health issues:1 sister, --pancreatic cancer  
Mother   
Heart disease  
Sister Cancer  
Legacy FamHx Problem: Diagnosed with Cancer  
Heart disease  
  
  
Social History  
Smoking Status: Never smoker  
Substance Use Type: None  
  
Meds  
Medications and Allergies  
Allergies  
  
metformin Allergy (Unknown, Verified 24 08:53)  
back spasms  
Penicillins Allergy (Unknown, Verified 24 08:53)  
rash  
  
  
Home Medications  
  
blood sugar diagnostic #50 ea 05/15/24 [Rx Confirmed 24]  
testosterone cypionate 200 mg/mL intramuscular oil 200 mg IM Q2W 24 days #2 mL   
24 [Rx Confirmed 24]  
diclofenac sodium 75 mg tablet,delayed release 75 mg PO BID 30 days #60 tabs 
24   
[Rx Confirmed 24]  
nabumetone 750 mg tablet 750 mg PO BID PRN pain 24 [History Confirmed 
24]  
  
  
  
Exam  
Physical Exam  
Vital Signs:  
  
  
  
  
Temp Pulse Resp BP Pulse Ox O2 Del Method  
  
36.8 C 75 18 148/67 H 95 Room Air  
  
24 17:08 24 17:08 24 17:08 24 17:08 24 17:08 
24   
17:08  
  
  
  
  
Results - Hospitalist H&P  
Lab Results  
Labs:  
Laboratory Last Values  
  
  
  
Corrected WBC 9.4 X10E3/uL (4.1-10.5) 24 16:43  
  
Uncorrected WBC Count 9.4 x10E3/uL (4.1-10.5) 24 16:43  
  
RBC 5.50 X10E6/uL (3.90-5.60) 24 16:43  
  
Hgb 16.3 g/dL (13.0-17.0) 24 16:43  
  
Hct 47.8 % (38.8-50.0) 24 16:43  
  
MCV 86.9 fl (83.5-101) 24 16:43  
  
MCH 29.6 pg (27.5-35.2) 24 16:43  
  
MCHC 34.1 g/dL (32.5-35.6) 24 16:43  
  
RDW 13.3 % (12.0-14.8) 24 16:43  
  
Plt Count 170 x10E3/uL (150-450) 24 16:43  
  
MPV 8.0 fl (6.6-10.1) 24 16:43  
  
Neut % (Auto) 88.3 % (.) 24 16:43  
  
Lymph % (Auto) 6.0 % (.) 24 16:43  
  
Mono % (Auto) 5.1 % (.) 24 16:43  
  
Eos % (Auto) 0.1 % (.) 24 16:43  
  
Baso % (Auto) 0.5 % (.) 24 16:43  
  
Nucleat RBC Rel Count 0.8 /100 WBC (0-0.5) H 24 16:43  
  
Neut # (Auto) 8.4 x10E3/uL (1.8-7.7) H 24 16:43  
  
Lymph # (Auto) 0.6 x10E3/uL (1.00-4.8) L 24 16:43  
  
Mono # (Auto) 0.5 x10E3/uL (0.0-0.8) 24 16:43  
  
Eos # (Auto) 0.0 x10E3/uL (0.0-0.45) 24 16:43  
  
Baso # (Auto) 0.0 x10E3/uL (0.0-0.2) 24 16:43  
  
Monocyte Dist Width 22.07 % (0.00-20.00) H 24 16:43  
  
ESR 32 mm/hr (0-19) H 24 16:43  
  
PHA Creatinine Clear 143.91 24 16:43  
  
Sodium 135 mmol/L (136-145) L 24 16:43  
  
Potassium 4.0 mmol/L (3.5-5.1) 24 16:43  
  
Chloride 102 mmol/L () 24 16:43  
  
Carbon Dioxide 23.1 mmol/L (21.0-31.0) 24 16:43  
  
Anion Gap 13.9 mEq/L (6.0-15.0) 24 16:43  
  
BUN 15 mg/dL (7-25) 24 16:43  
  
Creatinine 0.68 mg/dL (0.70-1.30) L 24 16:43  
  
Est GFR (CKD-EPI) > 60.0 mL/Min 24 16:43  
  
Glucose 169 mg/dL () H 24 16:43  
  
Calcium 8.6 mg/dL (8.6-10.3) 24 16:43  
  
Total Bilirubin 1.5 mg/dl (0.3-1.0) H 24 16:43  
  
AST 19 U/L (13-39) 24 16:43  
  
ALT 38 U/L (7-52) 24 16:43  
  
Alkaline Phosphatase 60 U/L () 24 16:43  
  
C-Reactive Prot, Quant 12.3 mg/dL (0.0-0.5) H 24 16:43  
  
Total Protein 6.8 gm/dL (6.4-8.9) 24 16:43  
  
Albumin 3.9 gm/dL (3.5-5.7) 24 16:43  
  
Globulin 2.9 gm/dL 24 16:43  
  
Albumin/Globulin Ratio 1.3 24 16:43  
  
  
  
  
Assessment & Plan  
Assessment/Plan  
(1) Septic arthritis of knee, right:  
  
Plan  
1. Right knee intractable pain to extended he is not able to ambulate associated
with   
large knee effusions by clinical exam and on x-ray, status post intra-articular   
injection on , per patient with steroids  
Will start empiric antibiotic therapy,  
We will get in touch with orthopedic doctor regarding the patient  
N.p.o. after midnight if he needs any surgical intervention  
Pain management with IV for intractable pain  
  
2. DVT prophylaxis SCDs for now  
IP vs OBS Justification  
Based on differential dx, clinical care plan, and risk of adverse events, if   
untreated, in my clinical judgement this patient requires an acute care setting 
as:   
INPATIENT because of an expectation of an over 2 midnight stay.  
Estimated length of stay (# of days): 5  
  
  
Documented By: Josie Ann MD 24  
Signed By: <Electronically signed by Josie Ann MD>  
24 1141  
   
  
[facility]  
Work Phone: [phone]- Progress note  
  
  
  
  
                                        Author              Jeff Onofre  
[facility]  
2024 10:04am  
   
                                        Note Date/Time      2024 10  
:04am  
   
                                                    [facility]  
ENTER  
[address] [place] [address]  
  
Orthopedic Progress Note  
Signed  
  
Patient: Kaylynn Mims MR#: [id number]  
[id number]  
: 1955 Acct:[id number]  
  
Age/Sex: 68 / M Adm Date:   
4  
Loc:  Room: [address] [place] [address] Type: ADM IN  
Attending Dr: Josie Ann MD  
  
Copies to: ~  
  
  
Date of Service:  
2024  
  
  
Subjective  
Subjective  
Interval History:  
Patient is resting comfortably in bed this morning. He reports that while the 
pain   
improved slightly after the aspiration yesterday and has returned and he still 
has   
significant pain with any sort of range of motion of the knee.  
Nursing reports no acute events overnight  
  
Exam  
Physical Exam  
Vital Signs:  
  
  
  
  
Temp Pulse Resp BP Pulse Ox O2 Del Method  
  
98.1 F 73 18 144/85 H 96 Room Air  
  
24 09:07 24 09:07 24 09:07 24 09:07 24 09:07 
24   
09:07  
  
  
  
Narrative:  
Right knee has a very large effusion. With any passive range of motion of the 
right   
knee causes significant pain. Tenderness diffusely about the knee. Minimal to no
  
erythema noted. There is some skin discoloration within the pretibial region   
bilaterally but no pitting edema noted.  
  
Objective  
Labs  
Labs:  
Laboratory Results - last 24 hr  
  
  
  
24  
  
16:43 19:15 19:45  
  
Corrected WBC 9.4  
  
Uncorrected WBC Count 9.4  
  
RBC 5.50  
  
Hgb 16.3  
  
Hct 47.8  
  
MCV 86.9  
  
MCH 29.6  
  
MCHC 34.1  
  
RDW 13.3  
  
Plt Count 170  
  
MPV 8.0  
  
Neut % (Auto) 88.3  
  
Lymph % (Auto) 6.0  
  
Mono % (Auto) 5.1  
  
Eos % (Auto) 0.1  
  
Baso % (Auto) 0.5  
  
Nucleat RBC Rel Count 0.8 H  
  
Neut # (Auto) 8.4 H  
  
Lymph # (Auto) 0.6 L  
  
Mono # (Auto) 0.5  
  
Eos # (Auto) 0.0  
  
Baso # (Auto) 0.0  
  
Monocyte Dist Width 22.07 H  
  
ESR 32 H  
  
PHA Creatinine Clear 143.91  
  
Sodium 135 L  
  
Potassium 4.0  
  
Chloride 102  
  
Carbon Dioxide 23.1  
  
Anion Gap 13.9  
  
BUN 15  
  
Creatinine 0.68 L  
  
Est GFR (CKD-EPI) > 60.0  
  
Glucose 169 H  
  
Lactic Acid 1.3  
  
Calcium 8.6  
  
Total Bilirubin 1.5 H  
  
AST 19  
  
ALT 38  
  
Alkaline Phosphatase 60  
  
C-Reactive Prot, Quant 12.3 H  
  
Total Protein 6.8  
  
Albumin 3.9  
  
Globulin 2.9  
  
Albumin/Globulin Ratio 1.3  
  
Synovial Color Cancelled  
  
Synovial Appearance Cancelled  
  
Synov Supernatant Color Cancelled  
  
Synovial RBC Cancelled  
  
Synovial Tot Nuc Cell Cancelled  
  
Synovial Mononuclear Cancelled  
  
Synovial Neutrophils Cancelled  
  
Synovial Eosinophils Cancelled  
  
Synov Monos/Histiocyte Cancelled  
  
Synovial LE Cells Cancelled  
  
Synovial Lymphocytes % Cancelled  
  
Synovial Other Cells % Cancelled  
  
Synovial Crystals Cancelled  
  
Synovial Glucose  
  
Synovial Total Protein  
  
Synovial Fluid Comment Cancelled  
  
  
  
  
24  
  
23:00 06:26  
  
Corrected WBC 8.3  
  
Uncorrected WBC Count 8.3  
  
RBC 4.91  
  
Hgb 14.7  
  
Hct 43.0  
  
MCV 87.5  
  
MCH 29.9  
  
MCHC 34.2  
  
RDW 13.5  
  
Plt Count 175  
  
MPV 7.8  
  
Neut % (Auto) 71.5  
  
Lymph % (Auto) 17.6  
  
Mono % (Auto) 10.0  
  
Eos % (Auto) 0.4  
  
Baso % (Auto) 0.5  
  
Nucleat RBC Rel Count 0.1  
  
Neut # (Auto) 5.9  
  
Lymph # (Auto) 1.5  
  
Mono # (Auto) 0.8  
  
Eos # (Auto) 0.0  
  
Baso # (Auto) 0.0  
  
Monocyte Dist Width  
  
ESR  
  
PHA Creatinine Clear 142.25  
  
Sodium 135 L  
  
Potassium 3.8  
  
Chloride 102  
  
Carbon Dioxide 25.2  
  
Anion Gap 11.6  
  
BUN 18  
  
Creatinine 0.72  
  
Est GFR (CKD-EPI) > 60.0  
  
Glucose 142 H  
  
Lactic Acid  
  
Calcium 8.4 L  
  
Total Bilirubin  
  
AST  
  
ALT  
  
Alkaline Phosphatase  
  
C-Reactive Prot, Quant  
  
Total Protein  
  
Albumin  
  
Globulin  
  
Albumin/Globulin Ratio  
  
Synovial Color Yellow A  
  
Synovial Appearance Cloudy A  
  
Synov Supernatant Color Yellow  
  
Synovial RBC 6370 H  
  
Synovial Tot Nuc Cell 78428 H  
  
Synovial Mononuclear  
  
Synovial Neutrophils 92 H  
  
Synovial Eosinophils 0  
  
Synov Monos/Histiocyte 1  
  
Synovial LE Cells  
  
Synovial Lymphocytes % 7  
  
Synovial Other Cells %  
  
Synovial Crystals None seen  
  
Synovial Glucose 80  
  
Synovial Total Protein  
  
Synovial Fluid Comment  
  
  
  
Micro  
Microbiology Results:  
Microbiology  
  
24 23:00 Joint Fluid - Knee, Right Gram Stain - Final  
24 19:15 Joint Fluid - Knee, Right Gram Stain - Final  
  
  
  
Assessment / Plan  
Assessment and plan  
(1) Septic arthritis of knee, right:  
Code(s):  
M00.9 - Pyogenic arthritis, unspecified  
  
Plan  
Right knee septic arthritis  
  
We discussed the patient's aspiration results this morning. I explained to him 
that   
with a cell count of greater than 79,000 with 92% PMNs is certainly concerning 
for   
infection especially given his history as well as his elevated inflammatory 
labs. As   
a result, I recommended a washout of the knee today. We discussed this procedure
in   
detail including its risk and benefits. Ultimately patient agreed to proceed 
after   
all of his questions and concerns were answered and addressed. Informed consent 
was   
obtained.  
  
Remain n.p.o. Plan for right knee washout/lavage later this morning.  
  
  
Documented By: Jeff Onofre MD 24 10  
  
Signed By: <Electronically signed by Jeff Onofre MD>  
24 1008  
   
  
[facility]  
Work Phone: [phone]- Consult note  
  
  
  
  
                                        Author              Jeff Onofre  
[facility]  
2024 11:12pm  
   
                                        Note Date/Time      2024 11  
:12pm  
   
                                                    [facility]  
ENTER  
[address] [place] [address]  
  
Orthopedic Consult Note  
Signed  
  
Patient: Kaylynn Mims MR#: [id number]  
[id number]  
: 1955 Acct:[id number]  
  
Age/Sex: 68 / M Adm Date:   
4  
Loc:  Room: [address] [place] [address] Type: ADM IN  
Attending Dr: Josie Ann MD  
  
Copies to: DO Jeff Baig MD Ruta Semaskiene, MD~  
  
  
History of Present Illness  
HPI  
Consult date:  
2024  
  
Requesting provider:  
Josie Ann MD  
  
History of present illness:  
Patient is a 68-year-old male with known bilateral knee osteoarthritis being 
treated   
by Dr. Woods. He had bilateral knee steroid injections a few days ago. He did 
really   
well initially for the first day. However, after that he began having 
significant   
pain and swelling to the point where he could not even put weight on the right 
knee.   
He says with any movement of the knee he has significant pain diffusely. He 
denies   
any trauma or injury to the knee. He denies any recent infections.  
I spoke with the emergency room physician and they were unable to obtain any 
fluid. I   
also spoke to lab who reported the small mount of fluid the ER sent was not 
enough   
for analysis.  
Currently the patient is resting comfortably in his bed. Has not had pain 
medications   
for many hours. He reports the knee is bothering him quite a bit ifhe moves it. 
He   
denies any history of gout or pseudogout.  
  
Count includes the [facility]  
Surgical History (Reviewed 24 @ 14:38 by [facility])  
  
History of facial surgery  
jaw  
  
  
Family History (Reviewed 24 @ 14:38 by GARRET Chong)  
Brother Heart disease  
Legacy FamHx Relation: Brother(s)  
Family history of mental disorder  
Legacy FamHx Relation: Brother(s); Legacy FamHx Problem: Diagnosed with Mental   
Illness  
Father   
Family/Other   
Legacy FamHx Problem: father, --cirrhosis of the liver:mother,   
--multiple health issues:1 sister, --pancreatic cancer  
Mother   
Heart disease  
Sister Cancer  
Legacy FamHx Problem: Diagnosed with Cancer  
Heart disease  
  
  
Social History  
Smoking Status: Never smoker  
Substance Use Type: None  
  
Allergies & Medications  
Medications and Allergies  
Allergies  
  
metformin Allergy (Unknown, Verified 24 13:30)  
back spasms  
Penicillins Allergy (Unknown, Verified 24 13:30)  
rash  
  
  
Home Medications  
  
blood sugar diagnostic #50 ea 05/15/24 [Rx Confirmed 24]  
testosterone cypionate 200 mg/mL intramuscular oil 200 mg IM Q2W 24 days #2 mL   
24 [Rx Confirmed 24]  
diclofenac sodium 75 mg tablet,delayed release 75 mg PO BID 30 days #60 tabs 
24   
[Rx Confirmed 24]  
nabumetone 750 mg tablet 750 mg PO BID PRN pain 24 [History Confirmed 
24]  
  
  
  
Exam  
Physical Exam  
Vital Signs:  
  
  
  
  
Temp Pulse Resp BP Pulse Ox O2 Del Method  
  
97.8 F 72 18 177/79 H 97 Room Air  
  
24 23:01 24 22:50 24 23:01 24 23:01 24 23:01 
24   
23:01  
  
  
  
Narrative:  
Right knee has a very large effusion. With any passive range of motion of the 
right   
knee causes significant pain. Tenderness diffusely about the knee. Minimal to no
  
erythema noted. There is some skin discoloration within the pretibial region   
bilaterally but no pitting edema noted.  
  
Results - Orthopedics  
Lab Results  
  
24 16:43  
  
24 16:43  
  
Labs:  
Laboratory Results - Last 48 hrs.  
  
24 19:45: Lactic Acid 1.3  
24 19:15: Synovial Color Cancelled, Synovial Appearance Cancelled, Synov   
Supernatant Color Cancelled, Synovial RBC Cancelled, Synovial Tot Nuc Cell 
Cancelled,   
Synovial Mononuclear Cancelled, Synovial Neutrophils Cancelled, Synovial 
Eosinophils   
Cancelled, Synov Monos/Histiocyte Cancelled, Synovial LE Cells Cancelled, 
Synovial   
Lymphocytes % Cancelled, Synovial Other Cells % Cancelled, Synovial Crystals   
Cancelled, Synovial Fluid Comment Cancelled  
24 16:43: Corrected WBC 9.4, Uncorrected WBC Count 9.4, RBC 5.50, Hgb 
16.3, Hct   
47.8, MCV 86.9, MCH 29.6, MCHC 34.1, RDW 13.3, Plt Count 170, MPV 8.0,Neut % 
(Auto)   
88.3, Lymph % (Auto) 6.0, Mono % (Auto) 5.1, Eos % (Auto) 0.1, Baso % (Auto) 
0.5,   
Nucleat RBC Rel Count 0.8 H, Neut # (Auto) 8.4 H, Lymph # (Auto) 0.6 L, Mono # 
(Auto)   
0.5, Eos # (Auto) 0.0, Baso # (Auto) 0.0, Monocyte Dist Width 22.07 H, ESR 32 H,
PHA   
Creatinine Clear 143.91, Sodium 135 L, Potassium 4.0, Chloride 102, Carbon 
Dioxide   
23.1, Anion Gap 13.9, BUN 15, Creatinine 0.68 L, Est GFR (CKD-EPI) > 60.0, 
Glucose   
169 H, Calcium 8.6, Total Bilirubin 1.5 H, AST 19, ALT 38, Alkaline Phosphatase 
60,   
C-Reactive Prot, Quant12.3 H, Total Protein 6.8, Albumin 3.9, Globulin 2.9,   
Albumin/Globulin Ratio 1.3  
  
H & H  
  
  
  
24 Range/Units  
  
16:43  
  
Hgb 16.3 (13.0-17.0) g/dL  
  
Hct 47.8 (38.8-50.0) %  
  
  
All other labs are normal.  
  
Microbiology Results  
Microbiology:  
Microbiology - Results from entire visit  
  
24 19:15 Joint Fluid - Knee, Right Gram Stain - Final  
  
  
Imaging & Diagnostic Results  
Imaging/Diagnostics:  
Nonweightbearing x-rays of the right knee were independently reviewed today. 
These do   
demonstrate signs of osteoarthritis. There is certainly a large effusion noted. 
No   
acute osseous abnormalities are appreciated.  
  
Assessment/Plan  
(1) Effusion, right knee:  
Code(s):  
M25.461 - Effusion, right knee  
  
Plan  
Right knee effusion, concern for gout/pseudogout versus septic arthritis  
  
I had a long conversation with the patient regarding his presentation and 
history   
along with his exam of the right knee. At this point in time the amountof 
swelling   
that he has in his right knee is concerning and given the timing of the 
injection is   
also concerning from an infection standpoint. His ESR is 32 and his CRP is 12.3   
today. Given this information coupled with his history as well as his exam, I am
  
concerned for septic arthritis of the right knee. I recommended a right knee   
aspiration and analysis of the fluid. We discussed therisks and benefits of the   
procedure at length. All the patient's questions and concerns were answered and   
addressed. Informed consent was obtained.  
After consent was obtained, the right knee had approximately 160 cc of 
yellowishegg   
drop soup appearing fluid aspirated using sterile technique. Patient tolerated 
the   
aspiration well. This fluid will be sent for the following:  
Cell count with differential  
Crystals  
Gram stain  
Culture  
Protein  
Glucose  
Patient will remain n.p.o. after midnight. I will check on the results in the 
morning   
and if there is concern for infection then we will consider a washout ofthe 
knee. I   
did discuss this with the patient this evening.  
  
  
Documented By: Jeff Onofre MD 24  
07  
Signed By: <Electronically signed by Jeff Onofre MD>  
24 [address] [place] [address] Ctr  
Work Phone: [phone]- Evaluation note*   
  
                      Encounter Date Diagnosis  Assessment Notes Treatment Notes  
 Treatment   
Clinical Notes  
   
                                                Medicare annual   
wellness visit,   
initial (ICD-10 -   
Z00.00)                                             Personalized health   
advice was given to   
the beneficiary   
including a written   
plan for screenings   
discussed and   
provided. Advanced   
care planning   
reviewed and/or   
information given   
as requested.   
Additional   
counseling was   
provided here today   
in regards to, [ ].   
The above visit was   
performed by Melany PÉREZ   
under direct   
supervision of Dr. Elvira Harper DO.   
Document reviewed   
and amended by   
provider signed   
below.  
UTD on colonoscopy  
Due for screening   
lab work in April  
To get second   
shingrix vaccine   
and TdaP at   
pharmacy  
Declines flu   
vaccine  
                                          
   
                                                Type 2 diabetes   
mellitus (ICD-10 -   
E11.9)                                              Well controlled on   
current dose of   
glipizide ER, will   
continue  
                                          
   
                                                Prostate cancer   
screening (ICD-10 -   
Z12.5)                                                        
   
                                                Low testosterone   
(ICD-10 - E29.1)                                    I have personally   
reviewed the [place]   
report for this   
patient. I have   
considered the   
risks of abuse,   
dependence,   
addiction and   
diversion. I   
believe that it is   
clinically   
appropriate for   
this patient to be   
prescribed this   
medication based on   
documented   
diagnosis.  
                                          
  
  
[facility]  
Other Phone: [phone]- Evaluation note*   
  
                      Encounter Date Diagnosis  Assessment Notes Treatment Notes  
 Treatment   
Clinical Notes  
   
                                        10 Aug, 2023        Type 2 diabetes   
mellitus (ICD-10 -   
E11.9)                                              Will discontinue   
Januvia due to   
cost, will trial   
switching to   
glipizide ER 5mg   
daily. Plan to f/u   
in 3 months for AWV   
and recheck a1c. To   
call with any   
issues with med in   
the mean time.  
                                          
   
                                        10 Aug, 2023        Low testosterone   
(ICD-10 - E29.1)                                    Recent PSA and CBC.   
Plan to check CBC   
and testosterone   
level next visit  
                                          
   
                                        10 Aug, 2023        High risk   
medication use   
(ICD-10 - Z79.899)                                            
  
  
[facility]  
Other Phone: [phone] summary  
  
  
  
  
                                        Author              Josie Ann  
[facility]  
2024 2:20pm  
   
                                        Note Date/Time      2024 2:  
21pm  
   
                                                    [facility]  
ENTER  
[address] [place] [address]  
  
Discharge Summary  
Signed  
  
Patient: Kaylynn Mims MR#: [id number]  
[id number]  
: 1955 Acct:[id number]  
  
Age/Sex: 68 / M Adm Date:   
4  
Loc: 4N Room: 3B0877-2  
  
Attending Dr: Josie Ann MD  
  
Copies to: DO Josie Baig MD~  
  
  
Providers  
Date of Discharge: 24  
Discharging Provider: Josie Ann  
Primary Care Provider: Elvira Harper  
Consults:  
  
  
24 17:56  
Consult to Orthopedic Surgery Routine  
Comment:  
Consulting Provider: [facility]  
Reason For Exam: knee effusion  
Has Provider Been Notified: Yes  
Date of Notification: 24  
Time of Notification: 07:32  
  
24 17:57  
Consult to Occupational Therapy Routine  
Comment:  
Physician Instructions:  
Consult to OT for:: Evaluation and Treat  
Consult to Physical Therapy Routine  
Comment:  
Physician Instructions:  
Consult to PT for:: Evaluation and Treat  
  
24 11:33  
Consult to Infectious Diseases Routine  
Comment:  
Consulting Provider: [facility] - Infectious Disease  
Reason For Exam: native R knee septic arthritis  
Has Provider Been Notified: Yes  
Date of Notification: 24  
Time of Notification: 11:42  
Consult to Occupational Therapy Routine  
Comment:  
Physician Instructions: WBAT and ROMAT RLE  
Consult to OT for:: Evaluation and Treat  
Comment: WBAT  
Consult to Physical Therapy Routine  
Comment:  
Physician Instructions:  
Consult to PT for:: Evaluation and Treat  
Comment: WBAT  
Extended Comment: WBAT and ROMAT RLE  
  
  
  
  
Discharge Diagnosis  
(1) Septic arthritis of knee, right:  
Final Diagnosis  
Final Discharge Diagnosis:  
Acute strep mitis/oralis septic arthritis of the right knee, status post 
incision and   
irrigation by Ortho team, discharged on Rocephin therapy through the right upper
  
extremity PICC line  
  
Newly diagnosed hypertension, started new medications  
  
  
  
Summary  
Hospital Course  
Hospital course:  
68 years old male presented with complaints of right knee pain. Few days ago 
hehad   
bilateral knee injection with steroids, 24 hours after he developed severe pain 
and   
swelling of the right knee. Patient was afebrile without leukocytosis,however he
did   
have significantly elevated CRP. Right knee drainage was done byan orthopedic 
doctor   
while in the emergency room and was sent for cultures. Patient was started on   
broad-spectrum antibiotics with vancomycin and cefepime, cultures revealed strep
  
mitis, with blood culture showing strep mitis as well. Repeated blood cultures 
remain   
negative. Antibiotics were adjusted Rocephin therapy and PICC line was placed in
the   
right upper extremity. Patient underwent incision and irrigation of the septic 
knee   
by orthopedic surgeon. ID was consulted. Patient was arranged outpatient 
infusion   
center to get IV antibiotics and physical therapy. With recommendations to 
follow-up   
with orthopedic doctor, ID as outpatient.  
Patient's blood pressure was noted to be elevated and he was started on 
valsartan and   
chlorthalidone therapy with recommendations to follow-up with primarycare doctor
for   
further evaluation and treatment. He was asymptomatic. Denies any nausea any   
headache, any vision problems any chest pain shortness of breath.  
On discharge she still had right knee pain, but it was better since admission. 
Denies   
any fevers. Denies any abdominal pain.  
  
Review of system:  
General - denies any fevers, dizziness, headache  
Pulmonary - denies any SOB, cough  
Gastrointestinal - denies any abdominal pain, any nausea, vomiting  
Cardiovascular - denies any chest pain, palpitations  
  
Physical exam:  
General -awake, alert, oriented ?3, not in acute distress  
Cardiovascular -S1 with S2, no murmurs, no rubs, no gallops  
Pulmonary - clear to auscultation bilaterally  
Gastrointestinal - abdomen is soft, slightly distended with positive bowel 
sounds, no   
tenderness  
Extremities -no edema  
Right knee in dressing  
Neurological -no focal neurological dysfunction noted  
  
The patient CARE and further plan was discussed with the patient and the 
patient's   
wife at the bedside. All questions answered. Patient expressed understanding and
was   
discharged home in a stable condition. The patient was given written and verbal   
instructions. The recommendations were made to follow-up as outpatient within 
one   
week.The patient was informed if his symptoms get worse to go back to emergency 
room   
or call his primary care physician office.  
  
Time spent on the discharge day 35 min.  
Time Spent with Patient  
Time spent providing/coordinating discharge services (# min): 35  
Surgeries and Procedures  
  
  
Operation Date: 24 12:30  
Actual Procedures  
p OR Right Knee Washout, Arthroscopy(Right) - Jeff Onofre II, MD  
  
  
  
Discharge Plan  
Discharge Plan  
Patient Disposition: Home  
  
Activity: No Activity Restriction  
  
Diet: Low-Sodium  
  
Additional Instructions:  
Weekly CRP to be done  
  
You were found to have elevated blood pressure, so blood pressure medications 
were   
initiated. Please follow-up with your family doctor for further evaluationand   
treatment and adjustment of your medications.  
  
  
Your next antibiotic will be at [facility] on 24 
at   
11:00am.  
  
Instructions: Know your Meds  
  
Prescriptions:  
New  
ceftriaxone 2 gram Recon Soln  
2 g IV Q24H 24 Days Qty: 24 0RF  
acetaminophen [Tylenol] 325 mg Tablet  
650 mg PO Q4H PRN (Reason: Pain Scale 1 - 5) Qty: 20 0RF  
ascorbic acid (vitamin C) [Vitamin C] 500 mg Tablet  
500 mg PO BID.WITH.MEALS Qty: 60 0RF  
oxycodone 5 mg Tablet  
5 mg PO Q4HR PRN (Reason: Pain Scale 6 - 10) 7 Days Qty: 20 0RF  
ferrous sulfate 324 mg (65 mg iron) Tablet,Delayed Release (Dr/Ec)  
324 mg PO BID.WITH.MEALS Qty: 0 0RF  
polyethylene glycol 3350 [HealthyLax] 17 gram Powder In Packet  
17 g PO DAILY Qty: 30 0RF  
sennosides-docusate sodium 8.6-50 mg Tablet  
2 tab PO BID PRN (Reason: constipation) Qty: 60 0RF  
valsartan 160 mg Tablet  
160 mg PO DAILY Qty: 30 3RF  
chlorthalidone 25 mg tablet  
25 mg PO DAILY Qty: 30 0RF  
  
Continued  
testosterone cypionate 200 mg/mL oil  
200 mg IM Q2W 24 Days Qty: 2 2RF  
nabumetone 750 mg tablet  
750 mg PO BID PRN (Reason: pain)  
(DME) blood sugar diagnostic Strip  
See Rx Instructions .Route Qty: 50 2RF  
Rx Instructions:  
As directed  
diclofenac sodium 75 mg tablet,delayed release (DR/EC)  
75 mg PO BID 30 Days Qty: 60 2RF  
  
Other Ambulatory Orders:  
DME Home Medical Equipment (Routine) Timeframe: 2024  
Location: Determined by Patient  
Ordered By: Jeff Onofre II  
Basic Metabolic Panel (Routine) Timeframe: 1 Week  
Location: Determined by Patient  
Ordered By: Josie Semaskiene  
C-Reactive Protein (QWEEK) Timeframe: 2024  
Location: Determined by Patient  
Ordered By: Josie Semaskiene  
C-Reactive Protein (QWEEK) Timeframe: 2024  
Location: Determined by Patient  
Ordered By: Josie Semaskiene  
C-Reactive Protein (QWEEK) Timeframe: 2024  
Location: Determined by Patient  
Ordered By: Josie Semaskiene  
C-Reactive Protein (Routine) Timeframe: 2024  
Location: Determined by Patient  
Ordered By: Josie Semaskiene  
C-Reactive Protein (Routine) Timeframe: 2024  
Location: Determined by Patient  
Ordered By: Josie Semaskiene  
C-Reactive Protein (Routine) Timeframe: 2024  
Location: Determined by Patient  
Ordered By: Josie Semaskiene  
  
Follow Up:  
Paul Crum MD [Active Staff] - (Call office on Monday to schedule follow-
upwith   
Dr. Crum in 2 weeks)  
Jeff Onofre II, MD [Active Staff] - 24 10:00 am (Follow-up with   
Orthopedic Surgery)  
Elvira Harper DO [Primary Care Provider] - 24 10:45 am (Follow-up with 
your   
Primary Care Provider, call office to reschedule if needed. )  
  
  
Exam  
Physical Exam  
Vital Signs:  
  
  
  
  
Temp Pulse Resp BP Pulse Ox O2 Del Method O2 Flow Rate  
  
37.1 C 75 18 177/82 H 96 Room Air 2  
  
24 11:41 24 11:41 24 11:41 24 11:41 24 11:41 
24   
11:41 24 15:21  
  
  
  
  
Diagnostic Studies  
Completed and Pending Studies  
Pending studies at discharge:  
  
  
24 19:45  
Blood Culture Stat  
  
24 13:40  
Blood Culture Routine  
  
  
Preliminary micro results at discharge  
  
  
  
24 13:40 Blood Culture - Preliminary  
  
Blood - Right Antecubital No Growth 3 Days  
  
24 13:41 Blood Culture - Preliminary  
  
Blood - Right Hand No Growth 3 Days  
  
24 19:55 Blood Culture - Preliminary  
  
Blood - Right Hand No Growth 4 Days  
  
  
  
  
Labs on day of discharge:  
  
  
24 14:52: C-Reactive Prot, Quant 22.7 H  
  
  
  
  
  
Documented By: Josie Ann MD 24 1416  
Signed By: <Electronically signed by Josie Ann MD>  
24 1420  
   
  
[facility]  
Work Phone: [phone] note*   
  
                          Diagnosis    Onset Date   Resolution   Status  
   
                          Right shoulder pain                           acute  
   
                          Type 2 diabetes mellitus                           acu  
te  
   
                          Bursitis of right shoulder                           a  
cute  
   
                          Right shoulder pain                           acute  
  
  
[facility]  
Work Phone: [phone] note*   
  
                          Diagnosis    Onset Date   Resolution   Status  
   
                          Bursitis of right shoulder                           a  
cute  
   
                          Right shoulder pain                           acute  
   
                          Effusion, left knee                           noneacti  
ve  
   
                          Left knee pain                           noneactive  
   
                          Arthritis of left knee                           chron  
ic  
   
                          Effusion, left knee                           noneacti  
ve  
  
  
[facility]  
Work Phone: [phone] note*   
  
                          Diagnosis    Onset Date   Resolution   Status  
   
                          Effusion, left knee                           noneacti  
ve  
   
                          Left knee pain                           noneactive  
   
                          Arthritis of left knee                           chron  
ic  
   
                          Effusion, left knee                           noneacti  
ve  
   
                          Iliotibial band syndrome                           non  
eactive  
   
                          Right knee pain                           noneactive  
  
  
[facility]  
Work Phone: [phone] note*   
  
                          Diagnosis    Onset Date   Resolution   Status  
   
                          Arthritis of left knee                           chron  
ic  
   
                          Effusion, left knee                           noneacti  
ve  
   
                          Iliotibial band syndrome                           non  
eactive  
   
                          Right knee pain                           noneactive  
   
                          Arthritis of knee, right                           chr  
onic  
   
                          Arthritis of left knee                           chron  
ic  
  
  
[facility]  
Work Phone: [phone] note*   
  
                          Diagnosis    Onset Date   Resolution   Status  
   
                          Iliotibial band syndrome                           non  
eactive  
   
                          Right knee pain                           noneactive  
   
                          Arthritis of knee, right                           chr  
onic  
   
                          Arthritis of left knee                           chron  
ic  
   
                          Effusion, right knee                           acute  
   
                          Intractable pain                           acute  
   
                          Pain and swelling of right knee                         
    acute  
   
                          Positive blood culture                           acute  
   
                          Septic arthritis of knee, right                         
    acute  
   
                          Streptococcus viridans infection                        
     acute  
  
  
[facility]  
Work Phone: [phone] note*   
  
                          Diagnosis    Onset Date   Resolution   Status  
   
                          Iliotibial band syndrome                           non  
eactive  
   
                          Right knee pain                           noneactive  
   
                          Arthritis of knee, right                           chr  
onic  
   
                          Arthritis of left knee                           chron  
ic  
   
                          Effusion, right knee                           resolve  
d  
   
                          Intractable pain                           resolved  
   
                          Pain and swelling of right knee                         
    resolved  
   
                          Positive blood culture                           resol  
maine  
   
                          Septic arthritis of knee, right                         
    resolved  
   
                          Streptococcus viridans infection                        
     resolved  
  
  
[facility]  
Work Phone: [phone] note*   
  
                          Diagnosis    Onset Date   Resolution   Status  
   
                          Iliotibial band syndrome                           non  
eactive  
   
                          Right knee pain                           noneactive  
   
                          Arthritis of knee, right                           chr  
onic  
   
                          Arthritis of left knee                           chron  
ic  
   
                          Effusion, right knee                           resolve  
d  
   
                          Intractable pain                           resolved  
   
                          Pain and swelling of right knee                         
    resolved  
   
                          Positive blood culture                           resol  
maine  
   
                          Septic arthritis of knee, right                         
    resolved  
   
                          Streptococcus viridans infection                        
     resolved  
   
                          Essential hypertension                           acute  
   
                          Septic arthritis of knee, right                         
    resolved  
   
                          Septic arthritis of knee, right                         
    resolved  
   
                          Acute pulmonary embolus                           acut  
e  
   
                          Bilateral pulmonary embolism                            
 acute  
   
                          Essential hypertension                           acute  
   
                          Septic arthritis                           acute  
  
  
[facility]  
Work Phone: [phone] general Narrative - Reported*   
  
                                Type            Description     Date  
   
                                Medical History type 2 diabetes   
   
                                Surgical History jaw sx            
   
                                Hospitalization History see above         
  
  
[facility]  
Other Phone: [phone] Discharge instructions  
Additional Instructions  
Maintain and perform routine care to right upper arm PICC line. Continue IV 
antibiotics as  
previously ordered.[facility]  
Work Phone: [phone]  
  
Summary Purpose  
  
  
                                                      
  
  
  
Family History  
No Family History Records Found  
  
                          Relationship Condition    Age at Onset Recorded Date/T  
denise  
   
                          brother      Heart disease Unknown        
   
                                       Family history of mental disorder Unknown  
        
   
                          father            Unknown        
   
                          family member      Unknown        
   
                          Not Specified      Unknown        
   
                                       Heart disease Unknown        
   
                          sister       Malignant neoplasm Unknown        
  
  
  
                          Relationship Condition    Age at Onset Recorded Date/T  
denise  
   
                          brother      Heart disease Unknown        
   
                                       Family history of mental disorder Unknown  
        
   
                          father            Unknown        
   
                          family member      Unknown        
   
                          mother            Unknown        
   
                                       Heart disease Unknown        
   
                          sister       Malignant neoplasm Unknown        
  
  
  
Advance Directives  
No Advanced Directives Records Found  
  
                                Advance Directive Response        Recorded Date/  
Time  
   
                                Advance Directives No               11:42am  
  
  
  
                                Advance Directive Response        Recorded Date/  
Time  
   
                                Advance Directives No               9:39am  
  
  
  
Chief Complaint and Reason for Visit  
  
  
                                        Chief Complaint     BACK SPASMS  
CONSULT DR ELVIRA HARPER RT SHOULDER PAIN  
M25.511 - Pain in right shoulder  
z12.5 e29.1 e11.9  
   
                                        Reason for Visit    Right shoulder pain  
Type 2 diabetes mellitus  
Bursitis of right shoulder  
Right shoulder pain  
  
  
  
                                        Chief Complaint     CONSULT DR ELVIRA ZENG  
[facility] RT SHOULDER PAIN  
M25.511 - Pain in right shoulder  
z12.5 e29.1 e11.9  
l knee pain  
M25.562  
Lt knee pain and effusion  
   
                                        Reason for Visit    Bursitis of right sh  
oulder  
Right shoulder pain  
Effusion, left knee  
Left knee pain  
Arthritis of left knee  
Effusion, left knee  
  
  
  
                                        Chief Complaint     l knee pain  
M25.562  
Lt knee pain and effusion  
Amb Documentation  
knee leg hip pain  
   
                                        Reason for Visit    Effusion, left knee  
Left knee pain  
Arthritis of left knee  
Effusion, left knee  
Iliotibial band syndrome  
Right knee pain  
  
  
  
                                        Chief Complaint     l knee pain  
M25.562  
Lt knee pain and effusion  
Amb Documentation  
knee leg hip pain  
M25.561  
   
                                        Reason for Visit    Effusion, left knee  
Left knee pain  
Arthritis of left knee  
Effusion, left knee  
Iliotibial band syndrome  
Right knee pain  
  
  
  
                                        Chief Complaint     Lt knee pain and eff  
usion  
Amb Documentation  
knee leg hip pain  
M25.561  
B/L knee steroid injection  
   
                                        Reason for Visit    Arthritis of left kn  
ee  
Effusion, left knee  
Iliotibial band syndrome  
Right knee pain  
Arthritis of knee, right  
Arthritis of left knee  
  
  
  
                                        Chief Complaint     Lt knee pain and eff  
usion  
Amb Documentation  
knee leg hip pain  
M25.561  
B/L knee steroid injection  
rt knee swelling  
   
                                        Reason for Visit    Arthritis of left kn  
ee  
Effusion, left knee  
Iliotibial band syndrome  
Right knee pain  
Arthritis of knee, right  
Arthritis of left knee  
  
  
  
                                        Chief Complaint     Amb Documentation  
knee leg hip pain  
M25.561  
B/L knee steroid injection  
rt knee swelling  
   
                                        Reason for Visit    Iliotibial band synd  
bon  
Right knee pain  
Arthritis of knee, right  
Arthritis of left knee  
Effusion, right knee  
Intractable pain  
Pain and swelling of right knee  
Positive blood culture  
Septic arthritis of knee, right  
Streptococcus viridans infection  
  
  
  
                                        Chief Complaint     Amb Documentation  
knee leg hip pain  
M25.561  
B/L knee steroid injection  
rt knee swelling  
Amb Documentation  
septic arthritis  
   
                                        Reason for Visit    Iliotibial band synd  
bon  
Right knee pain  
Arthritis of knee, right  
Arthritis of left knee  
Effusion, right knee  
Intractable pain  
Pain and swelling of right knee  
Positive blood culture  
Septic arthritis of knee, right  
Streptococcus viridans infection  
  
  
  
                                        Chief Complaint     Amb Documentation  
knee leg hip pain  
M25.561  
B/L knee steroid injection  
rt knee swelling  
Amb Documentation  
septic arthritis  
F/U FROM HOSPITAL RT KNEE  
Multiple PE  
   
                                        Reason for Visit    Iliotibial band synd  
bon  
Right knee pain  
Arthritis of knee, right  
Arthritis of left knee  
Effusion, right knee  
Intractable pain  
Pain and swelling of right knee  
Positive blood culture  
Septic arthritis of knee, right  
Streptococcus viridans infection  
Essential hypertension  
Septic arthritis of knee, right  
Septic arthritis of knee, right  
Acute pulmonary embolus  
Bilateral pulmonary embolism  
Essential hypertension  
Septic arthritis  
  
  
  
Additional Source Comments  
  
  
  
                                                    PREGNANCY (unrecognized sect  
ion and content)  
   
                                                    No Pregnancy Status Records   
FoundNo Pregnancy Status Records Found  
  
  
  
  
                                                    INFORMATION SOURCE (unrecogn  
ized section and content)  
   
                                          
  
  
  
                                        DATE CREATED        AUTHOR  
   
                                2023                      [facility]  
  
  
  
                                DATE CREATED    AUTHOR          AUTHOR'S ORGANIZ  
ATION  
   
                                2024                      [facility]  
ysician Group  
  
  
  
  
  
                                                    REASON FOR VISIT (unrecogniz  
ed section and content)  
   
                                                    [facility]  
  
  
  
  
                                                    Care Teams (unrecognized sec  
tion and content)  
   
                                          
  
  
  
                                                    Team Status: Active   
   
                          Member       Role         Status       Tyesha Harper DO Primary Care Provider Active        
   
  
  
  
                                                    Team Status: Active   
   
                          Member       Role         Status       Tyesha Harper DO Primary Care Provider Active        
 Start: 2024  
  
   
                          SAJNANA Samuel Attending Provider Active    
     Start: 2024  
  
  
  
  
                                                    Team Status: Inactive   
   
                          Member       Role         Status       Dates  
   
                          Elvira Harper DO Primary Care Provider Active        
 Start: 2024  
End: 2024  
   
                          SANJANA Samuel Attending Provider Active    
     Start: 2024  
End: 2024  
  
  
  
                                                    Team Status: Inactive   
   
                          Member       Role         Status       Tyesha Harper DO Primary Care Provider Active        
 Start: 2024  
End: 2024  
   
                          Matheus Woods DO Attending Provider Active         
Start: 2024  
End: 2024  
  
  
  
                                                    Team Status: Inactive   
   
                          Member       Role         Status       Dates  
   
                          Elvira Harper DO Primary Care Provider Active        
 Start: 2024  
End: 2024  
   
                          Daniela Sanchez [facility] Emergency Provider Active   
      Start: 2024  
End: 2024  
   
                                        Josie Ann MD Admit Provider, Att  
ending   
Provider                  Active                    Start: 2024  
End: 2024  
   
                          Gucci Maxwell MD Other Provider Active       Start: A  
ugust 2024  
End: 2024  
   
                          Ashley Rai MD Other Provider Active       Star  
t: 2024  
End: 2024  
   
                          Eduarda L DeSoto , NP-C Other Provider Active         
Start: 2024  
End: 2024  
   
                          Ted Penny DO Other Provider Active       Start  
: 2024  
End: 2024  
   
                          Jeff Onofre II, MD Other Provider Active       S  
tart: 2024  
End: 2024  
   
                          Sai Butts DO Other Provider Active       Start:  
 2024  
End: 2024  
   
                          Paul Crum MD Other Provider Active       Start:   
2024  
End: 2024  
  
  
  
                                                    Team Status: Active   
   
                          Member       Role         Status       Dates  
   
                          Elvira Harper DO Primary Care Provider Active        
 Start: 2024  
  
   
                          Daniela Sanchez [facility] Emergency Provider Active   
      Start: 2024  
  
   
                                        Josie Ann MD Admit Provider, Oth  
er   
Provider                  Active                    Start: 2024  
  
   
                          Jeff Onofre II, MD Attending Provider Active      
   Start: 2024  
  
  
  
  
                                                    Team Status: Active   
   
                          Member       Role         Status       Dates  
   
                          Elvira Harper DO Primary Care Provider Active        
 Start: 2024  
  
   
                          Daniela Sanchez [facility] Emergency Provider Active   
      Start: 2024  
  
   
                                        Josie Ann MD Admit Provider, Oth  
er   
Provider                  Active                    Start: 2024  
  
   
                          Gucci Maxwell MD Other Provider Active       Start: A  
ugust 2024  
  
   
                          Ashley Rai MD Other Provider Active       Star  
t: 2024  
  
   
                          Eduarda L DeSoto , NP-C Other Provider Active         
Start: 2024  
  
   
                          Ted Penny DO Other Provider Active       Start  
: 2024  
  
   
                          Jeff Onofre II, MD Other Provider Active       S  
tart: 2024  
  
   
                          Sai Butts DO Other Provider Active       Start:  
 2024  
  
   
                                        Paul Crum MD  Attending Provider,   
Other   
Provider                  Active                    Start: 2024  
  
  
  
  
                                                    Team Status: Inactive   
   
                          Member       Role         Status       Dates  
   
                                        Elvira Harper DO Primary Care Provide  
r,   
Attending Provider        Active                    Start: 2024  
End: 2024  
  
  
  
                                                    Team Status: Inactive   
   
                          Member       Role         Status       Dates  
   
                          Elvira Harper DO Primary Care Provider Active        
 Start: 2024  
End: 2024  
   
                          Gucci Maxwell MD Attending Provider Active       Star  
t: 2024  
End: 2024  
  
  
  
                                                    Team Status: Inactive   
   
                          Member       Role         Status       Dates  
   
                          Gucci Maxwell MD Attending Provider Active       Star  
t: 2024  
End: 2024  
   
                          Elvira Harper , DO Primary Care Provider Active        
 Start: 2024  
End: 2024  
  
  
  
                                                    Team Status: Inactive   
   
                          Member       Role         Status       Dates  
   
                                        Elvira Harper DO Primary Care Provide  
r, Attending   
Provider                  Active                    Start: 2024  
End: 2024  
  
  
  
                                                    Team Status: Inactive   
   
                          Member       Role         Status       Tyesha Harper DO Primary Care Provider Active        
 Start: May 15th, 2024  
End: May 15th, 2024  
   
                          To Khan , SANJANA Attending Provider Active    
     Start: May 15th, 2024  
End: May 15th, 2024  
  
  
  
                                                    Team Status: Inactive   
   
                          Member       Role         Status       Tyesha Harper DO Primary Care Provider Active        
 Start: May 23rd, 2024  
End: May 23rd, 2024  
   
                          Matheus Woods DO Attending Provider Active         
Start: May 23rd, 2024  
End: May 23rd, 2024  
  
  
  
                                                    Team Status: Active   
   
                          Member       Role         Status       Dates  
   
                          Elvira Harper DO Primary Care Provider Active        
 Start: 2024  
  
   
                          Daniela Sanchez , [facility] Emergency Provider Active   
      Start: 2024  
  
   
                                        Josie Ann MD Admit Provider, Att  
ending   
Provider                  Active                    Start: 2024  
  
  
  
  
                                                    Team Status: Active   
   
                          Member       Role         Status       Tyesha Harper DO Primary Care Provider Active        
 Start: 2024  
  
   
                          Maya Joaquin Attending Provider Active       Start:  
 2024  
  
  
  
  
                                                    Team Status: Inactive   
   
                          Member       Role         Status       Tyesha Harper DO Primary Care Provide  
r,   
Attending Provider        Active                    Start: 2024  
End: 2024  
  
  
  
                                                    Team Status: Inactive   
   
                          Member       Role         Status       Tyesha Harper DO Primary Care Provider Active        
 Start: 2024  
End: 2024  
   
                          Jeff Onofre II, MD Attending Provider Active      
   Start: 2024  
End: 2024  
  
  
  
                                                    Team Status: Inactive   
   
                          Member       Role         Status       Dates  
   
                          Elvira SUE Harper DO Primary Care Provider Active        
 Start: 2024  
End: 2024  
   
                          Katerina Oliveira MD Admit Provider Active       Sta  
rt: 2024  
End: 2024  
   
                          Miladys Osman MD Attending Provider Active       Sta  
rt: 2024  
End: 2024  
  
  
  
  
  
                                                    Goals (unrecognized section   
and content)  
   
                                                    Goals may be documented in a  
n alternate section  
  
FOR RECORDS PERTAINING TO PATIENTS WHO ARE OR HAVE BEEN ENROLLED IN A CHEMICAL 
DEPENDENCY/SUBSTANCEABUSE PROGRAM, SOME INFORMATION MAY BE OMITTED. This 
clinical summary was aggregated from multiple sources. Caution should be 
exercised in using it in the provision of clinical care. This summary normalizes
information from multiple sources, and as a consequence, information in this 
document may materially change the coding, format and clinical context of 
patient data. In addition, data may be omitted in some cases. CLINICAL DECISIONS
SHOULD BE BASED ON THE PRIMARY CLINICAL RECORDS. [facility] [employer] [facility]. provides 
no warranty or guarantee of the accuracy or completeness of information in this 
document.

## 2024-09-14 VITALS
DIASTOLIC BLOOD PRESSURE: 74 MMHG | OXYGEN SATURATION: 98 % | TEMPERATURE: 97.8 F | HEART RATE: 75 BPM | SYSTOLIC BLOOD PRESSURE: 117 MMHG

## 2024-09-15 VITALS
HEART RATE: 77 BPM | TEMPERATURE: 98.24 F | OXYGEN SATURATION: 97 % | SYSTOLIC BLOOD PRESSURE: 104 MMHG | DIASTOLIC BLOOD PRESSURE: 64 MMHG

## 2024-09-16 VITALS
TEMPERATURE: 97.88 F | HEART RATE: 85 BPM | DIASTOLIC BLOOD PRESSURE: 79 MMHG | OXYGEN SATURATION: 98 % | SYSTOLIC BLOOD PRESSURE: 139 MMHG

## 2024-09-16 NOTE — PC.NURSE
1035: Pt. to [facility] amb. accompanied by wife. Seated in recliner. VSS. PICC line in place to right upper, half of dressing peeled off. Flushes easily with good blood return. IV Rocephin initiated. Old dressing to PICC line removed. Skin around PICC 
red and irritated. Relays itching to site at times. No pain. Cleansed with CHG. New dressing applied. Pt. tolerates without c/o.
1106: Antibiotic infusion completed. PICC line flushed. New cap applied. D/c'd amb. to home with wife.

## 2024-09-17 ENCOUNTER — HOSPITAL ENCOUNTER (OUTPATIENT)
Dept: HOSPITAL 101 - INF | Age: 69
LOS: 13 days | Discharge: HOME | End: 2024-09-30
Payer: MEDICARE

## 2024-09-17 VITALS
SYSTOLIC BLOOD PRESSURE: 148 MMHG | HEART RATE: 75 BPM | TEMPERATURE: 97.7 F | OXYGEN SATURATION: 98 % | DIASTOLIC BLOOD PRESSURE: 78 MMHG

## 2024-09-17 VITALS
SYSTOLIC BLOOD PRESSURE: 148 MMHG | TEMPERATURE: 97.9 F | OXYGEN SATURATION: 98 % | HEART RATE: 75 BPM | DIASTOLIC BLOOD PRESSURE: 78 MMHG

## 2024-09-17 DIAGNOSIS — M00.9: Primary | ICD-10-CM

## 2024-09-17 DIAGNOSIS — Z79.899: ICD-10-CM

## 2024-09-17 DIAGNOSIS — R78.81: ICD-10-CM

## 2024-09-17 DIAGNOSIS — A49.1: ICD-10-CM

## 2024-09-17 PROCEDURE — 36592 COLLECT BLOOD FROM PICC: CPT

## 2024-09-17 PROCEDURE — 96365 THER/PROPH/DIAG IV INF INIT: CPT

## 2024-09-17 PROCEDURE — 86140 C-REACTIVE PROTEIN: CPT

## 2024-09-17 PROCEDURE — 80048 BASIC METABOLIC PNL TOTAL CA: CPT

## 2024-09-17 NOTE — PC.NURSE
1130 infusion completed. explained procedure to discontinue picc,patient verbalizes understanding, Patient reclined in supine position dressing removed from picc, valsalva manuever when discontinuing picc, pressure applied for 5 mins, no bleeding 
noted. 2x2 and covered with biocclusive dressing, site observed for 5 minutes,no bleeding noted. Length of removed catheter 39.5 cm. 1140 Released ambulatory.

## 2024-09-18 ENCOUNTER — APPOINTMENT (OUTPATIENT)
Dept: CARDIOLOGY | Facility: CLINIC | Age: 69
End: 2024-09-18
Payer: MEDICARE

## 2024-09-18 VITALS
HEART RATE: 64 BPM | HEIGHT: 74 IN | WEIGHT: 315 LBS | SYSTOLIC BLOOD PRESSURE: 124 MMHG | BODY MASS INDEX: 40.43 KG/M2 | DIASTOLIC BLOOD PRESSURE: 56 MMHG

## 2024-09-18 DIAGNOSIS — Z78.9 NEVER SMOKED TOBACCO: ICD-10-CM

## 2024-09-18 DIAGNOSIS — I26.99 PULMONARY EMBOLISM, UNSPECIFIED CHRONICITY, UNSPECIFIED PULMONARY EMBOLISM TYPE, UNSPECIFIED WHETHER ACUTE COR PULMONALE PRESENT (MULTI): ICD-10-CM

## 2024-09-18 PROCEDURE — 1159F MED LIST DOCD IN RCRD: CPT | Performed by: INTERNAL MEDICINE

## 2024-09-18 PROCEDURE — 99215 OFFICE O/P EST HI 40 MIN: CPT | Performed by: INTERNAL MEDICINE

## 2024-09-18 PROCEDURE — 1036F TOBACCO NON-USER: CPT | Performed by: INTERNAL MEDICINE

## 2024-09-18 PROCEDURE — 3008F BODY MASS INDEX DOCD: CPT | Performed by: INTERNAL MEDICINE

## 2024-09-18 RX ORDER — ASCORBIC ACID 500 MG
500 TABLET ORAL 2 TIMES DAILY
COMMUNITY
Start: 2024-08-24

## 2024-09-18 RX ORDER — METOPROLOL TARTRATE 25 MG/1
12.5 TABLET, FILM COATED ORAL 2 TIMES DAILY
COMMUNITY
Start: 2024-09-10

## 2024-09-18 RX ORDER — NABUMETONE 750 MG/1
750 TABLET, FILM COATED ORAL 2 TIMES DAILY PRN
COMMUNITY
Start: 2024-08-17 | End: 2024-09-18

## 2024-09-18 RX ORDER — DICLOFENAC SODIUM 75 MG/1
1 TABLET, DELAYED RELEASE ORAL
COMMUNITY
Start: 2024-08-28

## 2024-09-18 RX ORDER — ACETAMINOPHEN 325 MG/1
650 TABLET ORAL EVERY 4 HOURS PRN
COMMUNITY
Start: 2024-08-24

## 2024-09-18 RX ORDER — FERROUS SULFATE 324(65)MG
65 TABLET, DELAYED RELEASE (ENTERIC COATED) ORAL
COMMUNITY
Start: 2024-09-08

## 2024-09-18 RX ORDER — VALSARTAN 80 MG/1
80 TABLET ORAL DAILY
COMMUNITY
Start: 2024-09-04

## 2024-09-18 NOTE — PATIENT INSTRUCTIONS
Please bring all medicines, vitamins, and herbal supplements with you when you come to the office.    Prescriptions will not be filled unless you are compliant with your follow up appointments or have a follow up appointment scheduled as per instruction of your physician. Refills should be requested at the time of your visit.     Fall Prevention Education Given     BMI was above normal measurement. Current weight: 147 kg (325 lb)  Weight change since last visit (-) denotes wt loss 325 lbs   Weight loss needed to achieve BMI 25: 130.7 Lbs  Weight loss needed to achieve BMI 30: 91.8 Lbs  Provided instructions on dietary changes  Provided instructions on exercise.

## 2024-09-18 NOTE — LETTER
September 18, 2024     Soledad Malin DO  [address] [place] [address]    Patient: Henry Mehta   YOB: 1955   Date of Visit: 9/18/2024       Dear Dr. Soledad Malin DO:    Thank you for referring Henry Mehta to me for evaluation. Below are my notes for this consultation.  If you have questions, please do not hesitate to call me. I look forward to following your patient along with you.       Sincerely,     Earle Aguilar DO      CC: No Recipients  ______________________________________________________________________________________    Subjective   Henry Mehta is a 69 y.o. male       Chief Complaint    Hospital Follow-up          69-year-old obese gentleman returns for follow-up following recent, large, bilateral pulmonary emboli, treated with mechanical thrombectomy with adjunct thrombolysis with complete thrombotic resolution (see details in my report).    Notably, his event was preceded by right knee inflammation with knee injection, followed by infection.  His knee is markedly improved, cellulitis and edema are totally resolved.  He still has some mild edema of the right knee and lower extremity.  He ruled out for DVT in both lower extremities.  He still ambulates with a cane.  Details of his orthopedic history are noted in the chart.    As mentioned in my consult, there is no prior history of thromboembolic disorder, family disorder or thrombophilia or precocious thromboembolic disease or clotting disorders.    I believe we did perform a thrombotic risk profile prior to initiation of the Xarelto and are awaiting results (?)    I did recommend that he follow-up with hematology consult for consideration of hypercoagulable state given the fact that he has a unprovoked PE, and the further adjudicate whether DOAC versus warfarin therapy are most appropriate.  Will follow-up otherwise in 6 months.         Review of Systems   All other systems reviewed and are negative.    "        Vitals:    09/18/24 1428   BP: 124/56   BP Location: Left arm   Patient Position: Sitting   Pulse: 64   Weight: 147 kg (325 lb)   Height: 1.88 m (6' 2\")        Objective   Physical Exam  Constitutional:       Appearance: Normal appearance.   HENT:      Nose: Nose normal.   Neck:      Vascular: No carotid bruit.   Cardiovascular:      Rate and Rhythm: Normal rate.      Pulses: Normal pulses.      Heart sounds: Normal heart sounds.   Pulmonary:      Effort: Pulmonary effort is normal.   Abdominal:      General: Bowel sounds are normal.      Palpations: Abdomen is soft.   Musculoskeletal:         General: Normal range of motion.      Cervical back: Normal range of motion.      Right lower leg: No edema.      Left lower leg: No edema.   Skin:     General: Skin is warm and dry.   Neurological:      General: No focal deficit present.      Mental Status: He is alert.   Psychiatric:         Mood and Affect: Mood normal.         Behavior: Behavior normal.         Thought Content: Thought content normal.         Judgment: Judgment normal.         Allergies  Metformin and Penicillins     Current Medications    Current Outpatient Medications:   •  acetaminophen (Tylenol) 325 mg tablet, Take 2 tablets (650 mg) by mouth every 4 hours if needed., Disp: , Rfl:   •  ascorbic acid (Vitamin C) 500 mg tablet, Take 1 tablet (500 mg) by mouth 2 times a day., Disp: , Rfl:   •  diclofenac (Voltaren) 75 mg EC tablet, Take 1 tablet (75 mg) by mouth every 12 hours., Disp: , Rfl:   •  ferrous sulfate 324 mg (65 mg elemental iron) EC tablet (delayed release), Take 1 tablet (65 mg) by mouth once daily with breakfast., Disp: , Rfl:   •  metoprolol tartrate (Lopressor) 25 mg tablet, Take 0.5 tablets (12.5 mg) by mouth 2 times a day., Disp: , Rfl:   •  nabumetone (Relafen) 750 mg tablet, Take 1 tablet (750 mg) by mouth 2 times a day as needed., Disp: , Rfl:   •  rivaroxaban (Xarelto) 20 mg tablet, Take 1 tablet (20 mg) by mouth once daily " in the evening. Take with meals., Disp: , Rfl:   •  valsartan (Diovan) 80 mg tablet, Take 1 tablet (80 mg) by mouth once daily., Disp: , Rfl:                      Assessment/Plan   1. Pulmonary embolism, unspecified chronicity, unspecified pulmonary embolism type, unspecified whether acute cor pulmonale present (Multi)        2. BMI 40.0-44.9, adult (Multi)        3. Never smoked tobacco                 Scribe Attestation  By signing my name below, I, She CROSS RN   , Scribe   attest that this documentation has been prepared under the direction and in the presence of Earle Aguilar DO.     Provider Attestation - Scribe documentation    All medical record entries made by the Scribe were at my direction and personally dictated by me. I have reviewed the chart and agree that the record accurately reflects my personal performance of the history, physical exam, discussion and plan.

## 2024-09-18 NOTE — PROGRESS NOTES
"Subjective   Henry Mehta is a 69 y.o. male       Chief Complaint    Hospital Follow-up          69-year-old obese gentleman returns for follow-up following recent, large, bilateral pulmonary emboli, treated with mechanical thrombectomy with adjunct thrombolysis with complete thrombotic resolution (see details in my report).    Notably, his event was preceded by right knee inflammation with knee injection, followed by infection.  His knee is markedly improved, cellulitis and edema are totally resolved.  He still has some mild edema of the right knee and lower extremity.  He ruled out for DVT in both lower extremities.  He still ambulates with a cane.  Details of his orthopedic history are noted in the chart.    As mentioned in my consult, there is no prior history of thromboembolic disorder, family disorder or thrombophilia or precocious thromboembolic disease or clotting disorders.    I believe we did perform a thrombotic risk profile prior to initiation of the Xarelto and are awaiting results (?)    I did recommend that he follow-up with hematology consult for consideration of hypercoagulable state given the fact that he has a unprovoked PE, and the further adjudicate whether DOAC versus warfarin therapy are most appropriate.  Will follow-up otherwise in 6 months.         Review of Systems   All other systems reviewed and are negative.           Vitals:    09/18/24 1428   BP: 124/56   BP Location: Left arm   Patient Position: Sitting   Pulse: 64   Weight: 147 kg (325 lb)   Height: 1.88 m (6' 2\")        Objective   Physical Exam  Constitutional:       Appearance: Normal appearance.   HENT:      Nose: Nose normal.   Neck:      Vascular: No carotid bruit.   Cardiovascular:      Rate and Rhythm: Normal rate.      Pulses: Normal pulses.      Heart sounds: Normal heart sounds.   Pulmonary:      Effort: Pulmonary effort is normal.   Abdominal:      General: Bowel sounds are normal.      Palpations: Abdomen is soft. "   Musculoskeletal:         General: Normal range of motion.      Cervical back: Normal range of motion.      Right lower leg: No edema.      Left lower leg: No edema.   Skin:     General: Skin is warm and dry.   Neurological:      General: No focal deficit present.      Mental Status: He is alert.   Psychiatric:         Mood and Affect: Mood normal.         Behavior: Behavior normal.         Thought Content: Thought content normal.         Judgment: Judgment normal.         Allergies  Metformin and Penicillins     Current Medications    Current Outpatient Medications:     acetaminophen (Tylenol) 325 mg tablet, Take 2 tablets (650 mg) by mouth every 4 hours if needed., Disp: , Rfl:     ascorbic acid (Vitamin C) 500 mg tablet, Take 1 tablet (500 mg) by mouth 2 times a day., Disp: , Rfl:     diclofenac (Voltaren) 75 mg EC tablet, Take 1 tablet (75 mg) by mouth every 12 hours., Disp: , Rfl:     ferrous sulfate 324 mg (65 mg elemental iron) EC tablet (delayed release), Take 1 tablet (65 mg) by mouth once daily with breakfast., Disp: , Rfl:     metoprolol tartrate (Lopressor) 25 mg tablet, Take 0.5 tablets (12.5 mg) by mouth 2 times a day., Disp: , Rfl:     nabumetone (Relafen) 750 mg tablet, Take 1 tablet (750 mg) by mouth 2 times a day as needed., Disp: , Rfl:     rivaroxaban (Xarelto) 20 mg tablet, Take 1 tablet (20 mg) by mouth once daily in the evening. Take with meals., Disp: , Rfl:     valsartan (Diovan) 80 mg tablet, Take 1 tablet (80 mg) by mouth once daily., Disp: , Rfl:                      Assessment/Plan   1. Pulmonary embolism, unspecified chronicity, unspecified pulmonary embolism type, unspecified whether acute cor pulmonale present (Multi)        2. BMI 40.0-44.9, adult (Multi)        3. Never smoked tobacco                 Scribe Attestation  By signing my name below, She NELSON RN   , Scribe   attest that this documentation has been prepared under the direction and in the presence of Earle BRUNO  DO Jeff.     Provider Attestation - Scribe documentation    All medical record entries made by the Scribe were at my direction and personally dictated by me. I have reviewed the chart and agree that the record accurately reflects my personal performance of the history, physical exam, discussion and plan.

## 2024-09-24 ENCOUNTER — HOSPITAL ENCOUNTER (OUTPATIENT)
Dept: HOSPITAL 101 - PT | Age: 69
LOS: 32 days | Discharge: HOME | End: 2024-10-26
Payer: MEDICARE

## 2024-09-24 DIAGNOSIS — M00.9: Primary | ICD-10-CM

## 2024-09-24 PROCEDURE — G0283 ELEC STIM OTHER THAN WOUND: HCPCS

## 2024-09-24 PROCEDURE — 97110 THERAPEUTIC EXERCISES: CPT

## 2024-09-24 PROCEDURE — 97140 MANUAL THERAPY 1/> REGIONS: CPT

## 2024-09-24 PROCEDURE — 97161 PT EVAL LOW COMPLEX 20 MIN: CPT

## 2025-03-18 ENCOUNTER — APPOINTMENT (OUTPATIENT)
Dept: CARDIOLOGY | Facility: CLINIC | Age: 70
End: 2025-03-18
Payer: MEDICARE

## 2025-04-29 ENCOUNTER — TELEPHONE (OUTPATIENT)
Dept: CARDIOLOGY | Facility: CLINIC | Age: 70
End: 2025-04-29

## 2025-04-29 ENCOUNTER — APPOINTMENT (OUTPATIENT)
Dept: CARDIOLOGY | Facility: CLINIC | Age: 70
End: 2025-04-29
Payer: MEDICARE

## 2025-04-29 VITALS
WEIGHT: 315 LBS | HEART RATE: 76 BPM | DIASTOLIC BLOOD PRESSURE: 60 MMHG | HEIGHT: 74 IN | BODY MASS INDEX: 40.43 KG/M2 | SYSTOLIC BLOOD PRESSURE: 116 MMHG

## 2025-04-29 DIAGNOSIS — Z78.9 NEVER SMOKED TOBACCO: ICD-10-CM

## 2025-04-29 DIAGNOSIS — I26.09 OTHER ACUTE PULMONARY EMBOLISM WITH ACUTE COR PULMONALE: ICD-10-CM

## 2025-04-29 DIAGNOSIS — I10 ESSENTIAL HYPERTENSION: ICD-10-CM

## 2025-04-29 DIAGNOSIS — Z86.718 HISTORY OF THROMBECTOMY: ICD-10-CM

## 2025-04-29 DIAGNOSIS — Z98.890 HISTORY OF THROMBECTOMY: ICD-10-CM

## 2025-04-29 DIAGNOSIS — Z79.01 LONG TERM CURRENT USE OF ANTICOAGULANT THERAPY: ICD-10-CM

## 2025-04-29 PROBLEM — I82.409 DVT (DEEP VENOUS THROMBOSIS) (MULTI): Status: RESOLVED | Noted: 2025-04-29 | Resolved: 2025-04-29

## 2025-04-29 PROBLEM — I82.409 DVT (DEEP VENOUS THROMBOSIS) (MULTI): Status: ACTIVE | Noted: 2025-04-29

## 2025-04-29 PROCEDURE — 1159F MED LIST DOCD IN RCRD: CPT | Performed by: INTERNAL MEDICINE

## 2025-04-29 PROCEDURE — 99214 OFFICE O/P EST MOD 30 MIN: CPT | Performed by: INTERNAL MEDICINE

## 2025-04-29 PROCEDURE — 3074F SYST BP LT 130 MM HG: CPT | Performed by: INTERNAL MEDICINE

## 2025-04-29 PROCEDURE — 3078F DIAST BP <80 MM HG: CPT | Performed by: INTERNAL MEDICINE

## 2025-04-29 PROCEDURE — 1160F RVW MEDS BY RX/DR IN RCRD: CPT | Performed by: INTERNAL MEDICINE

## 2025-04-29 PROCEDURE — 3008F BODY MASS INDEX DOCD: CPT | Performed by: INTERNAL MEDICINE

## 2025-04-29 PROCEDURE — 1036F TOBACCO NON-USER: CPT | Performed by: INTERNAL MEDICINE

## 2025-04-29 RX ORDER — METOPROLOL TARTRATE 25 MG/1
0.5 TABLET, FILM COATED ORAL 2 TIMES DAILY
COMMUNITY

## 2025-04-29 RX ORDER — WARFARIN SODIUM 5 MG/1
5 TABLET ORAL
COMMUNITY
Start: 2025-03-17

## 2025-04-29 NOTE — PROGRESS NOTES
"Chief Complaint   Patient presents with    Follow-up     6 month for pulmonary emboli       Subjective   Henry Mehta is a 69 y.o. male     69-year-old gentleman returns for 6-month cardiovascular office visit for continued cardiopulmonary surveillance.  He is doing well from a cardiopulmonary standpoint with no dyspnea, bleeding or recurrent thromboembolic disease or events.  He is now on warfarin (Xarelto was discontinued by hematology we believe) in part for workup for SLE.  Details of hematology consult are not available in the epic EMR even surveilling the [facility]'s records; will try to obtain hematologist records.    His warfarin is being followed by the Coumadin clinic presently.    He is status post submassive bilateral PE with mechanical thrombectomy with adjunct thrombolysis in September 2024 with an overall excellent result.    He is asking about left knee replacement and left knee injections; I would not clear him for any type of orthopedic surgery until he is 1 year out from his submassive PE and at that time would recommend bridging.    Will follow-up in 6 months continue current therapies, obtain hematology records         Review of Systems   All other systems reviewed and are negative.           Vitals:    04/29/25 1347   BP: 116/60   BP Location: Right arm   Patient Position: Sitting   Pulse: 76   Weight: (!) 154 kg (339 lb)   Height: 1.88 m (6' 2\")        Objective   Physical Exam  Constitutional:       Appearance: Normal appearance.   HENT:      Nose: Nose normal.   Neck:      Vascular: No carotid bruit.   Cardiovascular:      Rate and Rhythm: Normal rate.      Pulses: Normal pulses.      Heart sounds: Normal heart sounds.   Pulmonary:      Effort: Pulmonary effort is normal.   Abdominal:      General: Bowel sounds are normal.      Palpations: Abdomen is soft.   Musculoskeletal:         General: Normal range of motion.      Cervical back: Normal range of motion.      Right lower leg: No " edema.      Left lower leg: No edema.   Skin:     General: Skin is warm and dry.   Neurological:      General: No focal deficit present.      Mental Status: He is alert.   Psychiatric:         Mood and Affect: Mood normal.         Behavior: Behavior normal.         Thought Content: Thought content normal.         Judgment: Judgment normal.         Allergies  Metformin and Penicillins     Current Medications  Current Outpatient Medications   Medication Instructions    acetaminophen (TYLENOL) 650 mg, Every 4 hours PRN    metoprolol tartrate (Lopressor) 25 mg tablet 0.5 tablets, 2 times daily    valsartan (DIOVAN) 80 mg, Daily    warfarin (COUMADIN) 5 mg                        Assessment/Plan   1. Pulmonary embolism, unspecified chronicity, unspecified pulmonary embolism type, unspecified whether acute cor pulmonale present (Multi)  Follow Up In Cardiology      2. Long term current use of anticoagulant therapy        3. History of thrombectomy        4. Essential hypertension        5. BMI 40.0-44.9, adult (Multi)        6. Never smoked tobacco                 Scribe Attestation  By signing my name below, IBrooke LPN, Scribe   attest that this documentation has been prepared under the direction and in the presence of Earle Aguilar DO.     Provider Attestation - Scribe documentation    All medical record entries made by the Scribe were at my direction and personally dictated by me. I have reviewed the chart and agree that the record accurately reflects my personal performance of the history, physical exam, discussion and plan.

## 2025-04-29 NOTE — TELEPHONE ENCOUNTER
Spoke with patient wife reports patient sees Xiomara Toro MD for hematology at [facility]. [facility] most recent progress notes from this physician to verify changes to patient medications that were brought up at  today 04/29/2025. Awaiting recs then send to Dr. Earle Aguilar DO

## 2025-04-29 NOTE — LETTER
April 29, 2025     Soledad Malin DO  [address] [place] [address]    Patient: Henry Mehta   YOB: 1955   Date of Visit: 4/29/2025       Dear Dr. Soledad Malin DO:    Thank you for referring Henry Mehta to me for evaluation. Below are my notes for this consultation.  If you have questions, please do not hesitate to call me. I look forward to following your patient along with you.       Sincerely,     Earle Aguilar DO      CC: No Recipients  ______________________________________________________________________________________    Chief Complaint   Patient presents with   • Follow-up     6 month for pulmonary emboli       Subjective   Henry Mehta is a 69 y.o. male     69-year-old gentleman returns for 6-month cardiovascular office visit for continued cardiopulmonary surveillance.  He is doing well from a cardiopulmonary standpoint with no dyspnea, bleeding or recurrent thromboembolic disease or events.  He is now on warfarin (Xarelto was discontinued by hematology we believe) in part for workup for SLE.  Details of hematology consult are not available in the epic EMR even surveilling the [facility]'s records; will try to obtain hematologist records.    His warfarin is being followed by the Coumad[facility] clinic presently.    He is status post submassive bilateral PE with mechanical thrombectomy with adjunct thrombolysis in September 2024 with an overall excellent result.    He is asking about left knee replacement and left knee injections; I would not clear him for any type of orthopedic surgery until he is 1 year out from his submassive PE and at that time would recommend bridging.    Will follow-up in 6 months continue current therapies, obtain hematology records         Review of Systems   All other systems reviewed and are negative.           Vitals:    04/29/25 1347   BP: 116/60   BP Location: Right arm   Patient Position: Sitting   Pulse: 76   Weight: (!) 154 kg (339 lb)  "  Height: 1.88 m (6' 2\")        Objective   Physical Exam  Constitutional:       Appearance: Normal appearance.   HENT:      Nose: Nose normal.   Neck:      Vascular: No carotid bruit.   Cardiovascular:      Rate and Rhythm: Normal rate.      Pulses: Normal pulses.      Heart sounds: Normal heart sounds.   Pulmonary:      Effort: Pulmonary effort is normal.   Abdominal:      General: Bowel sounds are normal.      Palpations: Abdomen is soft.   Musculoskeletal:         General: Normal range of motion.      Cervical back: Normal range of motion.      Right lower leg: No edema.      Left lower leg: No edema.   Skin:     General: Skin is warm and dry.   Neurological:      General: No focal deficit present.      Mental Status: He is alert.   Psychiatric:         Mood and Affect: Mood normal.         Behavior: Behavior normal.         Thought Content: Thought content normal.         Judgment: Judgment normal.         Allergies  Metformin and Penicillins     Current Medications  Current Outpatient Medications   Medication Instructions   • acetaminophen (TYLENOL) 650 mg, Every 4 hours PRN   • metoprolol tartrate (Lopressor) 25 mg tablet 0.5 tablets, 2 times daily   • valsartan (DIOVAN) 80 mg, Daily   • warfarin (COUMADIN) 5 mg                        Assessment/Plan   1. Pulmonary embolism, unspecified chronicity, unspecified pulmonary embolism type, unspecified whether acute cor pulmonale present (Multi)  Follow Up In Cardiology      2. Long term current use of anticoagulant therapy        3. History of thrombectomy        4. Essential hypertension        5. BMI 40.0-44.9, adult (Multi)        6. Never smoked tobacco                 Scribe Attestation  By signing my name below, Brooke NELSON LPN Scribe   attest that this documentation has been prepared under the direction and in the presence of Earle Aguilar DO.     Provider Attestation - Scribe documentation    All medical record entries made by the Scribe were at " my direction and personally dictated by me. I have reviewed the chart and agree that the record accurately reflects my personal performance of the history, physical exam, discussion and plan.

## 2025-04-29 NOTE — PATIENT INSTRUCTIONS
Please bring all medicines, vitamins, and herbal supplements with you when you come to the office.    Prescriptions will not be filled unless you are compliant with your follow up appointments or have a follow up appointment scheduled as per instruction of your physician. Refills should be requested at the time of your visit.     BMI was above normal measurement. Current weight: (!) 154 kg (339 lb)  Weight change since last visit (-) denotes wt loss 14 lbs   Weight loss needed to achieve BMI 25: 144.7 Lbs  Weight loss needed to achieve BMI 30: 105.8 Lbs  Provided instructions on dietary changes.

## 2025-07-18 DIAGNOSIS — I10 ESSENTIAL HYPERTENSION: ICD-10-CM

## 2025-07-21 RX ORDER — METOPROLOL TARTRATE 25 MG/1
TABLET, FILM COATED ORAL
Qty: 90 TABLET | Refills: 3 | Status: SHIPPED | OUTPATIENT
Start: 2025-07-21

## 2025-10-21 ENCOUNTER — APPOINTMENT (OUTPATIENT)
Dept: CARDIOLOGY | Facility: CLINIC | Age: 70
End: 2025-10-21
Payer: MEDICARE